# Patient Record
Sex: FEMALE | Race: WHITE | NOT HISPANIC OR LATINO | Employment: OTHER | URBAN - METROPOLITAN AREA
[De-identification: names, ages, dates, MRNs, and addresses within clinical notes are randomized per-mention and may not be internally consistent; named-entity substitution may affect disease eponyms.]

---

## 2017-06-28 DIAGNOSIS — E78.2 MIXED HYPERLIPIDEMIA: ICD-10-CM

## 2017-06-28 RX ORDER — SIMVASTATIN 40 MG
TABLET ORAL
Qty: 30 TABLET | Refills: 0 | COMMUNITY
Start: 2017-06-28 | End: 2022-05-13

## 2017-06-28 NOTE — TELEPHONE ENCOUNTER
Patient is due for a medication recheck for the following medication SIMVASTATION ,and meets the qualifications for a physician approved 30 day extension.    A #30 day refill has been called into the pharmacy listed.  Called into  [facility] in [place]     staff, per the refill protocol can you please call the patient and help assist in setting up a FASTING medication recheck.  Please attempt to reach the patient to schedule that appointment, and if you are unable to reach them, please forward back to the prescribing physician.      Telephone Information:   Mobile [phone]   Mobile [phone] (home) none (work)      Thank You  ROSY Mccabe ([facility])

## 2017-09-02 DIAGNOSIS — E78.2 MIXED HYPERLIPIDEMIA: ICD-10-CM

## 2017-09-02 RX ORDER — SIMVASTATIN 40 MG
TABLET ORAL
Qty: 30 TABLET | Refills: 0 | OUTPATIENT
Start: 2017-09-02

## 2017-09-02 NOTE — TELEPHONE ENCOUNTER
Refused Prescriptions:                       Disp   Refills    simvastatin (ZOCOR) 40 MG tablet [Pharmacy*30 tab*0        Sig: TAKE 1 TABLET BY MOUTH DAILY  Refused By: NIDIA HUGGINS  Reason for Refusal: Patient no longer under provider care    See refill under 6-  No longer coming to this clinic   Mery  [phone] (home)

## 2022-05-13 ENCOUNTER — HOSPITAL ENCOUNTER (OUTPATIENT)
Facility: CLINIC | Age: 77
Setting detail: OBSERVATION
Discharge: PSYCHIATRIC OR SUBSTANCE USE FACILITY | End: 2022-05-16
Attending: PHYSICIAN ASSISTANT | Admitting: PHYSICIAN ASSISTANT
Payer: COMMERCIAL

## 2022-05-13 DIAGNOSIS — R45.851 SUICIDAL IDEATION: ICD-10-CM

## 2022-05-13 DIAGNOSIS — F33.2 MAJOR DEPRESSIVE DISORDER, RECURRENT EPISODE, SEVERE WITH ANXIOUS DISTRESS (H): ICD-10-CM

## 2022-05-13 LAB — SARS-COV-2 RNA RESP QL NAA+PROBE: NEGATIVE

## 2022-05-13 PROCEDURE — U0005 INFEC AGEN DETEC AMPLI PROBE: HCPCS | Performed by: PHYSICIAN ASSISTANT

## 2022-05-13 PROCEDURE — 99285 EMERGENCY DEPT VISIT HI MDM: CPT | Mod: 25

## 2022-05-13 PROCEDURE — C9803 HOPD COVID-19 SPEC COLLECT: HCPCS

## 2022-05-13 PROCEDURE — G0378 HOSPITAL OBSERVATION PER HR: HCPCS

## 2022-05-13 PROCEDURE — 250N000013 HC RX MED GY IP 250 OP 250 PS 637: Performed by: NURSE PRACTITIONER

## 2022-05-13 PROCEDURE — 90791 PSYCH DIAGNOSTIC EVALUATION: CPT

## 2022-05-13 PROCEDURE — 99220 PR INITIAL OBSERVATION CARE,LEVEL III: CPT | Performed by: NURSE PRACTITIONER

## 2022-05-13 RX ORDER — VENLAFAXINE HYDROCHLORIDE 37.5 MG/1
37.5 CAPSULE, EXTENDED RELEASE ORAL
Status: DISCONTINUED | OUTPATIENT
Start: 2022-05-14 | End: 2022-05-16 | Stop reason: HOSPADM

## 2022-05-13 RX ORDER — LORAZEPAM 0.5 MG/1
0.5 TABLET ORAL
Status: DISCONTINUED | OUTPATIENT
Start: 2022-05-13 | End: 2022-05-16 | Stop reason: HOSPADM

## 2022-05-13 RX ORDER — LANOLIN ALCOHOL/MO/W.PET/CERES
3 CREAM (GRAM) TOPICAL AT BEDTIME
Status: DISCONTINUED | OUTPATIENT
Start: 2022-05-13 | End: 2022-05-16 | Stop reason: HOSPADM

## 2022-05-13 RX ORDER — BUPROPION HYDROCHLORIDE 150 MG/1
150 TABLET ORAL EVERY MORNING
COMMUNITY
End: 2022-05-13

## 2022-05-13 RX ORDER — MIRTAZAPINE 15 MG/1
15 TABLET, FILM COATED ORAL AT BEDTIME
Status: DISCONTINUED | OUTPATIENT
Start: 2022-05-13 | End: 2022-05-16 | Stop reason: HOSPADM

## 2022-05-13 RX ORDER — DULOXETIN HYDROCHLORIDE 60 MG/1
60 CAPSULE, DELAYED RELEASE ORAL DAILY
Status: DISCONTINUED | OUTPATIENT
Start: 2022-05-14 | End: 2022-05-13

## 2022-05-13 RX ORDER — DULOXETIN HYDROCHLORIDE 20 MG/1
40 CAPSULE, DELAYED RELEASE ORAL DAILY
Status: COMPLETED | OUTPATIENT
Start: 2022-05-14 | End: 2022-05-16

## 2022-05-13 RX ADMIN — LORAZEPAM 0.5 MG: 0.5 TABLET ORAL at 23:59

## 2022-05-13 RX ADMIN — MIRTAZAPINE 15 MG: 15 TABLET, FILM COATED ORAL at 21:32

## 2022-05-13 RX ADMIN — MELATONIN TAB 3 MG 3 MG: 3 TAB at 21:32

## 2022-05-13 ASSESSMENT — ENCOUNTER SYMPTOMS
CHEST TIGHTNESS: 0
ABDOMINAL PAIN: 0
DIARRHEA: 0
SHORTNESS OF BREATH: 0
VOMITING: 0
COUGH: 0
NAUSEA: 0
FEVER: 0

## 2022-05-13 NOTE — ED PROVIDER NOTES
History     Chief Complaint:  Suicidal       HPI   Lorena Hayden is a 76 year old female with a hx of anxiety and depression, presents to the emergency room today for evaluation of wanting to kill herself since getting home from an inpatient psych unit at [facility] in [place].  She plans to overdose on pills.  She denies any overdose in the past several days, drugs, alcohol, or other.  She denies any physical complaints such as chest pain, shortness of breath, cough or other.  She is accompanied by her .    ROS:  Review of Systems   Constitutional: Negative for fever.   Respiratory: Negative for cough, chest tightness and shortness of breath.    Cardiovascular: Negative for chest pain.   Gastrointestinal: Negative for abdominal pain, diarrhea, nausea and vomiting.   Psychiatric/Behavioral: Positive for suicidal ideas.      All other systems reviewed and are negative.    Allergies:  Horse Allergy     Medications:    acetaminophen (TYLENOL) 500 MG tablet  calcium-vitamin D (CALTRATE) 600-400 MG-UNIT per tablet  chlorhexidine (PERIDEX) 0.12 % solution  Cholecalciferol (VITAMIN D-3) 5000 UNITS TABS  DULoxetine (CYMBALTA) 60 MG capsule  Fish Oil-Cholecalciferol (OMEGA-3 FISH OIL/VITAMIN D3) 6194-8944 MG-UNIT CAPS  MELATONIN  MULTIVITAMINS TABS   OR  simvastatin (ZOCOR) 40 MG tablet        Past Medical History:    Past Medical History:   Diagnosis Date     Allergic rhinitis, cause unspecified      Anxiety      Depressive disorder      Patient Active Problem List   Diagnosis     Allergic rhinitis     Symptomatic menopausal or female climacteric states     Mixed hyperlipidemia     Anxiety state     Insomnia     Anal fissure     Major depressive disorder, recurrent episode, moderate (H)     Irritable bowel syndrome     ACP (advance care planning)     Health Care Home     Tinnitus     Acute lower gastrointestinal hemorrhage     Abdominal pain        Past Surgical History:    Past Surgical History:   Procedure  "Laterality Date     C IMPLANT HALLUX  3/2006 &     Yesi STEELE MAMMOGRAM, SCREENING  2011    Dr arredondo     COLONOSCOPY  2014    Mirian Orozco     EXTRACAPSULAR CATARACT EXTRATION WITH INTRAOCULAR LENS IMPLANT  2011     HC DILATION/CURETTAGE DIAG/THER NON OB  2005    D & C     HCL PAP SMEAR  2011    Dr Arredondo     ZZC  DELIVERY ONLY       ZZC LIGATE FALLOPIAN TUBE      Tubal Ligation     ZZHC COLONOSCOPY THRU STOMA W BIOPSY/CAUTERY TUMOR/POLYP/LESION  / /2011    benign polyp        Family History:    family history includes Cerebrovascular Disease in her father; Psychotic Disorder in her mother.    Social History:   reports that she has never smoked. She has never used smokeless tobacco. She reports current alcohol use of about 0.8 standard drinks of alcohol per week. She reports that she does not use drugs.  PCP: No primary care provider on file.     Physical Exam     Patient Vitals for the past 24 hrs:   BP Temp Temp src Pulse Resp SpO2 Height Weight   22 1458 137/78 98  F (36.7  C) Temporal 86 16 99 % 1.626 m (5' 4\") 64.4 kg (142 lb)        Physical Exam  General: Well appearing, pleasant female, resting on exam bed  HEENT: No evidence of trauma.  Conjunctive are clear. Neck range of motion intact.  Nose and throat clear.  Respiratory: Good effort  Cardiovascular: Good distal perfusion  Gastrointestinal: Nondistended  Musculoskeletal: Atraumatic  Skin: Exposed skin clear.  Neurologic: Alert.  Psych:  Patient is cooperative, with normal affect.      Emergency Department Course   ECG:  none    Imaging:  No orders to display        Laboratory:  Labs Ordered and Resulted from Time of ED Arrival to Time of ED Departure   COVID-19 VIRUS (CORONAVIRUS) BY PCR - Normal       Result Value    SARS CoV2 PCR Negative          Interventions:  Medications - No data to display     Impression & Plan      Medical Decision Making:  Lorena Hayden is a 76 year old female with a hx of " anxiety and depression, recent hospitalization, presents emergency room today for evaluation of suicidal ideation with a plan.  See HPI.  Her vitals are unremarkable.  She has no physical complaints.  I feel that she is safe for further evaluation by the [facility] team.  She is accompanied by her  who is watching her in the room.  Stable at time of transfer to the unit.    Diagnosis:    ICD-10-CM    1. Suicidal ideation  R45.851         Discharge Medications:  New Prescriptions    No medications on file        5/13/2022   Ankur Irene PA-C Cyr, Matthew R, PA-C  05/13/22 1556

## 2022-05-13 NOTE — PROGRESS NOTES
76 year old female received from ED for depression and suicidal ideation with a plan to overdose on medication. Pt reports she has felt depressed since the end of February when she thinks her medications stopped working. Pt reports she feels overwhelmed at home and can't keep up with her house chores. She reports having a hard time getting up in the morning. Pt states she feels hopeless about her medications not working. She states she has tried various medications and they do not work for her. She reports having recently been at [facility] in [place] for a week where they added Wellbutrin to her medications. Pt reports having fleeting suicidal ideation with a plan to overdose on medications she has at home. Pt reports feeling scared of being at home due to access to medications. Pt denies any SA in the past. Pt reports she won't see a therapist until June.       Nursing and risk assessments completed.  Assessments reviewed with LMHP and physician. Video monitoring in progress, patient informed.  Admission information reviewed with patient. Patient given a tour of [facility] and instructions on using the facility. Questions regarding [facility] addressed. Pt search completed and belongings inventoried.

## 2022-05-13 NOTE — CONSULTS
5/13/2022  Lorena Hayden 1945     [facility] Crisis Assessment    Patient was assessed: in person  Patient location: [facility]  Was a release of information signed: Yes. Providers included on the release: Brigida Patricio MD [facility]    Referral Data and Chief Complaint  Lorena is a 76 year old who uses she.her pronouns. Patient presented to the ED with family/friends and was referred to the ED by family/friends. Patient is presenting to the ED for the following concerns: depression with suicidal ideation.      Informed Consent and Assessment Methods    Patient is her own guardian. Writer met with patient and explained the crisis assessment process, including applicable information disclosures and limits to confidentiality, assessed understanding of the process, and obtained consent to proceed with the assessment. Patient was observed to be able to participate in the assessment as evidenced by egngaged in interview. . Assessment methods included conducting a formal interview with patient, review of medical records, collaboration with medical staff, and obtaining relevant collateral information from family and community providers when available.    Narrative Summary of Presenting Problem and Current Functioning  What led to the patient presenting for crisis services, factors that make the crisis life threatening or complex, stressors, how is this disrupting the patient's life, and how current functioning is in comparison to baseline. How is patient presenting during the assessment.     Lorena is a 76 year old female was brought to [facility] by her  Cole with chief complaint of depression with suicidal ideation with thoughts of wanting to overdose on her medications.  The patient reports she has had suicidal thoughts over the past 1+ weeks.  She was recent hospitalized in late  April at [facility] after presenting to a local ED for evaluation, but there were no beds in the [facility].    The patient's medications are being managed through her PCP for which she saw this provider this past Wednesday for which no medications could be adjusted for which the patient felt hopeless and has led to her symptoms being worse over the past few days.  She is currently not seeing a therapist at this time.  She does identify her main stressor is feeling lonely.  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   When she is feeling better, she describes herself as having tons of energy, joyful, very social in nature.      History of the Crisis  Duration of the current crisis, coping skills attempted to reduce the crisis, community resources used, and past presentations.    Kandance had her first depressive episode 10 years ago where she was hospitalized at [facility], was started on medications and given ECT.  She reports her mother completed suicide and had significant depression.  Her psychiatrist  around this time as well and her medications have been managed through PCP ever since.   She is currently not seeing a therapist.  She has no hx of IOP, but appears she could benefit from this as she struggles with daily structure.      Collateral Information  The following information was received from Cole Hayden whose relationship to the patient is . Information was obtained via phone. Their phone number is [phone] and they last had contact with patient on today.    What happened today: Reports the patient has been struggling since she left her inpatient hospitalization at [facility].  States today, their daughter in law came to the house today and recommended for patient come for help.  States there are 2 guns that are in locked glass cases a shot gun and a hand gun for which he is willing to have them removed from the house.  He states patient has not talked about wanting to harm herself with the guns, but had been thinking about removing them before having the conversation with this  provider.  States patient has never harmed herself in the past.  States at home, she is loved but again comments on struggling with loneliness.     What is different about patient's functioning: When she is feeling better, she is very social and active.     Concern about alcohol/drug use: No    What do you think the patient needs: Stay at [facility]    [facility] patient made comments about wanting to kill themselves/others:  Yes Pt made comments of wanting to harm herself by overdose    If d/c is recommended, can they take part in safety/aftercare planning: Yes Is willing to be part of her care plan    Other information:         Risk Assessment    Risk of Harm to Self     ESS-6  1.a. Over the past 2 weeks, have you had thoughts of killing yourself? Yes  1.b. Have you ever attempted to kill yourself and, if yes, when did this last happen? No   2. Recent or current suicide plan? Yes thoughts of wanting to overdose on her medications   3. Recent or current intent to act on ideation? Yes  4. Lifetime psychiatric hospitalization? Yes  5. Pattern of excessive substance use? No  6. Current irritability, agitation, or aggression? No  Scoring note: BOTH 1a and 1b must be yes for it to score 1 point, if both are not yes it is zero. All others are 1 point per number. If all questions 1a/1b - 6 are no, risk is negligible. If one of 1a/1b is yes, then risk is mild. If either question 2 or 3, but not both, is yes, then risk is automatically moderate regardless of total score. If both 2 and 3 are yes, risk is automatically high regardless of total score.     Score: 3, high risk    The patient has the following risk factors for suicide: depressive symptoms, family member or friend completed suicide and isolation    Is the patient experiencing current suicidal ideation: Yes. Plan: overodse Intent rates intent on wanting to act on these thoughts 7/10    Is the patient engaging in preparatory suicide behaviors (formulating how to act on plan,  giving away possessions, saying goodbye, displaying dramatic behavior changes, etc)? No    Does the patient have access to firearms or other lethal means? yes, lethal means counseling was provided and the following plan for risk mitigation was discussed:  is working on getting guns removed from house. .     The patient has the following protective factors: social support, voluntarily seeking mental health support, displays insight, expresses desire to engage in treatment, sense of obligation to people/pets and safe/stable housing    Support system information: , children    Patient strengths: Is artistic    Does the patient engage in non-suicidal self-injurious behavior (NSSI/SIB)? no    Is the patient vulnerable to sexual exploitation?  No    Is the patient experiencing abuse or neglect? no    Is the patient a vulnerable adult? No      Risk of Harm to Others  The patient has the following risk factors of harm to others: no risk factors identified    Does the patient have thoughts of harming others? No    Is the patient engaging in sexually inappropriate behavior?  no       Current Substance Abuse    Is there recent substance abuse? no    Was a urine drug screen or blood alcohol level obtained: No    CAGE AID  Have you felt you ought to cut down on your drinking or drug use?  No  Have people annoyed you by criticizing your drinking or drug use? No  Have you felt bad or guilty about your drinking or drug use? No  Have you ever had a drink or used drugs first thing in the morning to steady your nerves or to get rid of a hangover? No  Score: 0/4       Current Symptoms/Concerns    Symptoms  Attention, hyperactivity, and impulsivity symptoms present: No    Anxiety symptoms present: Yes: Generalized Symptoms: Cognitive anxiety - feelings of doom, racing thoughts, difficulty concentrating , Excessive worry, Pacing, Physiological anxiety - sweating, flushing, shaking, shortness of breath, or racing heart and  Somatic symptoms - abdominal pain, headache, or tension      Appetite symptoms present: Yes: Loss of Appetite     Behavioral difficulties present: No     Cognitive impairment symptoms present: No    Depressive symptoms present: Yes Appetite change/weight change , Crying or feels like crying, Depressed mood, Feelings of helplessness , Feelings of hopelessness , Feelings of worthlessness , Impaired concentration, Impaired decision making , Isolative , Low self esteem , Sleep disturbance  and Thoughts of suicide/death      Eating disorder symptoms present: No    Learning disabilities, cognitive challenges, and/or developmental disorder symptoms present: No      Manic/hypomanic symptoms present: No    Personality and interpersonal functioning difficulties present : No    Psychosis symptoms present: No      Sleep difficulties present: Yes: Difficulty falling asleep     Substance abuse disorder symptoms present: No     Trauma and stressor related symptoms present: No       Mental Status Exam   Affect: Labile   Appearance: Appropriate    Attention Span/Concentration: Attentive?    Eye Contact: Variable   Fund of Knowledge: Appropriate    Language /Speech Content: Fluent   Language /Speech Volume: Soft    Language /Speech Rate/Productions: Normal    Recent Memory: Intact   Remote Memory: Intact   Mood: Anxious, Depressed and Sad    Orientation to Person: Yes    Orientation to Place: Yes   Orientation to Time of Day: Yes    Orientation to Date: Yes    Situation (Do they understand why they are here?): Yes    Psychomotor Behavior: Underactive    Thought Content: Suicidal   Thought Form: Obsessive/Perseverative       Mental Health and Substance Abuse History    History  Current and historical diagnoses or mental health concerns: Depression    Prior  services (inpatient, programmatic care, outpatient, etc) : Yes Hx of inpatient 10 years ago at [facility].  April 2022 [facility]    Has the patient used [facility] crisis team  services before?: No    History of substance abuse: No    Prior RYLEY services (inpatient, programmatic care, detox, outpatient, etc) : No    History of commitment: No    Family history of MH/RYLEY: Yes Mother with depression who completed suicide    Trauma history: No    Medication  Psychotropic medications: Yes. Pt is currently taking Cymbalta 90 mg and Wellbutrin  mg . Medication compliant: Yes. Recent medication changes: No    Current Care Team  Primary Care Provider: Yes. Name: Brigida Patricio MD. Location: [facility]. Date of last visit: unknown. Frequency: prn. Perceived helpfulness: yes.    Psychiatrist: No    Therapist: No    : No    CTSS or ARMHS: No    ACT Team: No    Other: No    Biopsychosocial Information    Socioeconomic Information  Current living situation: Lives with .     Employment/income source: Retired     Relevant legal issues: Denies    Cultural, Catholic, or spiritual influences on mental health care: no    Is the patient active in the  or a : No      Relevant Medical Concerns   Patient identifies concerns with completing ADLs? No     Patient can ambulate independently? Yes     Other medical concerns? No     History of concussion or TBI? No        Diagnosis    296.33 (F33.2) Major Depressive Disorder, Recurrent Episode, Severe _ and With anxious distress    300.02 (F41.1) Generalized Anxiety Disorder - primary     Therapeutic Intervention  The following therapeutic methodologies were employed when working with the patient: establishing rapport, active listening and assessing dimensions of crisis. Patient response to intervention: Engaged in intervention. .      Disposition  Recommended disposition: Other: Admit to [facility] under OBS status for safety and stabilization      Reviewed case and recommendations with attending provider. Attending Name: TESSA Leyva      Attending concurs with disposition: Yes      Patient concurs with disposition: Yes       Guardian concurs with disposition: NA     Final disposition: Other: Admitted to [facility] under OBS Status for safety and stabilization.     I      Clinical Substantiation of Recommendations   Rationale with supporting factors for disposition and diagnosis.     Lorena is a 76 year old female who present today with worsening depression and anxiety along with suicidal ideation.  Patient is being admitted to [facility] under OBS status for safety and stabilization.          Assessment Details  Patient interview started at: 1700  and completed at: 1730.    Total duration spent on the patient case in minutes: 1.0 hrs     CPT code(s) utilized: 94832 - Psychotherapy for Crisis - 60 (30-74*) min         CAL Fofana

## 2022-05-13 NOTE — ED TRIAGE NOTES
Pt recently left inpatient behavioral health admission, reports not feeling suicidal until getting home. Pt doesn't meet with therapist until June. Denies wanting to act on plan as she doesn't want to die.     Triage Assessment     Row Name 05/13/22 7603       Triage Assessment (Adult)    Airway WDL WDL       Respiratory WDL    Respiratory WDL --  Diana SOB       Skin Circulation/Temperature WDL    Skin Circulation/Temperature WDL WDL       Cardiac WDL    Cardiac WDL --  Denies CP       Peripheral/Neurovascular WDL    Peripheral Neurovascular WDL WDL       Cognitive/Neuro/Behavioral WDL    Cognitive/Neuro/Behavioral WDL WDL               +racing thoughts

## 2022-05-14 ENCOUNTER — TELEPHONE (OUTPATIENT)
Dept: BEHAVIORAL HEALTH | Facility: CLINIC | Age: 77
End: 2022-05-14
Payer: COMMERCIAL

## 2022-05-14 LAB
ALBUMIN SERPL-MCNC: 3.3 G/DL (ref 3.4–5)
ALBUMIN UR-MCNC: NEGATIVE MG/DL
ALP SERPL-CCNC: 87 U/L (ref 40–150)
ALT SERPL W P-5'-P-CCNC: 24 U/L (ref 0–50)
AMPHETAMINES UR QL SCN: NORMAL
ANION GAP SERPL CALCULATED.3IONS-SCNC: 5 MMOL/L (ref 3–14)
APPEARANCE UR: ABNORMAL
AST SERPL W P-5'-P-CCNC: 21 U/L (ref 0–45)
BARBITURATES UR QL: NORMAL
BASOPHILS # BLD AUTO: 0 10E3/UL (ref 0–0.2)
BASOPHILS NFR BLD AUTO: 0 %
BENZODIAZ UR QL: NORMAL
BILIRUB SERPL-MCNC: 0.6 MG/DL (ref 0.2–1.3)
BILIRUB UR QL STRIP: NEGATIVE
BUN SERPL-MCNC: 13 MG/DL (ref 7–30)
CALCIUM SERPL-MCNC: 8.9 MG/DL (ref 8.5–10.1)
CANNABINOIDS UR QL SCN: NORMAL
CHLORIDE BLD-SCNC: 106 MMOL/L (ref 94–109)
CO2 SERPL-SCNC: 29 MMOL/L (ref 20–32)
COCAINE UR QL: NORMAL
COLOR UR AUTO: ABNORMAL
CREAT SERPL-MCNC: 0.65 MG/DL (ref 0.52–1.04)
EOSINOPHIL # BLD AUTO: 0.2 10E3/UL (ref 0–0.7)
EOSINOPHIL NFR BLD AUTO: 4 %
ERYTHROCYTE [DISTWIDTH] IN BLOOD BY AUTOMATED COUNT: 13.2 % (ref 10–15)
GFR SERPL CREATININE-BSD FRML MDRD: >90 ML/MIN/1.73M2
GLUCOSE BLD-MCNC: 133 MG/DL (ref 70–99)
GLUCOSE UR STRIP-MCNC: NEGATIVE MG/DL
HCT VFR BLD AUTO: 47.4 % (ref 35–47)
HGB BLD-MCNC: 15.4 G/DL (ref 11.7–15.7)
HGB UR QL STRIP: NEGATIVE
HYALINE CASTS: 1 /LPF
IMM GRANULOCYTES # BLD: 0 10E3/UL
IMM GRANULOCYTES NFR BLD: 0 %
KETONES UR STRIP-MCNC: NEGATIVE MG/DL
LEUKOCYTE ESTERASE UR QL STRIP: ABNORMAL
LYMPHOCYTES # BLD AUTO: 0.8 10E3/UL (ref 0.8–5.3)
LYMPHOCYTES NFR BLD AUTO: 14 %
MCH RBC QN AUTO: 30.9 PG (ref 26.5–33)
MCHC RBC AUTO-ENTMCNC: 32.5 G/DL (ref 31.5–36.5)
MCV RBC AUTO: 95 FL (ref 78–100)
MONOCYTES # BLD AUTO: 0.4 10E3/UL (ref 0–1.3)
MONOCYTES NFR BLD AUTO: 8 %
MUCOUS THREADS #/AREA URNS LPF: PRESENT /LPF
NEUTROPHILS # BLD AUTO: 4 10E3/UL (ref 1.6–8.3)
NEUTROPHILS NFR BLD AUTO: 74 %
NITRATE UR QL: NEGATIVE
NRBC # BLD AUTO: 0 10E3/UL
NRBC BLD AUTO-RTO: 0 /100
OPIATES UR QL SCN: NORMAL
PCP UR QL SCN: NORMAL
PH UR STRIP: 5.5 [PH] (ref 5–7)
PLATELET # BLD AUTO: 286 10E3/UL (ref 150–450)
POTASSIUM BLD-SCNC: 3.7 MMOL/L (ref 3.4–5.3)
PROT SERPL-MCNC: 7.2 G/DL (ref 6.8–8.8)
RBC # BLD AUTO: 4.98 10E6/UL (ref 3.8–5.2)
RBC URINE: 0 /HPF
SODIUM SERPL-SCNC: 140 MMOL/L (ref 133–144)
SP GR UR STRIP: 1.03 (ref 1–1.03)
UROBILINOGEN UR STRIP-MCNC: NORMAL MG/DL
WBC # BLD AUTO: 5.4 10E3/UL (ref 4–11)
WBC URINE: 44 /HPF

## 2022-05-14 PROCEDURE — 250N000013 HC RX MED GY IP 250 OP 250 PS 637: Performed by: NURSE PRACTITIONER

## 2022-05-14 PROCEDURE — G0378 HOSPITAL OBSERVATION PER HR: HCPCS

## 2022-05-14 PROCEDURE — 85025 COMPLETE CBC W/AUTO DIFF WBC: CPT | Performed by: NURSE PRACTITIONER

## 2022-05-14 PROCEDURE — 81001 URINALYSIS AUTO W/SCOPE: CPT | Performed by: NURSE PRACTITIONER

## 2022-05-14 PROCEDURE — 36415 COLL VENOUS BLD VENIPUNCTURE: CPT | Performed by: NURSE PRACTITIONER

## 2022-05-14 PROCEDURE — 99217 PR OBSERVATION CARE DISCHARGE: CPT | Performed by: NURSE PRACTITIONER

## 2022-05-14 PROCEDURE — 80053 COMPREHEN METABOLIC PANEL: CPT | Performed by: NURSE PRACTITIONER

## 2022-05-14 PROCEDURE — 87086 URINE CULTURE/COLONY COUNT: CPT | Performed by: NURSE PRACTITIONER

## 2022-05-14 PROCEDURE — 80307 DRUG TEST PRSMV CHEM ANLYZR: CPT | Performed by: NURSE PRACTITIONER

## 2022-05-14 RX ORDER — LORAZEPAM 0.5 MG/1
0.5 TABLET ORAL 2 TIMES DAILY PRN
Status: DISCONTINUED | OUTPATIENT
Start: 2022-05-14 | End: 2022-05-16 | Stop reason: HOSPADM

## 2022-05-14 RX ORDER — TIMOLOL MALEATE 5 MG/ML
1 SOLUTION/ DROPS OPHTHALMIC EVERY MORNING
COMMUNITY

## 2022-05-14 RX ORDER — TIMOLOL MALEATE 5 MG/ML
1 SOLUTION/ DROPS OPHTHALMIC DAILY
Status: DISCONTINUED | OUTPATIENT
Start: 2022-05-14 | End: 2022-05-16 | Stop reason: HOSPADM

## 2022-05-14 RX ADMIN — MIRTAZAPINE 15 MG: 15 TABLET, FILM COATED ORAL at 21:58

## 2022-05-14 RX ADMIN — MELATONIN TAB 3 MG 3 MG: 3 TAB at 21:58

## 2022-05-14 RX ADMIN — LORAZEPAM 0.5 MG: 0.5 TABLET ORAL at 12:55

## 2022-05-14 RX ADMIN — LORAZEPAM 0.5 MG: 0.5 TABLET ORAL at 23:44

## 2022-05-14 RX ADMIN — DULOXETINE 40 MG: 20 CAPSULE, DELAYED RELEASE ORAL at 09:40

## 2022-05-14 RX ADMIN — VENLAFAXINE HYDROCHLORIDE 37.5 MG: 37.5 CAPSULE, EXTENDED RELEASE ORAL at 09:40

## 2022-05-14 RX ADMIN — TIMOLOL MALEATE 1 DROP: 5 SOLUTION/ DROPS OPHTHALMIC at 13:28

## 2022-05-14 NOTE — SAFE
Lorena C Jackelyn  May 14, 2022  SAFE Note    Critical Safety Issues: Pt presents today with worsening symptoms of depression and anxiety.  Reporting suicidal ideation with thoughts of wanting to overdose on her medications.  She was recently hospitalized inpatient [facility] in April.  First psych admission 10 years ago.       Current Suicidal Ideation/Self-Injurious Concerns/Methods: Ingestion her medications      Current or Historical Inappropriate Sexual Behavior: No      Current or Historical Aggression/Homicidal Ideation: None - N/A      Triggers: None    Guardianship Status: [facility] Guardian: is her own guardian.     Updated care team: Yes: RN and APRN    For additional details see full [facility] assessment.       CAL Fofana

## 2022-05-14 NOTE — ED NOTES
Pt reports she is doing well today she just feels bored. She denies any SI, HI or hallucinations. Pt has been sitting on the recliner watching tv or coloring. She is pleasant and polite when interacting with staff and is social with peers.

## 2022-05-14 NOTE — ED NOTES
Pt has been awake since she woke up and reported that she did not get enough last night due fire alarm test going off last night. Pt got so anxious this morning due to some violent and agitated patients that ended up being restrained and taking away from the unit. Pt request a ose of ativan and her eye drops from home were included on her meds and pharmacist requested for a sticker. Pt will be going IP which might be more calming for her with activities and Pt is currently on IP list.

## 2022-05-14 NOTE — ED NOTES
Patient reports she had fallen asleep very briefly but then woke suddenly and was wide awake and feeling anxious. She denies any specific event that woke her. She is not experiencing racing thoughts, just a general sense of unease. She requests and received 0.5 mg Ativan and will try to return to sleep. Currently resting in recliner with eyes closed. VSS.

## 2022-05-14 NOTE — ED NOTES
Pt is calm and A/O x3. Pt at start of shift appears to be sleeping with eys closed until a peer became so loud, agitated. Pt ate her break fast and was med compliant.

## 2022-05-14 NOTE — TELEPHONE ENCOUNTER
Pt presents at  [facility] with SI.  B: Worsening depression, hopeless, decrease functioning, decrease sleep. No real stressors or precipitator. Pt states lonely, unable to cope. SI with plans to overdose on medications.Hx ECT.  Recent inpt in [facility].   A: Depression. No medical conditions, cooperative, vol.  R:  Patient cleared and ready for behavioral bed placement: Yes

## 2022-05-14 NOTE — ED PROVIDER NOTES
EmPATH Unit - Psychiatric Observation Discharge Summary  [facility] Emergency Department  Discharge Date: 5/14/2022    Lorena Hayden MRN: [id number]   Age: 76 year old YOB: 1945     Brief HPI & Initial ED Course     Chief Complaint   Patient presents with     Suicidal     HPI  Lorena Hayden is a 76 year old female with history notable for major depressive disorder and anxiety who presents to the ED with suicidal ideation with a plan to overdose on her medications.  Once medicaly cleared, she was transferred to [facility] for psychiatric assessment where she was evaluated by myself and placed on observation status while initiating medication changes to target her anxiety and depression. Wellbutrin  mg daily was discontinued and Remeron 15 mg nightly was started, while Duloxetine is being tapered down with the initiation of Venlafaxine. Please refer to yesterdays note for more detail on past treatment responses.    Patient is seen for follow up of anxiety and depression. She reports her anxiety is severe, unchanged from yesterday, and somewhat heightened due to witnessing two combative patients being restrained this morning.  She denies any adverse side effects from her medications. She felt like the Remeron was helpful for sleep until she was awoken by the sound of an alarm on the unit. She received a prn dose of ativan 0.5 mg after awakening and states it helped her fall back to sleep.  Her appetite is good.    She does not feel safe to return home today feeling she would become suicidal again. She is not finding the atmosphere therapeutic or engaging enough on the EmPATH unit to gain benefit from an additional day on observations status, thus she is seeking further treatment in the inpatient seeking, which appears prudent given the intensity of her symptoms and recent medication changes, which would benefit from further monitoring.          Physical Examination   BP: 135/85  Pulse: 84  Temp:  "97.3  F (36.3  C)  Resp: 16  Height: 162.6 cm (5' 4\")  Weight: 64.9 kg (143 lb)  SpO2: 98 %    Physical Exam  General: Appears stated age.   Neuro: Alert and fully oriented. Extremities appear to demonstrate normal strength on visual inspection.   Integumentary/Skin: no rash visualized, normal color    Psychiatric Examination   Appearance: awake, alert  Attitude:  cooperative  Eye Contact:  good  Mood:  anxious  Affect:  intensity is heightened  Speech:  clear, coherent  Psychomotor Behavior:  no evidence of tardive dyskinesia, dystonia, or tics and fidgeting  Thought Process:  logical, linear and goal oriented  Associations:  no loose associations  Thought Content:  no evidence of psychotic thought and passive suicidal ideation present  Insight:  good  Judgement:  intact  Oriented to:  time, person, and place  Attention Span and Concentration:  intact  Recent and Remote Memory:  intact  Language: able to name/identify objects without impairment  Fund of Knowledge: intact with awareness of current and past events    Results        Labs Ordered and Resulted from Time of ED Arrival to Time of ED Departure   COVID-19 VIRUS (CORONAVIRUS) BY PCR - Normal       Result Value    SARS CoV2 PCR Negative     COMPREHENSIVE METABOLIC PANEL   ROUTINE UA WITH MICROSCOPIC REFLEX TO CULTURE       Observation Course   The patient was found to have a psychiatric condition that would benefit from an observation stay in the emergency department for further psychiatric stabilization and/or coordination of a safe disposition. The plan upon observation admission included serial assessments of psychiatric condition, potential administration of medications if indicated, further disposition pending the patient's psychiatric course during the monitoring period.     Serial assessments of the patient's psychiatric condition were performed. Nursing notes were reviewed. During the observation period, the patient did not require medications for " agitation, and did not require restraints/seclusion for patient and/or provider safety.         Discharge Diagnoses:   Final diagnoses:   Suicidal ideation   Major depressive disorder, recurrent episode, severe with anxious distress (H)       Treatment Plan:  -Transfer to inpatient hospitalization for further mood stability and medication management.  -Currently taking:  Remeron 15 mg nightly (started 5/13/22)  Effexor 37.5 mg daily (first dose today, 5/14/22)  Tapering Cymbalta from 90 mg daily to 40 mg daily (fist 40 mg dose received today)  Ativan 0.5 mg TID PRN anxiety. She took a dose after being woke up from an alarm sounding and another dose after witnessing this morning two other patient's becoming combative with staff.  -resume PTA medications: melatonin 3 mg nightly for sleep and timolol maleate eye drops. Patient is using her own supply.  -Labs for transfer:  UA  Utox  CMP  CBC            --  VIRIDIANA Melton CNP  [facility] EMERGENCY DEPT  EmPATH Unit  5/14/2022      Alba Levya APRN CNP  05/14/22 [id number]

## 2022-05-14 NOTE — ED PROVIDER NOTES
[facility] Unit - Psychiatric Consultation  [facility] Emergency Department    Lorena Hayden MRN: [id number]   Age: 76 year old YOB: 1945     History     Chief Complaint   Patient presents with     Suicidal     HPI  Lorena Hayden is a 76 year old female with history notable for major depressive disorder and anxiety who presents to the ED with suicidal ideation with a plan to overdose on her medications.  Patient was evaluated by the ED provider, who medically cleared patient to transfer to [facility] for psychiatric assessment, this is reviewed along with all pertinent labs and tests performed.    Per chart review, patient has an extensive history of depression and anxiety documented over the past 15 years.  In 2011, she received outpatient ECT, which was briefly helpful. She has multiple past medication trials including: Ativan, Trazodone, Klonopin, Lexapro, Wellbutrin, Celexa, Seroquel, Zoloft, and Abilify. She has two past psychiatric hospitalizations. The first one 05/02/13-05/07/13 where she was treated for anxiety and depression, this is reviewed. Her symptoms have been best managed with Cymbalta 60 mg daily.  She was seen in [facility] ED 04/25/22 for suicidal ideation where she was transferred inpatient at [facility] in [place] for a week. At some point her Cymbalta was increased to 90 mg daily and Wellbutrin 150 mg daily was added during hospitalization.    On examination, patient states her depression has worsened since February 2022. She is unable to identify a specific trigger other then feeling lonely, overwhelmed and mentally exhausted daily. She states strategies she typically engages in to cope with her symptoms have not been working. She also notes she has no interest in doing activities she once enjoyed, like drawing, painting, and coloring.  She feels sad and hopeless. She does not feel the recent medication changes have improved her symptoms since being discharged from the  "hospital.  She became discouraged after a clinic visit with her PCP two days ago who recommended holding off on any further medication changes at this time.  Patient states she does not feel stable enough to wait at home for the medications to take effect.  She states she finds herself always \"waiting\" and is feeling suicidal in the interm.  She states her  has been supportive, yet has limitations to the times he is available for her.  She states mornings are the most difficult time of the day as she sleeps poorly and her  sleeps in and she is alone for almost 4 hours in the morning and feels helpless and doesn't know what to do with her time.  She is seeking psychiatric consultation for medication management and further crisis stabilization. She may seek inpatient hospitalization if her symptoms do not improve during her stay on EmPATH.    Past Medical History  Past Medical History:   Diagnosis Date     Allergic rhinitis, cause unspecified      Anxiety      Depressive disorder      Past Surgical History:   Procedure Laterality Date     C IMPLANT HALLUX  3/2006 &     Yesi STEELE MAMMOGRAM, SCREENING  2011    Dr arredondo     COLONOSCOPY  2014    Mirian Pam     EXTRACAPSULAR CATARACT EXTRATION WITH INTRAOCULAR LENS IMPLANT  2011     HC DILATION/CURETTAGE DIAG/THER NON OB  2005    D & C     HCL PAP SMEAR  2011    Dr Arredondo     ZC  DELIVERY ONLY       ZC LIGATE FALLOPIAN TUBE      Tubal Ligation     ZZ COLONOSCOPY THRU STOMA W BIOPSY/CAUTERY TUMOR/POLYP/LESION  / /2011    benign polyp     DULoxetine (CYMBALTA) 60 MG capsule      Allergies   Allergen Reactions     Horse Allergy      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 No additional pertinent items.       Review of Systems  A complete review of systems was performed with pertinent positives and negatives noted in the HPI, and all other systems negative.    Physical Examination   BP: 137/78  Pulse: 86  Temp: 98  F (36.7  C)  Resp: 16  Height: 162.6 cm (5' 4\")  Weight: 64.4 kg (142 lb)  SpO2: 99 %    Physical Exam  General: Appears stated age.   Neuro: Alert and fully oriented. Extremities appear to demonstrate normal strength on visual inspection.   Integumentary/Skin: no rash visualized, normal color    Psychiatric Examination   Appearance: awake, alert, cooperative, mild distress and neatly groomed  Attitude:  cooperative  Eye Contact:  good  Mood:  anxious, sad  and depressed  Affect:  mood congruent  Speech:  clear, coherent  Psychomotor Behavior:  no evidence of tardive dyskinesia, dystonia, or tics  Thought Process:  logical, linear and goal oriented  Associations:  no loose associations  Thought Content:  no evidence of psychotic thought and passive suicidal ideation present  Insight:  good  Judgement:  intact  Oriented to:  time, person, and place  Attention Span and Concentration:  intact  Recent and Remote Memory:  intact  Language: able to name/identify objects without impairment  Fund of Knowledge: intact with awareness of current and past events    ED Course        Labs Ordered and Resulted from Time of ED Arrival to Time of ED Departure   COVID-19 VIRUS (CORONAVIRUS) BY PCR - Normal       Result Value    SARS CoV2 PCR Negative         Assessments & Plan (with Medical Decision Making)   Patient presenting with worsening anxiety and depression in the context of MDD " "and HYACINTH further complicated by recent medication adjustments which patient perceives as ineffective.  She has been on Cymbalta for over 10 years, only recently increasing the dose from 60 mg to 90 mg daily once she noticed her depressive symptoms returning. This was unhelpful per patient. Her Cymbalta has also been augmented with Seroquel and Abilify in the past with poor tolerance.  Most recently, Wellbutrin was added, which patient has been on in the past with doses up to 300 mg daily, which was discontinued due to lack of response.  Patient describes her baseline as independent, cheerful and content.  She states it is her goal to feel this way again, believing Cymbalta was extremely helpful overcoming her last serious episode of depression, stating \"I noticed it working right away.\"      Nursing notes reviewed noting no acute issues.     I have reviewed the assessment completed by the [facility].     The Prescription Drug Monitoring Program (PDMP) database was accessed to verify accuracy of patients prescribed controlled substances.    Preliminary diagnosis:    ICD-10-CM    1. Suicidal ideation  R45.851    2. Major depressive disorder, recurrent episode, severe with anxious distress (H)  F33.2         Treatment Plan:  -Observation status for further crisis stabilization and medication adjustments.  -Will discontinue Wellbutrin 150 mg. I do not anticipate withdrawal to be an issue as it has been less than a month since she started it.  -Remeron 15 mg nightly to target depression and anxiety with the added benefit of sleep aide.  -Starting tomorrow, will taper off Cymbalta and start Effexor 37.5 mg daily for 7 days and then increase to 75 mg daily. Will schedule Cymbalta 40 mg daily for 3 doses, 20 mg daily for 3 doses then discontinue at which point she will be on Effexor 75 mg daily.  -Melatonin 3 mg for sleep per PTA medication.  -Ativan 0.5 mg at bedtime as needed will be available if patient is unable to fall asleep " after an hour of taking her HS medications or if she wakes up during the night and is unable to fall back to sleep.  -Medication education provided this visit includes, rationale for medication, importance of compliance, medication interactions, and common side effects. Patient agreeable.  -Reassess tomorrow. Anticipate patient could benefit from outpatient supports such as psychiatry, therapy and senior day treatment.  --  VIRIDIANA Melton CNP   [facility] EMERGENCY DEPT  EmPATH Unit  5/13/2022      Alba Leyva APRN CNP  05/13/22 2019

## 2022-05-14 NOTE — TREATMENT PLAN
EmPATH Treatment Plan    Client's Name: Lorena Hayden  YOB: 1945    DSM-5 Diagnoses: F33.3 Major Depressive Disorder recurrent severe.  F41.1 Generalized anxiety disorder    Psychosocial / Contextual Factors: Patient presented to the emPATH unit with the following concerns: Worsening symptoms of depression and anxiety with suicidal ideation with plan of wanting to overdose on her medications.     Anticipated number of sessions or this episode of care: 1-4    MeasurableTreatment Goal(s) related to diagnosis / functional impairment(s)    Goal 1: Goal: Stabilize the suicidal crisis?      Objective: Identify life factors/triggers that preceded the SI?      Patient will: express feelings related to SI in order to explore factors/triggers?      ?LMHP will: assist patient in becoming aware of life factors and triggers that may have been?      ?precursors to SI?      Objective: participate in therapy session around emotional issues resulting in suicidal thoughts?      ?Patient will: discuss feelings and emotional sources of stress, hopelessness?      ?LMHP will: encourage pt to express feelings and emotions?             Goal 2: Goal: Patient will increase awareness of depression symptoms and their impact on functioning and develop skills to reduce negative impact.      Objective #A       Patient will describe thoughts, feelings, and actions associated with depression.       Intervention(s)      LMHP will explore and process with patient how depression has impacted them.      Objective #B      Patient will increase depression coping skills.      Intervention(s)      LMHP will teach CBT skills and model their use.              Appearance:   Appropriate    Eye Contact:   Fair    Psychomotor Behavior: Normal    Attitude:   Cooperative    Orientation:   All   Speech    Rate / Production: Normal     Volume:  Normal    Mood:    Anxious  Depressed  Sad    Affect:    Labile    Thought Content:  Suicidal   Thought  Form:  Coherent  Logical    Insight:    Fair           PLAN:   Patient will board in emPATH until patient and treatment deem it appropriate to either discharge or admit to a higher level of care. Details:       Popeye March, [facility] HospitalSW                                                           ________

## 2022-05-15 PROCEDURE — G0378 HOSPITAL OBSERVATION PER HR: HCPCS

## 2022-05-15 PROCEDURE — 250N000013 HC RX MED GY IP 250 OP 250 PS 637: Performed by: NURSE PRACTITIONER

## 2022-05-15 RX ADMIN — MIRTAZAPINE 15 MG: 15 TABLET, FILM COATED ORAL at 21:50

## 2022-05-15 RX ADMIN — TIMOLOL MALEATE 1 DROP: 5 SOLUTION/ DROPS OPHTHALMIC at 10:11

## 2022-05-15 RX ADMIN — VENLAFAXINE HYDROCHLORIDE 37.5 MG: 37.5 CAPSULE, EXTENDED RELEASE ORAL at 10:12

## 2022-05-15 RX ADMIN — MELATONIN TAB 3 MG 3 MG: 3 TAB at 21:50

## 2022-05-15 RX ADMIN — DULOXETINE 40 MG: 20 CAPSULE, DELAYED RELEASE ORAL at 10:11

## 2022-05-15 RX ADMIN — LORAZEPAM 0.5 MG: 0.5 TABLET ORAL at 21:50

## 2022-05-15 RX ADMIN — LORAZEPAM 0.5 MG: 0.5 TABLET ORAL at 10:21

## 2022-05-15 NOTE — ED NOTES
Pt has been soundly sleeping in a sensory room after she was transferred there. No signs of distress noted. Will continue to monitor.

## 2022-05-15 NOTE — PROGRESS NOTES
RN introduced self to patient and explained that RN would be taking over as patient's nurse for the remainder of the shift. Patient verbalized understanding. Patient requested assistance with ordering dinner. RN assisted patient with ordering dinner. Patient denied need for anything else right now.

## 2022-05-15 NOTE — ED NOTES
Pt got up the chair and told this writer that she had a difficult time sleeping in her recliner. Pt agreed to move to sensory room for more comfortable sleep.

## 2022-05-15 NOTE — PROGRESS NOTES
"Pt tearful reporting feeling scared that nothing is going to help her depression and anxiety. Pt reports feeling \"stuck.\" She reports wanting to feel happy and calm again. She denies any SI, HI or hallucinations. Pt able to calm down with reassurance and encouragement from staff.   "

## 2022-05-15 NOTE — ED NOTES
Pt's  dropped some reading materials that have large print for patient. She was sleeping from 11:30 to 2 pm. We ordered lunch at 2 pm. still waiting to go inpatient.

## 2022-05-15 NOTE — ED NOTES
"Patient was sleeping in sensory room C. She did awaken when this writer opened the door. She needed to have some help getting up from the floor mat, but otherwise had a stable but slow gait. Her face has flat mask type and her hand s shake. She states this is because of anxiety. She rates her anxiety and depression at #10 and has fleeting thoughts of SI. Her Am meds given, food was ordered. Pt said she did not want to be here anymore and wants to go home. We processed this and she realized going home is not the answer as she needs help with medications adjustment to control her depression/anxiety better. Lorazepam given at 1021 for anxiety and this seemed to help her shaking At 1200 pt was coloring at a table and got up wanted to call her  but felt dizzy. BP taken was 119/79 and pulse 55. Pt was walked back to her chair and said \"why is this happening to me?\"  We did some visual imagery of remembering her past vacations with her family and some deep breathing. At 1213 she is resting.  "

## 2022-05-15 NOTE — TELEPHONE ENCOUNTER
R Pt in Empath unit awaiting placement    11pm bed search: No appropriate beds available in [place] or [facility]     Pt placed on work list until appropriate placement is available

## 2022-05-16 ENCOUNTER — HOSPITAL ENCOUNTER (INPATIENT)
Facility: CLINIC | Age: 77
LOS: 39 days | Discharge: HOME | DRG: 885 | End: 2022-06-24
Attending: PSYCHIATRY & NEUROLOGY | Admitting: PSYCHIATRY & NEUROLOGY
Payer: COMMERCIAL

## 2022-05-16 ENCOUNTER — TELEPHONE (OUTPATIENT)
Dept: BEHAVIORAL HEALTH | Facility: CLINIC | Age: 77
End: 2022-05-16

## 2022-05-16 VITALS
RESPIRATION RATE: 16 BRPM | TEMPERATURE: 98.4 F | HEART RATE: 77 BPM | DIASTOLIC BLOOD PRESSURE: 77 MMHG | WEIGHT: 143 LBS | OXYGEN SATURATION: 97 % | BODY MASS INDEX: 24.41 KG/M2 | SYSTOLIC BLOOD PRESSURE: 121 MMHG | HEIGHT: 64 IN

## 2022-05-16 DIAGNOSIS — F33.2 MAJOR DEPRESSIVE DISORDER, RECURRENT SEVERE WITHOUT PSYCHOTIC FEATURES (H): ICD-10-CM

## 2022-05-16 DIAGNOSIS — F33.2 MAJOR DEPRESSIVE DISORDER, RECURRENT EPISODE, SEVERE WITH ANXIOUS DISTRESS (H): ICD-10-CM

## 2022-05-16 DIAGNOSIS — F33.1 MAJOR DEPRESSIVE DISORDER, RECURRENT EPISODE, MODERATE (H): Primary | ICD-10-CM

## 2022-05-16 LAB — BACTERIA UR CULT: NORMAL

## 2022-05-16 PROCEDURE — 99222 1ST HOSP IP/OBS MODERATE 55: CPT | Mod: AI | Performed by: PSYCHIATRY & NEUROLOGY

## 2022-05-16 PROCEDURE — H2032 ACTIVITY THERAPY, PER 15 MIN: HCPCS

## 2022-05-16 PROCEDURE — 250N000009 HC RX 250: Performed by: PSYCHIATRY & NEUROLOGY

## 2022-05-16 PROCEDURE — 250N000013 HC RX MED GY IP 250 OP 250 PS 637: Performed by: NURSE PRACTITIONER

## 2022-05-16 PROCEDURE — 250N000013 HC RX MED GY IP 250 OP 250 PS 637: Performed by: PSYCHIATRY & NEUROLOGY

## 2022-05-16 PROCEDURE — 124N000002 HC R&B MH UMMC

## 2022-05-16 PROCEDURE — G0177 OPPS/PHP; TRAIN & EDUC SERV: HCPCS

## 2022-05-16 PROCEDURE — G0378 HOSPITAL OBSERVATION PER HR: HCPCS

## 2022-05-16 RX ORDER — OLANZAPINE 5 MG/1
5 TABLET ORAL 3 TIMES DAILY PRN
Status: DISCONTINUED | OUTPATIENT
Start: 2022-05-16 | End: 2022-06-24 | Stop reason: HOSPADM

## 2022-05-16 RX ORDER — AMOXICILLIN 250 MG
1 CAPSULE ORAL 2 TIMES DAILY PRN
Status: DISCONTINUED | OUTPATIENT
Start: 2022-05-16 | End: 2022-06-13

## 2022-05-16 RX ORDER — OLANZAPINE 10 MG/2ML
5 INJECTION, POWDER, FOR SOLUTION INTRAMUSCULAR 3 TIMES DAILY PRN
Status: DISCONTINUED | OUTPATIENT
Start: 2022-05-16 | End: 2022-06-24 | Stop reason: HOSPADM

## 2022-05-16 RX ORDER — MAGNESIUM HYDROXIDE/ALUMINUM HYDROXICE/SIMETHICONE 120; 1200; 1200 MG/30ML; MG/30ML; MG/30ML
30 SUSPENSION ORAL EVERY 4 HOURS PRN
Status: DISCONTINUED | OUTPATIENT
Start: 2022-05-16 | End: 2022-06-24 | Stop reason: HOSPADM

## 2022-05-16 RX ORDER — ACETAMINOPHEN 325 MG/1
650 TABLET ORAL EVERY 4 HOURS PRN
Status: DISCONTINUED | OUTPATIENT
Start: 2022-05-16 | End: 2022-06-10

## 2022-05-16 RX ORDER — TRAZODONE HYDROCHLORIDE 50 MG/1
50 TABLET, FILM COATED ORAL
Status: DISCONTINUED | OUTPATIENT
Start: 2022-05-16 | End: 2022-06-10

## 2022-05-16 RX ORDER — VENLAFAXINE HYDROCHLORIDE 37.5 MG/1
37.5 TABLET, EXTENDED RELEASE ORAL
Status: DISCONTINUED | OUTPATIENT
Start: 2022-05-17 | End: 2022-05-19

## 2022-05-16 RX ORDER — TIMOLOL MALEATE 5 MG/ML
1 SOLUTION/ DROPS OPHTHALMIC 2 TIMES DAILY
Status: DISCONTINUED | OUTPATIENT
Start: 2022-05-16 | End: 2022-05-17

## 2022-05-16 RX ORDER — HYDROXYZINE HYDROCHLORIDE 25 MG/1
25 TABLET, FILM COATED ORAL EVERY 4 HOURS PRN
Status: DISCONTINUED | OUTPATIENT
Start: 2022-05-16 | End: 2022-06-24 | Stop reason: HOSPADM

## 2022-05-16 RX ORDER — MIRTAZAPINE 7.5 MG/1
7.5 TABLET, FILM COATED ORAL AT BEDTIME
Status: DISCONTINUED | OUTPATIENT
Start: 2022-05-16 | End: 2022-05-17

## 2022-05-16 RX ORDER — DULOXETIN HYDROCHLORIDE 20 MG/1
40 CAPSULE, DELAYED RELEASE ORAL DAILY
Status: DISCONTINUED | OUTPATIENT
Start: 2022-05-17 | End: 2022-05-17

## 2022-05-16 RX ADMIN — DULOXETINE 40 MG: 20 CAPSULE, DELAYED RELEASE ORAL at 08:39

## 2022-05-16 RX ADMIN — VENLAFAXINE HYDROCHLORIDE 37.5 MG: 37.5 CAPSULE, EXTENDED RELEASE ORAL at 08:39

## 2022-05-16 RX ADMIN — HYDROXYZINE HYDROCHLORIDE 25 MG: 25 TABLET, FILM COATED ORAL at 19:04

## 2022-05-16 RX ADMIN — TRAZODONE HYDROCHLORIDE 50 MG: 50 TABLET ORAL at 22:44

## 2022-05-16 RX ADMIN — LORAZEPAM 0.5 MG: 0.5 TABLET ORAL at 09:39

## 2022-05-16 RX ADMIN — TRAZODONE HYDROCHLORIDE 50 MG: 50 TABLET ORAL at 21:11

## 2022-05-16 RX ADMIN — MIRTAZAPINE 7.5 MG: 7.5 TABLET, FILM COATED ORAL at 20:51

## 2022-05-16 RX ADMIN — TIMOLOL MALEATE 1 DROP: 5 SOLUTION/ DROPS OPHTHALMIC at 08:39

## 2022-05-16 ASSESSMENT — ACTIVITIES OF DAILY LIVING (ADL)
WEAR_GLASSES_OR_BLIND: YES
WALKING_OR_CLIMBING_STAIRS_DIFFICULTY: NO
HEARING_DIFFICULTY_OR_DEAF: YES
DIFFICULTY_COMMUNICATING: NO
ORAL_HYGIENE: INDEPENDENT
DESCRIBE_HEARING_LOSS: HEARING LOSS ON RIGHT SIDE;HEARING LOSS ON LEFT SIDE
FALL_HISTORY_WITHIN_LAST_SIX_MONTHS: NO
DOING_ERRANDS_INDEPENDENTLY_DIFFICULTY: NO
DRESSING/BATHING_DIFFICULTY: NO
LAUNDRY: UNABLE TO COMPLETE
WERE_AUXILIARY_AIDS_OFFERED?: NO
PATIENT'S_PREFERRED_MEANS_OF_COMMUNICATION: VERBAL
DRESS: INDEPENDENT
CONCENTRATING,_REMEMBERING_OR_MAKING_DECISIONS_DIFFICULTY: YES
HYGIENE/GROOMING: INDEPENDENT
THE_FOLLOWING_AIDS_WERE_PROVIDED;: PATIENT DECLINED OFFER OF AUXILIARY AIDS
DIFFICULTY_EATING/SWALLOWING: NO
HYGIENE/GROOMING: INDEPENDENT
DRESS: INDEPENDENT;SCRUBS (BEHAVIORAL HEALTH)
ORAL_HYGIENE: INDEPENDENT
TOILETING_ISSUES: NO
CHANGE_IN_FUNCTIONAL_STATUS_SINCE_ONSET_OF_CURRENT_ILLNESS/INJURY: NO
LAUNDRY: UNABLE TO COMPLETE

## 2022-05-16 NOTE — PROGRESS NOTES
Notified by intake that pt accepted to go to [place] [id number]. Empath to call for report around 0800 today. [phone].

## 2022-05-16 NOTE — PLAN OF CARE
"Assessment/Intervention/Current Symtoms and Care Coordination:  -Refer to psychosocial completed on 5/16/22 for assessment/social functioning  -Chart review  -Team meeting   -Writer met with pt to introduce self and explain role, pt said writer could call her \"Cayla\". Pt was working with psych associate to try to get headphones to work. She says she's feeling so bored here and writer told pt about different things she could try on the unit. Pt says she slept in a lazy boy chair last night and her left side is a little sore today. Pt told writer about her medications trials and became tearful when describing how trying them with no success/relief to her symptoms causes her to feel hopeless. She says the doctor talked to her about ECT and she didn't understand whether this was a recommendation or what the doctor thought about it. Writer asked if pt has tried therapy in the past and she says she hasn't but has an appointment scheduled for 6/2. She says it was hard to get this appointment. Pt says she tried to attend groups today. Writer explained there will be more groups this evening an encouraged pt to try them. Pt said she's \"scared\" when she's bored and is alone with her thoughts. Writer reviewed skills pt can try in these situations and encouraged pt to utilize staff/unit resources. Writer talked with pt about day treatment/55+ program as writer heard provider mention this to pt today. She reports interest, though she isn't comfortable with a computer. Pt says her  would be able to help her set it up. Writer will provide pt with hand out tomorrow 5/17. Pt said \"I just have to say this, shit\". Pt says she wants the tv channel to be changed to something other than the care channel and feels she needs more stimulation. Pt signed CARLOS MANUEL for her , son, and daughter-in-law. She expressed no further questions or concerns at this time.   Current Symptoms include the following: anxious, patient endorses poor " sleep and tearful  Precautions: SI    Discharge Plan or Goal:  Pending stabilization & development of a safe discharge plan.  Considerations include: Return home with outpatient providers    Barriers to Discharge:  Patient presents following SI with plan to overdose on medications in context of worsening depression and concern that medications are not improving symptoms. She reports feeling lonely and struggles to keep up with daily activities. Patient requires symptom stabilization, medication management, and increased support at time of discharge.     Referral Status:  Referrals TBD    Legal Status:  Patient is voluntary    Contacts:   - Cole Jackelyn (phone: [phone]) - CARLOS MANUEL obtained 5/16/22  Son - Kadeem Jackelyn (phone: [phone]) - CARLOS MANUEL obtained 5/16/22  Daughter-in-law - Shannon Jackelyn (phone: [phone]) - CARLOS MANUEL obtained 5/16/22    Upcoming Meetings/Important Dates:  N/A    Rationale for SIO/No Roommate Order:  Patient is not on SIO.  Patient has current roommate.

## 2022-05-16 NOTE — PLAN OF CARE
Occupational Therapy Group Note:     05/16/22 1500   Group Therapy Session   Group Attendance attended group session   Time Session Began 1315   Time Session Ended 1455   Total Time patient participated (minutes) 60   Total # Attendees 4-5   Group Type expressive therapy   Group Topic Covered coping skills/lifestyle management   Group Session Detail OT clinic   Patient Response/Contribution cooperative with task;organized   Patient Response Detail Pt actively participated in occupational therapy clinic to facilitate coping skill exploration, creative expression within personally meaningful activities, and clinical observation of social, cognitive, and kinesthetic performance skills. Pt response: Minimal assistance needed to initiate, gather materials, sequence, and adjust to workspace demands as needed. Demonstrated good focus, planning, and attention to detail for this moderately complex creative expression task. Able to ask for assistance and appeared comfortable interacting with peers and staff. Appeared to have difficulty hearing at times, asking writer to repeat self multiple times. Calm and pleasant on approach. Will continue to assess. Pt will be given self-assessment form, and OT staff will explain the purpose of including them in their treatment plan and offer options for meeting their needs. Initial assessment to be completed upon additional group participation.

## 2022-05-16 NOTE — PLAN OF CARE
05/16/22 1037   Patient Belongings   Did you bring any home meds/supplements to the hospital?  Yes   Disposition of meds  Other (see comment)   Patient Belongings locker   Patient Belongings Put in Hospital Secure Location (Security or Locker, etc.) cash/credit card;cell phone/electronics;clothing;glasses;medication(s);money (see comment);plastic bag;purse/wallet;shoes;wallet   Belongings Search Yes   Clothing Search Yes   Second Staff Buddhism     Pt brought the following items that went in her locker:  1 pair of grey, lace up shoes and white socks  1 pink jacket  3 skin lotion containers  2 magazines  1 hard covered book  1 white and grey cloth bag  1 pair of jeans  1 white t-shirt  1 black and pink cloth bag  1 blue and silver colored wallet  1 smart phone  2 eye glass containers  1 pair sunglasses  1 pair of reading glasses with string  1 tablet device  1 bra  1 pink shirt  1 pair pink and black leggings    Pt brought eye drops in container and were given to nursing staff.     Pt brought the following items and were sent to security:  $20 cash  Credit card ending in [id number]  Debit card ending in [phone]      A               Admission:  I am responsible for any personal items that are not sent to the safe or pharmacy.  [facility] is not responsible for loss, theft or damage of any property in my possession.    Signature:  _________________________________ Date: _______  Time: _____                                              Staff Signature:  ____________________________ Date: ________  Time: _____      2nd Staff person, if patient is unable/unwilling to sign:    Signature: ________________________________ Date: ________  Time: _____     Discharge:  [facility] has returned all of my personal belongings:    Signature: _________________________________ Date: ________  Time: _____                                          Staff Signature:  ____________________________ Date: ________  Time: _____

## 2022-05-16 NOTE — PLAN OF CARE
"Situation: Patient admitted to station 30 from [facility] Unit for depression and SI.    Background: Patient was brought to [facility] by her  after having increased depression and SI with plan to overdose on medication.  Patient explains that she was in an inpatient mental health unit in April and after being discharged, became overwhelmed at home.  She states that medication was not helping her.  She began crying when discussing that she did not have a therapy appointment scheduled until June.     Assessment: Patient is alert and orient to person, place, time, and situation.  She endorses high depression.  She states that she has intermittent SI and had a plan to overdose at home.  Patient states that she is \"unsure\" if she would ever act on her plan.  Patient expresses that she wants to feel better.  She explains that she is scared that she has thoughts of suicide because her mother committed suicide.  She states she feels safe in the hospital and contracts fors safety this shift.  Patient becomes tearful intermittently throughout admission assessment.  Patient endorses left hip pain.  Patient received PRN lorazepam 0.5 mg for anxiety prior to transfer to unit.     Vital Signs:  B/P: 149/87, T: 97.8, P: 80, R: 16     Recommendation: Patient has been oriented to unit.  Will facilitate therapeutic environment by encouraging attending groups, engaging with staff, and taking medication per provider order. Will monitor patient on status 15 minute checks. Sivan Arteaga RN    "

## 2022-05-16 NOTE — DISCHARGE INSTRUCTIONS
Behavioral Discharge Planning and Instructions    Summary: You were admitted on 5/16/2022 due to Depression and Suicidal Ideations. You were treated by Dr. Hernandez and discharged on 6/24/2022 from [facility] to home.     Main Diagnosis:   1.  Major depressive disorder, recurrent, moderate severity.  2.  Unspecified anxiety disorder.    3.  Suicidal ideation.    Health Care Follow-up:   Appointment Date/Time: June 30, 2022 at 2:00   Primary Care Provider: Brigida Patricio MD     Address: [place] [address]   Phone Number: [phone]         Appointment Date/Time: July 5 at 10:00 am  Therapist: Chiquita Gregory   Address: [place] [id number]   Phone Number: [phone]      2nd appointment with above therapist: July 12 at 10:00 am     Appointment Date/Time: August 11 at 7:55   Psychiatrist/Medication Management: Gary SAVAGE  Address: [address] [address]   Phone Number: [phone]       Follow-up appointment with above psychiatrist: September 8, 10:00 a.m. Psychiatrist/Medication Management: Gary SAVAGE    Referrals for group programming:    [facility] 55 plus outpatient group programming (3 hours a day, 3 times a week, virtual)  Complete the interview for the program.   [phone] plus outpatient programming  [phone] 55 plus intake scheduling phone number  Interview, Tuesday, June 28, 2:00 via computer  Simran Fitzgerald is the provider for the appointment.  They will send you a link close to the appointment time on Tuesday, to the following email: [email or web address]  Click on the link to enter the appointment at 2:00 p.m.    Referral for future DBT programming (2 hours/week)  Contact Thea for intake appointment   [facility]   Call: [phone]    Attend all scheduled appointments with your outpatient providers. Call at least  "48 hours in advance if you need to reschedule an appointment to ensure continued access to your outpatient providers.     Major Treatments, Procedures and Findings:  You were provided with: a psychiatric assessment, assessed for medical stability, medication evaluation and/or management, group therapy, and milieu management    Symptoms to Report: Feeling more aggressive, increased confusion, losing more sleep, mood getting worse, or thoughts of suicide.    Early warning signs can include: Increased depression or anxiety sleep disturbances increased thoughts or behaviors of suicide or self-harm  increased unusual thinking, such as paranoia or hearing voices.    Safety and Wellness: Take all medicines as directed. Make no changes unless your doctor suggests them. Follow treatment recommendations. Refrain from alcohol and non-prescribed drugs. Ask your support system to help you reduce your access to items that could harm yourself or others. If there is a concern for safety, call 525.    Resources:   Mental Health Resources:   -National [place] on Mental Illness (MALLORY) [place]: Connect for help, to navigate the mental health system, and for support and for resources. Call: 3-881-ATUE-Helps / 6-728-462-0605  -Crisis Text Line: The 24/7 emergency service is available if you or someone you know is experiencing a psychiatric or mental health crisis. Text: \"[place]\" to 123905  -[place] Warmline: Are you an adult needing support? Talk to a specialist who has firsthand experience living with a mental health condition. Call: 586.758.2535  Text: \"Support\" to 82334   mentalUniversity [facility]als [facility] Center.org/support/[place]-warmline/  -National Suicide Prevention Lifeline: The 24/7 lifeline provides support when in distress, has prevention and crisis resources for you or your loved ones, and resources for professionals.  Call 4-536-123-TALK (5625)  -Peer Support Connection Warmlines: Gasi-su-qsen telephone support that s safe and supportive. Open 5 " p.m. to 9 a.m. Call or text: 1-415.410.7807   -COVID Cares Stress Phone Support Service. Any [place] experiencing stress can call 671-OBGN4RM (885-305-9044) for free telephone support from 9am to 9pm every day. The service is a collaboration with volunteers from the [place] Psychiatric Society, the [place] Psychological Association, the [place] Black Psychologists, and [facility]. The free service is also accessible at [employer] where searchers can also find psychiatric and mental health services availability and real-time Substance Use Disorder Treatment program   openings.  -[facility] Crisis Response 080-342-0151    Outpatient Therapy Resources: (many of these clinics/organizations also offer outpatient psychiatry and medication management services - call to inquire)  -[facility] -  [phone]  https://www.CHORD/care/specialty/mental-behavioral-health/  https://www.CHORD/care/find/doctors/psychologists/  -[facility] - https://www.[facility].org/treatments/Counseling-Adult  -[facility] - https://[facility].Previstar/  -[facility] - ([phone])  https://[facility] Center[facility]ooskmn.Previstar/  -[facility] - (1-541.152.9712) https://www.[employer].Previstar/  -[facility] - https://www.[facility].Previstar/  -[facility] - https://[facility] Hospital[facility]k.Previstar/  -[facility] - https://walkin.org/    General Medication Instructions:   See your medication sheet(s) for instructions.   Take all medicines as directed.  Make no changes unless your doctor suggests them.   Go to all your doctor visits.  Be sure to have all your required lab tests. This way, your medicines can be refilled on time.  Do not use any drugs not prescribed by your doctor.  Avoid alcohol.    Advance Directives:   Scanned document on file with [employer]? No  scanned doc  Is document scanned? No. Copy Requested.  Honoring Choices Your Rights Handout: Informed and given  Was more information offered? Pt declined    The Treatment team has appreciated the opportunity to work with you. If you have any questions or concerns about your recent admission, you can contact the unit which can receive your call 24 hours a day, 7 days a week. They will be able to get in touch with a Provider if needed. The unit number is [phone].      ECT Treatment Appointment:  Date/Time :July 1st at 10:20 AM Provider: Dr. March  Address: [address] [place] [address].  [id number]  Phone: [phone]    Please call the above phone number at least 3 days prior to cancel your ECT treatment, if you decide you do not want to continue with maintenance ECT.     Getting Ready for Outpatient Electroconvulsive Therapy (ECT)    Your visit will take about 2 to 3 hours. Please wear a loose-fitting or short-sleeved shirt.  Bring a list of your current medications.    Driving:   Do Not drive for 24 hours after your ECT treatment if you are having only 1 treatment or less per week.  If you will be having 2 or more ECT treatments within a week, you must not drive until 7 days after your last ECT treatment.      A responsible adult must drive you home from your ECT treatment and stay with you for 6 hours after your treatment.      Food and Drink:  Please refrain from drinking alcohol or using illegal drugs for 24 hours before or after your ECT treatment.  To be safe, avoid using these substances altogether during the time you are having ECT.    Stop all food, milk, and tobacco 8 hours before treatment (this is usually midnight).  Also, refrain from chewing gum or sucking on hard candy.    You may keep drinking clear liquids until 2 hours before treatment.  Clear liquids include: water, clear juice, gatorade, clear soda, black coffee or clear tea without milk, cream or sugar.      Medications:  In case  you were/will be taking medication, the ECT physician will tell you which medicines to take on the morning of treatment.  Theses often include:  Blood pressure medications  Heart medications (for chest pain or irregular heartbeat)  GERD (acid reflux) medications  Inhalers (bronchodilators)  Long-acting (basal) insulin: take 1/2 of your normal dose.      During your ECT treatment period, you may call the ECT Department at [phone] between the hours of 6:00 am and 2:00 pm on Monday, Wednesday, and Friday about scheduling/cancelling your appointment or with any questions.   You may also leave a message on our department voice mail on Tuesday/Thursday and we will get back to you the next business day.    You will need to get a covid test before your procedure.    You are able to take an at-home rapid antigen Covid test.for your procedure. You can buy these at many pharmacy stores. Or you can order free, at-home tests at covid.gov/tests. If your test is negative, talia take a photo of the test. Show the photo to the nurse when you come in for your procedure. If your test is positive, call ECT right away. A positive test means that you have Covid-19 and your care team will discuss the plan for scheduling your procedure.   If you are unable to obtain an at-home rapid antigen test, please proceed with the below information for scheduling a PCR test with [facility].      Covid-19 Testing:  Please call our central scheduling department at [phone]  to have a Covid-19 test done on 6/29/22.  A Covid test MAY NOT be performed more than 4 days before  your scheduled ECT treatment.       Drop Off Instructions: Please come to the [facility] entrance and check-in with Security before your ECT appointment.  The [facility] entrance is located at [address] [place] [address], which is on the West side of our campus.  Our security personnel will notify our staff so we may escort you to the ECT  department.     Instructions: After your appointment, you may be picked up at the [facility] entrance, which is located at [address] [place] [address], about 1 block North of the [facility] entrance. You will be given a set of discharge instructions to take home with you and an ECT staff person will transport you to the entrance of the [facility] to meet your ride.

## 2022-05-16 NOTE — PROGRESS NOTES
Patient agreed to discharge plan going Inpatient to another hospital. Transfer  instructions reviewed with patient including follow-up care plan. Medications: all morning meds given with compliance. Ativan prn 0.5 mg po given for anxienty during transfer. Reviewed safety plan and outpatient resources. Denies SI and HI. All belongings that were brought into the hospital have been returned to patient. Escorted off the unit at door 4 accompanied by [facility] staff. Discharged to [place] via EMS.

## 2022-05-16 NOTE — PROGRESS NOTES
"SPIRITUAL HEALTH SERVICES  SPIRITUAL ASSESSMENT Progress Note  [facility] ([facility] Station 30     REFERRAL SOURCE: I did visit this afternoon patient Lorena per [facility]  referral. I introduced myself as the unit  and shared all the info about the SH[facility]. Pt was in a group, doing some project when I talk to her. Pt was not ready for one to one conversation with me because she was doing her project and she want to finish her project. Pt said, \"thanks for stopping by to visit me. As you can see me, I am so busy now. If I need your help, I will let you know myself and thanks anyway.\" I did respect the pt response and left her in the room to finish her project that she started. Pt got all the info from me and when she is ready I told her that I am always willing to come back for another visit and she agreed on that.    PLAN: I will remain open to provide spiritual care for the pt as needed.    Johan Sarmiento M.Div. (Alem), M.Th., D.Min., [facility]  Staff   Pager [phone]    "

## 2022-05-16 NOTE — TELEPHONE ENCOUNTER
R: 30 / Pho[facility]   03:51 - Called [facility]. RN reports pt is independent w/ ADLs and ambulation and would be appropriate for a roommate. 04:0 - Paged on call. 04:06 - Tony accepts for himself on 30 in a shared room. 04:16 - Placed pt in queue. Notified unit. RN reports bed is currently dirty but they can accommodate pt on day shift. 04:20 - Notified [facility]

## 2022-05-16 NOTE — TELEPHONE ENCOUNTER
R: Pt in [facility] unit awaiting placement    9pm bed search: No appropriate beds available in [place] or [facility]     Pt placed on work list until appropriate placement is available

## 2022-05-16 NOTE — PLAN OF CARE
05/16/22 1216   Individualization/Patient Specific Goals   Patient Personal Strengths expressive of emotions;expressive of needs;family/social support;independent living skills;resilient;resourceful;stable living environment   Patient Vulnerabilities limited support system   Team Discussion   Participants Srinivasa Sandhu MD; Sivan Arteaga RN; [facility]   Progress Minimal   Anticipated length of stay 3-5 days   Continued Stay Criteria/Rationale Patient presents following SI with plan to overdose on medications in context of worsening depression and concern that medications are not improving symptoms. She reports feeling lonely and struggles to keep up with daily activities. Patient requires symptom stabilization, medication management, and increased support at time of discharge.   Precautions SI   Plan Continue patient's medications with possible adjustments. Coordinate with patient's family and determine whether IOP or similar program would be appropriate.   Rationale for change in precautions or plan Initial plan   Anticipated Discharge Disposition home with family

## 2022-05-16 NOTE — PLAN OF CARE
"Problem: Behavioral Health Plan of Care  Goal: Adheres to Safety Considerations for Self and Others  Outcome: Ongoing, Progressing    Goal: Optimal Comfort and Wellbeing  Outcome: Ongoing, Progressing    Patient is up in the milieu and engages with select staff and peers since admission.  She reports high anxiety and depression.  PRN hydroxyzine offered but patient declined stating \"I don't just want to be medicated the whole time I am here.\"  Essential oil was provided which patient appeared to like.  She becomes intermittently tearful and expresses discomfort with adjusting to the unit.  She also states repeatedly, \"I can't wait, I want to feel better.\"  She asks multiple times about what she should do with her time.  Encouraged to attend groups.  Patient remains safe on unit.  Will continue to monitor.  Sivan Arteaga RN       "

## 2022-05-16 NOTE — PLAN OF CARE
Initial Psychosocial Assessment    I have reviewed the chart, met with the patient, and developed Care Plan.  Information for assessment was obtained from:     Chart review and patient interview    Presenting Problem:  Patient is a 76 year old female with a history of MDD and anxiety who presented to [facility] ED by family following suicidal ideation with plan to overdose on her medication. Patient was admitted to Station 30 voluntarily.   Recent stressors include: worsening depression/feeling her meds are no longer effective     History of Mental Health and Chemical Dependency:  Mental Health History:    Previous psychiatric hospitalizations:    [facility] - [facility] (week long admission?)   [facility] Station 20 - 13-13      Diagnoses: Major Depressive Disorder, Anxiety       Current Medications: Remeron, Effexor, Cymbalta (currently tapering)  o Multiple previous trials: Ativan, Trazodone, Klonopin, Lexapro, Celexa, Seroquel, Zoloft, Abilify, Wellbutrin       Chemical Dependency History:    Summary of use: Pt endorses minimal alcohol use. She denies use of drugs.      Chemical dependency treatment/detox: None       Family Description (Constellation, Family Psychiatric History):  Constellation/Background: Pt was born and raised in [place], [place]. She's the oldest of 3 siblings. She got  in  and has 2 sons.     Family Psychiatric/Substance Use History: Pt's mom had history of psychotic disorder and depression, and completed suicide when pt was 22 years old. Both of pt's sons have depression and one also has OCD.     Significant Life Events (Illness, Abuse, Trauma, Death):  Pt's mom and psychiatrist  around the same time.    Living Situation:  Pt lives with her  Cole (phone: [phone]) in [place], [place].   Pt's  reports to [facility]  there are 2 guns in their home which he plans to work on having removed.     Educational Background:  Pt attended  school for [employer].     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     Financial Status:    Health insurance: [facility] Medicare Advantage    Employment status: Retired    Income source: Not asked    Legal Issues:    Legal status during current admission: Voluntary    Legal guardian: Pt is her own guardian    History of civil commitment: None     Ethnic/Cultural Issues:  None reported    Spiritual Orientation:  Buddhism     Service History:  None    Social Functioning (organization, interests):  Pt reports her main stressor is feeling lonely, but that when she's doing well, she has lots of energy, is joyful, and social in nature.     Current Treatment Providers are:    Primary Care Provider (PCP): Brigida Patricio MD of [facility] in [place] (phone: [phone])   o Pt's medications have been managed by this provider.      Therapy - appointment scheduled in 6/2/22    No current outpatient psychiatrist     Social Service Assessment/Plan:  Patient will have psychiatric assessment and medication management by the psychiatrist. Medications will be reviewed and adjusted per DO/MD/APRN CNP as indicated. The treatment team will continue to assess and stabilize the patient's mental health symptoms with the use of medications and therapeutic programming. Hospital staff will provide a safe environment and a therapeutic milieu. Staff will continue to assess patient as needed. Patient will participate in unit groups and activities. Patient will receive individual and group support on the unit.      CTC will do individual inpatient treatment planning and after care planning. CTC will discuss options for increasing community supports with the patient. CTC will coordinate with outpatient providers and will place referrals to ensure appropriate follow up care is in place.

## 2022-05-17 LAB
HOLD SPECIMEN: NORMAL
TSH SERPL DL<=0.005 MIU/L-ACNC: 1.22 MU/L (ref 0.4–4)

## 2022-05-17 PROCEDURE — H2032 ACTIVITY THERAPY, PER 15 MIN: HCPCS

## 2022-05-17 PROCEDURE — 84443 ASSAY THYROID STIM HORMONE: CPT | Performed by: PSYCHIATRY & NEUROLOGY

## 2022-05-17 PROCEDURE — 124N000002 HC R&B MH UMMC

## 2022-05-17 PROCEDURE — 99232 SBSQ HOSP IP/OBS MODERATE 35: CPT | Performed by: PSYCHIATRY & NEUROLOGY

## 2022-05-17 PROCEDURE — 99233 SBSQ HOSP IP/OBS HIGH 50: CPT | Mod: 25 | Performed by: PSYCHIATRY & NEUROLOGY

## 2022-05-17 PROCEDURE — 99231 SBSQ HOSP IP/OBS SF/LOW 25: CPT | Performed by: PHYSICIAN ASSISTANT

## 2022-05-17 PROCEDURE — 250N000009 HC RX 250: Performed by: PSYCHIATRY & NEUROLOGY

## 2022-05-17 PROCEDURE — 90853 GROUP PSYCHOTHERAPY: CPT

## 2022-05-17 PROCEDURE — 250N000013 HC RX MED GY IP 250 OP 250 PS 637: Performed by: PSYCHIATRY & NEUROLOGY

## 2022-05-17 PROCEDURE — 36415 COLL VENOUS BLD VENIPUNCTURE: CPT | Performed by: PSYCHIATRY & NEUROLOGY

## 2022-05-17 RX ORDER — VENLAFAXINE 37.5 MG/1
37.5 TABLET ORAL DAILY
Status: ON HOLD | COMMUNITY
End: 2022-06-21

## 2022-05-17 RX ORDER — SIMVASTATIN 20 MG
40 TABLET ORAL AT BEDTIME
Status: DISCONTINUED | OUTPATIENT
Start: 2022-05-17 | End: 2022-06-24 | Stop reason: HOSPADM

## 2022-05-17 RX ORDER — LANOLIN ALCOHOL/MO/W.PET/CERES
3 CREAM (GRAM) TOPICAL
COMMUNITY
End: 2022-07-06

## 2022-05-17 RX ORDER — DULOXETIN HYDROCHLORIDE 20 MG/1
20 CAPSULE, DELAYED RELEASE ORAL DAILY
Status: COMPLETED | OUTPATIENT
Start: 2022-05-18 | End: 2022-05-22

## 2022-05-17 RX ORDER — DULOXETIN HYDROCHLORIDE 20 MG/1
20 CAPSULE, DELAYED RELEASE ORAL DAILY
Status: ON HOLD | COMMUNITY
End: 2022-06-21

## 2022-05-17 RX ORDER — DULOXETIN HYDROCHLORIDE 20 MG/1
40 CAPSULE, DELAYED RELEASE ORAL DAILY
Status: ON HOLD | COMMUNITY
End: 2022-06-21

## 2022-05-17 RX ORDER — TIMOLOL MALEATE 5 MG/ML
1 SOLUTION/ DROPS OPHTHALMIC DAILY
Status: DISCONTINUED | OUTPATIENT
Start: 2022-05-18 | End: 2022-06-24 | Stop reason: HOSPADM

## 2022-05-17 RX ORDER — SIMVASTATIN 40 MG
40 TABLET ORAL AT BEDTIME
COMMUNITY

## 2022-05-17 RX ORDER — MIRTAZAPINE 15 MG/1
15 TABLET, FILM COATED ORAL AT BEDTIME
Status: ON HOLD | COMMUNITY
End: 2022-06-21

## 2022-05-17 RX ORDER — MIRTAZAPINE 15 MG/1
15 TABLET, FILM COATED ORAL AT BEDTIME
Status: DISCONTINUED | OUTPATIENT
Start: 2022-05-17 | End: 2022-06-07

## 2022-05-17 RX ADMIN — CALCIUM CARBONATE 600 MG (1,500 MG)-VITAMIN D3 400 UNIT TABLET 1 TABLET: at 16:07

## 2022-05-17 RX ADMIN — MIRTAZAPINE 15 MG: 15 TABLET, FILM COATED ORAL at 21:32

## 2022-05-17 RX ADMIN — TIMOLOL MALEATE 1 DROP: 5 SOLUTION OPHTHALMIC at 08:54

## 2022-05-17 RX ADMIN — TRAZODONE HYDROCHLORIDE 50 MG: 50 TABLET ORAL at 21:32

## 2022-05-17 RX ADMIN — VENLAFAXINE HYDROCHLORIDE 37.5 MG: 37.5 TABLET, EXTENDED RELEASE ORAL at 08:53

## 2022-05-17 RX ADMIN — DULOXETINE HYDROCHLORIDE 40 MG: 20 CAPSULE, DELAYED RELEASE ORAL at 08:53

## 2022-05-17 RX ADMIN — SIMVASTATIN 40 MG: 20 TABLET, FILM COATED ORAL at 21:32

## 2022-05-17 ASSESSMENT — ACTIVITIES OF DAILY LIVING (ADL)
HYGIENE/GROOMING: INDEPENDENT
HYGIENE/GROOMING: INDEPENDENT
DRESS: SCRUBS (BEHAVIORAL HEALTH)
ORAL_HYGIENE: INDEPENDENT
ORAL_HYGIENE: INDEPENDENT
DRESS: INDEPENDENT

## 2022-05-17 NOTE — CONSULTS
"[facility], [place]  Internal Medicine Consult    Lorena Hayden MRN# [id number]   Age: 76 year old YOB: 1945     Date of Admission: 5/16/2022  Date of Consult:  5/17/2022    Requesting Service: Behavioral Health - Srinivasa Sandhu MD  Reason for Consult: Pre ECT Medicine Evaluation   Indication for ECT: Severe depression    Chief Complaint: SI  HPI: Mrs. Lorena Hayden is a 77 yo female with hx of severe depression admitted to station 30N after presenting to ED with SI. Please refer to psychiatric H&P by Dr. Sandhu dated 5/16 for further details of events that led to admission. An internal medicine consultation was ordered by Dr. Sandhu today to assess pt as having any absolute medical contraindication prior to undergoing ECT tentatively scheduled for tomorrow am. Currently pt denies fever, chills, chest pain, SOB, abd pain, and other acute physical concerns at this time.    Review of Systems:   Cardiovascular: negative  Pulmonary: No shortness of breath, dyspnea on exertion, cough, or hemoptysis  Neurological: negative    Past Medical History:   Prior Anesthesia: Yes  If yes, any complications: No    Prior ECT: Yes  If yes, 2012  Response: \"I don't remember\"  Side Effects: \"I don't remember\"    Cardiovascular: CAD No, MI No, HTN No, CHF No, pacemaker or ICD No  Pulmonary: Asthma/COPD Yes as a child of which she states she grew out of, Prior respiratory failure or need for emergent intubation No, On theophylline No (note that theophylline use is a contraindication to proceeding with ECT, needs to be tapered off prior)  Neurological: Brain tumor No, TIA/CVA No, Dementia No, Neuromuscular Disease (including post polio syndrome) No, Seizures and/or Epilepsy Yes, Head Injury No, Intracranial Hemorrhage No  Diabetes: No    Past Surgical History:   Past Surgical History:   Procedure Laterality Date     C IMPLANT HALLUX  3/2006 & 2007    Yesi STEELE" MAMMOGRAM, SCREENING  2011    Dr arredondo     COLONOSCOPY  2014    Mirian Orozco     EXTRACAPSULAR CATARACT EXTRATION WITH INTRAOCULAR LENS IMPLANT  2011     HC DILATION/CURETTAGE DIAG/THER NON OB  2005    D & C     HCL PAP SMEAR  2011    Dr Arredondo     ZZC  DELIVERY ONLY       ZZC LIGATE FALLOPIAN TUBE      Tubal Ligation     ZZHC COLONOSCOPY THRU STOMA W BIOPSY/CAUTERY TUMOR/POLYP/LESION  / /2011    benign polyp       Allergies:      Allergies   Allergen Reactions     Horse Allergy      Allergic to horse hair       Medication list reviewed.    Physical Exam:  /85 (BP Location: Right arm, Patient Position: Sitting)   Pulse 92   Temp 97.6  F (36.4  C) (Temporal)   Resp 16   Wt 65.1 kg (143 lb 8 oz)   SpO2 98%   BMI 24.63 kg/m     Cardiovascular: RRR. S1, S2. No murmurs, rubs, gallops.   Pulmonary: Effort normal. Lungs CTAB with no wheezing, rales, rhonchi.   Neurological: A&Ox3. CNs II-XII grossly intact. No acute deficits noted.     Data:  EKG (2022): Normal    Head Imaging: N/A    Labs were obtained within the last 30 days on 22 and are as follows:   CBC:  Recent Labs   Lab Test 22  1312   WBC 5.4   RBC 4.98   HGB 15.4   HCT 47.4*   MCV 95   MCH 30.9   MCHC 32.5   RDW 13.2        CMP:  Recent Labs   Lab Test 22  1312      POTASSIUM 3.7   CHLORIDE 106   RENUKA 8.9   CO2 29   BUN 13   CR 0.65   *   AST 21   ALT 24   BILITOTAL 0.6   ALBUMIN 3.3*   PROTTOTAL 7.2   ALKPHOS 87       Recommendations: Patient does not have absolute medical contraindications to proceeding with ECT at this time. Treatment plan per psychiatry.     Ankur Forte PA-C  [facility]  Hospitalist Service

## 2022-05-17 NOTE — PHARMACY-ADMISSION MEDICATION HISTORY
Admission Medication History Completed by Pharmacy    See [facility] Admission Navigator for allergy information, preferred outpatient pharmacy, prior to admission medications and immunization status.     Medication History Sources:     Patient    Sure Scripts    Changes made to PTA medication list (reason):    Added: calcium/vit d, vit d, fish oil (per pt), simvastatin (per pt, fill hx), melatonin prn (per [facility] provider, pt), mirtazapine (per pt, [facility] provider), venlafaxine (per pt, empath provider)    Deleted: None    Changed: Updated the Cymbalta taper plan (per [facility]path provider), timolol eye drops from BID to AM (per pt & Sure Scripts)    Additional Information:    Patient was unsure of the vitamin D and fish oil strengths she takes.    Patient says she takes simvastatin for her cholesterol and this matches with her fill hx. Not sure why in the [facility] Everywhere it has atorvastatin recently listed.     Patient confirmed she is no longer taking the Abilify, Wellbutrin, and hydroxyzine PRN.     Prior to Admission medications    Medication Sig Last Dose Taking? Auth Provider   calcium carbonate 600 mg-vitamin D 400 units (CALTRATE) 600-400 MG-UNIT per tablet Take 1 tablet by mouth daily Past Week at Unknown time Yes Unknown, Entered By History   DULoxetine (CYMBALTA) 20 MG capsule Take 40 mg by mouth daily 5/17/2022 at Unknown time Yes Unknown, Entered By History   DULoxetine (CYMBALTA) 20 MG capsule Take 20 mg by mouth daily  Yes Unknown, Entered By History   melatonin 3 MG tablet Take 3 mg by mouth nightly as needed for sleep (+ 1 tablet PRN if first tablet does not work) Past Week at Unknown time Yes Unknown, Entered By History   mirtazapine (REMERON) 15 MG tablet Take 15 mg by mouth At Bedtime 5/16/2022 at Unknown time Yes Unknown, Entered By History   Omega-3 Fatty Acids (FISH OIL PO) Take 1 capsule by mouth daily Past Week at Unknown time Yes Unknown, Entered By History   simvastatin (ZOCOR) 40 MG  tablet Take 40 mg by mouth At Bedtime Past Week at Unknown time Yes Unknown, Entered By History   timolol maleate (TIMOPTIC) 0.5 % ophthalmic solution Place 1 drop into both eyes every morning 5/17/2022 at Unknown time Yes Reported, Patient   venlafaxine (EFFEXOR) 37.5 MG tablet Take 37.5 mg by mouth daily 37.5mg x 7 days (through 5/20), then on 5/21 increase to 75mg daily 5/17/2022 at Unknown time Yes Unknown, Entered By History   VITAMIN D PO Take 1 tablet by mouth daily Past Week at Unknown time Yes Unknown, Entered By History       Date completed: 05/17/22    Medication history completed by: Serena Squires, PharmD, BCPS

## 2022-05-17 NOTE — PLAN OF CARE
05/17/22 1300   Group Therapy Session   Group Attendance attended group session   Time Session Began 1015   Time Session Ended 1115   Total Time patient participated (minutes) 35 (No Charge)   Total # Attendees 3   Group Type Occupational Therapy   Group Topic Covered cognitive therapy techniques;coping skills/lifestyle management;emotions/expression;structured socialization   Group Session Detail General Health and Coping Skills Group   Patient Response Detail Intervention: General Health and Coping Group with 2 peers.    Patient Response: Pt participated in group focused on the use of positive affirmations as a positive coping skill. Group involved discussion of positive affirmations and creating a personal affirmations project using personally selected affirmations from a list provided by writer that are meaningful to pt. Pt listened during discussion portion of group, did not offer answers of participate in discussion. Pt read through list of affirmations for a few minutes prior to telling writer that they are feeling to overwhelmed with how bad and down they are feeling right now and can't focus on reading anything. Writer encouraged pt that participating in group could be a way to focus on something else. Pt agreed to stay and work on the creative/decorating portion of the group to do something to keep their hands busy. Pt worked on this for another 20 mins prior to telling writer they were too overwhelmed and wanted to go talk to staff about it. Pt was noted to appear tearful and have shaking hands.     Cognition: Distractible, Preoccupied, Restless        Mood/Affect: Anxious, Tearful        Thought Content: Pt noted having difficulty concentrating on anything other than how down and depressed they feel right now.     Plan: Patient encouraged to maintain attendance for continued ongoing support in working towards occupational therapy goals to support overall treatment/care.

## 2022-05-17 NOTE — PLAN OF CARE
Problem: Behavioral Health Plan of Care  Goal: Plan of Care Review  5/17/2022 0657 by Chacho Schmidt, RN  Outcome: Ongoing, Progressing  5/16/2022 2154 by Chacho Schmidt, RN  Outcome: Ongoing, Progressing   Goal Outcome Evaluation:    Plan of Care Reviewed With: patient   Patient was given trazodone 50 mg at the start of the shift, she slept 7.0 hours.

## 2022-05-17 NOTE — PLAN OF CARE
Problem: Behavioral Health Plan of Care  Goal: Plan of Care Review  Outcome: Ongoing, Progressing  Flowsheets (Taken 5/16/2022 1700)  Plan of Care Reviewed With: patient  Patient Agreement with Plan of Care: agrees   Goal Outcome Evaluation:    Plan of Care Reviewed With: patient     Patient doing well, she is alert and oriented x 4. Patient able to verbalize concerns, independent of all her cares. Patient denies SI/SIB/HI and AVH. Patient endorsed depression 7/10 and anxiety 6/10. Patient was given scheduled Remeron and hydroxyzine 25 mg , medication was effective. Patient was also given trazodone 50 mg for sleep and refused her eye gtts, she wants to have them in the morning. Patient denies all other psych symptoms, she attended the group, good appetite and medication compliant. Will continue to monitor.

## 2022-05-17 NOTE — PLAN OF CARE
"Problem: Behavioral Health Plan of Care  Goal: Optimized Coping Skills in Response to Life Stressors  Outcome: Ongoing, Progressing    Cayla presents with a sad affect. She is intermittently tearful, stating, \"I just want to feel better.\" She endorses wishing she wasn't alive \"only sometimes.\" Reports no plan or intent to act on these thoughts. Contracts for safety. Reassurance and active listening are helpful to calm down pt when she is having a crying episode. Endorses depression and anxiety. Denies HI/AVH. She is present in the milieu, but tends to keep to herself.    Pt was seen by ECT provider, who recommended staff give pt ECT information and show her the ECT video. Writer offered these to pt, but pt states, \"I want to talk to my  first.\" Pt had a phone call, then told W that she does not want to do ECT, \"because it can make my memory worse.\" ECT provider mentioned that pt's mini mental exam showed some cognitive concern. Pt wants to \"try psychotherapy and the Effexor first.\" Of note, if pt chooses to do ECT, there will need to be an EKG ordered. Encouraged pt to think about it and discuss her decision with MD tomorrow.    She received PRN Trazodone with HS meds and went to bed.    "

## 2022-05-17 NOTE — PLAN OF CARE
"   05/17/22 1456   Group Therapy Session   Group Attendance attended group session   Time Session Began 1400   Time Session Ended 1500   Total Time patient participated (minutes) 30 (No Charge)    Total # Attendees 2   Group Type Occupational Therapy   Group Topic Covered balanced lifestyle;coping skills/lifestyle management;leisure exploration/use of leisure time;relaxation techniques   Group Session Detail OT Clinic Group   Patient Response Detail   Intervention: OT Clinic with 1 peer.    Patient Response: Pt joined clinic group about 15 minutes after it started and requested to work on a collage project, requesting needed materials and supplies for this project from writer. Pt appeared comfortable interacting with writer and peer in the room, asking peer about their project and was moderately social with writer. This is a noted change from morning group where pt noted they were feeling \"too down\" to be able to focus on anything and \"never felt so depressed in my life\" and left group being unable to focus on anything else. Pt excused themself early from group to use the bathroom, thanking writer for their time while leaving and noted they were looking forward to continuing to work on their collage next clinic group.     Cognition: Goal directed       Mood/Affect: Pleasant     Plan: Patient encouraged to maintain attendance for continued ongoing support in working towards occupational therapy goals to support overall treatment/care.        "

## 2022-05-17 NOTE — H&P
Admitted: 05/16/2022    PSYCHIATRIC EVALUATION    IDENTIFICATION:  The patient was seen for 52 minutes on 05/16/2022 on station 30.  Greater than 50% of the time was spent on counseling and coordinating care, clarifying diagnostic and prognostic issues, presence of support in community.    CHIEF COMPLAINT AND REASON FOR ADMISSION:  The patient is a 76-year-old  female admitted because of worsening of depression and increase in suicidal thoughts.    HISTORY OF PRESENT ILLNESS:  The patient presents as a reasonably reliable historian, reports longstanding history of depression with worsening about a year to year and a half ago.  She apparently was treated with Cymbalta for a long time and was able to maintain stable mood and be functional.  Then symptoms of depression started getting worse.  She tried to see a primary care provider/prescriber of psychotropic medication to increase Cymbalta to no avail.  Then Abilify was added, then Wellbutrin, then patient's Cymbalta was decreased with plan to taper it off and stop, and she was started on Effexor.  She reported that no significant improvement with all those medication changes.  Reported having more thoughts that life was not worth living and thoughts of suicide.  She also reported difficulties with sleep, low energy, poor appetite with significant weight loss of about 20 pounds.  She finally came to the hospital and requested to be hospitalized.  The patient was initially seen at [facility] unit and was referred for psychiatric stabilization.    PAST PSYCHIATRIC HISTORY:  Reports history of depression and anxiety.  Reported 1 psychiatric hospitalization back in 2013 when she was treated for anxiety and depression.  Past medication trials include Ativan, trazodone, Klonopin, Lexapro, Wellbutrin, Celexa, Seroquel, Zoloft, Abilify, currently Effexor and Cymbalta.  Patient denied any previous suicide attempts.  Reported that she had a course of 15 ECT about 10 years  ago and had a brief improvement, which then disappeared and she started feeling depressed again.  She was hospitalized again about 2 weeks ago.  Was transferred to [facility] in [place] and was hospitalized there for about 1 week.  The patient is unable to identify any specific triggers for increased depression.  Reports that she and her  are relatively healthy.  They are both on Social Security, but do not have any major financial obligations; they own their house and their cars.    PAST MEDICAL HISTORY:  Allergic rhinitis and mental health.    PAST SURGICAL HISTORY:  Significant for colonoscopy, mammography, extracapsular cataract extraction with intraocular lens implant, dilation and curettage diagnostic in year , history of  in year , status post ligation of fallopian tube, colonoscopy in  with biopsy tumor/polyp/lesion cauterization.    ALLERGIES:  REPORTED TO BEING ALLERGIC TO HORSE HAIR.    HOME MEDICATIONS:    1.  Cymbalta.  Most recent dose is 90 mg daily.  2.  Timoptic 0.5% one drop into both eyes 2 times a day.  3.  Effexor extended release 37.5 mg daily.    FAMILY HISTORY AND SOCIAL HISTORY:  Lorena has been  for many years.  Lives with her .  Has 2 adult sons, one of them is .  She has 3 grandkids.  Son has a significant other, but not  and has no children.  The patient and her  are retired.  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  Said that she liked her job.  Patient reports only one member of her family, her mother, suffered from depression and eventually committed suicide.    PHYSICAL EXAMINATION/REVIEW OF SYSTEMS:  For physical examination and 12-point review of system, please refer to the note of Dr. Irene's from 2022.  I reviewed this note and agree with it.  PATIENT'S VITAL SIGNS:  Temperature 97.8, respirations 16, heart rate 80, blood pressure 149/87.    MENTAL STATUS EXAMINATION:  The patient is a   female, short stature, dressed in hospital garbs, who is wearing glasses.  She is pleasant, cooperative on approach.  Speech is spontaneous, but monotonous.  Reports significant depression and passive suicidal thoughts.  She contracts for safety here.  She denies homicidal ideation.  She denied auditory or visual hallucinations.  Thought processes are linear, logical, goal directed.  Associations tight.  She is alert and oriented x 3.  Fund of knowledge is average with proper usage of vocabulary.  Ability to focus, concentrate are both good.  Insight and judgment are fair.  Immediate short and long-term memories appears to be good.  The patient is hard of hearing and frequently needs to be repeated questions.    IMPRESSION:  1.  Major depressive disorder, recurrent, moderate severity.  2.  Unspecified anxiety disorder.    3.  Suicidal ideation.    TREATMENT PLAN:  I discussed with the patient her current medications and her past trials of ECT.  I encouraged her to talk to her  as she was not sure whether ECT was helpful.  Advised her that if ECT was reasonably helpful we could repeat the course again and if it was not we would focus on medication changes.  She promised to talk to her .  We will continue her current meds with the only addition of mirtazapine 15 mg in hopes to help to improve sleep and appetite.  We will provide patient with support and structure.      Srinivasa Sandhu MD        D: 2022   T: 2022   MT: [id number]    Name:     MYA PEOPLES  MRN:      [id number]        Account:     [id number]   :      1945           Admitted:    2022       Document: [id number]    cc:  Brigida Patricio MD

## 2022-05-17 NOTE — PROGRESS NOTES
05/16/22 2100   Group Therapy Session   Group Attendance attended group session   Time Session Began 1700   Time Session Ended 1750   Total Time patient participated (minutes) 50   Total # Attendees 4   Group Type recreation   Group Topic Covered leisure exploration/use of leisure time   Group Session Detail TR leisure group   Patient Response/Contribution cooperative with task   Patient Response Detail Pt participated in Therapeutic Recreation group with focus on leisure participation, communication skills, and critical thinking. Engaged and focused in the group recreational activity via a group game.  Pt was a full participant throughout the entire duration of group. Pt seemed to occasionally have trouble sequencing the game, but with extra cues and help, she was able to complete her turns.

## 2022-05-17 NOTE — PLAN OF CARE
"  Problem: Behavioral Health Plan of Care  Goal: Plan of Care Review  Outcome: Ongoing, Progressing  Flowsheets (Taken 5/17/2022 1028)  Plan of Care Reviewed With: patient  Patient Agreement with Plan of Care: agrees   Goal Outcome Evaluation:    Plan of Care Reviewed With: patient     Pt had just finished a phone call with  when writer checked in with pt. Presented with a flat/tearful affect. Endorsed mild anxiety rating at a 1/10, depression 10/10, denied physical pain, SI,HI,and contracted for safety. Pt is fearful, sad, expressing hopelessness. Stated \" I am so tired of been so depressed, I want my medicine to work\", writer encouraged pt to give the medicine time to work, be positive. Pt stated \" I am having a hard time to be positive, I have tried so many medications which have not have not worked\". Compliant with all medications, social with staff and select peers. Attended community meeting and afternoon OT group. Pt has an order to use home face products with staff supervision. Pt is getting more comfortable and less fearful.                  "

## 2022-05-17 NOTE — PLAN OF CARE
05/17/22 1650   Group Therapy Session   Group Attendance attended group session   Time Session Began 1305   Time Session Ended 1350   Total Time patient participated (minutes) 50   Total # Attendees 4   Group Type psychotherapeutic   Group Topic Covered cognitive therapy techniques   Group Session Detail DBT skill of Mindfullness: Observing, Describing, Participating and How: Non Judgelemtnally, One-Mindfully, and Effectively; To reduce Pain and Increase Happiness   Patient Response/Contribution able to recall/repeat info presented;expressed readiness to alter behaviors;expressed understanding of topic   Patient Response Detail Check IN: Pt reported looking forward to grilling, walks and spending time with family this summer. Pt reported that she very much liked the topic as it related to reducing pain and increasing happiness.

## 2022-05-17 NOTE — PLAN OF CARE
"Assessment/Intervention/Current Symtoms and Care Coordination:  -Chart review  -Team meeting - pt endorses anxiety and depression. She accepted Trazodone PRN for sleep. RN reports pt was \"afraid\" to take medications this morning and required confirmation from looking at the computer before agreeing to take them.   -Writer met with pt and she stated she just went to group about mindfulness which she says was helpful as she still is experiencing SI in context of her antidepressants being ineffective/taking some time to notice improvement. Writer provided encouragement to pt to work on skill-building and practice as this paired with medications will get pt closer to her goals of feeling better. Pt says she feels skeptical about the medications working for her and wants to know how long she's been taking them. She says she feels a pit in her stomach when thinking about her SI and says she wants to feel hopeful. Writer told pt her questions about her medications would be better addressed by RN. She says she's going to start ECT tomorrow and expects to be here for another week or so. Writer provided additional information about 55+ IOP and pt says she plans to discuss it more with her . Pt showed writer her hand out from process group but had a hard time reading her print as the ink wasn't dark enough. Writer assisted pt with reading some of what she wrote including take things one at a time and observe. Writer mentioned that we can work on rescheduling individual therapy appointment if it seems pt will not be able to make the existing appointment on 6/2. Pt says the therapist is Kat Ortega from \"[facility]". She thanked writer and expressed no further questions at this time.   -Writer called pt's  Cole, left voicemail at 1429.   Current Symptoms include the following: SI and anxious  Precautions: SI     Discharge Plan or Goal:  Pending stabilization & development of a safe discharge " plan.  Considerations include: Return home with outpatient providers     Barriers to Discharge:  Patient presents following SI with plan to overdose on medications in context of worsening depression and concern that medications are not improving symptoms. She reports feeling lonely and struggles to keep up with daily activities. Patient requires symptom stabilization, medication management, and increased support at time of discharge.      Referral Status:  Referrals TBD     Legal Status:  Patient is voluntary     Contacts:   - Cole Jackelyn (phone: [phone]) - CARLOS MANUEL obtained 5/16/22  Son - Kadeem Jackelyn (phone: [phone]) - CARLOS MANUEL obtained 5/16/22  Daughter-in-law - Shannon Jackelyn (phone: [phone]) - CARLOS MANUEL obtained 5/16/22     Upcoming Meetings/Important Dates:  N/A     Rationale for SIO/No Roommate Order:  Patient is not on SIO.  Patient has current roommate.

## 2022-05-17 NOTE — PROGRESS NOTES
"[facility], [place]   Psychiatric Progress Note        Interim History:   The patient's care was discussed with the treatment team during the daily team meeting and/or staff's chart notes were reviewed.  Staff report patient slept for about 7 hours. Lorena reported feeling highly anxious, sad. Said that she felt desperate that even more recent med changes would not help and she would remain depressed and suicidal. Appeared to be suspicious about meds she was given, RN reports pt was \"afraid\" to take medications this morning and required confirmation from looking at the computer before agreeing to take them.     Met with patient: she was seen in interview room. Patient appeared to be open, came close to tears while talking about so many meds she had tried and how they were not helpful. Said that she tried to talk about her past experience with ECT and especially, if it was beneficial for her with her , but he could not recall too much as it was more than 10 years ago. We discussed with Lorena that per electronic records she had positive experience with ECT, but it was short lived. Lorena agreed to have at least 8 ECT treatments and stay at this hospital during ECT treatments. Lorena admitted to still having passive Suicidal ideation, but contracted for safety. She had no further questions or concerns.         Medications:       calcium carbonate 600 mg-vitamin D 400 units  1 tablet Oral Daily     [START ON 5/18/2022] DULoxetine  20 mg Oral Daily     mirtazapine  15 mg Oral At Bedtime     simvastatin  40 mg Oral At Bedtime     timolol maleate  1 drop Both Eyes BID     venlafaxine  37.5 mg Oral Daily with breakfast          Allergies:     Allergies   Allergen Reactions     Horse Allergy      Allergic to horse hair          Labs:     Recent Results (from the past 24 hour(s))   TSH with free T4 reflex and/or T3 as indicated    Collection Time: 05/17/22  7:19 AM   Result Value Ref " Range    TSH 1.22 0.40 - 4.00 mU/L   Extra Purple Top Tube    Collection Time: 05/17/22  7:19 AM   Result Value Ref Range    Hold Specimen JIC           Psychiatric Examination:     /85 (BP Location: Right arm, Patient Position: Sitting)   Pulse 92   Temp 97.6  F (36.4  C) (Temporal)   Resp 16   Wt 65.1 kg (143 lb 8 oz)   SpO2 98%   BMI 24.63 kg/m    Weight is 143 lbs 8 oz  Body mass index is 24.63 kg/m .    Orthostatic Vitals  Report      Most Recent      Sitting Orthostatic /84 05/17 0921    Sitting Orthostatic Pulse (bpm) 91 05/17 0921    Standing Orthostatic /84 05/17 0921    Standing Orthostatic Pulse (bpm) 97 05/17 0921          Appearance: awake, alert, dressed in hospital scrubs and appeared older than stated age  Attitude:  somewhat cooperative  Eye Contact:  fair  Mood:  anxious and sad   Affect:  intensity is dramatic  Speech:  decreased prosody  Psychomotor Behavior:  no evidence of tardive dyskinesia, dystonia, or tics  Throught Process:  linear  Associations:  no loose associations  Thought Content:  passive suicidal ideation present  Insight:  limited  Judgement:  limited  Oriented to:  time, person, and place  Attention Span and Concentration:  fair  Recent and Remote Memory:  fair    Clinical Global Impressions  First: 6/4 5/17/2022      Most recent:            Precautions:     Behavioral Orders   Procedures     Code 1 - Restrict to Unit     Electroconvulsive therapy     Routine Programming     As clinically indicated     Status 15     Every 15 minutes.     Suicide precautions     Patients on Suicide Precautions should have a Combination Diet ordered that includes a Diet selection(s) AND a Behavioral Tray selection for Safe Tray - with utensils, or Safe Tray - NO utensils            DIagnoses:     1.  Major depressive disorder, recurrent, moderate severity.  2.  Unspecified anxiety disorder.    3.  Suicidal ideation.         Plan:     Will order ECT and IM consults. Patient  had pharmacist reconciliation of her outpatient meds: as per reconciliation I will decrease Cymbalta to 20 mg daily, increase Remeron to 15 mg qhs, will add Simvastatin and Calcium Carbonate. Will continue to provide support and structure.

## 2022-05-18 ENCOUNTER — TELEPHONE (OUTPATIENT)
Dept: BEHAVIORAL HEALTH | Facility: CLINIC | Age: 77
End: 2022-05-18
Payer: COMMERCIAL

## 2022-05-18 PROCEDURE — 250N000013 HC RX MED GY IP 250 OP 250 PS 637: Performed by: PSYCHIATRY & NEUROLOGY

## 2022-05-18 PROCEDURE — 124N000002 HC R&B MH UMMC

## 2022-05-18 PROCEDURE — 90853 GROUP PSYCHOTHERAPY: CPT

## 2022-05-18 PROCEDURE — 99232 SBSQ HOSP IP/OBS MODERATE 35: CPT | Performed by: PSYCHIATRY & NEUROLOGY

## 2022-05-18 PROCEDURE — G0177 OPPS/PHP; TRAIN & EDUC SERV: HCPCS

## 2022-05-18 RX ADMIN — SIMVASTATIN 40 MG: 20 TABLET, FILM COATED ORAL at 21:35

## 2022-05-18 RX ADMIN — DULOXETINE HYDROCHLORIDE 20 MG: 20 CAPSULE, DELAYED RELEASE ORAL at 09:01

## 2022-05-18 RX ADMIN — MIRTAZAPINE 15 MG: 15 TABLET, FILM COATED ORAL at 21:35

## 2022-05-18 RX ADMIN — TRAZODONE HYDROCHLORIDE 50 MG: 50 TABLET ORAL at 21:40

## 2022-05-18 RX ADMIN — CALCIUM CARBONATE 600 MG (1,500 MG)-VITAMIN D3 400 UNIT TABLET 1 TABLET: at 09:01

## 2022-05-18 RX ADMIN — HYDROXYZINE HYDROCHLORIDE 25 MG: 25 TABLET, FILM COATED ORAL at 11:13

## 2022-05-18 RX ADMIN — TIMOLOL MALEATE 1 DROP: 5 SOLUTION OPHTHALMIC at 09:01

## 2022-05-18 RX ADMIN — VENLAFAXINE HYDROCHLORIDE 37.5 MG: 37.5 TABLET, EXTENDED RELEASE ORAL at 09:01

## 2022-05-18 ASSESSMENT — ACTIVITIES OF DAILY LIVING (ADL)
LAUNDRY: UNABLE TO COMPLETE
DRESS: INDEPENDENT;SCRUBS (BEHAVIORAL HEALTH)
HYGIENE/GROOMING: INDEPENDENT
ORAL_HYGIENE: INDEPENDENT
DRESS: SCRUBS (BEHAVIORAL HEALTH);INDEPENDENT
ORAL_HYGIENE: INDEPENDENT
HYGIENE/GROOMING: INDEPENDENT
LAUNDRY: UNABLE TO COMPLETE

## 2022-05-18 NOTE — PLAN OF CARE
Problem: Behavioral Health Plan of Care  Goal: Plan of Care Review  Outcome: Ongoing, Progressing  Flowsheets  Taken 5/18/2022 1727  Plan of Care Reviewed With: patient  Patient Agreement with Plan of Care: agrees  Taken 5/18/2022 1700  Plan of Care Reviewed With: patient     Problem: Behavioral Health Plan of Care  Goal: Optimal Comfort and Wellbeing  Intervention: Provide Person-Centered Care  Recent Flowsheet Documentation  Taken 5/18/2022 1700 by Sujatha Donnelly RN  Trust Relationship/Rapport:    choices provided    emotional support provided    empathic listening provided    questions answered    thoughts/feelings acknowledged    reassurance provided    questions encouraged    other (see comments)     Problem: Behavioral Health Plan of Care  Goal: Develops/Participates in Therapeutic Cascade to Support Successful Transition  Intervention: Foster Therapeutic Cascade  Recent Flowsheet Documentation  Taken 5/18/2022 1700 by Sujatha Donnelly RN  Trust Relationship/Rapport:    choices provided    emotional support provided    empathic listening provided    questions answered    thoughts/feelings acknowledged    reassurance provided    questions encouraged    other (see comments)    Patient pleasant,cooperative, confused, disorganized at times (frequently asking for numbers to be written down). Visualized patient many times watching TV, and chatting with others. During MH check in patient began to cry and get weepy when talking about ECT. Because, she is unsure if it is going to work this time. She rates her depression a 6 out of 10. Anxiety is rating a 5 out of 10. She denies any SI,SIB,HI tendencies. She concluded that her sleeping appetite are adequate and she has no complaints. Medication compliant. No other  incidents to report on this shift.

## 2022-05-18 NOTE — CONSULTS
[facility], [place]   ECT Consultation   May 17, 2022     Lorena Hayden [id number]   76 year old 1945     Patient Status: Inpatient    Is this the first in a series of 12 treatments?  No    Allergies   Allergen Reactions     Horse Allergy      Allergic to horse hair       Weight:  143 lbs 8 oz              Indications for ECT:   Medications ineffective and History of good ECT response in one or more previous episodes of illness         Clinical Narrative:   Lorena Hayden is a 76 year old woman with a history of depression, started in her mid 60s. Her depression was relatively well-controlled on duloxetine until February 2022, since when she has had progressive depression with suicidal thoughts without an identifiable trigger. Multiple medication trials yielded to no improvement (Increased duloxetine, added aripiprazole then bupropion, now cross titrating to desvenlafaxine). She had a recent psychiatric hospitalization in [facility] in [place], discharged only two weeks ago. However, depression persisted, she had more thoughts that life was not worth living and thoughts of suicide.  She also reported difficulties with sleep, low energy, poor appetite with significant weight loss of about 20 pounds.  She finally came to the hospital and requested to be hospitalized.  The patient was initially seen at [facility] unit and was referred for psychiatric stabilization. This consultation is requested to evaluate electroconvulsive therapy (ECT) candidacy.      Psychiatric Review of Systems:  Depression: Positive for depressive symptoms including sadness, irritability, anhedonia, social isolation, sleep and appetite irregularities, psychomotor retardation, fatigue, feeling worthless, poor concentration, indecisiveness, passive thoughts of death. Reports no thoughts of self-harm or harm to others when asked about explicitly.   Anxiety: Positive for generalized worry in the  "context of depression and fear that she \"would never be able to feel happy again\"   Cognition: She shares progressive difficulty remembering names and details of recent unimportant events. The patient reports being fully independent in activities of daily living, performing household chores, managing her medictions without known difficulties, driving without getting lost or any traffic violation/accidents. Household finances have always been managed by her . She is unsure whether ECT had any effects on her memory.  A comprehensive psychiatric review of systems have been performed and otherwise negative.       Psychiatric History:   Diagnoses: Major Depressive Disorder    Hospitalizations: Two former (first in , last in )    Suicide Attempts: None    Medication Trials: Ativan, trazodone, Klonopin, Lexapro, Wellbutrin, Celexa, Seroquel, Zoloft, Abilify, currently Effexor and Cymbalta. No TCAs or MAOIs.    Neuromodulation:  Right unilateral ultrabrief ECT x 19 sessions in . She had a good response during treatment which lasted only several weeks after the discontinuation.    Psychotherapy:  None      Past Medical History:   Diagnosis Date     Allergic rhinitis, cause unspecified      Anxiety      Depressive disorder        Past Surgical History:   Procedure Laterality Date     C IMPLANT HALLUX  3/2006 &     Yesi STEELE MAMMOGRAM, SCREENING  2011    Dr arredondo     COLONOSCOPY  2014    Mirian Orozco     EXTRACAPSULAR CATARACT EXTRATION WITH INTRAOCULAR LENS IMPLANT  2011     HC DILATION/CURETTAGE DIAG/THER NON OB  2005    D & C     HCL PAP SMEAR  2011    Dr Arredondo     ZZC  DELIVERY ONLY       ZZC LIGATE FALLOPIAN TUBE      Tubal Ligation     ZZHC COLONOSCOPY THRU STOMA W BIOPSY/CAUTERY TUMOR/POLYP/LESION  / /2011    benign polyp       Family History   Problem Relation Age of Onset     Cerebrovascular Disease Father         74     Psychotic Disorder Mother         " "suicide       Social History     Tobacco Use     Smoking status: Never Smoker     Smokeless tobacco: Never Used   Substance Use Topics     Alcohol use: Yes     Alcohol/week: 0.8 standard drinks     Types: 1 drink(s) per week     Comment: social           Current Med  Current Facility-Administered Medications   Medication     acetaminophen (TYLENOL) tablet 650 mg     alum & mag hydroxide-simethicone (MAALOX) suspension 30 mL     calcium carbonate 600 mg-vitamin D 400 units (CALTRATE) per tablet 1 tablet     DULoxetine (CYMBALTA) DR capsule 20 mg     hydrOXYzine (ATARAX) tablet 25 mg     mirtazapine (REMERON) tablet 15 mg     OLANZapine (zyPREXA) tablet 5 mg    Or     OLANZapine (zyPREXA) injection 5 mg     senna-docusate (SENOKOT-S/PERICOLACE) 8.6-50 MG per tablet 1 tablet     simvastatin (ZOCOR) tablet 40 mg     timolol maleate (TIMOPTIC) 0.5 % ophthalmic solution 1 drop     traZODone (DESYREL) tablet 50 mg     venlafaxine (EFFEXOR-ER) 24 hr tablet 37.5 mg        PMH:  Past Medical History:   Diagnosis Date     Allergic rhinitis, cause unspecified      Anxiety      Depressive disorder        Objective:  /85 (BP Location: Right arm, Patient Position: Sitting)   Pulse 92   Temp 97.6  F (36.4  C) (Temporal)   Resp 16   Wt 65.1 kg (143 lb 8 oz)   SpO2 98%   BMI 24.63 kg/m   Weight is 143 lbs 8 oz  Body mass index is 24.63 kg/m .    Mental Status Examination:  Appearance/ Behavior: In appropriate attire; has good hygiene. Calmly and actively engages with the examiner. Maintains good eye contact.   Speech:  Clear, spontaneous with no apparent receptive or expressive difficulties. Normal rate, rhythm and volume.  Musculoskeletal:  Normal bulk with no visible atrophy.  No abnormal movements noted.  Gait is of normal base, stride and speed. Normal arm swing bilaterally.  Mood/Affect: Mood is reported as \"depressed\".  Affect is restricted to depressed range with occasional appropriate tearfulness.    Thought " Process: Mostly linear with occasional circumstantiality which responds to redirection  Thought Content: Passive death wishes with no plan/intent. No evidence for thoughts of harm to others   Perception:  No evidence for any perceptual disturbances  Cognition:  Mild delay in processing information with mild increase in response latency  Short-term recall: 1/3  Appropriate fund of knowledge.   Judgment/Insight: Fair insight regarding the multifactorial nature of emotional dysregulation and need for care. Judgment is reasonable within the context of insight.         Labs/imaging:       Lab Results   Component Value Date     05/14/2022     04/12/2016     03/16/2015     05/03/2013    Lab Results   Component Value Date    CHLORIDE 106 05/14/2022    CHLORIDE 102 04/12/2016    CHLORIDE 104 03/16/2015    CHLORIDE 105 05/03/2013    Lab Results   Component Value Date    BUN 13 05/14/2022    BUN 14 04/12/2016    BUN NOT APPLICABLE 04/12/2016    BUN 18 03/16/2015    BUN NOT APPLICABLE 03/16/2015    BUN 15 05/03/2013      Lab Results   Component Value Date    POTASSIUM 3.7 05/14/2022    POTASSIUM 4.1 04/12/2016    POTASSIUM 4.5 03/16/2015    POTASSIUM 4.1 05/03/2013    Lab Results   Component Value Date    CO2 29 05/14/2022    CO2 25 04/12/2016    CO2 26 03/16/2015    CO2 28 05/03/2013    Lab Results   Component Value Date    CR 0.65 05/14/2022    CR 0.75 04/12/2016    CR 0.75 03/16/2015    CR 0.70 05/03/2013        Lab Results   Component Value Date    WBC 5.4 05/14/2022    WBC 4.3 05/03/2013    WBC 4.5 12/06/2012    HGB 15.4 05/14/2022    HGB 10.5 (L) 06/29/2016    HGB 10.2 (L) 06/29/2016    HCT 47.4 (H) 05/14/2022    HCT 42.4 05/03/2013    HCT 44.6 12/06/2012    MCV 95 05/14/2022    MCV 95 05/03/2013    MCV 96 12/06/2012     05/14/2022     05/03/2013     12/06/2012     Lab Results   Component Value Date    TSH 1.22 05/17/2022    TSH 1.31 05/03/2013    TSH 2.10 11/02/2011             Diagnosis:     1. Major Depressive Disorder, recurrent, severe, without psychotic features   2. Cognitive Complaints        Assessment/ Plan:     Considering the clinical acuity and historical evidence for medication inefficacy and former response to treatment; electroconvulsive therapy (ECT) appears appropriate; despite multifactorial nature of the presentation that would benefit from optimization of cognitive, behavioral and social interventions longitudinally.     Discussed all relevant aspects of ECT with patient, including risks of memory loss, HA, nausea, death <1/50,000, driving prohibition; possible lack of benefit or relapse after successful treatment, alternatives, right to decline, possible outpatient procedures. All questions answered, patient will have opportunity to review ECT video.  Due to her concerns for her memory, the patient is hesitant about ECT. She decided to think about it and discuss with her .  Once she reaches her decision, she will update the primary team. The case is reviewed with the primary nursing team and the ECT team.  She may be a candidate for the research study implementing FEAST protocol. She is willing to hear about it. Will discuss with the research team and orchestrate accordingly.  Adding structured, evidence-based, time-limited cognitive behavioral therapy to her regimen would be of help. She has not tried this and is interested.  Once psychiatric stability is reached, a comprehensive neuropsychometric testing is needed to further evaluate cognitive performance, establish a baseline for future comparison and longitudinal follow-up. We would recommend this evaluation to be completed as an outpatient at a tertiary care facility, ensuring high quality and fidelity. I'd also recommend baseline brain imaging, preferably MRI brain without and with contrast, at the time of comprehensive neuropsychometric testing.

## 2022-05-18 NOTE — PLAN OF CARE
05/18/22 1340   Group Therapy Session   Group Attendance attended group session   Time Session Began 1300   Time Session Ended 1325   Total Time patient participated (minutes) 25   Total # Attendees 2   Group Type psychotherapeutic   Group Topic Covered coping skills/lifestyle management   Group Session Detail Solution Focused Therapy - Miracle Question   Patient Response/Contribution discussed personal experience with topic   Patient Response Detail When pt came to group, she initially said she was shaking because she was cold in the room. Writer introduced topic and pt was able to offer ideas about what would be different if she were to wake up and feel better with her problems resolved. Pt then started becoming distressed about all of the things she needs to do when she returns home. Pt said she was no longer shaking from being cold, but rather due to feeling scared. Writer prompted pt on ideas for how to prioritize tasks and utilize support. Pt said this would be helpful, but she is currently too overwhelmed and it feels like too much - she became tearful when stating this. Pt's shaking increased and writer asked if there was a calming/coping strategy that has worked for her such as deep breathing. Pt did deep breathing and the shaking seemed to improve. Pt elected to leave group as she felt she could not participate further.

## 2022-05-18 NOTE — PROGRESS NOTES
Behavioral Health  Note   Behavioral Health  Spirituality Group Note     Unit 30    Name: Lorena Hayden    YOB: 1945   MRN: [id number]    Age: 76 year old     Patient attended -led group, which included discussion of spirituality, coping with illness and building resilience.   Patient attended group for 1.0 hrs.   patient minimally participated, but was respectful to the group process.     Johan [facility] [place]  Staff    Page [phone]

## 2022-05-18 NOTE — PLAN OF CARE
"Assessment/Intervention/Current Symtoms and Care Coordination:  -Chart review  -Team meeting - pt presents as sad, tearful, reports passive SI and needs frequent reassurance. Pt was encouraged to watch ECT video but after talking with her , decided not to pursue ECT due to concern for memory impairment. Pt has been asked to take part in an ECT research study and RN reports pt is hesitant but willing to listen. Pt endorses having a good night of sleep.   -Writer received voicemail from pt's  requesting call back.   -Writer spoke with Frances Ricks MD - fellow from ECT research department. He states he met with pt to discuss ECT and doesn't think pt has capacity to engage in ~8 hours of assessment. He states he compelted MoCA with pt and she scored 16/30 which he thinks is due to pt's level of anxiety versus impairment. Dr. Ricks states he spoke with both pt and her , and encouraged them to discuss whether to proceed and provide decision by tomorrow (within 24 hours) so pt can be prepped to start ECT on Friday 5/20. He says he told pt she could have MoCA done throughout ECT to monitor for change in cognitive functioning. Dr. Ricks provided phone number: [phone] and asks that someone from team check in with pt tomorrow about decision and let him know.   -Writer returned call to pt's  and asked for collateral information about when he noticed pt's symptoms start to worsen. He says it happened quick and didn't take long for meds to stop working. Cole says he was part of conversation with Dr. Ricks and had a good talk with him. He feels this provider knows what he's doing and explained ECT in \"bridget's terms\" so there were some things he didn't understand a lot of but he thinks pt should move forward with ECT. He says pt had positive results from the first time she did ECT based on the records, as he has trouble recalling how it went for her given it was about 10 years ago. He says pt " called him again after speaking with Dr. Ricks and they already made a decision that she should do it. Cole says he told pt since it will be a couple weeks before getting the full effect of her medications, she may as well start ECT at the same time. He says pt thought that sounded like a good plan. Cole says before talking with Dr. Ricks, he felt skeptical about ECT. He asked if he needs to be here for ECT on Friday and writer explained he won't need to be but could visit in the evenings. He says he feels badly he hasn't been to visit pt and shared he's just talked with her on the phone. He says he doesn't like driving at night due to living in [place] and recovering from spinal myelopathy.   -Writer provided name of ECT fellow to pt per her request.   Current Symptoms include the following: SI and anxious  Precautions: SI     Discharge Plan or Goal:  Pending stabilization & development of a safe discharge plan.  Considerations include: Return home with outpatient providers     Barriers to Discharge:  Patient presents following SI with plan to overdose on medications in context of worsening depression and concern that medications are not improving symptoms. She reports feeling lonely and struggles to keep up with daily activities. Patient requires symptom stabilization, medication management, and increased support at time of discharge.      Referral Status:  Referrals TBD     Legal Status:  Patient is voluntary     Contacts:   - Cole Boyerar (phone: [phone]) - CARLOS MANUEL obtained 5/16/22  Son - Kadeem Boyerar (phone: [phone]) - CARLOS MANUEL obtained 5/16/22  Daughter-in-law - Shannon Jackelyn (phone: [phone]) - CARLOS MANUEL obtained 5/16/22     Upcoming Meetings/Important Dates:  N/A     Rationale for SIO/No Roommate Order:  Patient is not on SIO.  Patient has current roommate.

## 2022-05-18 NOTE — PLAN OF CARE
"Problem: Behavioral Health Plan of Care  Goal: Optimal Comfort and Wellbeing  Outcome: Ongoing, Progressing  Intervention: Provide Person-Centered Care  Goal: Optimized Coping Skills in Response to Life Stressors  Outcome: Ongoing, Progressing    At initial assessment patient expressed that she slept well and was feeling better.  She denies anxiety, depression, SI, HI, SIB, and hallucinations.  Patient has increasing anxiety this morning.  Patient reports increasing anxiety when being around other patients who are loud or agitated.  She expresses feeling worried about everything she has to do when she goes home.  PRN hydroxyzine 25 mg administered at 1113.  Essential oil, deep breathing, and radio also provided for patient.  Patient reports that interventions are effective but anxiety \"comes and goes.\"  She is intermittently tearful and restless.  Tremor in hand is observed.  Patient is medication compliant and remains safe on unit.  Will continue to monitor.  Sivan Arteaga RN            "

## 2022-05-18 NOTE — PLAN OF CARE
Goal Outcome Evaluation:        Problem: Sleep Disturbance (Depressive Signs/Symptoms)  Goal: Improved Sleep (Depressive Signs/Symptoms)  Outcome: Ongoing, Progressing  Intervention: Promote Healthy Sleep Hygiene  Recent Flowsheet Documentation  Taken 5/18/2022 0600 by Alexia Darling RN  Sleep Hygiene Promotion:   awakenings minimized   noise level reduced     Patient appeared to be asleep for 7 hours during safety checks this shift. No complaints or concerns voiced by patient or noted by staff. Will continue to monitor and update if there are changes.

## 2022-05-18 NOTE — PROGRESS NOTES
"[facility], [place]   Psychiatric Progress Note        Interim History:   The patient's care was discussed with the treatment team during the daily team meeting and/or staff's chart notes were reviewed.  Staff report patient slept for about 7 hours. Lorena reported feeling less depressed and connected this with getting better sleep, but still highly anxious. She again discussed ECT with her  and decided not to do it due to concerns of having lasting memory deficit.    Met with patient: she was seen in interview room. Patient appeared to be very anxious and appeared to be close to panic attack. She was able to talk to me for a short time only. Confirmed that she would not do ECT and would prefer to focus on changing her meds. Because of severe anxiety we had to interrupt our meeting and I talked to the patient 2nd time in a couple of hours. Lorena asked about participating in 55+program. I in presence of RN explained to Lorena what participation would involve and Lorena indicated that she understood and was interested in participating. Her processing speed was slow, Lorena seemed to be having a lot of difficulties understanding even simple concepts and making decisions. She asked about discharge date, then, voiced concern about how she would do back home and whether she would be safe at home. I encouraged Lorena to take: \"one day at a time\" approach and not to try to discharge from hospital before she is ready. She had no further questions or concerns.         Medications:       calcium carbonate 600 mg-vitamin D 400 units  1 tablet Oral Daily     DULoxetine  20 mg Oral Daily     mirtazapine  15 mg Oral At Bedtime     simvastatin  40 mg Oral At Bedtime     timolol maleate  1 drop Both Eyes Daily     venlafaxine  37.5 mg Oral Daily with breakfast          Allergies:     Allergies   Allergen Reactions     Horse Allergy      Allergic to horse hair          Labs:     No results " found for this or any previous visit (from the past 24 hour(s)).       Psychiatric Examination:     BP (!) 142/80 (BP Location: Right leg, Patient Position: Sitting, Cuff Size: Adult Regular)   Pulse 94   Temp 97.6  F (36.4  C) (Temporal)   Resp 16   Wt 65.1 kg (143 lb 8 oz)   SpO2 96%   BMI 24.63 kg/m    Weight is 143 lbs 8 oz  Body mass index is 24.63 kg/m .    Orthostatic Vitals  Report      Most Recent      Sitting Orthostatic /84 05/17 0921    Sitting Orthostatic Pulse (bpm) 91 05/17 0921    Standing Orthostatic /84 05/17 0921    Standing Orthostatic Pulse (bpm) 97 05/17 0921           Appearance: awake, alert, dressed in hospital scrubs and appeared older than stated age  Attitude:  somewhat cooperative  Eye Contact:  fair  Mood:  anxious and sad   Affect: constricted, at times anxious and dramatic  Speech:  decreased prosody  Psychomotor Behavior:  no evidence of tardive dyskinesia, dystonia, or tics  Throught Process:  linear  Associations:  no loose associations  Thought Content:  passive suicidal ideation present  Insight:  limited  Judgement:  limited  Oriented to:  time, person, and place  Attention Span and Concentration: partial  Recent and Remote Memory: mildly impaired    Clinical Global Impressions  First: 6/4 5/17/2022      Most recent:            Precautions:     Behavioral Orders   Procedures     Code 1 - Restrict to Unit     Electroconvulsive therapy     Routine Programming     As clinically indicated     Status 15     Every 15 minutes.     Suicide precautions     Patients on Suicide Precautions should have a Combination Diet ordered that includes a Diet selection(s) AND a Behavioral Tray selection for Safe Tray - with utensils, or Safe Tray - NO utensils            DIagnoses:     1.  Major depressive disorder, recurrent, moderate severity.  2.  Unspecified anxiety disorder.    3.  Suicidal ideation.         Plan:     Changed her mind about doing ECT. Will refer to 55+ program.  No med changes today 5/18/2022. Will continue to provide support and structure.

## 2022-05-18 NOTE — PLAN OF CARE
"   05/17/22 2100   Group Therapy Session   Group Attendance attended group session   Time Session Began 1700   Time Session Ended 1745   Total Time patient participated (minutes) 40   Total # Attendees 7   Group Type expressive therapy  (art therapy)   Group Topic Covered emotions/expression   Group Session Detail image collage   Patient Response/Contribution cooperative with task;organized;discussed personal experience with topic   Patient Response Detail \"Kaylynn\" presented as calm and cooperative. She participated in making a collage of words and images representing her interests and appeared focused while working. She shared briefly with the group and interacted appropriately with peers.   Goal Outcome Evaluation:                      "

## 2022-05-18 NOTE — TELEPHONE ENCOUNTER
OP UR received phone call from Marylu at  wanting to provide auth for IP. Wtr transferred her to IP UR at [phone] and also provide IP UR contact.

## 2022-05-18 NOTE — PLAN OF CARE
Occupational Therapy Group Note:     05/18/22 1300   Group Therapy Session   Group Attendance attended group session   Time Session Began 1115   Time Session Ended 1215   Total Time patient participated (minutes) 10  (no charge)   Total # Attendees 1-3   Group Type expressive therapy   Group Topic Covered coping skills/lifestyle management   Group Session Detail OT clinic   Patient Response/Contribution cooperative with task   Patient Response Detail Attended the last x10 minutes of group (no charge) and politely requested to just relax and listen to music, sharing that she enjoys Johnathon Gudino specifically. Reported feeling anxious regarding a new peer's behavior on the unit. Will continue to assess.

## 2022-05-19 DIAGNOSIS — F33.2 MAJOR DEPRESSIVE DISORDER, RECURRENT SEVERE WITHOUT PSYCHOTIC FEATURES (H): Primary | ICD-10-CM

## 2022-05-19 LAB — SARS-COV-2 RNA RESP QL NAA+PROBE: NEGATIVE

## 2022-05-19 PROCEDURE — H2032 ACTIVITY THERAPY, PER 15 MIN: HCPCS

## 2022-05-19 PROCEDURE — 250N000013 HC RX MED GY IP 250 OP 250 PS 637: Performed by: PSYCHIATRY & NEUROLOGY

## 2022-05-19 PROCEDURE — G0177 OPPS/PHP; TRAIN & EDUC SERV: HCPCS

## 2022-05-19 PROCEDURE — 124N000002 HC R&B MH UMMC

## 2022-05-19 PROCEDURE — 93010 ELECTROCARDIOGRAM REPORT: CPT | Performed by: INTERNAL MEDICINE

## 2022-05-19 PROCEDURE — 99232 SBSQ HOSP IP/OBS MODERATE 35: CPT | Performed by: PSYCHIATRY & NEUROLOGY

## 2022-05-19 PROCEDURE — U0003 INFECTIOUS AGENT DETECTION BY NUCLEIC ACID (DNA OR RNA); SEVERE ACUTE RESPIRATORY SYNDROME CORONAVIRUS 2 (SARS-COV-2) (CORONAVIRUS DISEASE [COVID-19]), AMPLIFIED PROBE TECHNIQUE, MAKING USE OF HIGH THROUGHPUT TECHNOLOGIES AS DESCRIBED BY CMS-2020-01-R: HCPCS | Performed by: PSYCHIATRY & NEUROLOGY

## 2022-05-19 PROCEDURE — 93005 ELECTROCARDIOGRAM TRACING: CPT

## 2022-05-19 RX ORDER — VENLAFAXINE HYDROCHLORIDE 75 MG/1
75 TABLET, EXTENDED RELEASE ORAL
Status: DISCONTINUED | OUTPATIENT
Start: 2022-05-20 | End: 2022-05-25

## 2022-05-19 RX ADMIN — MIRTAZAPINE 15 MG: 15 TABLET, FILM COATED ORAL at 21:38

## 2022-05-19 RX ADMIN — CALCIUM CARBONATE 600 MG (1,500 MG)-VITAMIN D3 400 UNIT TABLET 1 TABLET: at 08:15

## 2022-05-19 RX ADMIN — SIMVASTATIN 40 MG: 20 TABLET, FILM COATED ORAL at 21:38

## 2022-05-19 RX ADMIN — DULOXETINE HYDROCHLORIDE 20 MG: 20 CAPSULE, DELAYED RELEASE ORAL at 08:15

## 2022-05-19 RX ADMIN — VENLAFAXINE HYDROCHLORIDE 37.5 MG: 37.5 TABLET, EXTENDED RELEASE ORAL at 08:15

## 2022-05-19 RX ADMIN — TRAZODONE HYDROCHLORIDE 50 MG: 50 TABLET ORAL at 21:39

## 2022-05-19 ASSESSMENT — ACTIVITIES OF DAILY LIVING (ADL)
ORAL_HYGIENE: INDEPENDENT
LAUNDRY: UNABLE TO COMPLETE
DRESS: SCRUBS (BEHAVIORAL HEALTH)
HYGIENE/GROOMING: INDEPENDENT

## 2022-05-19 NOTE — PROGRESS NOTES
[facility], [place]   Psychiatric Progress Note        Interim History:   The patient's care was discussed with the treatment team during the daily team meeting and/or staff's chart notes were reviewed.  Staff report patient slept for about 7 hours. Lorena reported feeling highly anxious because of ongoing commotion on this unit. She, however, changed her mind about ECT and decided to do it. Was seen by ECT psychiatrist.     Met with patient: she was seen in milieu. Lorena admitted that today she was not the best. Reported very high anxiety. She agreed with my suggestion to increase her Effexor ER dose. She also confirmed that she would do ECT and hoped that she could start tomorrow. Lorena reported only passive Suicidal ideation and contracted for safety at this facility. She had no further questions or concerns. EKG and Covid 19 were ordered. Results were reviewed.         Medications:       calcium carbonate 600 mg-vitamin D 400 units  1 tablet Oral Daily     DULoxetine  20 mg Oral Daily     mirtazapine  15 mg Oral At Bedtime     simvastatin  40 mg Oral At Bedtime     timolol maleate  1 drop Both Eyes Daily     venlafaxine  37.5 mg Oral Daily with breakfast          Allergies:     Allergies   Allergen Reactions     Horse Allergy      Allergic to horse hair          Labs:     Recent Results (from the past 24 hour(s))   EKG 12-lead, complete    Collection Time: 05/19/22 10:44 AM   Result Value Ref Range    Systolic Blood Pressure  mmHg    Diastolic Blood Pressure  mmHg    Ventricular Rate 96 BPM    Atrial Rate 96 BPM    ME Interval 132 ms    QRS Duration 76 ms     ms    QTc 414 ms    P Axis 63 degrees    R AXIS 24 degrees    T Axis 66 degrees    Interpretation ECG       Sinus rhythm  Normal ECG  When compared with ECG of 06-DEC-2012 22:54,  No significant change was found     Asymptomatic COVID-19 Virus (Coronavirus) by PCR Nasopharyngeal    Collection Time: 05/19/22 11:34 AM     Specimen: Nasopharyngeal; Swab   Result Value Ref Range    SARS CoV2 PCR Negative Negative          Psychiatric Examination:     /82   Pulse 93   Temp 98  F (36.7  C) (Temporal)   Resp 18   Wt 65.7 kg (144 lb 14.4 oz)   SpO2 97%   BMI 24.87 kg/m    Weight is 144 lbs 14.4 oz  Body mass index is 24.87 kg/m .    Orthostatic Vitals  Report      Most Recent      Sitting Orthostatic /81 05/19 0851    Sitting Orthostatic Pulse (bpm) 92 05/19 0851    Standing Orthostatic /78 05/19 0851    Standing Orthostatic Pulse (bpm) 98 05/19 0851         Appearance: awake, alert, dressed in hospital scrubs and appeared older than stated age  Attitude:  somewhat cooperative  Eye Contact:  fair  Mood:  anxious and sad   Affect: constricted, at times anxious and dramatic  Speech:  decreased prosody  Psychomotor Behavior:  no evidence of tardive dyskinesia, dystonia, or tics  Throught Process:  linear  Associations:  no loose associations  Thought Content:  passive suicidal ideation present  Insight: mildly impaired  Judgement: improving?  Oriented to:  time, person, and place  Attention Span and Concentration: partial  Recent and Remote Memory: mildly impaired    Clinical Global Impressions  First: 6/4 5/17/2022      Most recent:            Precautions:     Behavioral Orders   Procedures     Code 1 - Restrict to Unit     Electroconvulsive therapy     Routine Programming     As clinically indicated     Status 15     Every 15 minutes.     Suicide precautions     Patients on Suicide Precautions should have a Combination Diet ordered that includes a Diet selection(s) AND a Behavioral Tray selection for Safe Tray - with utensils, or Safe Tray - NO utensils            DIagnoses:     1.  Major depressive disorder, recurrent, moderate severity.  2.  Unspecified anxiety disorder.    3.  Suicidal ideation.         Plan:     Changed her mind about doing ECT and will have her first ECT tomorrow. Was referred to 55+ program.  Intake is aware and promised to interview patient shortly. Will increase Effexor ER dose on 5/19/2022. Will continue to provide support and structure.

## 2022-05-19 NOTE — PROGRESS NOTES
CLINICAL NUTRITION SERVICES - ASSESSMENT NOTE     Nutrition Prescription    RECOMMENDATIONS FOR MDs/PROVIDERS TO ORDER:  Please consult RDN if further nutrition needs arise.    Malnutrition Status:    Patient does not meet two of the established criteria necessary for diagnosing malnutrition.    Recommendations already ordered by Registered Dietitian (RD):  None at this time. RDN to sign off.       REASON FOR ASSESSMENT  Lorena Hayden is a/an 76 year old female assessed by the dietitian for Admission Nutrition Risk Screen for unsure wt loss and decreased appetite.    [facility]  Lorena Hayden is a 76 year old woman with a history of depression, started in her mid 60s. Her depression was relatively well-controlled on duloxetine until February 2022, since when she has had progressive depression with suicidal thoughts without an identifiable trigger. Multiple medication trials yielded to no improvement (Increased duloxetine, added aripiprazole then bupropion, now cross titrating to desvenlafaxine). She had a recent psychiatric hospitalization in [facility] in [place], discharged only two weeks ago. However, depression persisted, she had more thoughts that life was not worth living and thoughts of suicide.  She also reported difficulties with sleep, low energy, poor appetite with significant weight loss of about 20 pounds.    NUTRITION HISTORY  Lorena reports she is eating well and has no nutrition concerns at this time. She reports her typical weight to be between 150-155# (no significant weight loss). At home she eats 3 meals per day and continues to do that during this admission.    CURRENT NUTRITION ORDERS  Diet: Regular  Intake/Tolerance: eating well per patient    LABS  Labs reviewed 5/19  Glucose 133H (5/14)    MEDICATIONS  Medications reviewed 5/19  Calcium Carbonate 600mg - Vitamin D 400 units  Duloxetine  Remeron  Zocor  Atarax  Desyrel    ANTHROPOMETRICS  Height: 162.6cm  Most Recent  Weight: 65.7 kg (144 lb 14.4 oz)    IBW: 54.6 kg  %IBW: 120%  BMI: Normal BMI  Weight History: Difficult to assess weight trends since no recent weight history, however, patient reports typical weight of 150-155#.   Wt Readings from Last 5 Encounters:   05/19/22 65.7 kg (144 lb 14.4 oz)   05/13/22 64.9 kg (143 lb)   07/07/16 72.6 kg (160 lb)   06/29/16 74.3 kg (163 lb 12.8 oz)   06/06/16 73.5 kg (162 lb)     Dosing Weight: 65.7 kg (actual BW)    ASSESSED NUTRITION NEEDS  Estimated Energy Needs: 9032-6443 kcals/day (25 - 30 kcals/kg)  Justification: Maintenance  Estimated Protein Needs: 53-66 grams protein/day (0.8 - 1 grams of pro/kg)  Justification: Maintenance  Estimated Fluid Needs: 9236-7443 mL/day (1 mL/kcal)   Justification: Maintenance    PHYSICAL FINDINGS  See malnutrition section below.    MALNUTRITION  % Intake: No decreased intake noted  % Weight Loss: Weight loss does not meet criteria  Subcutaneous Fat Loss: None observed, visually  Muscle Loss: None observed, visually  Fluid Accumulation/Edema: None noted  Malnutrition Diagnosis: Patient does not meet two of the established criteria necessary for diagnosing malnutrition    NUTRITION DIAGNOSIS  No nutrition diagnosis at this time.    INTERVENTIONS  Implementation  Provided education on RDN role in care and nutrient needs.    Goals  Patient to consume % of nutritionally adequate meal trays TID, or the equivalent with supplements/snacks.     Monitoring/Evaluation  Progress toward goals will be monitored and evaluated per protocol.  Paty Granados, MPH, RDN, LD  Behavioral Clinical Dietitian  Mental Health Pager (M-F): [phone]  On Call Pager (weekends only): [phone]

## 2022-05-19 NOTE — PLAN OF CARE
Problem: Activity and Energy Impairment (Depressive Signs/Symptoms)  Goal: Optimized Energy Level (Depressive Signs/Symptoms)  Outcome: Ongoing, Not Progressing  Flowsheets (Taken 5/19/2022 1135)  Mutually Determined Action Steps (Optimized Energy Level):   dresses/ready for morning activity   grooms self without prompting   Goal Outcome Evaluation:      Patient up early Ruminating about her therapist christopher made for June. Needed much reassurance and guidance on what to to. She has a hard time thinking about today and near future  instead of worrying about future. She was able to call daughter in-law and cancel christopher until a later day. She has agreed to start Ect tomorrow.and was compliant with Covid swab, and Ekg ordered. She has been attending groups today with participation. Blunted affect, mood anxious, will continue to offer support. She denies any safety concerns at this time.

## 2022-05-19 NOTE — PROGRESS NOTES
05/18/22 2200   Group Therapy Session   Group Attendance attended group session   Time Session Began 1715   Time Session Ended 1800   Total Time patient participated (minutes) 45   Total # Attendees 4   Group Type psychotherapeutic   Group Topic Covered self-care activities;structured socialization   Group Session Detail process/ values/ self awareness   Patient Response/Contribution cooperative with task;discussed personal experience with topic;listened actively   Patient Response Detail pt reported mood as mixture/ nervous, ups and downs. 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

## 2022-05-19 NOTE — CONSULTS
Consult acknowledged. Message left for CTC to coordinate DA interview.     Whitney Fortune, [facility]  Mental Health and Addiction Evaluation Center  Phone: [phone]                - over last few days, more weakness than usual. difficulty walking, sleeping more than usual.   - has not felt this tired after previous chemo cycles - feels extreme weakness this time.

## 2022-05-19 NOTE — PLAN OF CARE
05/19/22 1432   Group Therapy Session   Group Attendance attended group session   Time Session Began 1115   Time Session Ended 1200   Total Time patient participated (minutes) 45   Total # Attendees 5   Group Type life skill;other (see comments)  (OT)   Group Topic Covered balanced lifestyle;coping skills/lifestyle management;self-care activities;structured socialization   Group Session Detail OT - Coping   Patient Response/Contribution able to recall/repeat info presented;expressed understanding of topic;cooperative with task;discussed personal experience with topic   Patient Response Detail Topic of group was identification of various skills and techniques that are helpful for health management such as affirmations and attitudes, supports and community services, friends and family, and skills. Pt was able to give examples in all the areas and noted several things that she does try to engage in and do. She was open to listen to others. She was generally quiet with flat affect.       Goal Outcome Evaluation: Progressing and ongoing

## 2022-05-19 NOTE — PLAN OF CARE
"Assessment/Intervention/Current Symtoms and Care Coordination:  -Chart review  -Team meeting - pt presents as \"nervous wreck\". She has been experiencing distress and anxiety surrounding ECT and discussion of aftercare. Pt will need to have EKG, negative COVID test, and be medically cleared before she's able to begin ECT, potentially tomorrow 5/20.   -Writer received message from Whitney Fortune from the assessment center wondering if pt would be ready to complete DA today or tomorrow. Writer explained pt would be able to complete assessment, but she is expected to begin ECT tomorrow so it may be better to hold off for the DA until team has better idea of when pt will be discharged. Whitney confirmed it would be better to wait until pt is closer to discharging before having her complete DA. She says consult will be closed in a week and provider would need to place a new consult order when pt is ready to have DA.   -Writer met with pt who was reading in the dining area with magnifying glass. She said it was helpful to have as her reading glasses weren't working so well and she was so bored between groups. Pt reportedly did not remember who her  was at the hospital so writer reminded pt of role and it seemed pt was familiar with writer as she recognized writer's glasses. Writer asked pt if she'd like help with cancelling or rescheduling her therapy appointment on 6/2 as she expressed concern about this to RN. She says she spoke with her daughter in law who agreed to cancel appointment. Writer explained process for DA prior to expected discharge and when pt would start 55+ program and she seemed understanding of this. Pt did not recall that writer spoke with her  yesterday and writer reminded her of conversation from yesterday. Pt's teeth were chattering during conversation and she appeared anxious. She thanked writer for stopping by and resumed reading.   Current Symptoms include the following: SI " and anxious  Precautions: SI     Discharge Plan or Goal:  Pending stabilization & development of a safe discharge plan.  Considerations include: Return home with outpatient providers     Barriers to Discharge:  Patient presents following SI with plan to overdose on medications in context of worsening depression and concern that medications are not improving symptoms. She reports feeling lonely and struggles to keep up with daily activities. Patient requires symptom stabilization, medication management, and increased support at time of discharge.      Referral Status:  Referrals TBD     Legal Status:  Patient is voluntary     Contacts:   - Cole Jackelyn (phone: [phone]) - CARLOS MANUEL obtained 5/16/22  Son - Kadeem Jackelyn (phone: [phone]) - CARLOS MANUEL obtained 5/16/22  Daughter-in-law - Shannon Jackelyn (phone: [phone]) - CARLOS MANUEL obtained 5/16/22     Upcoming Meetings/Important Dates:  N/A     Rationale for SIO/No Roommate Order:  Patient is not on SIO.  Patient has current roommate.

## 2022-05-19 NOTE — CONSULTS
Clinician discussed referral with CTC. CTC stated patient will start ECT tomorrow and not yet ready to be assessed. CTC will request provider place new consult when pt is ready to be seen.       KAREN Ordaz, [facility]  Mental Health and Addiction Evaluation Center  Phone: [phone]

## 2022-05-19 NOTE — PROGRESS NOTES
ECT follow-up    Complete ECT clearance awaiting EKG and Covid test ordered today.    Clarify if Antibiotic treatment is warranted for recent positive Urine culture (I didn't see the resolution of that issue in the chart)    ECT plan has been entered and the service will call friday morning and give specific appointment slot.      Stop all food, milk, and tobacco 8 hours before treatment (usually midnight)    Ok to drink clear liquids until 2 hours before treatment (about 5:30 am).  This includes water, clear juice, Gatorade, clear soda, black coffee or clear tea without milk.      Ok to have gum or hard candy until 2 hours before treatment    No driving for 24 hours after treatment.  If you have more than one treatment within a week, no driving until 7 days after your last ECT treatment.        Do not take these medications the evening before an ECT is planned: Avoid benzodizipines or lithium the night before planned tretament  Take these medications in the morning before ECT (take all other medications after you are done with ECT): can utilize her prn's for comfort or pre-procedure anxiety    Marvin March MD

## 2022-05-19 NOTE — PLAN OF CARE
05/19/22 1428   Group Therapy Session   Group Attendance attended group session   Time Session Began 1015   Time Session Ended 1100   Total Time patient participated (minutes) 45   Total # Attendees 5   Group Type task skill;other (see comments)  (OT)   Group Topic Covered cognitive activities;leisure exploration/use of leisure time;structured socialization;relaxation techniques   Group Session Detail OT Clinic   Patient Response/Contribution able to recall/repeat info presented;cooperative with task;discussed personal experience with topic   Patient Response Detail Pt actively participated in occupational therapy clinic to facilitate coping skill exploration, creative expression within personally meaningful activities, and clinical observation of social, cognitive, and kinesthetic performance skills.   Pt response: Pt initially noted feeling overwhelmed and could not decide what to choose next to work on. She then on her own initiated a structured creative project. She was able to make color choices and focused on fine detail. She was hesitant initially to mix colors and then was able to do so with minimal assistance. She was quiet and did not interact with others. She did share her work history and interests of activities.      Goal Outcome Evaluation: Progressing and on going

## 2022-05-19 NOTE — PLAN OF CARE
Problem: Sleep Disturbance (Depressive Signs/Symptoms)  Goal: Improved Sleep (Depressive Signs/Symptoms)  Outcome: Ongoing, Progressing      Patient appeared to be asleep at the start of the night shift and remains quietly resting in bed when staff observed her during rounds.  She offered no complaints or concerns when awake and no PRN medications were given during hs.  Approximately 7 hours of sleep was recorded tonight.

## 2022-05-19 NOTE — PLAN OF CARE
05/19/22 1435   Group Therapy Session   Group Attendance attended group session   Time Session Began 1300   Time Session Ended 1345   Total Time patient participated (minutes) 45   Total # Attendees 3   Group Type life skill;other (see comments)  (OT)   Group Topic Covered cognitive activities;structured socialization   Group Session Detail OT - Topic   Patient Response/Contribution able to recall/repeat info presented;cooperative with task   Patient Response Detail Pt actively participated in a structured occupational therapy group with a focus on visuospatial problem solving and social engagement via a group game. Pt demonstrated understanding of the novel 3-step task after an initial explanation and demonstration and a few reminders/assistance. Pt remained focused and engaged throughout the full duration of group. She noted that it was enjoyable and that she did enjoy playing games with others. She shared more about her depressive symptoms and how her medications stopped working and she has not been able to find another that works at this time. Staff also gave her magnifier to assist in reading as she noted her prescription glasses were still making things blurry for her. She noted this was helpful and would be another activity should could engage in on the unit.       Goal Outcome Evaluation: Progressing and ongoing

## 2022-05-20 ENCOUNTER — ANESTHESIA (OUTPATIENT)
Dept: BEHAVIORAL HEALTH | Facility: CLINIC | Age: 77
DRG: 885 | End: 2022-05-20
Payer: COMMERCIAL

## 2022-05-20 ENCOUNTER — ANESTHESIA EVENT (OUTPATIENT)
Dept: BEHAVIORAL HEALTH | Facility: CLINIC | Age: 77
DRG: 885 | End: 2022-05-20
Payer: COMMERCIAL

## 2022-05-20 ENCOUNTER — APPOINTMENT (OUTPATIENT)
Dept: BEHAVIORAL HEALTH | Facility: CLINIC | Age: 77
DRG: 885 | End: 2022-05-20
Attending: PSYCHIATRY & NEUROLOGY
Payer: COMMERCIAL

## 2022-05-20 LAB — SARS-COV-2 RNA RESP QL NAA+PROBE: NEGATIVE

## 2022-05-20 PROCEDURE — 99232 SBSQ HOSP IP/OBS MODERATE 35: CPT | Mod: 25 | Performed by: PSYCHIATRY & NEUROLOGY

## 2022-05-20 PROCEDURE — 90870 ELECTROCONVULSIVE THERAPY: CPT

## 2022-05-20 PROCEDURE — 90870 ELECTROCONVULSIVE THERAPY: CPT | Performed by: PSYCHIATRY & NEUROLOGY

## 2022-05-20 PROCEDURE — 250N000011 HC RX IP 250 OP 636: Performed by: STUDENT IN AN ORGANIZED HEALTH CARE EDUCATION/TRAINING PROGRAM

## 2022-05-20 PROCEDURE — 124N000002 HC R&B MH UMMC

## 2022-05-20 PROCEDURE — 250N000009 HC RX 250: Performed by: PSYCHIATRY & NEUROLOGY

## 2022-05-20 PROCEDURE — 250N000009 HC RX 250: Performed by: STUDENT IN AN ORGANIZED HEALTH CARE EDUCATION/TRAINING PROGRAM

## 2022-05-20 PROCEDURE — G0177 OPPS/PHP; TRAIN & EDUC SERV: HCPCS

## 2022-05-20 PROCEDURE — GZB0ZZZ ELECTROCONVULSIVE THERAPY, UNILATERAL-SINGLE SEIZURE: ICD-10-PCS | Performed by: PSYCHIATRY & NEUROLOGY

## 2022-05-20 PROCEDURE — 87635 SARS-COV-2 COVID-19 AMP PRB: CPT | Performed by: PSYCHIATRY & NEUROLOGY

## 2022-05-20 PROCEDURE — 250N000011 HC RX IP 250 OP 636: Performed by: PSYCHIATRY & NEUROLOGY

## 2022-05-20 PROCEDURE — 250N000013 HC RX MED GY IP 250 OP 250 PS 637: Performed by: PSYCHIATRY & NEUROLOGY

## 2022-05-20 PROCEDURE — 370N000017 HC ANESTHESIA TECHNICAL FEE, PER MIN

## 2022-05-20 RX ORDER — HYDRALAZINE HYDROCHLORIDE 20 MG/ML
2.5-5 INJECTION INTRAMUSCULAR; INTRAVENOUS EVERY 10 MIN PRN
Status: DISCONTINUED | OUTPATIENT
Start: 2022-05-20 | End: 2022-05-20

## 2022-05-20 RX ORDER — ONDANSETRON 4 MG/1
4 TABLET, ORALLY DISINTEGRATING ORAL EVERY 30 MIN PRN
Status: DISCONTINUED | OUTPATIENT
Start: 2022-05-20 | End: 2022-05-20

## 2022-05-20 RX ORDER — NICARDIPINE HCL-0.9% SOD CHLOR 1 MG/10 ML
SYRINGE (ML) INTRAVENOUS PRN
Status: DISCONTINUED | OUTPATIENT
Start: 2022-05-20 | End: 2022-05-20

## 2022-05-20 RX ORDER — ONDANSETRON 2 MG/ML
4 INJECTION INTRAMUSCULAR; INTRAVENOUS EVERY 30 MIN PRN
Status: DISCONTINUED | OUTPATIENT
Start: 2022-05-20 | End: 2022-05-20

## 2022-05-20 RX ORDER — ETOMIDATE 2 MG/ML
INJECTION INTRAVENOUS PRN
Status: DISCONTINUED | OUTPATIENT
Start: 2022-05-20 | End: 2022-05-20

## 2022-05-20 RX ORDER — SODIUM CHLORIDE, SODIUM LACTATE, POTASSIUM CHLORIDE, CALCIUM CHLORIDE 600; 310; 30; 20 MG/100ML; MG/100ML; MG/100ML; MG/100ML
INJECTION, SOLUTION INTRAVENOUS CONTINUOUS
Status: DISCONTINUED | OUTPATIENT
Start: 2022-05-20 | End: 2022-05-20

## 2022-05-20 RX ORDER — LABETALOL HYDROCHLORIDE 5 MG/ML
10 INJECTION, SOLUTION INTRAVENOUS
Status: DISCONTINUED | OUTPATIENT
Start: 2022-05-20 | End: 2022-05-20

## 2022-05-20 RX ORDER — DIMENHYDRINATE 50 MG/ML
25 INJECTION, SOLUTION INTRAMUSCULAR; INTRAVENOUS
Status: DISCONTINUED | OUTPATIENT
Start: 2022-05-20 | End: 2022-05-20

## 2022-05-20 RX ADMIN — Medication 14 MG: at 12:14

## 2022-05-20 RX ADMIN — Medication 500 MCG: at 12:16

## 2022-05-20 RX ADMIN — Medication 500 MCG: at 12:20

## 2022-05-20 RX ADMIN — SIMVASTATIN 40 MG: 20 TABLET, FILM COATED ORAL at 21:50

## 2022-05-20 RX ADMIN — MIDAZOLAM HYDROCHLORIDE 1 MG: 1 INJECTION, SOLUTION INTRAMUSCULAR; INTRAVENOUS at 12:24

## 2022-05-20 RX ADMIN — SUCCINYLCHOLINE CHLORIDE 60 MG: 20 INJECTION, SOLUTION INTRAMUSCULAR; INTRAVENOUS; PARENTERAL at 12:15

## 2022-05-20 RX ADMIN — TRAZODONE HYDROCHLORIDE 50 MG: 50 TABLET ORAL at 21:50

## 2022-05-20 RX ADMIN — MIRTAZAPINE 15 MG: 15 TABLET, FILM COATED ORAL at 21:50

## 2022-05-20 RX ADMIN — TIMOLOL MALEATE 1 DROP: 5 SOLUTION OPHTHALMIC at 09:47

## 2022-05-20 RX ADMIN — HYDROXYZINE HYDROCHLORIDE 25 MG: 25 TABLET, FILM COATED ORAL at 20:34

## 2022-05-20 RX ADMIN — DULOXETINE HYDROCHLORIDE 20 MG: 20 CAPSULE, DELAYED RELEASE ORAL at 13:03

## 2022-05-20 RX ADMIN — VENLAFAXINE HYDROCHLORIDE 75 MG: 75 TABLET, EXTENDED RELEASE ORAL at 13:03

## 2022-05-20 RX ADMIN — CALCIUM CARBONATE 600 MG (1,500 MG)-VITAMIN D3 400 UNIT TABLET 1 TABLET: at 13:03

## 2022-05-20 ASSESSMENT — ACTIVITIES OF DAILY LIVING (ADL)
ORAL_HYGIENE: INDEPENDENT
ORAL_HYGIENE: INDEPENDENT
DRESS: SCRUBS (BEHAVIORAL HEALTH)
HYGIENE/GROOMING: INDEPENDENT
DRESS: INDEPENDENT;SCRUBS (BEHAVIORAL HEALTH)
LAUNDRY: UNABLE TO COMPLETE
HYGIENE/GROOMING: INDEPENDENT

## 2022-05-20 ASSESSMENT — LIFESTYLE VARIABLES: TOBACCO_USE: 0

## 2022-05-20 NOTE — PROGRESS NOTES
Patients VSS, A/O, IV removed, meets phase 2 criteria and is able to move to unit 30 at this time. Report given to BRITTANY Finnegan

## 2022-05-20 NOTE — PROGRESS NOTES
" 05/19/22 1900   Group Therapy Session   Time Session Began 1700   Time Session Ended 1745   Total Time patient participated (minutes) 45   Total # Attendees 3   Group Type expressive therapy   Group Topic Covered balanced lifestyle;relaxation techniques   Group Session Detail Evening Relaxation   Patient Response/Contribution cooperative with task   Patient Response Detail Cooperatively engaged in Evening Music Relaxation group to decrease anxiety and promote sleep.  Calm affect, engaged in session, responding well to the music.   Became tearful during a slow song (\"You Raise Me Up\" by Azael Colvin) near the end of session.  Presented with safe and successful emotional release through the music.  Positive participant.         "

## 2022-05-20 NOTE — PLAN OF CARE
"    Problem: Activity and Energy Impairment (Depressive Signs/Symptoms)  Goal: Optimized Energy Level (Depressive Signs/Symptoms)  Outcome: Ongoing, Progressing     Goal Outcome Evaluation:      Kandance has been visible in the community areas of the unit all evening. She participated in evening programing. She ate supper well. She is very anxious. She declined taking any medication for her anxiety.  She reports being anxious about ECT in the am and worried about her  visiting.  Her  Mohsen \"Does not want to drive here in the dark.\" Lorena was informed that on Saturday mohsen can visit during the day as there are daytime visiting hours. She is also very disappointed that some peers on the unit indicated that they would watch a movie with her and then went to bed. She is aware that she is having ECT in the AM. and should not eat or drink after midnight. Cayla denies SI/SIB/AH/VH.Cayla continues on Suicide precautions. Nursing to continue to support current plan of care.                    "

## 2022-05-20 NOTE — PROCEDURES
Procedure/Surgery Information   [facility]    Bedside Procedure Note  Date of Service (when I performed the procedure): 05/20/2022    Lorena Hayden is a 76 year old female patient.  1. Major depressive disorder, recurrent severe without psychotic features (H)      Past Medical History:   Diagnosis Date     Allergic rhinitis, cause unspecified      Anxiety      Depressive disorder      Temp: 97.7  F (36.5  C) Temp src: Oral BP: 115/76 Pulse: 91   Resp: 16 SpO2: 95 % O2 Device: None (Room air)      Procedures     Marvin March MD     Lorena Hayden is a 76 year old  year old female patient.  [id number]  @DX@    [facility]   ECT Procedure Note   05/20/2022    Patient Status: Inpatient    Is this the first in a series of 12 treatments?  Yes     Allergies   Allergen Reactions     Horse Allergy      Allergic to horse hair       Weight:  144 lbs 14.4 oz         Indications for ECT:   Medications ineffective         Clinical Narrative:   Lorena Hayden is a 76 year old woman with a history of depression, started in her mid 60s. Her depression was relatively well-controlled on duloxetine until February 2022, since when she has had progressive depression with suicidal thoughts without an identifiable trigger. Multiple medication trials yielded to no improvement (Increased duloxetine, added aripiprazole then bupropion, now cross titrating to desvenlafaxine). She had a recent psychiatric hospitalization in  in [place], discharged only two weeks ago. However, depression persisted, she had more thoughts that life was not worth living and thoughts of suicide.  She also reported difficulties with sleep, low energy, poor appetite with significant weight loss of about 20 pounds.  She finally came to the hospital and requested to be hospitalized.     Psychiatric History:   Diagnoses: Major Depressive  Disorder     Hospitalizations: Two former (first in 2013, last in 2022)     Suicide Attempts: None     Medication Trials: Ativan, trazodone, Klonopin, Lexapro, Wellbutrin, Celexa, Seroquel, Zoloft, Abilify, currently Effexor and Cymbalta. No TCAs or MAOIs.     Neuromodulation:  Right unilateral ultrabrief ECT x 19 sessions in 2012. She had a good response during treatment which lasted only several weeks after the discontinuation.     Psychotherapy:  None            Diagnosis:   Major depression         Assessment:   #1 she reports her depression is severe and decreases pleasure and motivation and she can be very paralyzed and indecisive with anxiety. ECT was helpful before         Pause for the Cause:     Right patient Yes   Right procedure/laterality settings: Yes          Intra-Procedure Documentation:       ECT #: 1   Treatment number this series: 1   Total treatment number: 20     Type of ECT:  Right, unilateral ultrabrief    ECT Medications:    Etomidate: 12mg  Succinyl Choline: 60mg  Versed 1mg for agitation    ECT Strip Summary:   Energy Level: 19.2 percent  Motor Seizure Duration: 28 seconds  EEG Seizure Duration: 110 seconds poor suppression    Complications: No     time for re-orientation:     Plan:   Continue ECT Q MWF at 115.2m C settings    Monitor depression severity with clinical assessment augmented with CUDOS every other treatment  Continue current medications    MD Rod

## 2022-05-20 NOTE — ANESTHESIA CARE TRANSFER NOTE
Patient: Lorena Hayden    Procedure: *ECT     Diagnosis: *Major depressive disorder, recurrent episode, moderate   Diagnosis Additional Information: No value filed.    Anesthesia Type:   General     Note:    Oropharynx: oropharynx clear of all foreign objects  Level of Consciousness: drowsy  Oxygen Supplementation: room air    Independent Airway: airway patency satisfactory and stable    Vital Signs Stable: post-procedure vital signs reviewed and stable  Report to RN Given: handoff report given  Patient transferred to: PACU    Handoff Report: Identifed the Patient, Identified the Reponsible Provider, Reviewed the pertinent medical history, Discussed the surgical course, Reviewed Intra-OP anesthesia mangement and issues during anesthesia, Set expectations for post-procedure period and Allowed opportunity for questions and acknowledgement of understanding      Vitals:  Vitals Value Taken Time   BP     Temp     Pulse     Resp     SpO2         Electronically Signed By: Petra Peters MD  May 20, 2022  12:29 PM

## 2022-05-20 NOTE — INTERVAL H&P NOTE
I have reviewed the surgical (or preoperative) H&P that is linked to this encounter, and examined the patient. There are no significant changes

## 2022-05-20 NOTE — PLAN OF CARE
05/20/22 1225   Group Therapy Session   Group Attendance attended group session   Time Session Began 1015   Time Session Ended 1100   Total Time patient participated (minutes) 45   Total # Attendees 3   Group Type task skill;other (see comments)  (OT)   Group Topic Covered relaxation techniques;leisure exploration/use of leisure time;coping skills/lifestyle management;cognitive activities   Group Session Detail OT Clinic   Patient Response/Contribution able to recall/repeat info presented;cooperative with task;expressed understanding of topic   Patient Response Detail Pt actively participated in occupational therapy clinic to facilitate coping skill exploration, creative expression within personally meaningful activities, and clinical observation of social, cognitive, and kinesthetic performance skills.   Pt response: Noted she was feeling anxious and depressed and difficulty with waiting for ECT. Did note benefits of distraction from engagement in structured creative task. She also was able to note the fine detailed task she had started yesterday was not the activity to try to engage in and chose to work on her other one - less choices and less detail. She then engaged in it for a few minutes at a time,would stop and note to staff how she was feeling and then return to work after staff offered support and validation. She also was open to try a creative meditative activity and noted benefits from that.       Goal Outcome Evaluation: Progressing and ongoing

## 2022-05-20 NOTE — PLAN OF CARE
Problem: Activity and Energy Impairment (Depressive Signs/Symptoms)  Goal: Optimized Energy Level (Depressive Signs/Symptoms)  Outcome: Ongoing, Progressing  Intervention: Optimize Energy Level     Problem: Feelings of Worthlessness, Hopelessness or Excessive Guilt (Depressive Signs/Symptoms)  Goal: Enhanced Self-Esteem and Confidence (Depressive Signs/Symptoms)  Outcome: Ongoing, Progressing    Upon initial approach patient is highly anxious and restless.  Continues to endorse anxiety and depression.  She was intermittently tearful.  Patient had first ECT today but had to wait until about 1130 to go to ECT.  Waiting for this made her nervous.  Staff attempted to distract patient.  She read, watched, television, and attended group.  Reassurance is frequently provided. Upon return from ECT patient appears much calmer.  She is up in milieu and watches television.  She engages appropriately with staff.  Patient expresses fear with loud patients on the unit.  States that patient across the lara from her scares her because he is loud.  Reassurance continues to be provided.  Patient is medication compliant and remains safe on unit.  Will continue to monitor.  Sivan Arteaga RN

## 2022-05-20 NOTE — PROGRESS NOTES
"Pt appeared to sleep 7 hours tonight.  Remains resting in bed at 7 am.  Affect appears blunted.  She remains NPO for ECT and states \"I am ready I guess.\"  She denies pain and any concerns at this time.  Pre-ECT vitals are completed.  Pt says she will use the bathroom prior to ECT.    "

## 2022-05-20 NOTE — ANESTHESIA POSTPROCEDURE EVALUATION
Patient: Lorena Hayden    Procedure: * No procedures listed *       Anesthesia Type:  General    Note:  Disposition: Inpatient   Postop Pain Control: Uneventful            Sign Out: Well controlled pain   PONV: No   Neuro/Psych: Uneventful            Sign Out: Acceptable/Baseline neuro status   Airway/Respiratory: Uneventful            Sign Out: Acceptable/Baseline resp. status   CV/Hemodynamics: Uneventful            Sign Out: Acceptable CV status; No obvious hypovolemia; No obvious fluid overload   Other NRE: NONE   DID A NON-ROUTINE EVENT OCCUR? No           Last vitals:  Vitals:    05/20/22 0700 05/20/22 1018 05/20/22 1224   BP: 113/65 115/76 129/64   Pulse: 103 91 95   Resp: 18 16 19   Temp: 36.8  C (98.2  F) 36.5  C (97.7  F) 36.5  C (97.7  F)   SpO2: 95% 95% 94%       Electronically Signed By: Petra Peters MD  May 20, 2022  12:40 PM

## 2022-05-20 NOTE — PLAN OF CARE
05/20/22 1429   Group Therapy Session   Group Attendance attended group session   Time Session Began 1315   Time Session Ended 1345   Total Time patient participated (minutes) 30 - no charge   Total # Attendees 3   Group Type life skill;other (see comments)  (OT)   Group Topic Covered coping skills/lifestyle management;relaxation techniques   Group Session Detail OT - Topic   Patient Response/Contribution able to recall/repeat info presented;cooperative with task   Patient Response Detail Pt attended group with focus on practice of a variety of structured relaxation techniques. Pt were to identify those they have tried to practice and ways to improve and/or try new techniques based on their own situations and symptoms.   Pt was finishing lunch after ECT at start of group and then joined the group as a code on the unit was occurring. She was able to note some relaxation practices she used to do. She then was more focused on ideas of things to do for the weekend, occasionally sighing and briefly noting her depression and anxiety. She was able to make choices of materials for her use this weekend.       Goal Outcome Evaluation: Progressing and ongoing

## 2022-05-20 NOTE — PLAN OF CARE
Assessment/Intervention/Current Symptoms and Care Coordination:  Attend Team Meeting  Review Chart  Pt was very panicky about her first ECT.            Discharge Plan or Goal:  Pending stabilization & development of a safe discharge plan.  Considerations include: Return home with outpatient providers     Barriers to Discharge:  Schedule for ECT has not been determined.  Today was the first day.     Referral Status:  Referrals TBD     Legal Status:  Patient is voluntary

## 2022-05-20 NOTE — ANESTHESIA PREPROCEDURE EVALUATION
Anesthesia Pre-Procedure Evaluation    Patient: Lorena Hayden   MRN: [id number] : 1945        Procedure : * No procedures listed *          Past Medical History:   Diagnosis Date     Allergic rhinitis, cause unspecified      Anxiety      Depressive disorder       Past Surgical History:   Procedure Laterality Date     C IMPLANT HALLUX  3/2006 &     Yesi STEELE MAMMOGRAM, SCREENING  2011    Dr arredondo     COLONOSCOPY  2014    Mirian Orozco     EXTRACAPSULAR CATARACT EXTRATION WITH INTRAOCULAR LENS IMPLANT  2011     HC DILATION/CURETTAGE DIAG/THER NON OB  2005    D & C     HCL PAP SMEAR  2011    Dr Arredondo     ZZC  DELIVERY ONLY       ZZC LIGATE FALLOPIAN TUBE      Tubal Ligation     ZZHC COLONOSCOPY THRU STOMA W BIOPSY/CAUTERY TUMOR/POLYP/LESION  / /2011    benign polyp      Allergies   Allergen Reactions     Horse Allergy      Allergic to horse hair      Social History     Tobacco Use     Smoking status: Never Smoker     Smokeless tobacco: Never Used   Substance Use Topics     Alcohol use: Yes     Alcohol/week: 0.8 standard drinks     Types: 1 drink(s) per week     Comment: social      Wt Readings from Last 1 Encounters:   22 65.7 kg (144 lb 14.4 oz)        Anesthesia Evaluation   Pt has had prior anesthetic.     No history of anesthetic complications       ROS/MED HX  ENT/Pulmonary: Comment:   Tinnutus  Prior cataract Sx with IOL, uncomplicated   (-) tobacco use   Neurologic:  - neg neurologic ROS     Cardiovascular:     (+) Dyslipidemia -----    METS/Exercise Tolerance:     Hematologic:  - neg hematologic  ROS     Musculoskeletal:  - neg musculoskeletal ROS     GI/Hepatic: Comment:   Hx/o GI bleeding       Renal/Genitourinary:  - neg Renal ROS     Endo:  - neg endo ROS     Psychiatric/Substance Use:     (+) psychiatric history anxiety and depression     Infectious Disease:  - neg infectious disease ROS     Malignancy:  - neg malignancy ROS     Other: Comment:  PAST SURGICAL HISTORY:  Significant for colonoscopy, mammography, extracapsular cataract extraction with intraocular lens implant, dilation and curettage diagnostic in year , history of  in year , status post ligation of fallopian tube, colonoscopy in  with biopsy tumor/polyp/lesion cauterization.  No reported anesthesia complications. But no anesthetic records available             Physical Exam    Airway  airway exam normal      Mallampati: II   TM distance: > 3 FB   Neck ROM: full   Mouth opening: > 3 cm    Respiratory Devices and Support         Dental  no notable dental history         Cardiovascular   cardiovascular exam normal          Pulmonary   pulmonary exam normal                OUTSIDE LABS:  CBC:   Lab Results   Component Value Date    WBC 5.4 2022    WBC 4.3 2013    HGB 15.4 2022    HGB 10.5 (L) 2016    HCT 47.4 (H) 2022    HCT 42.4 2013     2022     2013     BMP:   Lab Results   Component Value Date     2022     2016    POTASSIUM 3.7 2022    POTASSIUM 4.1 2016    CHLORIDE 106 2022    CHLORIDE 102 2016    CO2 29 2022    CO2 25 2016    BUN 13 2022    BUN 14 2016    BUN NOT APPLICABLE 2016    CR 0.65 2022    CR 0.75 2016     (H) 2022    GLC 97 2016     COAGS: No results found for: PTT, INR, FIBR  POC: No results found for: BGM, HCG, HCGS  HEPATIC:   Lab Results   Component Value Date    ALBUMIN 3.3 (L) 2022    PROTTOTAL 7.2 2022    ALT 24 2022    AST 21 2022    ALKPHOS 87 2022    BILITOTAL 0.6 2022    BILIDIRECT 0.1 2012     OTHER:   Lab Results   Component Value Date    PH 7.0 2012    A1C 5.4 2013    RENUKA 8.9 2022    TSH 1.22 2022    SED 33 (A) 2011       Anesthesia Plan    ASA Status:  2   NPO Status:  NPO Appropriate    Anesthesia Type:  General.     - Airway: Mask Only   Induction: Intravenous.   Maintenance: N/A.        Consents    Anesthesia Plan(s) and associated risks, benefits, and realistic alternatives discussed. Questions answered and patient/representative(s) expressed understanding.    - Discussed:     - Discussed with:  Patient      - Extended Intubation/Ventilatory Support Discussed: No.      - Patient is DNR/DNI Status: No    Use of blood products discussed: No .     Postoperative Care    Pain management: Oral pain medications.   PONV prophylaxis: Ondansetron (or other 5HT-3)     Comments:    Other Comments: KASIE Peters MD

## 2022-05-20 NOTE — PROGRESS NOTES
[facility], [place]   Psychiatric Progress Note        Interim History:     The patient's care was discussed with the treatment team during the daily team meeting and/or staff's chart notes were reviewed.  Staff report patient slept for about 7 hours. Lorena reported feeling highly anxious because of ongoing commotion on this unit. Today reported even more anxiety as she was told to wait until 11:30 am for her 1st ECT treatment, needed a lot of support and encouragement.    Met with patient: she was seen in milieu. Lorena reported severe anxiety and, in fact, was trembling during our visit. Appeared to be irritated that she would have to skip her breakfast and eat her lunch late, but agreed with me that because of her late decision to agree to do ECT, ECT team might not have other time slots to give her. Voiced hope that her next ECT treatments would be scheduled for earlier times. Appeared to be somewhat less anxious and more relaxed by the end of our conversation. Lorena denied Suicidal ideation, contracted for safety and had no further questions or concerns. Later on came from ECT and reported no immediate side effects. Covid 19 test was negative.         Medications:       calcium carbonate 600 mg-vitamin D 400 units  1 tablet Oral Daily     DULoxetine  20 mg Oral Daily     mirtazapine  15 mg Oral At Bedtime     simvastatin  40 mg Oral At Bedtime     timolol maleate  1 drop Both Eyes Daily     venlafaxine  37.5 mg Oral Daily with breakfast          Allergies:     Allergies   Allergen Reactions     Horse Allergy      Allergic to horse hair          Labs:     Recent Results (from the past 24 hour(s))   Asymptomatic COVID-19 Virus (Coronavirus) by PCR Nasopharyngeal    Collection Time: 05/20/22 12:19 PM    Specimen: Nasopharyngeal; Swab   Result Value Ref Range    SARS CoV2 PCR Negative Negative          Psychiatric Examination:     /65   Pulse 85   Temp 97.9  F (36.6  C)  (Temporal)   Resp 24   Wt 65.7 kg (144 lb 14.4 oz)   SpO2 96%   BMI 24.87 kg/m    Weight is 144 lbs 14.4 oz  Body mass index is 24.87 kg/m .    Orthostatic Vitals  Report      Most Recent      Sitting Orthostatic /81 05/19 0851    Sitting Orthostatic Pulse (bpm) 92 05/19 0851    Standing Orthostatic /78 05/19 0851    Standing Orthostatic Pulse (bpm) 98 05/19 0851         Appearance: awake, alert, dressed in hospital scrubs and appeared older than stated age  Attitude:  somewhat cooperative  Eye Contact:  fair  Mood:  anxious   Affect: constricted, at times anxious and dramatic  Speech:  decreased prosody  Psychomotor Behavior:  no evidence of tardive dyskinesia, dystonia, trembles  Throught Process:  linear  Associations:  no loose associations  Thought Content: denies active and passive Suicidal ideation.  Insight: mildly impaired  Judgement: partial  Oriented to:  time, person, and place  Attention Span and Concentration: partial  Recent and Remote Memory: mildly impaired    Clinical Global Impressions  First: 6/4 5/17/2022      Most recent:            Precautions:     Behavioral Orders   Procedures     Code 1 - Restrict to Unit     Code 2     Electroconvulsive therapy     Routine Programming     As clinically indicated     Status 15     Every 15 minutes.     Suicide precautions     Patients on Suicide Precautions should have a Combination Diet ordered that includes a Diet selection(s) AND a Behavioral Tray selection for Safe Tray - with utensils, or Safe Tray - NO utensils            DIagnoses:     1.  Major depressive disorder, recurrent, moderate severity.  2.  Unspecified anxiety disorder.    3.  Suicidal ideation.         Plan:     76 year old WF with history of chronic depression and anxiety. Still remains highly emotional/anxious. Was referred to 55+ program. Today had 1st ECT with no immediate side effects. No medication changes today. Will continue to provide support and structure.

## 2022-05-21 LAB
ATRIAL RATE - MUSE: 96 BPM
DIASTOLIC BLOOD PRESSURE - MUSE: NORMAL MMHG
INTERPRETATION ECG - MUSE: NORMAL
P AXIS - MUSE: 63 DEGREES
PR INTERVAL - MUSE: 132 MS
QRS DURATION - MUSE: 76 MS
QT - MUSE: 328 MS
QTC - MUSE: 414 MS
R AXIS - MUSE: 24 DEGREES
SYSTOLIC BLOOD PRESSURE - MUSE: NORMAL MMHG
T AXIS - MUSE: 66 DEGREES
VENTRICULAR RATE- MUSE: 96 BPM

## 2022-05-21 PROCEDURE — 124N000002 HC R&B MH UMMC

## 2022-05-21 PROCEDURE — 250N000013 HC RX MED GY IP 250 OP 250 PS 637: Performed by: PSYCHIATRY & NEUROLOGY

## 2022-05-21 RX ADMIN — MIRTAZAPINE 15 MG: 15 TABLET, FILM COATED ORAL at 21:36

## 2022-05-21 RX ADMIN — TRAZODONE HYDROCHLORIDE 50 MG: 50 TABLET ORAL at 21:36

## 2022-05-21 RX ADMIN — CALCIUM CARBONATE 600 MG (1,500 MG)-VITAMIN D3 400 UNIT TABLET 1 TABLET: at 08:38

## 2022-05-21 RX ADMIN — VENLAFAXINE HYDROCHLORIDE 75 MG: 75 TABLET, EXTENDED RELEASE ORAL at 08:37

## 2022-05-21 RX ADMIN — SIMVASTATIN 40 MG: 20 TABLET, FILM COATED ORAL at 21:36

## 2022-05-21 RX ADMIN — TIMOLOL MALEATE 1 DROP: 5 SOLUTION OPHTHALMIC at 08:38

## 2022-05-21 RX ADMIN — HYDROXYZINE HYDROCHLORIDE 25 MG: 25 TABLET, FILM COATED ORAL at 09:40

## 2022-05-21 RX ADMIN — DULOXETINE HYDROCHLORIDE 20 MG: 20 CAPSULE, DELAYED RELEASE ORAL at 08:37

## 2022-05-21 ASSESSMENT — ACTIVITIES OF DAILY LIVING (ADL)
HYGIENE/GROOMING: INDEPENDENT
HYGIENE/GROOMING: INDEPENDENT
ORAL_HYGIENE: INDEPENDENT
ORAL_HYGIENE: INDEPENDENT
DRESS: SCRUBS (BEHAVIORAL HEALTH);INDEPENDENT
LAUNDRY: UNABLE TO COMPLETE
LAUNDRY: UNABLE TO COMPLETE
DRESS: SCRUBS (BEHAVIORAL HEALTH)

## 2022-05-21 NOTE — PROGRESS NOTES
05/20/22 2000   Group Therapy Session   Group Attendance attended group session   Time Session Began 1715   Time Session Ended 1800   Total Time patient participated (minutes) 45   Total # Attendees 4   Group Type psychotherapeutic   Group Topic Covered cognitive activities   Patient Response/Contribution cooperative with task;discussed personal experience with topic;listened actively   Patient Response Detail mood reported as mixed feeling, ups and downs. 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 She spoke about her dog, Tibetan terrier she loves to take for walks.

## 2022-05-21 NOTE — PLAN OF CARE
"Problem: Mood Impairment (Anxiety Signs/Symptoms)  Goal: Improved Mood Symptoms (Anxiety Signs/Symptoms)  Outcome: Ongoing, Progressing    Cayla continues to struggle emotionally this evening. She is quite disturbed by one specific younger manic patient. She reports, \"I can't handle him and all of this. But I don't want to be alone in my room.\" Any time he walks by her, she starts crying. Pt reports feelings of hopelessness, loneliness, and overall very low mood. She is anxious. Affect is sad, tearful, labile. Denies HI/SI/AVH.    Pt perseverates on discharge and has asked several staff if she can leave on Monday. Pt is very distressed about the possibility of \"getting bored\" this weekend, and frequently sobs when discussing this. She was given magazines and coloring books. Staff went to the senior unit to get her some puzzles that have big pieces that she can work on. She was grateful for this. Staff will continue to encourage pt to engage in programming, take her treatment one day at a time, and to talk to her provider next week.    Pt refused Hydroxyzine for anxiety x3. Pt was having a crying episode later in the shift and eventually accepted Hydroxyzine, states \"it helped a little,\" and is glad she tried it. Pt spent the shift watching TV, going to group, pacing, and talking on the phone. She cried throughout several of these activities. She received PRN Trazodone with HS meds and retired to her room.       "

## 2022-05-21 NOTE — PROGRESS NOTES
Pt appeared to be resting/sleeping much of the night as staff observed rounds.   7 hours of sleep was recorded.  She offered no complaints when awake. Next ECT is Monday.

## 2022-05-22 PROCEDURE — 124N000002 HC R&B MH UMMC

## 2022-05-22 PROCEDURE — 250N000013 HC RX MED GY IP 250 OP 250 PS 637: Performed by: PSYCHIATRY & NEUROLOGY

## 2022-05-22 RX ADMIN — CALCIUM CARBONATE 600 MG (1,500 MG)-VITAMIN D3 400 UNIT TABLET 1 TABLET: at 08:57

## 2022-05-22 RX ADMIN — VENLAFAXINE HYDROCHLORIDE 75 MG: 75 TABLET, EXTENDED RELEASE ORAL at 08:57

## 2022-05-22 RX ADMIN — DULOXETINE HYDROCHLORIDE 20 MG: 20 CAPSULE, DELAYED RELEASE ORAL at 08:57

## 2022-05-22 RX ADMIN — HYDROXYZINE HYDROCHLORIDE 25 MG: 25 TABLET, FILM COATED ORAL at 10:21

## 2022-05-22 RX ADMIN — MIRTAZAPINE 15 MG: 15 TABLET, FILM COATED ORAL at 21:25

## 2022-05-22 RX ADMIN — TIMOLOL MALEATE 1 DROP: 5 SOLUTION OPHTHALMIC at 08:57

## 2022-05-22 RX ADMIN — TRAZODONE HYDROCHLORIDE 50 MG: 50 TABLET ORAL at 21:25

## 2022-05-22 RX ADMIN — SIMVASTATIN 40 MG: 20 TABLET, FILM COATED ORAL at 21:25

## 2022-05-22 ASSESSMENT — ACTIVITIES OF DAILY LIVING (ADL)
ORAL_HYGIENE: INDEPENDENT
LAUNDRY: UNABLE TO COMPLETE
DRESS: SCRUBS (BEHAVIORAL HEALTH)
HYGIENE/GROOMING: INDEPENDENT
LAUNDRY: UNABLE TO COMPLETE
HYGIENE/GROOMING: INDEPENDENT
ORAL_HYGIENE: INDEPENDENT
DRESS: SCRUBS (BEHAVIORAL HEALTH)

## 2022-05-22 NOTE — PLAN OF CARE
"Pt presented in anxious mood with flat affect. Just after breakfast pt reported anxiety 7/10 and depression 10/10. Pt denies SI, SIB, HI and A/VH. PRN hydroxyzine given with beneficial effect. During this time, pt reported feelings of \"anger\" and this is new. Pt was sobbing, clenching her fists and yelling. Writer guided pt through grounding techniques and deep breathing. Pt was able to calm down after about 20 minutes. Pt reports feeling bored on the unit, \"we watch the same movies over and over\". Pt was visible in the milieu intermittently, hanging out on the fringes with little socialization with select peers.    Pt continues to be anxious, worried. Pt with observed tremor in hands.    Pt was med compliant and appreciative of care.    VS reviewed: BP (!) 140/83   Pulse 88   Temp 97.4  F (36.3  C)   Resp 16   Wt 65.7 kg (144 lb 14.4 oz)   SpO2 97%   BMI 24.87 kg/m   . Patient denies pain.    Length of stay: 6  "

## 2022-05-22 NOTE — PLAN OF CARE
"  Problem: Activity and Energy Impairment (Depressive Signs/Symptoms)  Goal: Optimized Energy Level (Depressive Signs/Symptoms)  Outcome: Ongoing, Progressing  Intervention: Optimize Energy Level  Recent Flowsheet Documentation  Taken 5/21/2022 1900 by Lisseth Rodas RN  Patient Performed Hygiene: dressed  Activity (Behavioral Health): up ad jessenia     Goal Outcome Evaluation:    Plan of Care Reviewed With: patient     Cayla has been present in the television lounge all shift. She informed this writer \"I just want to sit and watch a movie.\" She is overly concerned about what her peer's are doing and if they want to watch the movie she has picked out or not. The movie after dinner this evening was changed four times as Cayla attempts to please everyone. Cayla reports \"Anxiety\" but declined to take any medications for it. Lorena's  visited earlier today and she reports \"It went well.\" She has multiple complaints about why she can't do something ie: \"Can't read in my room because the lightening isn't good enough.\" She is concerned about a male peer because \"He is all spaced out and can't concentrate on the movie because he has been up since 05 am.\" She has been encouraged to  take care of herself. She denies any Suicidal thoughts at this time. She denies any Hallucinations. Complains of \"High Anxiety.\" Cayla continues on Suicide precautions. Nursing to continue to support current plan of care.                "

## 2022-05-22 NOTE — PLAN OF CARE
Pt appeared to be quietly sleeping much of the night as staff observed rounds.   No behavioral concerns noted this shift.  7 hours of sleep was recorded.   Next ECT is Monday.

## 2022-05-23 ENCOUNTER — ANESTHESIA (OUTPATIENT)
Dept: BEHAVIORAL HEALTH | Facility: CLINIC | Age: 77
DRG: 885 | End: 2022-05-23
Payer: COMMERCIAL

## 2022-05-23 ENCOUNTER — APPOINTMENT (OUTPATIENT)
Dept: BEHAVIORAL HEALTH | Facility: CLINIC | Age: 77
DRG: 885 | End: 2022-05-23
Attending: PSYCHIATRY & NEUROLOGY
Payer: COMMERCIAL

## 2022-05-23 ENCOUNTER — ANESTHESIA EVENT (OUTPATIENT)
Dept: BEHAVIORAL HEALTH | Facility: CLINIC | Age: 77
DRG: 885 | End: 2022-05-23
Payer: COMMERCIAL

## 2022-05-23 LAB — SARS-COV-2 RNA RESP QL NAA+PROBE: NEGATIVE

## 2022-05-23 PROCEDURE — 250N000009 HC RX 250: Performed by: STUDENT IN AN ORGANIZED HEALTH CARE EDUCATION/TRAINING PROGRAM

## 2022-05-23 PROCEDURE — 250N000011 HC RX IP 250 OP 636: Performed by: STUDENT IN AN ORGANIZED HEALTH CARE EDUCATION/TRAINING PROGRAM

## 2022-05-23 PROCEDURE — 250N000011 HC RX IP 250 OP 636: Performed by: PSYCHIATRY & NEUROLOGY

## 2022-05-23 PROCEDURE — U0003 INFECTIOUS AGENT DETECTION BY NUCLEIC ACID (DNA OR RNA); SEVERE ACUTE RESPIRATORY SYNDROME CORONAVIRUS 2 (SARS-COV-2) (CORONAVIRUS DISEASE [COVID-19]), AMPLIFIED PROBE TECHNIQUE, MAKING USE OF HIGH THROUGHPUT TECHNOLOGIES AS DESCRIBED BY CMS-2020-01-R: HCPCS | Performed by: PSYCHIATRY & NEUROLOGY

## 2022-05-23 PROCEDURE — 90853 GROUP PSYCHOTHERAPY: CPT

## 2022-05-23 PROCEDURE — 250N000013 HC RX MED GY IP 250 OP 250 PS 637: Performed by: PSYCHIATRY & NEUROLOGY

## 2022-05-23 PROCEDURE — 90870 ELECTROCONVULSIVE THERAPY: CPT | Performed by: PSYCHIATRY & NEUROLOGY

## 2022-05-23 PROCEDURE — 370N000017 HC ANESTHESIA TECHNICAL FEE, PER MIN

## 2022-05-23 PROCEDURE — G0177 OPPS/PHP; TRAIN & EDUC SERV: HCPCS

## 2022-05-23 PROCEDURE — 90870 ELECTROCONVULSIVE THERAPY: CPT

## 2022-05-23 PROCEDURE — 124N000002 HC R&B MH UMMC

## 2022-05-23 PROCEDURE — 99232 SBSQ HOSP IP/OBS MODERATE 35: CPT | Mod: 25 | Performed by: PSYCHIATRY & NEUROLOGY

## 2022-05-23 RX ORDER — HYDRALAZINE HYDROCHLORIDE 20 MG/ML
2.5-5 INJECTION INTRAMUSCULAR; INTRAVENOUS EVERY 10 MIN PRN
Status: DISCONTINUED | OUTPATIENT
Start: 2022-05-23 | End: 2022-05-23

## 2022-05-23 RX ORDER — SODIUM CHLORIDE, SODIUM LACTATE, POTASSIUM CHLORIDE, CALCIUM CHLORIDE 600; 310; 30; 20 MG/100ML; MG/100ML; MG/100ML; MG/100ML
INJECTION, SOLUTION INTRAVENOUS CONTINUOUS
Status: DISCONTINUED | OUTPATIENT
Start: 2022-05-23 | End: 2022-05-23

## 2022-05-23 RX ORDER — ONDANSETRON 4 MG/1
4 TABLET, ORALLY DISINTEGRATING ORAL EVERY 30 MIN PRN
Status: DISCONTINUED | OUTPATIENT
Start: 2022-05-23 | End: 2022-05-23

## 2022-05-23 RX ORDER — ETOMIDATE 2 MG/ML
INJECTION INTRAVENOUS PRN
Status: DISCONTINUED | OUTPATIENT
Start: 2022-05-23 | End: 2022-05-23

## 2022-05-23 RX ORDER — ONDANSETRON 2 MG/ML
4 INJECTION INTRAMUSCULAR; INTRAVENOUS EVERY 30 MIN PRN
Status: DISCONTINUED | OUTPATIENT
Start: 2022-05-23 | End: 2022-05-23

## 2022-05-23 RX ORDER — NICARDIPINE HCL-0.9% SOD CHLOR 1 MG/10 ML
SYRINGE (ML) INTRAVENOUS PRN
Status: DISCONTINUED | OUTPATIENT
Start: 2022-05-23 | End: 2022-05-23

## 2022-05-23 RX ORDER — LABETALOL HYDROCHLORIDE 5 MG/ML
10 INJECTION, SOLUTION INTRAVENOUS
Status: DISCONTINUED | OUTPATIENT
Start: 2022-05-23 | End: 2022-05-23

## 2022-05-23 RX ADMIN — MIDAZOLAM HYDROCHLORIDE 1 MG: 1 INJECTION, SOLUTION INTRAMUSCULAR; INTRAVENOUS at 11:34

## 2022-05-23 RX ADMIN — Medication 500 MCG: at 11:28

## 2022-05-23 RX ADMIN — Medication 500 MCG: at 11:31

## 2022-05-23 RX ADMIN — VENLAFAXINE HYDROCHLORIDE 75 MG: 75 TABLET, EXTENDED RELEASE ORAL at 13:21

## 2022-05-23 RX ADMIN — MIRTAZAPINE 15 MG: 15 TABLET, FILM COATED ORAL at 21:19

## 2022-05-23 RX ADMIN — HYDROXYZINE HYDROCHLORIDE 25 MG: 25 TABLET, FILM COATED ORAL at 17:53

## 2022-05-23 RX ADMIN — CALCIUM CARBONATE 600 MG (1,500 MG)-VITAMIN D3 400 UNIT TABLET 1 TABLET: at 13:22

## 2022-05-23 RX ADMIN — TIMOLOL MALEATE 1 DROP: 5 SOLUTION OPHTHALMIC at 10:20

## 2022-05-23 RX ADMIN — Medication 14 MG: at 11:26

## 2022-05-23 RX ADMIN — SUCCINYLCHOLINE CHLORIDE 60 MG: 20 INJECTION, SOLUTION INTRAMUSCULAR; INTRAVENOUS; PARENTERAL at 11:26

## 2022-05-23 RX ADMIN — TRAZODONE HYDROCHLORIDE 50 MG: 50 TABLET ORAL at 21:19

## 2022-05-23 RX ADMIN — SIMVASTATIN 40 MG: 20 TABLET, FILM COATED ORAL at 21:19

## 2022-05-23 ASSESSMENT — ACTIVITIES OF DAILY LIVING (ADL)
ORAL_HYGIENE: INDEPENDENT
DRESS: SCRUBS (BEHAVIORAL HEALTH)
LAUNDRY: UNABLE TO COMPLETE
HYGIENE/GROOMING: INDEPENDENT

## 2022-05-23 ASSESSMENT — LIFESTYLE VARIABLES: TOBACCO_USE: 0

## 2022-05-23 NOTE — PLAN OF CARE
Goal Outcome Evaluation:Patient expressing fear that she isn't making the right choice to have ECT. Expressing anxiety, breaks down crying that she won't ever leave the hospital. Had much angst waiting until being called down at 1030 for ECT. Responds well to empathetic listening and encouragement. Complained of dizziness on return from ECT. Drank and ate well on return from ECT. Feeling more stable after lunch. Brief period of crying lamenting that it takes so long for ECT and feeling better.

## 2022-05-23 NOTE — PROGRESS NOTES
"[facility] [place]   Psychiatric Progress Note        Interim History:     The patient's care was discussed with the treatment team during the daily team meeting and/or staff's chart notes were reviewed.  Staff report patient slept for about 7 hours. Lorena reported feeling highly anxious because of ongoing commotion on this unit. Today reported even more anxiety as she was told to wait until 11:30 am for her 1st ECT treatment, needed a lot of support and encouragement.    Met with patient: she was seen in her room. She said that she was very distressed about the fact that ECT was postponed this morning. She said that she was informed that she would have ECT at 0730, but had to wait until 1130. Explained why there may be delays. She said that after ECT, \"I felt like there was a cloud over me. I was mentally exhausted. That is the best way I can describe it.\" She was alert and oriented x 3. Denied other side effects/adverse reactions from ECT. Reports that she has felt depressed since 2/2022. She reported that last SI thought was this morning \"when I was feeling frustrated before ECT.\" When asked to describe the thought, she replied \"to take pills, but it was fleeting.\" She denied any intent to harm herself in the hospital. She is amenable with plan to continue ECT at this time while monitoring closely for side effects. She denied side effects from medications. Appeared to know her medications and recent medication changes quite well.  Appears anxious, though responded well to reassurance.          Medications:       calcium carbonate 600 mg-vitamin D 400 units  1 tablet Oral Daily     DULoxetine  20 mg Oral Daily     mirtazapine  15 mg Oral At Bedtime     simvastatin  40 mg Oral At Bedtime     timolol maleate  1 drop Both Eyes Daily     venlafaxine  37.5 mg Oral Daily with breakfast          Allergies:     Allergies   Allergen Reactions     Horse Allergy      Allergic to horse hair " "         Labs:     No results found for this or any previous visit (from the past 24 hour(s)).       Psychiatric Examination:     /71 (BP Location: Left arm, Patient Position: Sitting, Cuff Size: Adult Regular)   Pulse 96   Temp 97.8  F (36.6  C)   Resp 18   Wt 65.7 kg (144 lb 14.4 oz)   SpO2 98%   BMI 24.87 kg/m    Weight is 144 lbs 14.4 oz  Body mass index is 24.87 kg/m .    Orthostatic Vitals  Report      Most Recent      Sitting Orthostatic /81 05/19 0851    Sitting Orthostatic Pulse (bpm) 92 05/19 0851    Standing Orthostatic /78 05/19 0851    Standing Orthostatic Pulse (bpm) 98 05/19 0851         Appearance: awake, alert, dressed in hospital scrubs and appeared older than stated age  Attitude:  Cooperative, anxious  Eye Contact: good  Mood:  Anxious, \"mentally exhausted\"   Affect: constricted, at times anxious and dramatic, heightened intensity  Speech:  decreased prosody  Psychomotor Behavior:  no evidence of tardive dyskinesia, dystonia, trembles  Throught Process:  linear, goal oriented  Associations:  no loose associations  Thought Content: Reports brief active SI thought without intent. Denies HI and psychosis.   Insight: mildly impaired  Judgement: partial  Oriented to:  time, person, and place  Attention Span and Concentration: partial  Recent and Remote Memory: mildly impaired    Clinical Global Impressions  First: 6/4 5/17/2022      Most recent:6            Precautions:     Behavioral Orders   Procedures     Code 1 - Restrict to Unit     Code 2     Electroconvulsive therapy     Routine Programming     As clinically indicated     Status 15     Every 15 minutes.     Suicide precautions     Patients on Suicide Precautions should have a Combination Diet ordered that includes a Diet selection(s) AND a Behavioral Tray selection for Safe Tray - with utensils, or Safe Tray - NO utensils            DIagnoses:     1.  Major depressive disorder, recurrent, moderate severity.  2.  " Unspecified anxiety disorder.    3.  Suicidal ideation.         Plan:     76 year old WF with history of chronic depression and anxiety. Still remains highly emotional/anxious. Was referred to 55+ program. Today had 2nd ECT with no immediate side effects with exception of possible confusion post ECT, now resolved. She is alert and oriented x 3. No medication changes today. Will continue to provide support and structure.

## 2022-05-23 NOTE — ANESTHESIA PREPROCEDURE EVALUATION
Anesthesia Pre-Procedure Evaluation    Patient: Lorena Hayden   MRN: [id number] : 1945        Procedure : * ECT        Past Medical History:   Diagnosis Date     Allergic rhinitis, cause unspecified      Anxiety      Depressive disorder       Past Surgical History:   Procedure Laterality Date     C IMPLANT HALLUX  3/2006 &     Yesi STEELE MAMMOGRAM, SCREENING  2011    Dr arredondo     COLONOSCOPY  2014    Mirian Orozco     EXTRACAPSULAR CATARACT EXTRATION WITH INTRAOCULAR LENS IMPLANT  2011     HC DILATION/CURETTAGE DIAG/THER NON OB  2005    D & C     HCL PAP SMEAR  2011    Dr Arredondo     ZZC  DELIVERY ONLY       ZZC LIGATE FALLOPIAN TUBE      Tubal Ligation     ZZHC COLONOSCOPY THRU STOMA W BIOPSY/CAUTERY TUMOR/POLYP/LESION  / /2011    benign polyp      Allergies   Allergen Reactions     Horse Allergy      Allergic to horse hair      Social History     Tobacco Use     Smoking status: Never Smoker     Smokeless tobacco: Never Used   Substance Use Topics     Alcohol use: Yes     Alcohol/week: 0.8 standard drinks     Types: 1 drink(s) per week     Comment: social      Wt Readings from Last 1 Encounters:   22 65.7 kg (144 lb 14.4 oz)        Anesthesia Evaluation   Pt has had prior anesthetic.     No history of anesthetic complications       ROS/MED HX  ENT/Pulmonary: Comment:   Tinnutus  Prior cataract Sx with IOL, uncomplicated   (-) tobacco use   Neurologic:  - neg neurologic ROS     Cardiovascular:     (+) Dyslipidemia -----    METS/Exercise Tolerance:     Hematologic:  - neg hematologic  ROS     Musculoskeletal:  - neg musculoskeletal ROS     GI/Hepatic: Comment:   Hx/o GI bleeding       Renal/Genitourinary:  - neg Renal ROS     Endo:  - neg endo ROS     Psychiatric/Substance Use:     (+) psychiatric history anxiety and depression     Infectious Disease:  - neg infectious disease ROS     Malignancy:  - neg malignancy ROS     Other: Comment: PAST SURGICAL HISTORY:   Significant for colonoscopy, mammography, extracapsular cataract extraction with intraocular lens implant, dilation and curettage diagnostic in year , history of  in year , status post ligation of fallopian tube, colonoscopy in  with biopsy tumor/polyp/lesion cauterization.  No reported anesthesia complications. But no anesthetic records available             Physical Exam    Airway  airway exam normal           Respiratory Devices and Support         Dental  no notable dental history         Cardiovascular   cardiovascular exam normal          Pulmonary   pulmonary exam normal                OUTSIDE LABS:  CBC:   Lab Results   Component Value Date    WBC 5.4 2022    WBC 4.3 2013    HGB 15.4 2022    HGB 10.5 (L) 2016    HCT 47.4 (H) 2022    HCT 42.4 2013     2022     2013     BMP:   Lab Results   Component Value Date     2022     2016    POTASSIUM 3.7 2022    POTASSIUM 4.1 2016    CHLORIDE 106 2022    CHLORIDE 102 2016    CO2 29 2022    CO2 25 2016    BUN 13 2022    BUN 14 2016    BUN NOT APPLICABLE 2016    CR 0.65 2022    CR 0.75 2016     (H) 2022    GLC 97 2016     COAGS: No results found for: PTT, INR, FIBR  POC: No results found for: BGM, HCG, HCGS  HEPATIC:   Lab Results   Component Value Date    ALBUMIN 3.3 (L) 2022    PROTTOTAL 7.2 2022    ALT 24 2022    AST 21 2022    ALKPHOS 87 2022    BILITOTAL 0.6 2022    BILIDIRECT 0.1 2012     OTHER:   Lab Results   Component Value Date    PH 7.0 2012    A1C 5.4 2013    RENUKA 8.9 2022    TSH 1.22 2022    SED 33 (A) 2011       Anesthesia Plan    ASA Status:  3   NPO Status:  NPO Appropriate    Anesthesia Type: General.     - Airway: Mask Only   Induction: Intravenous.   Maintenance: TIVA.         Consents    Anesthesia Plan(s) and associated risks, benefits, and realistic alternatives discussed. Questions answered and patient/representative(s) expressed understanding.    - Discussed:     - Discussed with:  Patient      - Extended Intubation/Ventilatory Support Discussed: No.      - Patient is DNR/DNI Status: No    Use of blood products discussed: No .     Postoperative Care    Pain management: Oral pain medications.   PONV prophylaxis: Ondansetron (or other 5HT-3)     Comments:    Other Comments: KASIE Peters MD

## 2022-05-23 NOTE — PROCEDURES
Procedure/Surgery Information   [facility]    Bedside Procedure Note  Date of Service (when I performed the procedure): 05/23/2022    Lorena Hayden is a 76 year old female patient.  1. Major depressive disorder, recurrent severe without psychotic features (H)      Past Medical History:   Diagnosis Date     Allergic rhinitis, cause unspecified      Anxiety      Depressive disorder      Temp: 98.6  F (37  C) Temp src: Temporal BP: 126/71 Pulse: 96   Resp: 18 SpO2: 96 % O2 Device: None (Room air)      Procedures     Marvin March MD     Lorena Hayden is a 76 year old  year old female patient.  [id number]  @DX@    [facility]   ECT Procedure Note   05/23/2022    Patient Status: Inpatient    Is this the first in a series of 12 treatments?  Yes     Allergies   Allergen Reactions     Horse Allergy      Allergic to horse hair       Weight:  144 lbs 14.4 oz         Indications for ECT:   Medications ineffective         Clinical Narrative:   Lorena Hayden is a 76 year old woman with a history of depression, started in her mid 60s. Her depression was relatively well-controlled on duloxetine until February 2022, since when she has had progressive depression with suicidal thoughts without an identifiable trigger. Multiple medication trials yielded to no improvement (Increased duloxetine, added aripiprazole then bupropion, now cross titrating to desvenlafaxine). She had a recent psychiatric hospitalization in [facility] in [place], discharged only two weeks ago. However, depression persisted, she had more thoughts that life was not worth living and thoughts of suicide.  She also reported difficulties with sleep, low energy, poor appetite with significant weight loss of about 20 pounds.  She finally came to the hospital and requested to be hospitalized.     Psychiatric History:   Diagnoses: Major Depressive  Disorder     Hospitalizations: Two former (first in 2013, last in 2022)     Suicide Attempts: None     Medication Trials: Ativan, trazodone, Klonopin, Lexapro, Wellbutrin, Celexa, Seroquel, Zoloft, Abilify, currently Effexor and Cymbalta. No TCAs or MAOIs.     Neuromodulation:  Right unilateral ultrabrief ECT x 19 sessions in 2012. She had a good response during treatment which lasted only several weeks after the discontinuation.     Psychotherapy:  None            Diagnosis:   Major depression         Assessment:   #1 she reports her depression is severe and decreases pleasure and motivation and she can be very paralyzed and indecisive with anxiety. ECT was helpful before  #2  She is irritable she was awoken early but her appt pushed back. She denies discomfort after last appointment. Some SI but she feels it is  becuase she is so anxious waiting.           Pause for the Cause:     Right patient Yes   Right procedure/laterality settings: Yes          Intra-Procedure Documentation:       ECT #: 2   Treatment number this series: 2   Total treatment number: 22     Type of ECT:  Right, unilateral ultrabrief    ECT Medications:    Etomidate: 14mg  Succinyl Choline: 60mg  Versed 1mg for agitation    ECT Strip Summary:    Titration( 19.2 percent ) Energy Level:115.2mc  0.3ms 30 Hz time 8 sec 800mA  Motor Seizure Duration: *? seconds  EEG Seizure Duration: ?seconds      Complications: None        Plan:    Prefers earliest appointment to decrease pre-procedure anxiety    Continue ECT Q MWF at 115.2m C settings    Monitor depression severity with clinical assessment augmented with CUDOS every other treatment  Continue current medications    MD Rod

## 2022-05-23 NOTE — PLAN OF CARE
05/23/22 1504   Group Therapy Session   Group Attendance attended group session   Time Session Began 1410   Time Session Ended 1450   Total Time patient participated (minutes) 30   Total # Attendees 3   Group Type psychotherapeutic   Group Topic Covered coping skills/lifestyle management;community integration;relationship   Group Session Detail Social Skills   Patient Response/Contribution listened actively;discussed personal experience with topic   Patient Response Detail Patient arrived about 10 minutes late to group. She was an active participant and participation was appropriate. She reported she felt like she was in a fog due to ECT.

## 2022-05-23 NOTE — PROGRESS NOTES
Patients VSS, A/O, IV removed, meets phase 2 criteria and is able to move to st 30 at this time. Report given to RN.

## 2022-05-23 NOTE — PLAN OF CARE
05/23/22 1125   Individualization/Patient Specific Goals   Patient Personal Strengths expressive of emotions;expressive of needs;family/social support;independent living skills;resilient;resourceful;stable living environment   Patient Vulnerabilities limited support system   Team Discussion   Participants Rnia Dye MD; BRITTANY Santiago; Rosa M [facility], [facility]   Progress Minimal   Anticipated length of stay 3-4 weeks   Continued Stay Criteria/Rationale Patient started an ECT series on 5/20/22 and planned to have 12 treatment. It is not feasible for her to complete the series on an OP basis due to distance to ECT hospital and available supports.   Medical/Physical None noted   Plan Complete ECT series started 5/20/22 12 treatments at 3 times a week puts final treatment on 6/15/22. Maintence ECT may not be an option.   Rationale for change in precautions or plan ECT started   Anticipated Discharge Disposition home with family       PRECAUTIONS AND SAFETY    Behavioral Orders   Procedures    Code 1 - Restrict to Unit    Code 2    Electroconvulsive therapy    Routine Programming     As clinically indicated    Status 15     Every 15 minutes.    Suicide precautions     Patients on Suicide Precautions should have a Combination Diet ordered that includes a Diet selection(s) AND a Behavioral Tray selection for Safe Tray - with utensils, or Safe Tray - NO utensils         Safety  Safety WDL: WDL  Patient Location: patient room, own  Observed Behavior: sleeping  Observed Behavior (Comment): reading, watching television  Airway Safety Measures: all equipment/monitors on and audible, emergency medication sheet, mask at bedside, manual resuscitator at bedside, manual resuscitator/mask/valve in room, suction at bedside, suction regulator, suction equipment, oxygen flowmeter  Safety Measures: suicide assessment completed, suicide check-in completed, safety rounds completed, clinical history reviewed  Suicidality: Status 15,  Behavioral scrubs (pajamas)  Assault: status 15, behavioral scrubs (pajamas)  Elopement: status 15, behavioral scrubs (pajamas)  Sexual: status 15, private room

## 2022-05-23 NOTE — PLAN OF CARE
Pt appeared to be quietly sleeping much of the night.   She remains NPO for ECT.  No behavioral concerns noted this shift.  VS done and Pt has voided at this time.  7 hours of sleep was recorded.

## 2022-05-23 NOTE — PLAN OF CARE
"[facility] Occupational Therapy Group Intervention Note     05/23/22 1750   Group Therapy Session   Group Attendance attended group session   Time Session Began 1700   Time Session Ended 1750   Total Time patient participated (minutes) 45   Total # Attendees 3   Group Type life skill   Group Topic Covered coping skills/lifestyle management;relaxation techniques   Group Session Detail Coping skill exploration group through guided meditation for decreased anxiety and stress management. Education was provided on the use of meditation within daily/weekly routine.    Patient Response/Contribution cooperative with task;discussed personal experience with topic   Patient Response Detail Pt reported previous use of meditation for relaxation with variable effects. Was observed fully engaged in two 10 minute guided meditation practices. Expressed significant positive outcome after body scan meditation. Observed with labored breathing and  anxious as described as \"having a hard time with it\" during a visualization practice.     Shiela French OT on 5/23/2022 at 5:51 PM      "

## 2022-05-23 NOTE — ANESTHESIA POSTPROCEDURE EVALUATION
Patient: Lorena Hayden    Procedure: * No procedures listed *       Anesthesia Type:  General    Note:  Disposition: Inpatient   Postop Pain Control: Uneventful            Sign Out: Well controlled pain   PONV: No   Neuro/Psych: Uneventful            Sign Out: Acceptable/Baseline neuro status   Airway/Respiratory: Uneventful            Sign Out: Acceptable/Baseline resp. status   CV/Hemodynamics: Uneventful            Sign Out: Acceptable CV status; No obvious hypovolemia; No obvious fluid overload   Other NRE: NONE   DID A NON-ROUTINE EVENT OCCUR? No           Last vitals:  Vitals:    05/23/22 1147 05/23/22 1200 05/23/22 1239   BP: 125/76 (!) 142/78    Pulse: 93 94 80   Resp: 20 18 18   Temp: 36.7  C (98  F) 36.6  C (97.9  F) 36.8  C (98.2  F)   SpO2: 94% 96% 96%       Electronically Signed By: Petra Peters MD  May 23, 2022  12:41 PM

## 2022-05-23 NOTE — PLAN OF CARE
"  Problem: Activity and Energy Impairment (Depressive Signs/Symptoms)  Goal: Optimized Energy Level (Depressive Signs/Symptoms)  Outcome: Ongoing, Progressing     Goal Outcome Evaluation:    Plan of Care Reviewed With: patient     Kandance has appeared to be anxious all evening but refused to take any medication for it. A male hyper verbal peer is causing her to be irritable. She informed this writer \"He just doesn't stop talking. She denies any SI/SIB/AH/VH. She is very worried she is going to be in the Hospital all summer. She was angry about a movie tonight as she thought it was \"Too sad.\" She ate well for supper. Denies SI/SIB/AH/VH. She continues on Suicide precautions. Nursing to continue to support current plan of care.                 "

## 2022-05-23 NOTE — PLAN OF CARE
"Assessment/Intervention/Current Symtoms and Care Coordination:    Upcoming appointments:  Future Appointments   Date Time Provider Department Center   5/25/2022 10:20 AM Marvin March MD  ECT [place]       Chart Review    Met with team to discuss patient care.    Completed Team Note.    Checked in with patient after group. She said ECT took a lot out of her today and she feels like everything is in a fog. She is worried it will get worse as the strength of the treatment is increased. Encouraged her to discuss with Dr. March before her treatment on Wednesday so she is aware. Talked with patient about plans after the ECT series is complete. Confirmed with her that maintenance ECT once weekly is unrealistic. She does not have the supports in place to drive out to [place] once a week and be monitored after. She said her  just had a procedure with his spine and when he visited he needed to be wheeled out. Observed her get up from the table and slowly shuffle to the door. Inquired if she uses any walking aids at home, she said she sometimes uses a walker. Inquired if she thinks she needs one here, she said \"I don't think so\".    Discharge Plan or Goal:  Pending stabilization & development of a safe discharge plan.  Considerations include: Return home with outpatient providers    Barriers to Discharge:  Patient requires further psychiatric stabilization due to current symptomology and will likely complete the 12 treatment series in hospital as OP ECT would be undue hardship due to distance and supports she has. Will need to discuss if maintenance ECT will be needed.    Referral Status:  None made    Legal Status:  Patient is voluntary    "

## 2022-05-23 NOTE — ANESTHESIA CARE TRANSFER NOTE
Patient: Lorena Hayden    Procedure: * No procedures listed *       Diagnosis: * No pre-op diagnosis entered *  Diagnosis Additional Information: No value filed.    Anesthesia Type:   General     Note:    Oropharynx: oropharynx clear of all foreign objects  Level of Consciousness: drowsy      Independent Airway: airway patency not satisfactory and stable (received verseed 1mg, Sedated in PACU. Requires chin lift )  Dentition: dentition unchanged      Patient transferred to: PACU    Handoff Report: Identifed the Patient, Identified the Reponsible Provider, Reviewed the pertinent medical history, Discussed the surgical course, Reviewed Intra-OP anesthesia mangement and issues during anesthesia, Set expectations for post-procedure period and Allowed opportunity for questions and acknowledgement of understanding      Vitals:  Vitals Value Taken Time   /68    Temp     Pulse     Resp     SpO2         Electronically Signed By: Petra Peters MD  May 23, 2022  11:35 AM

## 2022-05-24 ENCOUNTER — ANESTHESIA EVENT (OUTPATIENT)
Dept: BEHAVIORAL HEALTH | Facility: CLINIC | Age: 77
DRG: 885 | End: 2022-05-24
Payer: COMMERCIAL

## 2022-05-24 PROCEDURE — 90853 GROUP PSYCHOTHERAPY: CPT

## 2022-05-24 PROCEDURE — 124N000002 HC R&B MH UMMC

## 2022-05-24 PROCEDURE — 99232 SBSQ HOSP IP/OBS MODERATE 35: CPT

## 2022-05-24 PROCEDURE — G0177 OPPS/PHP; TRAIN & EDUC SERV: HCPCS

## 2022-05-24 PROCEDURE — 250N000013 HC RX MED GY IP 250 OP 250 PS 637: Performed by: PSYCHIATRY & NEUROLOGY

## 2022-05-24 RX ORDER — ONDANSETRON 2 MG/ML
4 INJECTION INTRAMUSCULAR; INTRAVENOUS EVERY 30 MIN PRN
Status: CANCELLED | OUTPATIENT
Start: 2022-05-24

## 2022-05-24 RX ORDER — LABETALOL HYDROCHLORIDE 5 MG/ML
10 INJECTION, SOLUTION INTRAVENOUS
Status: CANCELLED | OUTPATIENT
Start: 2022-05-24

## 2022-05-24 RX ORDER — SODIUM CHLORIDE, SODIUM LACTATE, POTASSIUM CHLORIDE, CALCIUM CHLORIDE 600; 310; 30; 20 MG/100ML; MG/100ML; MG/100ML; MG/100ML
INJECTION, SOLUTION INTRAVENOUS CONTINUOUS
Status: CANCELLED | OUTPATIENT
Start: 2022-05-24

## 2022-05-24 RX ORDER — ONDANSETRON 4 MG/1
4 TABLET, ORALLY DISINTEGRATING ORAL EVERY 30 MIN PRN
Status: CANCELLED | OUTPATIENT
Start: 2022-05-24

## 2022-05-24 RX ADMIN — TRAZODONE HYDROCHLORIDE 50 MG: 50 TABLET ORAL at 21:26

## 2022-05-24 RX ADMIN — SENNOSIDES AND DOCUSATE SODIUM 1 TABLET: 50; 8.6 TABLET ORAL at 09:05

## 2022-05-24 RX ADMIN — MIRTAZAPINE 15 MG: 15 TABLET, FILM COATED ORAL at 21:26

## 2022-05-24 RX ADMIN — SIMVASTATIN 40 MG: 20 TABLET, FILM COATED ORAL at 21:26

## 2022-05-24 RX ADMIN — TIMOLOL MALEATE 1 DROP: 5 SOLUTION OPHTHALMIC at 08:01

## 2022-05-24 RX ADMIN — VENLAFAXINE HYDROCHLORIDE 75 MG: 75 TABLET, EXTENDED RELEASE ORAL at 07:57

## 2022-05-24 RX ADMIN — CALCIUM CARBONATE 600 MG (1,500 MG)-VITAMIN D3 400 UNIT TABLET 1 TABLET: at 07:57

## 2022-05-24 ASSESSMENT — ACTIVITIES OF DAILY LIVING (ADL)
HYGIENE/GROOMING: INDEPENDENT
ORAL_HYGIENE: INDEPENDENT
LAUNDRY: WITH SUPERVISION
DRESS: INDEPENDENT

## 2022-05-24 NOTE — PROGRESS NOTES
[facility], [place]   Psychiatric Progress Note  Hospital Day: 8        Interim History:   The patient's care was discussed with the treatment team during the daily team meeting and staff's chart notes were reviewed. Patient seen by VIRIDIANA Gerardo CNP  Staff report patient slept 6.5 hours and staff has noted high anxiety, took a Hydroxyzine PRN last night after much encouragement.    Upon interview, the patient states she feels better after showering. Patient states that she didn't sleep great last night because she was up late worrying about different things such as her , cleaning the house, her upcoming ECT. Pt became anxious and shakey just talking about her worries. States she mainly uses distraction as a form of coping. States she wishes she could go home and finish up ECT treatments from home, but states there is no way her and her  can make the drive from [facility] three days a week. Denies any physical complaints.     Suicidal ideation: denies current or recent suicidal ideation or behaviors.    Homicidal ideation: denies current or recent homicidal ideation or behaviors.    Psychotic symptoms: Patient denies AH, VH, paranoia, delusions.            Diagnoses:     1.  Major depressive disorder, recurrent, moderate severity.  2.  Unspecified anxiety disorder.    3.  Suicidal ideation.          Plan:     76 year old WF with history of chronic depression and anxiety. Still remains highly emotional/anxious. Was referred to 55+ program. Yesterday had 2nd ECT with no immediate side effects with exception of possible confusion post ECT, now resolved. She is alert and oriented x 3. No medication changes today. Will continue to provide support and structure.      Disposition Plan   Discharge location: home with family  Discharge Medications: not ordered  Follow-up Appointments: not scheduled  Legal Status: voluntary         Medications:       calcium carbonate 600  mg-vitamin D 400 units  1 tablet Oral Daily     mirtazapine  15 mg Oral At Bedtime     simvastatin  40 mg Oral At Bedtime     timolol maleate  1 drop Both Eyes Daily     venlafaxine  75 mg Oral Daily with breakfast            Psychiatric Examination:     /86 (BP Location: Right arm, Patient Position: Sitting, Cuff Size: Adult Regular)   Pulse 90   Temp 98.2  F (36.8  C) (Oral)   Resp 18   Wt 66 kg (145 lb 9.6 oz)   SpO2 96%   BMI 24.99 kg/m    Weight is 145 lbs 9.6 oz  Body mass index is 24.99 kg/m .  Orthostatic Vitals  Report      Most Recent      Sitting Orthostatic /83 05/23 1239    Sitting Orthostatic Pulse (bpm) 80 05/23 1239    Standing Orthostatic /90 05/23 0904    Standing Orthostatic Pulse (bpm) 104 05/23 0904        Appearance: awake, alert, adequately groomed and dressed in hospital scrubs  Attitude:  cooperative  Eye Contact:  good  Mood:  anxious  Affect:  appropriate and in normal range and mood congruent  Speech:  clear, coherent  Psychomotor Behavior:  no evidence of tardive dyskinesia, dystonia, or tics  Thought Process:  logical, linear and goal oriented  Associations:  no loose associations  Thought Content:  no evidence of suicidal ideation or homicidal ideation and no evidence of psychotic thought  Insight:  good  Judgement:  intact  Oriented to:  time, person, and place  Attention Span and Concentration:  intact  Recent and Remote Memory:  intact           Allergies:     Allergies   Allergen Reactions     Horse Allergy      Allergic to horse hair          Labs:   No results found for this or any previous visit (from the past 24 hour(s)).         Precautions:     Behavioral Orders   Procedures     Code 1 - Restrict to Unit     Code 2     Electroconvulsive therapy     Routine Programming     As clinically indicated     Status 15     Every 15 minutes.     Suicide precautions     Patients on Suicide Precautions should have a Combination Diet ordered that includes a Diet  selection(s) AND a Behavioral Tray selection for Safe Tray - with utensils, or Safe Tray - NO utensils         Entered by: VIRIDIANA Gerardo CNP on May 24, 2022 at 12:05 PM

## 2022-05-24 NOTE — PLAN OF CARE
"  Problem: Activity and Energy Impairment (Depressive Signs/Symptoms)  Goal: Optimized Energy Level (Depressive Signs/Symptoms)  Outcome: Ongoing, Progressing  Intervention: Optimize Energy Level  Recent Flowsheet Documentation  Taken 5/23/2022 1903 by Lisseth Rodas RN  Diversional Activity: (Chess/board games)    television    other (see comments)  Activity (Behavioral Health): up ad jessenia     Goal Outcome Evaluation:    Plan of Care Reviewed With: patient     Cayla has been out in the television lounge all evening. She did participate in evening programing. She has been very labile all evening. Cayla has been perseverating on \"I'm going to be here all summer.\" She has been reassured that the plan is not to keep her here. She has been tearful off and on. She denies any pain. She denies any SI/SIB/AH/VH. She does complain of very high Anxiety. Took some Hydroxyzine for it after lots of encouragement. She enjoyed watching a movie with a male peer this evening. She ate well for supper. She continues on Suicide Precautions. Nursing to continue to support current plan of care.                "

## 2022-05-24 NOTE — PLAN OF CARE
05/24/22 1532   Group Therapy Session   Group Attendance attended group session   Time Session Began 1305   Time Session Ended 1355   Total Time patient participated (minutes) 45   Total # Attendees 4   Group Type psychotherapeutic   Group Topic Covered relationship   Group Session Detail Building Social Support to assist with mental health Recovery   Patient Response/Contribution discussed personal experience with topic   Patient Response Detail Upon check in, pt reproted she was looking forward to celebrting her birthday which is on the 4th of July with her family. Pt did appear somewhat non-responsive at times but did respond if asked questions directly to her. She needed repeated reminders to keep her mask on. 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 She reported that [facility] and that her family is supportive.

## 2022-05-24 NOTE — PLAN OF CARE
Pt appeared to be sleeping at the start of the night and remained resting in bed most of the night.  She had ECT yesterday but offered no complaints tonight.  She did not request any PRN medications nor report any post ECT symptoms.  6.5 hours of sleep was recorded.

## 2022-05-24 NOTE — PLAN OF CARE
Assessment/Intervention/Current Symtoms and Care Coordination:  -Chart review  -Team meeting - pt presents as labile and tearful. She reports sleeping well, though she endorses anxiety. Pt reported dizziness following ECT yesteday - team will continue to monitor. Pt is expected to have ECT #3 tomorrow.   -Pt continues to express anxiety about her medications, LOS, ECT, and all of the things she's missing at home while she remains hospitalized. Pt seems accepting of reassurance by staff and is able to verbalize when she feels she needs a break or is feeling too overwhelmed.   Current Symptoms include the following: anxious  Precautions: SI     Discharge Plan or Goal:  Pending stabilization & development of a safe discharge plan.  Considerations include: Return home with outpatient providers     Barriers to Discharge:  Patient presents following SI with plan to overdose on medications in context of worsening depression and concern that medications are not improving symptoms. She reports feeling lonely and struggles to keep up with daily activities. Patient requires symptom stabilization, medication management, and increased support at time of discharge.      Referral Status:  Referrals TBD     Legal Status:  Patient is voluntary     Contacts:   - Cole Jackelyn (phone: [phone]) - CARLOS MANUEL obtained 5/16/22  Son - Kadeem Jackelyn (phone: [phone]) - CARLOS MANUEL obtained 5/16/22  Daughter-in-law - Shannon Jackelyn (phone: [phone]) - CARLOS MANUEL obtained 5/16/22     Upcoming Meetings/Important Dates:  N/A     Rationale for SIO/No Roommate Order:  Patient is not on SIO.  Patient has current roommate.

## 2022-05-24 NOTE — PLAN OF CARE
05/24/22 1255   Group Therapy Session   Group Attendance attended group session   Time Session Began 1015   Time Session Ended 1115   Total Time patient participated (minutes) 45   Total # Attendees 4   Group Type Occupational Therapy   Group Topic Covered balanced lifestyle;coping skills/lifestyle management;leisure exploration/use of leisure time;relaxation techniques   Group Session Detail OT Clinic Group   Patient Response Detail Intervention: OT Clinic with 3 peers    Patient Response: Pt joined clinic group requesting to continue working on a painting project. Pt was assisted with setup of of project after which they worked on project IND for the duration of their time in group, was noted to demonstrate concentration and attention to project. Pt was moderately social with writer and peer during group time. After 40 minutes, pt stated they needed to take a break, cleaned up their supplies and left group.     Mood/Affect: Pleasant      Plan: Patient encouraged to maintain attendance for continued ongoing support in working towards occupational therapy goals to support overall treatment/care.

## 2022-05-24 NOTE — PLAN OF CARE
"Pt received in hallway awake at start of shift. Affect anxious and worried. Pt able to make needs known. Requested a shower at the start of the shift. Clothing and bedding changed. Appreciated a warm blanket to help her warm up. Compliant with morning medications. Denied pain or anxiety.  Denies SI/SIB/HI/AVH at this time. Ate meals. Attended groups. Endorsed increasing anxiety toward the end of the shift. States she is anxious and depressed and doesn't know what to do about it. Wrier offered prn medication for anxiety. Pt declined, stating \"not yet\". Pt agreed to plan to take medication if her anxiety does not start doing down soon. Stated she needs something to do with her hands. Declined coloring. Accepted offer for stress ball.      Suicide precautions in addition to No Roommate order.     Recommendations for oncoming staff: Remind pt that she agreed to take prn medication if her anxiety didn't decrease by the start of evening shift.     Will continue to monitor and assess.       Problem: Behavioral Health Plan of Care  Goal: Adheres to Safety Considerations for Self and Others  Outcome: Ongoing, Progressing  Goal: Absence of New-Onset Illness or Injury  Outcome: Ongoing, Progressing     Problem: Pain Acute  Goal: Acceptable Pain Control and Functional Ability  Outcome: Ongoing, Progressing     Problem: Suicidal Behavior  Goal: Suicidal Behavior is Absent or Managed  Outcome: Ongoing, Progressing     Problem: Activity and Energy Impairment (Depressive Signs/Symptoms)  Goal: Optimized Energy Level (Depressive Signs/Symptoms)  Outcome: Ongoing, Progressing  Intervention: Optimize Energy Level  Recent Flowsheet Documentation  Taken 5/24/2022 1300 by Lucy Cardoza RN  Patient Performed Hygiene: shower  Activity (Behavioral Health): up ad jessenia     Problem: Decreased Participation/Engagement (Depressive Signs/Symptoms)  Goal: Increased Participation and Engagement (Depressive Signs/Symptoms)  Outcome: Ongoing, " Progressing     Problem: Mood Impairment (Depressive Signs/Symptoms)  Goal: Improved Mood Symptoms (Depressive Signs/Symptoms)  Outcome: Ongoing, Progressing     Problem: Activity and Energy Impairment (Anxiety Signs/Symptoms)  Goal: Optimized Energy Level (Anxiety Signs/Symptoms)  Outcome: Ongoing, Progressing  Intervention: Optimize Energy Level  Recent Flowsheet Documentation  Taken 5/24/2022 1300 by Lucy Cardoza, RN  Patient Performed Hygiene: shower  Activity (Behavioral Health): up ad jessenia

## 2022-05-25 ENCOUNTER — APPOINTMENT (OUTPATIENT)
Dept: BEHAVIORAL HEALTH | Facility: CLINIC | Age: 77
DRG: 885 | End: 2022-05-25
Attending: PSYCHIATRY & NEUROLOGY
Payer: COMMERCIAL

## 2022-05-25 ENCOUNTER — ANESTHESIA (OUTPATIENT)
Dept: BEHAVIORAL HEALTH | Facility: CLINIC | Age: 77
DRG: 885 | End: 2022-05-25
Payer: COMMERCIAL

## 2022-05-25 PROCEDURE — 90870 ELECTROCONVULSIVE THERAPY: CPT

## 2022-05-25 PROCEDURE — 370N000017 HC ANESTHESIA TECHNICAL FEE, PER MIN

## 2022-05-25 PROCEDURE — 250N000013 HC RX MED GY IP 250 OP 250 PS 637: Performed by: PSYCHIATRY & NEUROLOGY

## 2022-05-25 PROCEDURE — 250N000011 HC RX IP 250 OP 636: Performed by: PSYCHIATRY & NEUROLOGY

## 2022-05-25 PROCEDURE — 250N000011 HC RX IP 250 OP 636: Performed by: ANESTHESIOLOGY

## 2022-05-25 PROCEDURE — 250N000009 HC RX 250: Performed by: ANESTHESIOLOGY

## 2022-05-25 PROCEDURE — 90870 ELECTROCONVULSIVE THERAPY: CPT | Performed by: PSYCHIATRY & NEUROLOGY

## 2022-05-25 PROCEDURE — 124N000002 HC R&B MH UMMC

## 2022-05-25 RX ORDER — NICARDIPINE HCL-0.9% SOD CHLOR 1 MG/10 ML
SYRINGE (ML) INTRAVENOUS PRN
Status: DISCONTINUED | OUTPATIENT
Start: 2022-05-25 | End: 2022-05-25

## 2022-05-25 RX ORDER — OXYCODONE HYDROCHLORIDE 5 MG/1
5 TABLET ORAL EVERY 4 HOURS PRN
Status: DISCONTINUED | OUTPATIENT
Start: 2022-05-25 | End: 2022-05-25

## 2022-05-25 RX ORDER — SODIUM CHLORIDE, SODIUM LACTATE, POTASSIUM CHLORIDE, CALCIUM CHLORIDE 600; 310; 30; 20 MG/100ML; MG/100ML; MG/100ML; MG/100ML
INJECTION, SOLUTION INTRAVENOUS CONTINUOUS
Status: DISCONTINUED | OUTPATIENT
Start: 2022-05-25 | End: 2022-05-25

## 2022-05-25 RX ORDER — VENLAFAXINE HYDROCHLORIDE 150 MG/1
150 TABLET, EXTENDED RELEASE ORAL
Status: DISCONTINUED | OUTPATIENT
Start: 2022-05-26 | End: 2022-06-03

## 2022-05-25 RX ORDER — ETOMIDATE 2 MG/ML
INJECTION INTRAVENOUS PRN
Status: DISCONTINUED | OUTPATIENT
Start: 2022-05-25 | End: 2022-05-25

## 2022-05-25 RX ORDER — ONDANSETRON 4 MG/1
4 TABLET, ORALLY DISINTEGRATING ORAL EVERY 30 MIN PRN
Status: DISCONTINUED | OUTPATIENT
Start: 2022-05-25 | End: 2022-05-25

## 2022-05-25 RX ORDER — ONDANSETRON 2 MG/ML
4 INJECTION INTRAMUSCULAR; INTRAVENOUS EVERY 30 MIN PRN
Status: DISCONTINUED | OUTPATIENT
Start: 2022-05-25 | End: 2022-05-25

## 2022-05-25 RX ADMIN — SIMVASTATIN 40 MG: 20 TABLET, FILM COATED ORAL at 21:42

## 2022-05-25 RX ADMIN — TIMOLOL MALEATE 1 DROP: 5 SOLUTION OPHTHALMIC at 09:58

## 2022-05-25 RX ADMIN — HYDROXYZINE HYDROCHLORIDE 25 MG: 25 TABLET, FILM COATED ORAL at 15:22

## 2022-05-25 RX ADMIN — MIRTAZAPINE 15 MG: 15 TABLET, FILM COATED ORAL at 21:42

## 2022-05-25 RX ADMIN — TRAZODONE HYDROCHLORIDE 50 MG: 50 TABLET ORAL at 21:42

## 2022-05-25 RX ADMIN — CALCIUM CARBONATE 600 MG (1,500 MG)-VITAMIN D3 400 UNIT TABLET 1 TABLET: at 12:26

## 2022-05-25 RX ADMIN — MIDAZOLAM 1 MG: 1 INJECTION INTRAMUSCULAR; INTRAVENOUS at 11:37

## 2022-05-25 RX ADMIN — SUCCINYLCHOLINE CHLORIDE 60 MG: 20 INJECTION, SOLUTION INTRAMUSCULAR; INTRAVENOUS; PARENTERAL at 11:29

## 2022-05-25 RX ADMIN — Medication 400 MCG: at 11:33

## 2022-05-25 RX ADMIN — Medication 16 MG: at 11:29

## 2022-05-25 RX ADMIN — VENLAFAXINE HYDROCHLORIDE 75 MG: 75 TABLET, EXTENDED RELEASE ORAL at 12:26

## 2022-05-25 ASSESSMENT — ACTIVITIES OF DAILY LIVING (ADL)
DRESS: INDEPENDENT
HYGIENE/GROOMING: INDEPENDENT
LAUNDRY: WITH SUPERVISION
ORAL_HYGIENE: INDEPENDENT
DRESS: INDEPENDENT
LAUNDRY: WITH SUPERVISION
HYGIENE/GROOMING: INDEPENDENT
ORAL_HYGIENE: INDEPENDENT

## 2022-05-25 NOTE — PLAN OF CARE
"Goal Outcome Evaluation:    Patient presents with a sad and tearful affect. Reports mood as depressed, anxious, and fearful. Is pleasant, polite, calm, and cooperative. Thought content presents as hopelessness and perseveration. Thought process presents as relevant and linear. Speech is soft, quiet, clear, and coherent. Eye contact is good. Patient endorses \"a lot of depression and some anxiety\" but would not rated. Refused offered PRN medication for anxiety stating \"I don't want it. I don't want to be drugged up.\" Denies suicidal ideation, SIB, homicidal ideation, and auditory/visual hallucinations. No medical issues noted. Vital signs stable. Medication compliant. Did not attend group this shift. Ate evening meal. Patient out in the milieu occasionally this shift watching movies. Social with select peer.    /69   Pulse 95   Temp 98.3  F (36.8  C) (Oral)   Resp 16   Wt 66 kg (145 lb 9.6 oz)   SpO2 95%   BMI 24.99 kg/m      Patient was tearful this shift. Tearfully verbalized to this writer that she doesn't want to be here any longer. States that she doesn't like it here, and that she doesn't feel comfortable with all of the young people on the unit. States \"I feel disconnected. There is no one my age to talk to. All these young people and I have nothing in common. That makes me afraid. I'm afraid I'm going to have to live here and never leave. That's how I feel.\" This writer provided empathetic listening, emotional support, and reassurance to patient. Patient was appreciative. Patient is currently calm at HS and is no longer tearful; and verbalizing upset and fear.              "

## 2022-05-25 NOTE — ANESTHESIA PREPROCEDURE EVALUATION
Anesthesia Pre-Procedure Evaluation    Patient: Lorena Hayden   MRN: [id number] : 1945        Procedure : * No procedures listed *          Past Medical History:   Diagnosis Date     Allergic rhinitis, cause unspecified      Anxiety      Depressive disorder       Past Surgical History:   Procedure Laterality Date     C IMPLANT HALLUX  3/2006 &     Yesi STEELE MAMMOGRAM, SCREENING  2011    Dr arredondo     COLONOSCOPY  2014    Mirian Mottamalika     EXTRACAPSULAR CATARACT EXTRATION WITH INTRAOCULAR LENS IMPLANT  2011     HC DILATION/CURETTAGE DIAG/THER NON OB  2005    D & C     HCL PAP SMEAR  2011    Dr Arredondo     ZZC  DELIVERY ONLY       ZZC LIGATE FALLOPIAN TUBE      Tubal Ligation     ZZHC COLONOSCOPY THRU STOMA W BIOPSY/CAUTERY TUMOR/POLYP/LESION  / /2011    benign polyp      Allergies   Allergen Reactions     Horse Allergy      Allergic to horse hair      Social History     Tobacco Use     Smoking status: Never Smoker     Smokeless tobacco: Never Used   Substance Use Topics     Alcohol use: Yes     Alcohol/week: 0.8 standard drinks     Types: 1 drink(s) per week     Comment: social      Wt Readings from Last 1 Encounters:   22 66 kg (145 lb 9.6 oz)           Physical Exam    Airway        Mallampati: II   TM distance: > 3 FB   Neck ROM: full   Mouth opening: > 3 cm    Respiratory Devices and Support         Dental  no notable dental history         Cardiovascular   cardiovascular exam normal          Pulmonary                   OUTSIDE LABS:  CBC:   Lab Results   Component Value Date    WBC 5.4 2022    WBC 4.3 2013    HGB 15.4 2022    HGB 10.5 (L) 2016    HCT 47.4 (H) 2022    HCT 42.4 2013     2022     2013     BMP:   Lab Results   Component Value Date     2022     2016    POTASSIUM 3.7 2022    POTASSIUM 4.1 2016    CHLORIDE 106 2022    CHLORIDE 102 2016     CO2 29 05/14/2022    CO2 25 04/12/2016    BUN 13 05/14/2022    BUN 14 04/12/2016    BUN NOT APPLICABLE 04/12/2016    CR 0.65 05/14/2022    CR 0.75 04/12/2016     (H) 05/14/2022    GLC 97 04/12/2016     COAGS: No results found for: PTT, INR, FIBR  POC: No results found for: BGM, HCG, HCGS  HEPATIC:   Lab Results   Component Value Date    ALBUMIN 3.3 (L) 05/14/2022    PROTTOTAL 7.2 05/14/2022    ALT 24 05/14/2022    AST 21 05/14/2022    ALKPHOS 87 05/14/2022    BILITOTAL 0.6 05/14/2022    BILIDIRECT 0.1 07/28/2012     OTHER:   Lab Results   Component Value Date    PH 7.0 07/28/2012    A1C 5.4 01/25/2013    RENUKA 8.9 05/14/2022    TSH 1.22 05/17/2022    SED 33 (A) 04/23/2011       Anesthesia Plan    ASA Status:  3      Anesthesia Type: General.   Induction: Intravenous.           Consents            Postoperative Care            Comments:                Jenaro Beaver DO

## 2022-05-25 NOTE — ANESTHESIA POSTPROCEDURE EVALUATION
Patient: Lorena Hayden    Procedure: * No procedures listed *       Anesthesia Type:  General    Note:     Postop Pain Control: Uneventful            Sign Out: Well controlled pain   PONV: No   Neuro/Psych: Uneventful            Sign Out: Acceptable/Baseline neuro status   Airway/Respiratory: Uneventful            Sign Out: Acceptable/Baseline resp. status   CV/Hemodynamics: Uneventful            Sign Out: Acceptable CV status; No obvious hypovolemia; No obvious fluid overload   Other NRE: NONE   DID A NON-ROUTINE EVENT OCCUR? No           Last vitals:  Vitals:    05/24/22 1554 05/24/22 1555 05/25/22 0950   BP:   (!) 147/87   Pulse:   98   Resp: 14 12 16   Temp: 36.8  C (98.3  F) 36.6  C (97.8  F) 36.8  C (98.2  F)   SpO2:   95%       Electronically Signed By: Jenaro Beaver DO  May 25, 2022  11:38 AM

## 2022-05-25 NOTE — ANESTHESIA CARE TRANSFER NOTE
Patient: Lorena Hayden    Procedure: * No procedures listed *       Diagnosis: * No pre-op diagnosis entered *  Diagnosis Additional Information: No value filed.    Anesthesia Type:   General     Note:      Level of Consciousness: drowsy      Independent Airway: airway patency satisfactory and stable  Dentition: dentition unchanged  Vital Signs Stable: post-procedure vital signs reviewed and stable  Report to RN Given: handoff report given            Vitals:  Vitals Value Taken Time   BP     Temp     Pulse     Resp     SpO2         Electronically Signed By: Jenaro Beaver DO  May 25, 2022  11:38 AM

## 2022-05-25 NOTE — PLAN OF CARE
Problem: Activity and Energy Impairment (Anxiety Signs/Symptoms)  Goal: Optimized Energy Level (Anxiety Signs/Symptoms)  Outcome: Ongoing, Progressing     Goal Outcome Evaluation:    Kandance was smiling upon this writers arrival to the unit this afternoon. She has been very calm this evening. She was quite concerned after dinner as her habit is to watch a movie and that was threatened by a baseball game. The movie was watched , the baseball game came on afterwards and she was pleased. She denies SI/SIB/AH/VH. She was medication compliant with her medications. Ect is scheduled tomorrow, Cayla is aware of nothing to eat or drink after midnight.  Cayla continues on Suicide Precautions.

## 2022-05-25 NOTE — PLAN OF CARE
Pt appeared to anxiously walk to her room at 11:30 pm while trying to avoid a male patient who was cursing loudly in the hallway nearby.  She was resting in bed/sleeping for much of the night and did not approach staff with concerns.  Pt remains NPO for ECT.  She slept approximately 7 hours tonight.  Pt is aware that her ECT is scheduled for 10:20 am but did comment that she would prefer an earlier ECT time.

## 2022-05-25 NOTE — PROGRESS NOTES
"SPIRITUAL HEALTH SERVICES  SPIRITUAL ASSESSMENT Progress Note  [facility] [address]     REFERRAL SOURCE: I did visit this morning patient Lorena per epic consult order. I encourage the pt to come into the spirituality group to attend and she did came to attend but she didn't stay longer. In stead she get upset and left the group room. Pt said, \"I am waiting to go for other treatment today and I am not feeling well. I can't stay here for the whole one hour and I am leaving now.\" As we can tell pt was not feeling good and have some anger mood. I tried to make her calm to stay in the group without participation but she don't. After the group was over pt already went for ECT treatment. I informed about the pt situation with the unit staff.     PLAN: I will remain open to provide spiritual care for the pt as needed.    Johan Sarmiento M.Div. ([place]), M.Th., D.Min., [facility]  Staff   Pager [phone]    "

## 2022-05-25 NOTE — PROCEDURES
Procedure/Surgery Information   [facility]    Bedside Procedure Note  Date of Service (when I performed the procedure): 05/25/2022    Lorena Hayden is a 76 year old female patient.  1. Major depressive disorder, recurrent severe without psychotic features (H)      Past Medical History:   Diagnosis Date     Allergic rhinitis, cause unspecified      Anxiety      Depressive disorder      Temp: 98.2  F (36.8  C) Temp src: Oral BP: (!) 147/87 Pulse: 98   Resp: 16 SpO2: 95 % O2 Device: None (Room air)      Procedures       Marvin March MD     Lorena Hayden is a 76 year old  year old female patient.  [id number]  @DX@    [facility]   ECT Procedure Note   05/25/2022    Patient Status: Inpatient    Is this the first in a series of 12 treatments?  Yes     Allergies   Allergen Reactions     Horse Allergy      Allergic to horse hair       Weight:  145 lbs 9.6 oz         Indications for ECT:   Medications ineffective         Clinical Narrative:   Lorena Hayden is a 76 year old woman with a history of depression, started in her mid 60s. Her depression was relatively well-controlled on duloxetine until February 2022, since when she has had progressive depression with suicidal thoughts without an identifiable trigger. Multiple medication trials yielded to no improvement (Increased duloxetine, added aripiprazole then bupropion, now cross titrating to desvenlafaxine). She had a recent psychiatric hospitalization in [facility] in [place], discharged only two weeks ago. However, depression persisted, she had more thoughts that life was not worth living and thoughts of suicide.  She also reported difficulties with sleep, low energy, poor appetite with significant weight loss of about 20 pounds.  She finally came to the hospital and requested to be hospitalized.     Psychiatric History:   Diagnoses: Major Depressive  Disorder     Hospitalizations: Two former (first in 2013, last in 2022)     Suicide Attempts: None     Medication Trials: Ativan, trazodone, Klonopin, Lexapro, Wellbutrin, Celexa, Seroquel, Zoloft, Abilify, currently Effexor and Cymbalta. No TCAs or MAOIs.     Neuromodulation:  Right unilateral ultrabrief ECT x 19 sessions in 2012. She had a good response during treatment which lasted only several weeks after the discontinuation.     Psychotherapy:  None            Diagnosis:   Major depression         Assessment:   #1 she reports her depression is severe and decreases pleasure and motivation and she can be very paralyzed and indecisive with anxiety. ECT was helpful before  #2  She is irritable she was awoken early but her appt pushed back. She denies discomfort after last appointment. Some SI but she feels it is  becuase she is so anxious waiting.  #3 sleeping well with trazodone + remeron, she reports no worsening as in in fact improving no SI today, no tremor and less anxious, less catastrophizing thoughts. Her dog nehemias is awaiting her at home. She feels her thinking is improved not worsened since starting ECT           Pause for the Cause:     Right patient Yes   Right procedure/laterality settings: Yes          Intra-Procedure Documentation:       ECT #: 3   Treatment number this series: 3   Total treatment number: 23     Type of ECT:  Right, unilateral ultrabrief    ECT Medications:    Etomidate: 16mg  Succinyl Choline: 60mg  Versed 1mg for agitation      ECT Strip Summary:    Titration( 19.2 percent ) Energy Level:115.2mc  0.3ms 30 Hz time 8 sec 800mA  Motor Seizure Duration: 42seconds  EEG Seizure Duration:  111 seconds      Complications: None        Plan:    Prefers earliest appointment to decrease pre-procedure anxiety.      If she has consistent transportation and supervision for at least 6hrs after treatment she can complete her course outpatient when appropriate    Continue ECT Q MWF at 115.2m C  settings    Monitor depression severity with clinical assessment augmented with CUDOS every other treatment  Continue current medications    MD Rod

## 2022-05-25 NOTE — PLAN OF CARE
"   05/25/22 1451   Group Therapy Session   Group Attendance attended group session   Time Session Began 1400   Time Session Ended 1430   Total Time patient participated (minutes) 30 - no charge   Total # Attendees 1   Group Type life skill   Group Topic Covered Coping skills/lifestyle management, leisure   Group Session Detail OT - Topic   Patient Response/Contribution cooperative with task   Patient Response Detail Pt was only person for group and she noted her frustration with being one of the only older patients on the unit with others discharging. She was tearful for brief period and noted feeling more anxious, getting hot, etc. Staff encouraged use of breathing and fidget for calming. She required much reassurances and encouragement to try these coping skills. She then did note that she fel cooler and less anxious. She was open to engage in a leisure activity for distraction vs the coping skills activity and did so for brief period. She noted that she had awoken this morning \"feeling better\" and now did not feel well. She  noted that she thought the treatments were \"getting stronger\" and she had just completed third ECT.     Goal Outcome Evaluation: Progressing and ongoing                      "

## 2022-05-25 NOTE — PLAN OF CARE
Problem: Mood Impairment (Depressive Signs/Symptoms)  Goal: Improved Mood Symptoms (Depressive Signs/Symptoms)  Outcome: Ongoing, Progressing       Pt had her 3rd ECT this morning at 1030 and requested to speak to a  before she had her treatment. However pt was already in the treatment area and wouldn't have time to meet with anyone before going into anesthesia. When she came back to the unit she reported feeling dizzy and unsteady. Pt was assisted to her room and encouraged to lie down and rest in bed. Pt is currently denying pain and nausea. She was compliant with medications and ate her lunch tray. Affect was blunted, flat. Mood was anxious, somewhat tense and irritable at times. Pt feels that she would feel more comfortable if she could be transferred to 3B where the patients are closer to her age and would have more in common with her. Covering psychiatrist was notified of pt's preference, however 3B is currently full. Will continue to monitor.

## 2022-05-25 NOTE — PROGRESS NOTES
Patient arrived in PACU at 1137    Patient meets phase I recovery  criteria at 1152 , switched to phase II recovery at 1153.      The following medications were given in ECT:    Anesthetic:   Etomidate 16mg at 1129    Muscle Relaxant:   Succinylcholine  60mg at 1129    Blood Pressure:   Nicardipine 400 mcg  at 1133    Anxiety/Aggitation:      Versed 1 mg at 1137      Patient meets PACU discharge criteria at 1207.    Pt discharged from the recovery room via wheelchair back to station 30N   with staff at 1222       Report given to Jailene SOTO                  Feel free to call with any questions at *[phone]    Alba Dyer RN

## 2022-05-25 NOTE — PLAN OF CARE
Assessment/Intervention/Current Symtoms and Care Coordination:  -Chart review  -Team meeting - pt went down for ECT #3 today and RN reports pt requested  visit when she got downstairs for this treatment. Pt continues to present as anxious, tense, and irritable.   -Pt reports feeling displeased with LOS yet doesn't feel continuing ECT as outpatient is viable given the distance from her home to [facility] and because pt's  is recovering from spinal myelopathy, so may not be able to drive her/supervise for length of time recommended after treatment. Team will continue to discuss options for how pt may consider continuing ECT while outpatient if it seems like something her family can arrange. Pt continues to benefit from 1:1 interactions with staff members and requires frequent reassurance.   Current Symptoms include the following: patient is irritable and anxious  Precautions: SI     Discharge Plan or Goal:  Pending stabilization & development of a safe discharge plan.  Considerations include: Return home with outpatient providers     Barriers to Discharge:  Patient presents following SI with plan to overdose on medications in context of worsening depression and concern that medications are not improving symptoms. She reports feeling lonely and struggles to keep up with daily activities. Patient requires symptom stabilization, medication management, and increased support at time of discharge.      Referral Status:  Referrals TBD     Legal Status:  Patient is voluntary     Contacts:   - Cole Jackelyn (phone: [phone]) - CARLOS MANUEL obtained 5/16/22  Son - Kadeem aJckelyn (phone: [phone]) - CARLOS MANUEL obtained 5/16/22  Daughter-in-law - Shannon Jackelyn (phone: [phone]) - CARLOS MANUEL obtained 5/16/22     Upcoming Meetings/Important Dates:  N/A     Rationale for SIO/No Roommate Order:  Patient is not on SIO.  Patient has current roommate.

## 2022-05-26 PROCEDURE — G0177 OPPS/PHP; TRAIN & EDUC SERV: HCPCS

## 2022-05-26 PROCEDURE — 124N000002 HC R&B MH UMMC

## 2022-05-26 PROCEDURE — 250N000013 HC RX MED GY IP 250 OP 250 PS 637: Performed by: PSYCHIATRY & NEUROLOGY

## 2022-05-26 PROCEDURE — 99232 SBSQ HOSP IP/OBS MODERATE 35: CPT | Performed by: PSYCHIATRY & NEUROLOGY

## 2022-05-26 RX ADMIN — CALCIUM CARBONATE 600 MG (1,500 MG)-VITAMIN D3 400 UNIT TABLET 1 TABLET: at 08:56

## 2022-05-26 RX ADMIN — TRAZODONE HYDROCHLORIDE 50 MG: 50 TABLET ORAL at 21:23

## 2022-05-26 RX ADMIN — VENLAFAXINE HYDROCHLORIDE 150 MG: 150 TABLET, EXTENDED RELEASE ORAL at 08:56

## 2022-05-26 RX ADMIN — ACETAMINOPHEN 650 MG: 325 TABLET ORAL at 09:55

## 2022-05-26 RX ADMIN — SIMVASTATIN 40 MG: 20 TABLET, FILM COATED ORAL at 21:23

## 2022-05-26 RX ADMIN — TIMOLOL MALEATE 1 DROP: 5 SOLUTION OPHTHALMIC at 08:57

## 2022-05-26 RX ADMIN — MIRTAZAPINE 15 MG: 15 TABLET, FILM COATED ORAL at 21:23

## 2022-05-26 ASSESSMENT — ACTIVITIES OF DAILY LIVING (ADL)
DRESS: INDEPENDENT
HYGIENE/GROOMING: INDEPENDENT
LAUNDRY: WITH SUPERVISION
ORAL_HYGIENE: INDEPENDENT
HYGIENE/GROOMING: INDEPENDENT
DRESS: INDEPENDENT
LAUNDRY: WITH SUPERVISION
ORAL_HYGIENE: INDEPENDENT

## 2022-05-26 NOTE — PLAN OF CARE
"Goal Outcome Evaluation:    Patient presents with a neutral affect. Reports mood as \"okay\". Is pleasant, polite, calm, and cooperative. Thought content presents as logical and relevant. Thought process presents as logical, relevant, and linear. Speech is soft, quiet, clear, and coherent. Eye contact is good. Patient currently denies depression and anxiety stating \"It comes and goes.\" Denies suicidal ideation, SIB, homicidal ideation, and auditory/visual hallucinations. No medical issues noted. Vital signs stable. Medication compliant. Attended group this shift. Ate evening meal. Patient out in the milieu occasionally this shift watching movies. No interaction with peers noted.    /68   Pulse 86   Temp 97.9  F (36.6  C) (Temporal)   Resp 16   Wt 66 kg (145 lb 9.6 oz)   SpO2 97%   BMI 24.99 kg/m      ECT scheduled for tomorrow Friday 5/27.                "

## 2022-05-26 NOTE — PLAN OF CARE
Assessment/Intervention/Current Symtoms and Care Coordination:  -Chart review  -Team meeting - pt presents as tearful and anxious. She endorses anxiety, depression, fearfulness, and hopelessness. She continues to express concern that she'll be stuck in the hospital for an exaggerated amount of time. Pt has expressed interest in transfer to 3B as she feels uncomfortable and distressed regarding being surrounded by younger-aged peers. Team will consider a transfer when a bed becomes available. Pt will continue with ECT.   -Writer met with pt who states her daughter-in-law Shannon plans to call writer today and is looking for an update on pt's treatment plan. She says Shannon works as a Child Life Specialist and thinks she gets off work around 1600. She says Shannon is very supportive and calls her every day.   -Writer received voicemail from Shannon stating she'd like to help with coordinating care plan for pt. Writer returned call and Shannon was driving. She requested writer call back in 5 minutes when she's out of traffic.   -Writer called pt's DIL back. She suggested waiting to call pt's sister Monika until talking with Cole as Monika can cause pt a lot of anxiety. Shannon says she's surprised she wanted to sign CARLOS MANUEL for Monika. Shannon says she calls every day to check on pt and see how she's doing. Writer provided update about pt currently completing ECT, plan to possibly move pt to 3B when a bed becomes available, pursuing outpatient therapy, psychiatry, and possibly 55+ IOP upon discharge. She says based on her conversations with pt, she is very angry, sad, depressed, and feels like she's going to be in the hospital for the whole summer. She says she's tried to provide reassurance to pt and tells her this is the best place for pt to be while her meds are being increased. She says she's in support of IOP but inquired about inpatient options as pt is a very social person. She thinks the transfer to 3B would be good as pt thrives when talking  with people. She thinks the transfer would greatly help pt's demeanor. Shannon has concerns about pt not doing well over computer for IOP and says pt's house is also very stressful for her. She says pt lives in a 3 level single family home and both her and her  are not good on feet. They reportedly live in a younger [place] neighborhood and don't have many neighbors they can relate to. Pt also views her  navigating their house (specifcally stairs) to be stressful. Shannon described pt's gait as not being the best on her feet and she's in the process of working to get pt and her  into a single level home. Shannon says this is a major stressor for pt and pt doesn't think they'd be able to move while she's in the hospital. Shannon says she's getting some push back about the potential move from pt's . Shannon is working with a SW through caregiver assistance at [place] to consider 55+ community - possibly a condo or apartment. She told writer pt is scared and doesn't like how she feels after ECT. She asked questions about when to know if it's working and how to tell whether it's causing damage. She says she hasn't cancelled therapy appointment on 6/2 yet as she didn't know how long pt would be in the hospital. She will plan to cancel it and try to reschedule for the first week of July. She wondered about whether pt is seeing a therapist regularly while inpatient and wondered if pt could see a  who speaks more clear English as she was feeling frustrated she couldn't understand first  well enough. She described sony as healing to pt. She says pt needs and benefits from individual attention and often reports feeling lonely, even at home as her  doesn't require as much socialization. Shannon asked if pet therapy is available and says pt loves dogs. She says, however, that pt has a dog at home and this is one of her worries that the dog is home with her  who isn't able to walk the dog as  "much. Shannon mentioned 7 day stay at [facility] and says the doctors there expressed concern for pt's cognitive ability and questioned whether pt is demonstrating onset of dementia. Shannon shared pt's memory is \"different\" and she often repeats herself. She shared that they completed an assessment while she was there, though she couldn't recall the name of the assessment, and says pt scored 12/30. It was recommended that pt be scheduled with a neurologist and Shannon made an appointment for 6/21. She expressed concern that ECT may further lead to memory loss, but wonders if pt's current presentation has to do with her state of mind (anxiety) and not being as alert. She says there is a lot of paperwork pt will need to complete for the neurology appointment and she plans to wait to bring this up with pt to avoid overwhelming her. She is requesting help with obtaining records from [place]. Shannon reported feeling better knowing more about what pt's discharge plan might be. She explained that pt's PCP will be retiring at the end of June and pt had appointment to see her on 6/12 but will need to determine who to continue seeing. She thinks pt will be receptive to recommendation of her PCP for who will take over her care. She says pt was placed on medication for anxiety while hospitalized and PCP was just starting to manage this medication. Shannon shared that her  Jose R is currently in treatment at [facility] for alcohol but pt does not know this. She says he is expected to be released from the program on 6/7. She says pt hasn't inquired in depth about pt, but has generally asked how he's doing and Shannon has been responding that he's doing well. Shannon says he went to treatment voluntarily and doesn't think it would be helpful to overwhelm pt with this information. Shannon appreciates however team is able to support her, as she has 3 kids and is currently having to parent alone while pt's son is in treatment. She expressed no further " questions or concerns at this time and thanked writer.   Current Symptoms include the following: anxious  Precautions: SI     Discharge Plan or Goal:  Pending stabilization & development of a safe discharge plan.  Considerations include: Return home with outpatient providers     Barriers to Discharge:  Patient presents following SI with plan to overdose on medications in context of worsening depression and concern that medications are not improving symptoms. She reports feeling lonely and struggles to keep up with daily activities. Patient requires symptom stabilization, medication management, and increased support at time of discharge.      Referral Status:  Referrals TBD     Legal Status:  Patient is voluntary     Contacts:   - Cole Jackelyn (phone: [phone]) - CARLOS MANUEL obtained 5/16/22  Son - Kadeem Jackelyn (phone: [phone]) - CARLOS MANUEL obtained 5/16/22  Daughter-in-law - Shannon Jackelyn (phone: [phone]) - CARLOS MANUEL obtained 5/16/22     Upcoming Meetings/Important Dates:  N/A     Rationale for SIO/No Roommate Order:  Patient is not on SIO.  Patient has current roommate.

## 2022-05-26 NOTE — PROGRESS NOTES
"SPIRITUAL HEALTH SERVICES   Spiritual Assessment Progress Note (Behavioral Health/CD Focus)  [facility] ([facility] [place], [place]) 30N    REFERRAL SOURCE: Consult placed for pt asking for spiritual/emotional support. Pt clarified request for someone different than unit .    On this visit, I met with Lorena in the lounge for 5 minutes. Provided brief spiritual support and prayer at pt request.    EXPERIENCE OF ILLNESS/HOSPITALIZATION:  Pt was tearful as she spoke of feeling hopeless, \"What if I'm stuck here all summer!\"    MEANING-MAKING:      SPIRITUALITY/VALUES/Catholic:  Yazidi    COPING/SPIRITUAL PRACTICES: daily prayers for \"healing and hope.\"  Pt was initially tearful then calmed as we identified steps in her treatment process as evidence of hope: adjusting medications, ECT (pt recalls that this was \"successful\" a number of years ago)    SUPPORT SYSTEMS:     PLAN: I will coordinate follow up with unit  as pt requests writer to visit again.                                                                                                                                             Valeria Staples MDiv  Associate   Pager [phone]  Office [phone]  [facility] remains available 24/7 for emergent requests/referrals, either by having the switchboard page the on-call  or by entering an ASAP/STAT consult in Epic (this will also page the on-call ). Routine Epic consults receive an initial response within 24 hours.    "

## 2022-05-26 NOTE — PLAN OF CARE
BEH Occupational Therapy Group Intervention Note     05/26/22 1059   Group Therapy Session   Group Attendance attended group session   Time Session Began 1015   Time Session Ended 1100   Total Time patient participated (minutes) 45   Total # Attendees 3   Group Type life skill   Group Topic Covered relaxation techniques;self-care activities;coping skills/lifestyle management   Group Session Detail Chair yoga: mindful movement to facilitate stress management, awakening and/or relaxation. Education was provided on the use of stretching, exercise, and meditation practice as a coping tool within daily/weekly routine.    Patient Response/Contribution cooperative with task;discussed personal experience with topic;organized   Patient Response Detail Reported walking as a means for physical self-cares. Independently followed and engaged in a 20min guided yoga practice. Verbalized increased awareness of physical tension in neck/shoulders d/t stress and anxiety; asked for additional stretches to release tension and manage stress.      Shiela French OT on 5/26/2022 at 11:00 AM

## 2022-05-26 NOTE — PROGRESS NOTES
PSYCHIATRIC CONSULTATION    Admission Date: 05/16/2022  Date of Service: 05/25/2022    The patient was seen, her chart reviewed, her case discussed with staff at the Team Meeting.  She had ECT # 3 this morning and felt rather dizzy and sleepy when she came back to the unit. She was seen after she had some rest and was watching TV in the lounge. She thought that it was her 4th ECT. She noted no significant changes in her condition except for not feeling suicidal. She  Presented as emotionally labile and appeared to be depressed and somewhat agitated and hopeless thinking that she will be in the hospital till Rodney. It took a while to reassure her and calm her down. She told me that after she took both Remeron and Trazodone at night she felt dizzy and experienced hangover-type reaction.    This is a 76 year old  white female with a long history of anxiety and depression who initially presented to [facility] ED  for evaluation of suicidal ideation and intent. She had a plan to overdose on her pills. She was referred to [facility] unit where she was seen by VIRIDIANA Parada CNP  Her PTA Cymbalta was tapered and discontinued and she was started on Effexor XR. It was felt that she needed inpatient treatment and she was transferred to [facility] and placed on Station 30 where she was seen and followed by Dr. Sandhu, who recommended ECT. It was started last Friday.    MEDICATIONS, psychotropic  Effexor XR 75 mg QAM  Remeron 15 mg at bedtime  Hydroxyzine 25 mg Q4H PRN  Zyprexa 5 mg PO or IM TID PRN  Trazodone 50 mg HS PRN    LABS: No new results    MENTAL STATUS EXAMINATION   Revealed a normally built and normally developed 76-year-old white female appearing her stated age. Her speech was coherent and goal related. She appeared to be anxious and emotionally labile. Her mood was somewhat depressed with hopelessness. She denied SI and HI. No overt psychotic features were noted or elicited. She had only marginal  insight into her problems.    DIAGNOSTIC IMPRESSION  MDD, recurrent  Anxiety Disorder NOS    PLAN: Will optimize the dose of Effexor XR by increasing it to 150 mg QAM. Continue Remeron in the same dose. The patient was advised not to take PRN Trazodone.    Mir Alamo MD

## 2022-05-26 NOTE — PLAN OF CARE
Occupational Therapy Group Note      05/26/22 1400   Group Therapy Session   Group Attendance attended group session   Time Session Began 0100   Time Session Ended 0145   Total Time patient participated (minutes) 45   Total # Attendees 5   Group Type expressive therapy   Group Topic Covered coping skills/lifestyle management;emotions/expression;leisure exploration/use of leisure time   Group Session Detail Topic: OT Open Clinic for creative expression, promoting autonomy, building sense of self-worth, coping with stress/symptoms and opportunity to exercise cognitive skills (i.e. initiation, planning, organization, sequencing, sustained attention, follow-through, termination of task and overall safety awareness).   Patient Response/Contribution cooperative with task;organized    Pt joined group and was independent with gathering supplies for her painting project.  Pt worked in a goal directed manner with attention to detail.  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  Pt appeared easily frustrated when she made errors on her project but was able to persist with the activity.  Workspace was neat and tidy.  Affect: Blunted, progressing towards congruent.  Mood: content, anxious intermittently.

## 2022-05-26 NOTE — PROGRESS NOTES
"[facility], [place]   Psychiatric Progress Note        Interim History:     The patient's care was discussed with the treatment team during the daily team meeting and/or staff's chart notes were reviewed.  Staff report patient slept for about 6.5 hours. Appeared to be anxious and somewhat irritable. Complained of pain in her leg, received Tylenol. So far had 3 ECT treatments and seems to be tolerating them well.    Met with patient: she was seen in the conference room. Lorena presented as cooperative. Said that her mood was better today: \"I enjoyed playing [employer] game\". Overall, reported that her mood was somewhat better and she was not so hopeless as before. She denied active Suicidal ideation. Asked about how many more ECT treatments she would have to get before discharge, said that she and her  live in [place] and it would be too hard for them to do ECT on an outpatient basis. I informed Lorena that she would need to have at least few more ECT treatments to achieve a stable improvement in her mood. She was receptive to that and had no further questions or concerns.          Medications:       calcium carbonate 600 mg-vitamin D 400 units  1 tablet Oral Daily     DULoxetine  20 mg Oral Daily     mirtazapine  15 mg Oral At Bedtime     simvastatin  40 mg Oral At Bedtime     timolol maleate  1 drop Both Eyes Daily     venlafaxine  37.5 mg Oral Daily with breakfast          Allergies:     Allergies   Allergen Reactions     Horse Allergy      Allergic to horse hair          Labs:     No results found for this or any previous visit (from the past 24 hour(s)).       Psychiatric Examination:     /68   Pulse 86   Temp 97.9  F (36.6  C) (Temporal)   Resp 16   Wt 66 kg (145 lb 9.6 oz)   SpO2 97%   BMI 24.99 kg/m    Weight is 145 lbs 9.6 oz  Body mass index is 24.99 kg/m .    Orthostatic Vitals  Report      Most Recent      Sitting Orthostatic /86 05/26 0947    Sitting " Orthostatic Pulse (bpm) 98 05/26 0947    Standing Orthostatic /87 05/26 0947    Standing Orthostatic Pulse (bpm) 100 05/26 0947         Appearance: awake, alert, dressed in hospital scrubs and appeared older than stated age  Attitude:  Overall, cooperative  Eye Contact:  fair  Mood:  Anxious, sad   Affect: constricted, at times anxious and dramatic  Speech:  decreased prosody  Psychomotor Behavior:  no evidence of tardive dyskinesia, dystonia, trembles  Throught Process:  linear  Associations:  no loose associations  Thought Content: denies active and passive Suicidal ideation.  Insight: mildly impaired  Judgement: partial  Oriented to:  time, person, and place  Attention Span and Concentration: partial  Recent and Remote Memory: mildly impaired- was confused about how many ECTs she has had.    Clinical Global Impressions  First: 6/4 5/17/2022      Most recent: 5/3 5/26/2022             Precautions:     Behavioral Orders   Procedures     Code 1 - Restrict to Unit     Code 2     Electroconvulsive therapy     Routine Programming     As clinically indicated     Status 15     Every 15 minutes.     Suicide precautions     Patients on Suicide Precautions should have a Combination Diet ordered that includes a Diet selection(s) AND a Behavioral Tray selection for Safe Tray - with utensils, or Safe Tray - NO utensils            DIagnoses:     1.  Major depressive disorder, recurrent, moderate severity.  2.  Unspecified anxiety disorder.    3.  Suicidal ideation.         Plan:     Still remains highly emotional/anxious, though, reports more stable mood. Was referred to 55+ program. Tomorrow will have ECT#4. No medication changes today. Will continue to provide support and structure.

## 2022-05-26 NOTE — PLAN OF CARE
Problem: Sleep Disturbance (Depressive Signs/Symptoms)  Goal: Improved Sleep (Depressive Signs/Symptoms)  Outcome: Ongoing, Progressing  Intervention: Promote Healthy Sleep Hygiene  Recent Flowsheet Documentation  Taken 5/26/2022 0600 by Alexia Darling RN  Sleep Hygiene Promotion:   awakenings minimized   noise level reduced   Goal Outcome Evaluation:        Patient appeared to be asleep for 6.5 hours during safety checks this shift. No complaints or concerns voiced by patient or noted by staff. Will continue to monitor and update if there are changes.

## 2022-05-26 NOTE — PLAN OF CARE
"Problem: Activity and Energy Impairment (Depressive Signs/Symptoms)  Goal: Optimized Energy Level (Depressive Signs/Symptoms)  Intervention: Optimize Energy Level  Recent Flowsheet Documentation  Patient Performed Hygiene: dressed  Activity (Behavioral Health): up ad jessenia   Goal Outcome Evaluation:  Plan of Care Reviewed With: patient     Patient was visible in the milieu and watched TV. Pt attended and participated group. Pt read with her magnifying glasses. Pt wants to keep the magnifying glasses in her room; Writer explained due to safety reasons we need to keep it in the med room. Patient denied SI, SIB, HI, auditory, and  visual hallucinations. Patient endorses depression and anxiety. Patient describes mood as \"anxious, fearful, irritable, and agitated\" and affect is anxious. Offered PRN anxiety meds, but pt refused it. Provided emotional support. Pt endorsed hip pain and rated 4/10; Administered Tylenol. Pt reported pain has improved. Patient is cooperative with cares; appearing Blunted/Flat. Patient is med-compliant, is eating 75% of her meals. Reports \"sleeps through the night\". Patient evaluation continues.       VS reviewed: /69   Pulse 95   Temp 97.3  F (36.3  C) (Temporal)   Resp 16   Wt 66 kg (145 lb 9.6 oz)   SpO2 97%   BMI 24.99 kg/m   .       Length of stay: 10                                                                              "

## 2022-05-27 ENCOUNTER — ANESTHESIA EVENT (OUTPATIENT)
Dept: BEHAVIORAL HEALTH | Facility: CLINIC | Age: 77
DRG: 885 | End: 2022-05-27
Payer: COMMERCIAL

## 2022-05-27 ENCOUNTER — TELEPHONE (OUTPATIENT)
Dept: BEHAVIORAL HEALTH | Facility: CLINIC | Age: 77
End: 2022-05-27
Payer: COMMERCIAL

## 2022-05-27 ENCOUNTER — ANESTHESIA (OUTPATIENT)
Dept: BEHAVIORAL HEALTH | Facility: CLINIC | Age: 77
DRG: 885 | End: 2022-05-27
Payer: COMMERCIAL

## 2022-05-27 ENCOUNTER — APPOINTMENT (OUTPATIENT)
Dept: BEHAVIORAL HEALTH | Facility: CLINIC | Age: 77
DRG: 885 | End: 2022-05-27
Attending: PSYCHIATRY & NEUROLOGY
Payer: COMMERCIAL

## 2022-05-27 PROCEDURE — 250N000013 HC RX MED GY IP 250 OP 250 PS 637: Performed by: PSYCHIATRY & NEUROLOGY

## 2022-05-27 PROCEDURE — G0177 OPPS/PHP; TRAIN & EDUC SERV: HCPCS

## 2022-05-27 PROCEDURE — 124N000002 HC R&B MH UMMC

## 2022-05-27 PROCEDURE — 250N000011 HC RX IP 250 OP 636: Performed by: ANESTHESIOLOGY

## 2022-05-27 PROCEDURE — 250N000009 HC RX 250: Performed by: ANESTHESIOLOGY

## 2022-05-27 PROCEDURE — 90870 ELECTROCONVULSIVE THERAPY: CPT

## 2022-05-27 PROCEDURE — 99231 SBSQ HOSP IP/OBS SF/LOW 25: CPT | Mod: 25 | Performed by: PSYCHIATRY & NEUROLOGY

## 2022-05-27 PROCEDURE — 370N000017 HC ANESTHESIA TECHNICAL FEE, PER MIN

## 2022-05-27 PROCEDURE — 90870 ELECTROCONVULSIVE THERAPY: CPT | Performed by: PSYCHIATRY & NEUROLOGY

## 2022-05-27 RX ORDER — ONDANSETRON 2 MG/ML
4 INJECTION INTRAMUSCULAR; INTRAVENOUS EVERY 30 MIN PRN
Status: CANCELLED | OUTPATIENT
Start: 2022-05-27

## 2022-05-27 RX ORDER — NICARDIPINE HCL-0.9% SOD CHLOR 1 MG/10 ML
SYRINGE (ML) INTRAVENOUS PRN
Status: DISCONTINUED | OUTPATIENT
Start: 2022-05-27 | End: 2022-05-27

## 2022-05-27 RX ORDER — ONDANSETRON 4 MG/1
4 TABLET, ORALLY DISINTEGRATING ORAL EVERY 30 MIN PRN
Status: CANCELLED | OUTPATIENT
Start: 2022-05-27

## 2022-05-27 RX ORDER — SODIUM CHLORIDE, SODIUM LACTATE, POTASSIUM CHLORIDE, CALCIUM CHLORIDE 600; 310; 30; 20 MG/100ML; MG/100ML; MG/100ML; MG/100ML
INJECTION, SOLUTION INTRAVENOUS CONTINUOUS
Status: CANCELLED | OUTPATIENT
Start: 2022-05-27

## 2022-05-27 RX ORDER — ETOMIDATE 2 MG/ML
INJECTION INTRAVENOUS PRN
Status: DISCONTINUED | OUTPATIENT
Start: 2022-05-27 | End: 2022-05-27

## 2022-05-27 RX ADMIN — CALCIUM CARBONATE 600 MG (1,500 MG)-VITAMIN D3 400 UNIT TABLET 1 TABLET: at 09:51

## 2022-05-27 RX ADMIN — VENLAFAXINE HYDROCHLORIDE 150 MG: 150 TABLET, EXTENDED RELEASE ORAL at 09:51

## 2022-05-27 RX ADMIN — Medication 400 MCG: at 08:42

## 2022-05-27 RX ADMIN — TIMOLOL MALEATE 1 DROP: 5 SOLUTION OPHTHALMIC at 07:53

## 2022-05-27 RX ADMIN — TRAZODONE HYDROCHLORIDE 50 MG: 50 TABLET ORAL at 21:30

## 2022-05-27 RX ADMIN — SIMVASTATIN 40 MG: 20 TABLET, FILM COATED ORAL at 21:28

## 2022-05-27 RX ADMIN — SUCCINYLCHOLINE CHLORIDE 60 MG: 20 INJECTION, SOLUTION INTRAMUSCULAR; INTRAVENOUS; PARENTERAL at 08:36

## 2022-05-27 RX ADMIN — Medication 16 MG: at 08:34

## 2022-05-27 RX ADMIN — MIRTAZAPINE 15 MG: 15 TABLET, FILM COATED ORAL at 21:28

## 2022-05-27 ASSESSMENT — ACTIVITIES OF DAILY LIVING (ADL)
ORAL_HYGIENE: INDEPENDENT
LAUNDRY: WITH SUPERVISION
DRESS: INDEPENDENT
DRESS: INDEPENDENT
LAUNDRY: WITH SUPERVISION
DRESS: INDEPENDENT
ORAL_HYGIENE: INDEPENDENT
HYGIENE/GROOMING: INDEPENDENT
HYGIENE/GROOMING: INDEPENDENT
LAUNDRY: WITH SUPERVISION
HYGIENE/GROOMING: INDEPENDENT
ORAL_HYGIENE: INDEPENDENT

## 2022-05-27 NOTE — ANESTHESIA POSTPROCEDURE EVALUATION
Patient: Lorena Hayden    Procedure: * No procedures listed *       Anesthesia Type:  General    Note:     Postop Pain Control: Uneventful            Sign Out: Well controlled pain   PONV: No   Neuro/Psych: Uneventful            Sign Out: Acceptable/Baseline neuro status   Airway/Respiratory: Uneventful            Sign Out: Acceptable/Baseline resp. status   CV/Hemodynamics: Uneventful            Sign Out: Acceptable CV status; No obvious hypovolemia; No obvious fluid overload   Other NRE: NONE   DID A NON-ROUTINE EVENT OCCUR? No           Last vitals:  Vitals:    05/26/22 0947 05/26/22 1605 05/27/22 0753   BP:  101/68 133/76   Pulse:  86 89   Resp:   16   Temp: 36.3  C (97.3  F) 36.6  C (97.9  F) 36.5  C (97.7  F)   SpO2: 97%  97%       Electronically Signed By: Jenaro Beaver DO  May 27, 2022  8:46 AM

## 2022-05-27 NOTE — PLAN OF CARE
Pt appeared to sleep for a total of 7 hours overnight. No PRN medications were administered and no concerns were noted.     Problem: Sleep Disturbance (Depressive Signs/Symptoms)  Goal: Improved Sleep (Depressive Signs/Symptoms)  Outcome: Ongoing, Progressing  Flowsheets (Taken 5/27/2022 4068)  Mutually Determined Action Steps (Improved Sleep): sleeps 4-6 hours at night

## 2022-05-27 NOTE — PROGRESS NOTES
"[facility], [place]   Psychiatric Progress Note        Interim History:     The patient's care was discussed with the treatment team during the daily team meeting and/or staff's chart notes were reviewed.  Staff report patient slept between 4 to 6 hours. Appeared to be anxious and somewhat irritable. Complained repeatedly about most of patients on this floor being younger of her, watching TV shows which she finds immature and not being able to talk to many people. She is aware that unit 3B doesn't have beds today. So far had 4 ECT treatments and seems to be tolerating them well.    Met with patient: she was seen in the conference room. Lorena presented as cooperative. Said that her mood was OK, though, she felt tired: \"I just returned from ECT\". Said that she didn't have Suicidal ideation anymore, but appeared to be anxious and tired. Became tearful when I wished her to have a good weekend. When I asked her why she was crying, Lorena said that she felt as if as she was stuck forever at this hospital. I reminded her that doing ECT was her choice and she would, most likely, be done with ECT in a couple of weeks. I also informed Lorena that I called intake and that she was on a waiting list for station 3B. She was receptive to that and had no further questions or concerns.          Medications:       calcium carbonate 600 mg-vitamin D 400 units  1 tablet Oral Daily     DULoxetine  20 mg Oral Daily     mirtazapine  15 mg Oral At Bedtime     simvastatin  40 mg Oral At Bedtime     timolol maleate  1 drop Both Eyes Daily     venlafaxine  37.5 mg Oral Daily with breakfast          Allergies:     Allergies   Allergen Reactions     Horse Allergy      Allergic to horse hair          Labs:     No results found for this or any previous visit (from the past 24 hour(s)).       Psychiatric Examination:     BP (!) 133/96 (BP Location: Right arm, Patient Position: Sitting)   Pulse 93   Temp 98.1  F " (36.7  C) (Temporal)   Resp 16   Wt 66 kg (145 lb 9.6 oz)   SpO2 96%   BMI 24.99 kg/m    Weight is 145 lbs 9.6 oz  Body mass index is 24.99 kg/m .    Orthostatic Vitals  Report      Most Recent      Sitting Orthostatic /86 05/26 0947    Sitting Orthostatic Pulse (bpm) 98 05/26 0947    Standing Orthostatic /87 05/26 0947    Standing Orthostatic Pulse (bpm) 100 05/26 0947         Appearance: awake, alert, dressed in hospital scrubs and appeared older than stated age  Attitude:  Overall, cooperative  Eye Contact:  fair  Mood:  Anxious, sad   Affect: constricted, at times anxious and dramatic, at times tearful  Speech:  decreased prosody  Psychomotor Behavior:  no evidence of tardive dyskinesia, dystonia, trembles  Throught Process:  linear  Associations:  no loose associations  Thought Content: denies active and passive Suicidal ideation.  Insight: mildly impaired  Judgement: partial  Oriented to:  time, person, and place  Attention Span and Concentration: partial  Recent and Remote Memory: mildly impaired    Clinical Global Impressions  First: 6/4 5/17/2022      Most recent: 5/3 5/26/2022             Precautions:     Behavioral Orders   Procedures     Code 1 - Restrict to Unit     Code 2     Electroconvulsive therapy     Routine Programming     As clinically indicated     Status 15     Every 15 minutes.     Suicide precautions     Patients on Suicide Precautions should have a Combination Diet ordered that includes a Diet selection(s) AND a Behavioral Tray selection for Safe Tray - with utensils, or Safe Tray - NO utensils            DIagnoses:     1.  Major depressive disorder, recurrent, moderate severity.  2.  Unspecified anxiety disorder.    3.  Suicidal ideation.         Plan:     Still remains highly emotional/anxious. Was referred to 55+ program. Today had ECT#4. No medication changes today. Will have ECT#5 on Wednesday due to [facility] on Monday. Will continue to provide support and  structure. On a waiting list for a transfer to 3B. If over long weekend demands to be discharged and contracts for safety, can be discharged AMA.

## 2022-05-27 NOTE — PLAN OF CARE
05/27/22 5601   Team Discussion   Participants Srinivasa Sandhu MD; Jailene Mercer, RN; TU Tom   Progress Minimal   Anticipated length of stay 3-4 weeks   Continued Stay Criteria/Rationale Patient presented following SI with plan to overdose on medications in context of worsening depression and concern that medications are not improving symptoms. She reports feeling lonely and struggles to keep up with daily activities. She has started ECT with 4 treatments in series completed so far. It does not seem patient will be able to complete ECT while outpatient due to limitations with transportation and someone to supervise her. Patient requires symptom stabilization, medication management, and increased support at time of discharge.   Precautions SI   Plan Continue patient's medications and ECT schedule. Pursue transfer to  when a bed becomes available as patient has expressed distress regarding being surrounded by younger-aged peers. Coordinate 55+, psychiatry, and therapy referrals when patient is approaching discharge.   Anticipated Discharge Disposition home with family

## 2022-05-27 NOTE — PLAN OF CARE
05/27/22 1357   Group Therapy Session   Group Attendance attended group session   Time Session Began 1115   Time Session Ended 1200   Total Time patient participated (minutes) 45   Total # Attendees 2   Group Type life skill;other (see comments)  (OT)   Group Topic Covered relaxation techniques;other (see comments);coping skills/lifestyle management  (Sensory)   Group Session Detail OT - Sensory   Patient Response/Contribution able to recall/repeat info presented;cooperative with task   Patient Response Detail Focus of group was basic introduction of using both internal and external senses for calming and alerting self for mental health management. Pt was able to identify a few specific types of techniques with several of the various senses. She did note that she was not as aware of these things yet she did understand how doing these things can be helpful. At end of group, she was tearful with staff and noted she was frustrated and scared of behaviors of another patient. Staff offered reassurances for her safety and options of things to do to help manage if they continued. Offered a different kind of fidget and breathing techniques. She noted these were helpful.       Goal Outcome Evaluation: Progressing and ongoing

## 2022-05-27 NOTE — PROGRESS NOTES
05/26/22 1900   Group Therapy Session   Time Session Began 1700   Time Session Ended 1750   Total Time patient participated (minutes) 40   Total # Attendees 6   Group Type expressive therapy   Group Topic Covered cognitive activities;leisure exploration/use of leisure time;structured socialization   Group Session Detail Music Bingo!   Patient Response/Contribution cooperative with task   Patient Response Detail Participated in Music Therapy intervention of [employer] today.  Goals of session were focusing, memory recall, positive distraction, emotional containment and social cohesion.  Pt response was initially hesitant to try the game, but she ended up winning [employer].  She was attentive throughout and pleasant.

## 2022-05-27 NOTE — PLAN OF CARE
Problem: Mood Impairment (Depressive Signs/Symptoms)  Goal: Improved Mood Symptoms (Depressive Signs/Symptoms)  Outcome: Ongoing, Progressing     Pt is perseverating on being on a unit with patients who are younger than her and continues to be irritable about not having older adults to talk to and the fact that the unit is continuing to admit younger patients. Affect is blunted, flat. Mood is angry at times, anxious, tense and irritable. After the pt returned to the unit from ECT she reported being thirsty and hungry, but she refused her breakfast tray because it was no longer hot and declined all of the beverage options offered to her. Pt denied having any pain or nausea and was compliant with medications. Pt attended some of the groups today. No SI/SIB noted or observed. Will continue to monitor.

## 2022-05-27 NOTE — PLAN OF CARE
"Goal Outcome Evaluation:    Pt continues to perseverate on being on a unit with younger patients. She expressed frustration at not being able to watch the [employer] channel and \"just watching cartoons.\" Her affect is blunted and flat. Her mood is calm, sad, and irritable. She is apologetic when expressing thoughts. She reported having depression and denies anxiety, SI, SIB, HI, and AH/VH.  She reports feeling tired after ECT. Pt was moved to a single room due to anxiety. She spent the majority of the shift in the milieu.  She attended group.     /77   Pulse 95   Temp 98.3  F (36.8  C) (Oral)   Resp 16   Wt 66 kg (145 lb 9.6 oz)   SpO2 95%   BMI 24.99 kg/m                        "

## 2022-05-27 NOTE — PLAN OF CARE
Assessment/Intervention/Current Symtoms and Care Coordination:  -Chart review  -Team meeting - pt went down early today for ECT #4. She reports as irritable and says she doesn't like being here. Pt becomes increasingly upset when younger patients are admitted and seems to have issue with types of shows that are being played on tv as she views them as childish. Team will plan to coordinate possible transfer to 3B as pt prefers this and team agrees it would be beneficial and likely more therapeutic for her. Writer also mentioned possibility of scheduling  visits, though team indicated pt is able to request visit from  when she feels she needs to see one.  from 5/26 included pager/phone number in note. Writer brought up animal therapy and plans to discuss further with nurse manager.   -Provider states pt is on wait list for opening on 3B and it is currently unclear when bed will become available.   -Writer provided update to pt about conversation with her daughter-in-law Shannon. Pt appeared relieved that writer was able to reach her and is in agreement with plan to hopefully transfer to senior unit when a bed is available. Pt is understanding that animal therapy can be difficult to arrange and if it is determined to be possible, may take some time. She would appreciate a visit from a dog. Pt says she enjoyed her visit with  she saw yesterday and would enjoy seeing her again.   -Writer contacted nurse manager to ask about possibility of getting therapy dog to the unit.   Current Symptoms include the following: patient is irritable and anxious  Precautions: SI     Discharge Plan or Goal:  Pending stabilization & development of a safe discharge plan.  Considerations include: Return home with outpatient providers     Barriers to Discharge:  Patient presents following SI with plan to overdose on medications in context of worsening depression and concern that medications are not improving  symptoms. She reports feeling lonely and struggles to keep up with daily activities. Patient requires symptom stabilization, medication management, and increased support at time of discharge.      Referral Status:  Referrals TBD - pt will need to complete DA following IP MH consult referral if she is in agreement with 55+ program upon discharge. DA needs to be completed as close to discharge as possible, otherwise pt can be scheduled to complete this as outpatient.      Legal Status:  Patient is voluntary     Contacts:   - Cole Jackelyn (phone: [phone]) - CARLOS MANUEL obtained 5/16/22  Son - Kadeem Jackelyn (phone: [phone]) - CARLOS MANUEL obtained 5/16/22  Daughter-in-law - Shannon Jackelyn (phone: [phone]) - CARLOS MANUEL obtained 5/16/22     Upcoming Meetings/Important Dates:  N/A     Rationale for SIO/No Roommate Order:  Patient is not on SIO.  Patient has current roommate.

## 2022-05-27 NOTE — TELEPHONE ENCOUNTER
9:54am Intake received a call from Dr. Sandhu reporting pt could benefit from a transfer due to pt going through ECT and with the milieu being younger, could benefit from an older social milieu. Pt is on the working list pending a 3B transfer. Intake will follow up after finding bed placement.

## 2022-05-27 NOTE — ANESTHESIA CARE TRANSFER NOTE
Patient: Lorena Hayden    Procedure: * No procedures listed *       Diagnosis: * No pre-op diagnosis entered *  Diagnosis Additional Information: No value filed.    Anesthesia Type:   General     Note:      Level of Consciousness: drowsy      Independent Airway: airway patency satisfactory and stable  Dentition: dentition unchanged      Patient transferred to: PACU    Handoff Report: Identifed the Patient, Identified the Reponsible Provider, Reviewed the pertinent medical history, Discussed the surgical course, Reviewed Intra-OP anesthesia mangement and issues during anesthesia, Set expectations for post-procedure period and Allowed opportunity for questions and acknowledgement of understanding      Vitals:  Vitals Value Taken Time   BP     Temp     Pulse     Resp     SpO2         Electronically Signed By: Jenaro Beaver DO  May 27, 2022  8:45 AM

## 2022-05-27 NOTE — PLAN OF CARE
"   05/27/22 1405   Group Therapy Session   Group Attendance attended group session   Time Session Began 1300   Time Session Ended 1345   Total Time patient participated (minutes) 45   Total # Attendees 1   Group Type life skill;other (see comments)  (OT)   Group Topic Covered coping skills/lifestyle management;relaxation techniques;other (see comments)  (Sensorimotor)   Group Session Detail OT - Sensory   Patient Response/Contribution able to recall/repeat info presented;discussed personal experience with topic;cooperative with task   Patient Response Detail Group focus was learning and practice of use of sensorimotor techniques for calming and self regulation. Initially noted she would be the only one in group and that she was still frustrated. She noted she would participate as \"there was nothing else to do\". She then noted that she did want to participate and had participated yesterday in another type of movement and how helpful it was. Pt was engaged and practiced techniques offered. Noted benefits of feeling calmer at end of session and thanked staff for the group and information.        Goal Outcome Evaluation: Progressing and ongoing                      "

## 2022-05-27 NOTE — PROCEDURES
Procedure/Surgery Information   [facility]    Bedside Procedure Note  Date of Service (when I performed the procedure): 05/27/2022    Lorena Hayden is a 76 year old female patient.  1. 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 Major depressive disorder, recurrent episode, severe with anxious distress (H)      Past Medical History:   Diagnosis Date     Allergic rhinitis, cause unspecified      Anxiety      Depressive disorder      Temp: 97.7  F (36.5  C) Temp src: Temporal BP: 133/76 Pulse: 89   Resp: 16 SpO2: 97 % O2 Device: None (Room air)      Procedures       Marvin March MD     Lorena Hayden is a 76 year old  year old female patient.  [id number]  @DX@    [facility]   ECT Procedure Note   05/27/2022    Patient Status: Inpatient    Is this the first in a series of 12 treatments?  Yes     Allergies   Allergen Reactions     Horse Allergy      Allergic to horse hair       Weight:  145 lbs 9.6 oz         Indications for ECT:   Medications ineffective         Clinical Narrative:   Lorena Hayden is a 76 year old woman with a history of depression, started in her mid 60s. Her depression was relatively well-controlled on duloxetine until February 2022, since when she has had progressive depression with suicidal thoughts without an identifiable trigger. Multiple medication trials yielded to no improvement (Increased duloxetine, added aripiprazole then bupropion, now cross titrating to desvenlafaxine). She had a recent psychiatric hospitalization in [facility] in [place], discharged only two weeks ago. However, depression persisted, she had more thoughts that life was not worth living and thoughts of suicide.  She also reported difficulties with sleep, low energy, poor appetite with significant weight loss of about 20 pounds.  She finally came to the hospital and  requested to be hospitalized.     Psychiatric History:   Diagnoses: Major Depressive Disorder     Hospitalizations: Two former (first in 2013, last in 2022)     Suicide Attempts: None     Medication Trials: Ativan, trazodone, Klonopin, Lexapro, Wellbutrin, Celexa, Seroquel, Zoloft, Abilify, currently Effexor and Cymbalta. No TCAs or MAOIs.     Neuromodulation:  Right unilateral ultrabrief ECT x 19 sessions in 2012. She had a good response during treatment which lasted only several weeks after the discontinuation.     Psychotherapy:  None            Diagnosis:   Major depression         Assessment:   #1 she reports her depression is severe and decreases pleasure and motivation and she can be very paralyzed and indecisive with anxiety. ECT was helpful before  #2  She is irritable she was awoken early but her appt pushed back. She denies discomfort after last appointment. Some SI but she feels it is  becuase she is so anxious waiting.  #3 sleeping well with trazodone + remeron, she reports no worsening as in in fact improving no SI today, no tremor and less anxious, less catastrophizing thoughts. Her dog nehemias is awaiting her at home. She feels her thinking is improved not worsened since starting ECT  #4 Today she is flustered she has trouble putting her depression intensity on a scale but admits sadness, no tearfulness, no Si today and denies complaints about recovery process of ECT. She denies worsening anxiety and she slept well overnight.           Pause for the Cause:     Right patient Yes   Right procedure/laterality settings: Yes          Intra-Procedure Documentation:       ECT #: 4   Treatment number this series: 4   Total treatment number: 24     Type of ECT:  Right, unilateral ultrabrief    ECT Medications:    Etomidate: 16mg  Succinyl Choline: 60mg    Versed 1mg for agitation      ECT Strip Summary:    Titration ( 19.2 percent ) Energy Level:115.2mc  0.3ms 30 Hz time 8 sec 800mA  Motor Seizure Duration: 34  seconds  EEG Seizure Duration:  46 seconds      Complications: None    Time for re-orientation:20    Plan:    Prefers earliest appointment to decrease pre-procedure anxiety.      If she has consistent transportation and supervision for at least 6hrs after treatment she can complete her course outpatient when appropriate    Continue ECT Q MWF at 115.2mC settings    Monitor depression severity with clinical assessment augmented with CUDOS every treatment  Continue current medications    MD Rod

## 2022-05-27 NOTE — PLAN OF CARE
OT PROGRESS NOTE:       05/26/22 1452   Group Therapy Session   Group Attendance attended group session   Time Session Began 1115   Time Session Ended 1200   Total Time patient participated (minutes) 45   Total # Attendees 3   Group Type life skill   Group Topic Covered coping skills/lifestyle management;self-care activities;structured socialization   Group Session Detail Leisure acvtivity to support social interaction, distraction from negative obsessive thinking and integration of cognitive skill use for improved self management.   Patient Response/Contribution discussed personal experience with topic;expressed reluctance to alter behaviors;listened actively;unable to sequence the task   Patient Response Detail Initiated group, immediately became labile. Most of group was the only pt in group. Redirected by focusing on positive activities done in the past independently and with others. Identified spending time with friends, reading and drawing as activities that brought her great susanne. Identified her need to be out of her room rather than ruminate when alone in her room. With assist dentified playing cards, walking and possibly drawing as potential activities she could engage in with staff and peers. Noted she did not relate well with male mikhail thus wanted to see a female mikhail. Writer noted she would let her nurse know of her desire for a female mikhail. Before group was done a female mikhail arrived to meet with patient. Writer was unable to locate a large print novel for patient to distract and return to enjoyed activities.

## 2022-05-27 NOTE — PROGRESS NOTES
Patient adequate for discharge . Report called to unit nurse  Jailene SOTO  . Iv removed and hemostasis achieved less than 2 min.  Patient safely transported back to unit with  staff persons.

## 2022-05-27 NOTE — ANESTHESIA PREPROCEDURE EVALUATION
Anesthesia Pre-Procedure Evaluation    Patient: Lorena Hayden   MRN: [id number] : 1945        Procedure : * No procedures listed *          Past Medical History:   Diagnosis Date     Allergic rhinitis, cause unspecified      Anxiety      Depressive disorder       Past Surgical History:   Procedure Laterality Date     C IMPLANT HALLUX  3/2006 &     Yesi STEELE MAMMOGRAM, SCREENING  2011    Dr arredondo     COLONOSCOPY  2014    Mirian Mottamalika     EXTRACAPSULAR CATARACT EXTRATION WITH INTRAOCULAR LENS IMPLANT  2011     HC DILATION/CURETTAGE DIAG/THER NON OB  2005    D & C     HCL PAP SMEAR  2011    Dr Arredondo     ZZC  DELIVERY ONLY       ZZC LIGATE FALLOPIAN TUBE      Tubal Ligation     ZZHC COLONOSCOPY THRU STOMA W BIOPSY/CAUTERY TUMOR/POLYP/LESION  / /2011    benign polyp      Allergies   Allergen Reactions     Horse Allergy      Allergic to horse hair      Social History     Tobacco Use     Smoking status: Never Smoker     Smokeless tobacco: Never Used   Substance Use Topics     Alcohol use: Yes     Alcohol/week: 0.8 standard drinks     Types: 1 drink(s) per week     Comment: social      Wt Readings from Last 1 Encounters:   22 66 kg (145 lb 9.6 oz)           Physical Exam    Airway        Mallampati: III   TM distance: > 3 FB   Neck ROM: full   Mouth opening: > 3 cm    Respiratory Devices and Support         Dental  no notable dental history         Cardiovascular   cardiovascular exam normal          Pulmonary   pulmonary exam normal                OUTSIDE LABS:  CBC:   Lab Results   Component Value Date    WBC 5.4 2022    WBC 4.3 2013    HGB 15.4 2022    HGB 10.5 (L) 2016    HCT 47.4 (H) 2022    HCT 42.4 2013     2022     2013     BMP:   Lab Results   Component Value Date     2022     2016    POTASSIUM 3.7 2022    POTASSIUM 4.1 2016    CHLORIDE 106 2022     CHLORIDE 102 04/12/2016    CO2 29 05/14/2022    CO2 25 04/12/2016    BUN 13 05/14/2022    BUN 14 04/12/2016    BUN NOT APPLICABLE 04/12/2016    CR 0.65 05/14/2022    CR 0.75 04/12/2016     (H) 05/14/2022    GLC 97 04/12/2016     COAGS: No results found for: PTT, INR, FIBR  POC: No results found for: BGM, HCG, HCGS  HEPATIC:   Lab Results   Component Value Date    ALBUMIN 3.3 (L) 05/14/2022    PROTTOTAL 7.2 05/14/2022    ALT 24 05/14/2022    AST 21 05/14/2022    ALKPHOS 87 05/14/2022    BILITOTAL 0.6 05/14/2022    BILIDIRECT 0.1 07/28/2012     OTHER:   Lab Results   Component Value Date    PH 7.0 07/28/2012    A1C 5.4 01/25/2013    RENUKA 8.9 05/14/2022    TSH 1.22 05/17/2022    SED 33 (A) 04/23/2011       Anesthesia Plan    ASA Status:  3      Anesthesia Type: General.   Induction: Intravenous.           Consents            Postoperative Care            Comments:                eJnaro Beavre DO

## 2022-05-28 PROCEDURE — 124N000002 HC R&B MH UMMC

## 2022-05-28 PROCEDURE — 250N000013 HC RX MED GY IP 250 OP 250 PS 637: Performed by: PSYCHIATRY & NEUROLOGY

## 2022-05-28 PROCEDURE — G0177 OPPS/PHP; TRAIN & EDUC SERV: HCPCS

## 2022-05-28 RX ADMIN — MIRTAZAPINE 15 MG: 15 TABLET, FILM COATED ORAL at 21:23

## 2022-05-28 RX ADMIN — SIMVASTATIN 40 MG: 20 TABLET, FILM COATED ORAL at 21:23

## 2022-05-28 RX ADMIN — TIMOLOL MALEATE 1 DROP: 5 SOLUTION OPHTHALMIC at 08:51

## 2022-05-28 RX ADMIN — VENLAFAXINE HYDROCHLORIDE 150 MG: 150 TABLET, EXTENDED RELEASE ORAL at 08:51

## 2022-05-28 RX ADMIN — TRAZODONE HYDROCHLORIDE 50 MG: 50 TABLET ORAL at 21:23

## 2022-05-28 RX ADMIN — CALCIUM CARBONATE 600 MG (1,500 MG)-VITAMIN D3 400 UNIT TABLET 1 TABLET: at 08:51

## 2022-05-28 NOTE — PLAN OF CARE
"  Problem: Decreased Participation/Engagement (Depressive Signs/Symptoms)  Goal: Increased Participation and Engagement (Depressive Signs/Symptoms)  Outcome: Ongoing, Progressing           Pt has spent the majority of the day in the common areas watching TV or reading, played Dwight with peer and staff which she reports was somewhat helpful to break up her day. Pt continues to endorse feeling very depressed and \"dull,\" denies anxiety and declines any PRN medications despite becoming upset and tearful a handful of times and being worried about her  coming to visit and his safety. Pt denies feeling any improvement from ECT or her medications at this time, reports being upset that she needs to stay in the hospital and wait. Writer encouraged pt to utilize any of the distraction tools and items that are available on the unit and approach staff to make her needs know. Pt denies SI/SIB/HI/A/VH, affect is labile and appears anxious and restless but not interested in PRN medications, offered ice pack or fidgets and pt not interested at this time. Pt denies pain or medication SE, will continue to monitor and support plan of care.               "

## 2022-05-28 NOTE — PLAN OF CARE
Goal Outcome Evaluation:    Plan of Care Reviewed With: patient     Pt endorses depression and said her mood is okay. She asked writer how long she has been taking her Effexor. Pt expressed concern about not noticing a benefit so was focused on if she had taken this medication for more than two weeks. She has taken Effexor for 9 days and has been on her current dose since 5/26. Her affect is tense, sad and anxious. She denies anxiety, SI, HI, SIB, and AH/VH. She attended group. She spent most of the shift in the milieu watching television and some time reading.     /75   Pulse 92   Temp 98  F (36.7  C) (Temporal)   Resp 16   Wt 66 kg (145 lb 9.6 oz)   SpO2 96%   BMI 24.99 kg/m                 Received a fax from [facility], the form a record of the patient's DX list.      This list was printed and the forms were faxed over to [facility]. Received a confirmation of the documents sent.

## 2022-05-28 NOTE — TELEPHONE ENCOUNTER
R The pt is currently on Unit 30 awaiting a transfer to 3B.     8:04a 3B at capacity. The pt remains on the work-list awaiting a transfer.

## 2022-05-28 NOTE — PLAN OF CARE
Pt appeared to have slept for a total of 7 hours overnight. No PRN medications were administered and no concerns were noted.     Problem: Sleep Disturbance (Depressive Signs/Symptoms)  Goal: Improved Sleep (Depressive Signs/Symptoms)  Outcome: Ongoing, Progressing  Flowsheets (Taken 5/28/2022 0330)  Mutually Determined Action Steps (Improved Sleep): sleeps 4-6 hours at night

## 2022-05-28 NOTE — PROGRESS NOTES
05/27/22 1900   Group Therapy Session   Group Attendance attended group session   Time Session Began 1715   Time Session Ended 1800   Total Time patient participated (minutes) 45   Total # Attendees 2   Group Type psychotherapeutic   Group Topic Covered problem-solving   Group Session Detail motivation   Patient Response/Contribution cooperative with task;discussed personal experience with topic   Patient Response Detail reported mood as up and down and that ECT was difficult today. She is looking forward to a visit from her  this weekend

## 2022-05-29 ENCOUNTER — TELEPHONE (OUTPATIENT)
Dept: BEHAVIORAL HEALTH | Facility: CLINIC | Age: 77
End: 2022-05-29
Payer: COMMERCIAL

## 2022-05-29 PROCEDURE — H2032 ACTIVITY THERAPY, PER 15 MIN: HCPCS

## 2022-05-29 PROCEDURE — 250N000013 HC RX MED GY IP 250 OP 250 PS 637: Performed by: PSYCHIATRY & NEUROLOGY

## 2022-05-29 PROCEDURE — G0177 OPPS/PHP; TRAIN & EDUC SERV: HCPCS

## 2022-05-29 PROCEDURE — 124N000002 HC R&B MH UMMC

## 2022-05-29 RX ADMIN — HYDROXYZINE HYDROCHLORIDE 25 MG: 25 TABLET, FILM COATED ORAL at 15:12

## 2022-05-29 RX ADMIN — TIMOLOL MALEATE 1 DROP: 5 SOLUTION OPHTHALMIC at 08:49

## 2022-05-29 RX ADMIN — MIRTAZAPINE 15 MG: 15 TABLET, FILM COATED ORAL at 21:29

## 2022-05-29 RX ADMIN — TRAZODONE HYDROCHLORIDE 50 MG: 50 TABLET ORAL at 21:29

## 2022-05-29 RX ADMIN — SENNOSIDES AND DOCUSATE SODIUM 1 TABLET: 50; 8.6 TABLET ORAL at 13:20

## 2022-05-29 RX ADMIN — CALCIUM CARBONATE 600 MG (1,500 MG)-VITAMIN D3 400 UNIT TABLET 1 TABLET: at 08:49

## 2022-05-29 RX ADMIN — VENLAFAXINE HYDROCHLORIDE 150 MG: 150 TABLET, EXTENDED RELEASE ORAL at 08:49

## 2022-05-29 RX ADMIN — SIMVASTATIN 40 MG: 20 TABLET, FILM COATED ORAL at 21:29

## 2022-05-29 ASSESSMENT — ACTIVITIES OF DAILY LIVING (ADL)
DRESS: INDEPENDENT
ORAL_HYGIENE: INDEPENDENT
HYGIENE/GROOMING: INDEPENDENT
LAUNDRY: WITH SUPERVISION

## 2022-05-29 NOTE — PLAN OF CARE
"Goal Outcome Evaluation:    Plan of Care Reviewed With: patient        Pt endorses anxiety. She spent the majority of the shift in the milieu and was social with select peers. Pt made some phone calls and attended art group. She continues to appear anxious and distressed. She declined offered PRN medications. She endorses depression and anxiety and denies SI, HI, SIB, and AH/VH. She had an increase in anxiety when peers were loud. She reported anxiety about \"lack of programming due to the Holiday.\" She was reassured that she would be taken care of tomorrow. She declined medication and was able to calm herself done when prompted to do a breathing exercise. Pt then requested writer to stop an action movie due to it making her anxious. Pt received senna on day shift that was effective. She denies pain and medication SE.     /74   Pulse 89   Temp 98.4  F (36.9  C) (Oral)   Resp 16   Wt 66.6 kg (146 lb 12.8 oz)   SpO2 95%   BMI 25.20 kg/m        "

## 2022-05-29 NOTE — TELEPHONE ENCOUNTER
R The pt is currently on Unit 30 awaiting a transfer to 3B.      7:14a 3B at capacity. The pt remains on the work-list awaiting a transfer.

## 2022-05-29 NOTE — PLAN OF CARE
05/28/22 1941   Group Therapy Session   Group Attendance attended group session   Time Session Began 1700   Time Session Ended 1745   Total Time patient participated (minutes) 45   Total # Attendees 3   Group Type recreation   Group Topic Covered coping skills/lifestyle management;leisure exploration/use of leisure time;structured socialization   Group Session Detail OT Leisure Group   Patient Response/Contribution cooperative with task;listened actively;pleasant mood;engaged;congruent affect   Patient Response Detail Pt actively participated in a structured occupational therapy group with a focus on a visual-spatial leisure task. Pt response: Pt was able to follow 2-step directions of the novel task with consistent cueing from writer throughout, and demonstrated concrete planning and problem solving throughout the task. Pt remained focused and engaged for the full duration of group, and verbalized that she enjoyed the activity at the end of group. Pt stated that she often plays games with her grandson outside of the hospital.

## 2022-05-29 NOTE — PLAN OF CARE
"  Problem: Behavioral Health Plan of Care  Goal: Develops/Participates in Therapeutic Byron to Support Successful Transition  5/29/2022 1330 by Mirian Wilson RN  Outcome: Ongoing, Progressing    Pt has spent the majority of the shift in the common areas, selectively social with peers, attended scheduled unit programming and made some phone calls. Pt continues to appear anxious, restless, and distressed, offered PRN medications as well as non-pharmalogical interventions but continues to decline and state she can calm herself, was agreeable to ride bike briefly which was somewhat helpful. Occasionally becomes tearful or despondent and reports she is very depressed and wants to go home. Pt states she feels like the medications are working in her body, and she woke up this morning feeling less depressed but now is feeling that she is \"fit to be tied\" and she doesn't know what to do with herself and she just wants to go home. States being in the hospital and away from her family is what is affecting her mood and she feels like she would feel less depressed if she could go home and do outpatient ECT, encouraged pt to speak with team about this on Tuesday. Pt denies SI/SIB/HI/A/VH at this time. Pt took a shower and approached writer at the end of the shift to request PRN hydroxyzine for anxiety due to some loud peers on the unit. Pt also received PRN senna and reports her last BM was 5/27 or 5/28 but she usually goes daily. Pt denies any pain or other medication SE, will continue to monitor and support plan of care.               "

## 2022-05-29 NOTE — PLAN OF CARE
05/29/22 1240   Group Therapy Session   Group Attendance attended group session   Time Session Began 1115   Time Session Ended 1200   Total Time patient participated (minutes) 45   Total # Attendees 4   Group Type psychotherapeutic   Group Topic Covered balanced lifestyle   Patient Response/Contribution cooperative with task   Patient Response Detail Lorena GeorgianaCayla) participated in a Health & Coping group this a.m. on imaginative writing. She wrote a story of her future starting the day of discharge. Discussed her pleasant recent visit with her  and her hopes of joining a seniors' group for companionship and friendship in the future. Agreeable with restricted affect.

## 2022-05-30 ENCOUNTER — TELEPHONE (OUTPATIENT)
Dept: BEHAVIORAL HEALTH | Facility: CLINIC | Age: 77
End: 2022-05-30
Payer: COMMERCIAL

## 2022-05-30 LAB — SARS-COV-2 RNA RESP QL NAA+PROBE: NEGATIVE

## 2022-05-30 PROCEDURE — 124N000002 HC R&B MH UMMC

## 2022-05-30 PROCEDURE — 87635 SARS-COV-2 COVID-19 AMP PRB: CPT | Performed by: PSYCHIATRY & NEUROLOGY

## 2022-05-30 PROCEDURE — 90853 GROUP PSYCHOTHERAPY: CPT

## 2022-05-30 PROCEDURE — 250N000013 HC RX MED GY IP 250 OP 250 PS 637: Performed by: PSYCHIATRY & NEUROLOGY

## 2022-05-30 PROCEDURE — G0177 OPPS/PHP; TRAIN & EDUC SERV: HCPCS

## 2022-05-30 RX ADMIN — CALCIUM CARBONATE 600 MG (1,500 MG)-VITAMIN D3 400 UNIT TABLET 1 TABLET: at 08:51

## 2022-05-30 RX ADMIN — TIMOLOL MALEATE 1 DROP: 5 SOLUTION OPHTHALMIC at 08:51

## 2022-05-30 RX ADMIN — MIRTAZAPINE 15 MG: 15 TABLET, FILM COATED ORAL at 21:46

## 2022-05-30 RX ADMIN — SIMVASTATIN 40 MG: 20 TABLET, FILM COATED ORAL at 21:46

## 2022-05-30 RX ADMIN — TRAZODONE HYDROCHLORIDE 50 MG: 50 TABLET ORAL at 21:46

## 2022-05-30 RX ADMIN — VENLAFAXINE HYDROCHLORIDE 150 MG: 150 TABLET, EXTENDED RELEASE ORAL at 08:51

## 2022-05-30 ASSESSMENT — ACTIVITIES OF DAILY LIVING (ADL)
HYGIENE/GROOMING: INDEPENDENT
ORAL_HYGIENE: INDEPENDENT
HYGIENE/GROOMING: INDEPENDENT
LAUNDRY: WITH SUPERVISION
ORAL_HYGIENE: INDEPENDENT
DRESS: INDEPENDENT
DRESS: INDEPENDENT

## 2022-05-30 NOTE — PLAN OF CARE
05/29/22 1900   Group Therapy Session   Group Attendance attended group session   Time Session Began 1700   Time Session Ended 1800   Total Time patient participated (minutes) 55   Total # Attendees 5   Group Type expressive therapy  (art therapy)   Group Topic Covered emotions/expression   Group Session Detail ivet sculpture   Patient Response/Contribution cooperative with task;discussed personal experience with topic;organized   Patient Response Detail Kaylynn presented as calm and cooperative. She participated in making a ivet sculpture representing herself. She shared her two sculptures with the group, one of a heart and one of a flower, and talked about valuing her family's love for her. She interacted appropriately with peers.   Goal Outcome Evaluation:

## 2022-05-30 NOTE — PROGRESS NOTES
SPIRITUAL HEALTH SERVICES  SPIRITUAL ASSESSMENT Progress Note  [facility] ([facility]) 30N     REFERRAL SOURCE: follow up    Brief visit with Lorena in  pt room. Shared prayer for healing at pt request.    Plan: I will follow up 1-2x weekly in coordination with unit .    Valeria Staples MDiv  Associate   Pager [phone]  Office [phone]  [facility] remains available 24/7 for emergent requests/referrals, either by having the switchboard page the on-call  or by entering an ASAP/STAT consult in Epic (this will also page the on-call ). Routine Epic consults receive an initial response within 24 hours.

## 2022-05-30 NOTE — TELEPHONE ENCOUNTER
R:  Inpatient Bed Call Log 5/30/2022 done at 7am    Adults:    [facility] is posting 0 beds.     [facility] is posting 0 beds.    [facility] is posting 0 beds.    [facility] is posting 0 beds.    [facility] is posting 0 beds. Juan Jackson at 8am    [facility] is posting 0 beds.    [facility] is posting 0 beds.    [facility] is posting 0 beds.      [facility] is posting 0 beds. Mixed unit 12+. Low acuity only.     [facility] is positing 0 beds. No aggression.     [facility] is posting 0 beds.     [facility] is posting 0 beds. Low acuity only.    [facility] is posting 0 beds.    [facility] is posting 0 beds. Low acuity.     [facility] is posting 0 beds. 72 hr hold required.     [facility] is posting 0 beds.       [facility] is posting 2 beds. Vol only, No Hx of aggression, violence or assault. No sexual offenders. No 72 hr holds.    [facility] is posting 0 beds. (Must have the cognitive ability to do programming. No aggressive or violent behavior or recent HX in the last 2 yrs. MH must be primary.)    [facility] is posting 0 beds. Low acuity only. Violence and aggression capped.     [facility] is posting 0 beds. Low acuity, Neg Covid.     [facility] is posting 2 beds. Covid neg. Vol only. Combined adolescent and adult unit. No aggressive or violent behavior. No registered sex offenders.     [facility] is posting 12 beds. Call for details.    [facility] is posting 3 beds.

## 2022-05-30 NOTE — PLAN OF CARE
"  Problem: Suicidal Behavior  Goal: Suicidal Behavior is Absent or Managed  Outcome: Ongoing, Progressing   Goal Outcome Evaluation:    Plan of Care Reviewed With: patient      \"I'm the same.The medication hasn't worked yet.\"Depression is at an 8, denies anxiety. States thoughts are clear. Attends most the groups and is social with some peers. Became teary eyed during check in. Patient is fearful she isn't going to get better. Was reassured. Patient's hand was shaking a couple times today, patient said the shaking was from anxiety. Patient declined anxiety medication, instead wanted to work on breathing exercises on her own. Attended OT group this afternoon. Patient said she enjoyed group.           "

## 2022-05-30 NOTE — PLAN OF CARE
"Goal Outcome Evaluation:    Plan of Care Reviewed With: patient      Pt spent most of the shift in the milieu watching tv. She reports high depression, low anxiety and feeling lonely. She denies SI, SIB, HI, and AH/VH. She became teary eyed during check-in due to her fearing being hospitalized all Summer and feeling like her antidepressants are not working. She was reassured when told the goal of hospitalization is to help her stabilize and she would have outpatient support when she discharges.     Pt was tearful after group and talked with staff. She felt fear and grief because her mom had committed suicide and she learned that children of parents who commit suicide are more likely to make an attempt. After processing she expressed some hope saying \"look at me I'm working on myself.\"     /74   Pulse 89   Temp 97.8  F (36.6  C) (Oral)   Resp 16   Wt 66.6 kg (146 lb 12.8 oz)   SpO2 96%   BMI 25.20 kg/m             "

## 2022-05-30 NOTE — PLAN OF CARE
Pt appeared to have slept for a total of 7 hours overnight. No PRN medications were administered and no concerns were noted.     Problem: Sleep Disturbance (Depressive Signs/Symptoms)  Goal: Improved Sleep (Depressive Signs/Symptoms)  Outcome: Ongoing, Progressing

## 2022-05-30 NOTE — PLAN OF CARE
05/30/22 1456   Group Therapy Session   Group Attendance attended group session   Time Session Began 1330   Time Session Ended 1425   Total Time patient participated (minutes) 55   Total # Attendees 4   Group Type expressive therapy   Group Topic Covered balanced lifestyle   Patient Response/Contribution cooperative with task   Patient Response Detail Lorenalorene Hernandez) participated in OT clinic this afternoon, where she initiated a chosen project (painted ceramic), followed through with plan, and asked for support with supplies as needed. Selected several songs online to share with the group. Focused on task; pleasant and polite. Discussed her  and adult sons and how much she misses them.

## 2022-05-30 NOTE — PLAN OF CARE
05/30/22 1756   Group Therapy Session   Group Attendance attended group session   Time Session Began 1700   Time Session Ended 1750   Total Time patient participated (minutes) 50   Total # Attendees 4   Group Type psychotherapeutic   Group Topic Covered coping skills/lifestyle management   Group Session Detail Group participated in discussing the self-care wheel. Discussed it generally as a group; the importance of balancing the different areas, what each one might look like for the individual, how they care for themselves within that wheel. Group members then did individual exercise with pen and paper containing self-care wheel writing ways to care for self within the different areas.   Patient Response/Contribution cooperative with task;discussed personal experience with topic   Patient Response Detail Pt participated thoughtfully. After discussion and when creating her own self-care wheel, pt approached  and was quite tearful. Pt reported that a comment made during the group about the impact of suicide on children was very hard for her because her mom had committed suicide. Discussed further with pt, thanked her for sharing, empathized on how hard having those feelings brought up is.

## 2022-05-31 PROCEDURE — H2032 ACTIVITY THERAPY, PER 15 MIN: HCPCS

## 2022-05-31 PROCEDURE — 124N000003 HC R&B MH SENIOR/ADOLESCENT

## 2022-05-31 PROCEDURE — 250N000013 HC RX MED GY IP 250 OP 250 PS 637: Performed by: PSYCHIATRY & NEUROLOGY

## 2022-05-31 PROCEDURE — G0177 OPPS/PHP; TRAIN & EDUC SERV: HCPCS

## 2022-05-31 PROCEDURE — 99232 SBSQ HOSP IP/OBS MODERATE 35: CPT | Performed by: PSYCHIATRY & NEUROLOGY

## 2022-05-31 RX ADMIN — VENLAFAXINE HYDROCHLORIDE 150 MG: 150 TABLET, EXTENDED RELEASE ORAL at 08:38

## 2022-05-31 RX ADMIN — MIRTAZAPINE 15 MG: 15 TABLET, FILM COATED ORAL at 21:20

## 2022-05-31 RX ADMIN — CALCIUM CARBONATE 600 MG (1,500 MG)-VITAMIN D3 400 UNIT TABLET 1 TABLET: at 08:38

## 2022-05-31 RX ADMIN — TRAZODONE HYDROCHLORIDE 50 MG: 50 TABLET ORAL at 21:22

## 2022-05-31 RX ADMIN — TIMOLOL MALEATE 1 DROP: 5 SOLUTION OPHTHALMIC at 08:38

## 2022-05-31 RX ADMIN — HYDROXYZINE HYDROCHLORIDE 25 MG: 25 TABLET, FILM COATED ORAL at 14:46

## 2022-05-31 RX ADMIN — SIMVASTATIN 40 MG: 20 TABLET, FILM COATED ORAL at 21:20

## 2022-05-31 ASSESSMENT — ACTIVITIES OF DAILY LIVING (ADL)
LAUNDRY: UNABLE TO COMPLETE
HYGIENE/GROOMING: INDEPENDENT
HYGIENE/GROOMING: INDEPENDENT
DRESS: SCRUBS (BEHAVIORAL HEALTH);INDEPENDENT
ORAL_HYGIENE: INDEPENDENT
DRESS: SCRUBS (BEHAVIORAL HEALTH)
LAUNDRY: WITH SUPERVISION
ORAL_HYGIENE: INDEPENDENT

## 2022-05-31 NOTE — PLAN OF CARE
"Problem: Activity and Energy Impairment (Depressive Signs/Symptoms)  Goal: Optimized Energy Level (Depressive Signs/Symptoms)  Outcome: Ongoing, Progressing     Problem: Cognitive Impairment (Depressive Signs/Symptoms)  Goal: Optimized Cognitive Function  Outcome: Ongoing, Progressing     Patient is up in milieu most the shift.  She is engaged upon approach.  Patient is restless and intermittently tearful.  She states her depression is \"high\" and denies SI today.  She reports that she does not feel anxious but she feels \"overwhelmed.\"  Declines PRN medication.  Patient explained that another patient on the unit said to her that she did not trust her.  She became tearful and stated, \"I don't know what to do.\"  Reassurance provided.  Patient encouraged to attend groups, engage with staff and peers who are supportive.  She is medication compliant and remains safe on unit.  Plan is for patient to transfer to unit 3b this evening.  Patient was told this and was accepting of this but does appear anxious/nervous.  Will continue to monitor.  Sivan Arteaga RN   "

## 2022-05-31 NOTE — PROVIDER NOTIFICATION
05/31/22 0700   Sleep/Rest   Sleep/Rest/Relaxation appears asleep   Sleep Hygiene Promotion regular sleep pattern promoted   Night Time # Hours 7 hours     NOC Shift Report    Pt in bed at beginning of shift, breathing quiet and unlabored. Pt slept through shift. Pt slept 7 hours.     No pt complaints or concerns at this time. No PRNs given. Will continue to monitor.

## 2022-05-31 NOTE — PROGRESS NOTES
Cayla polite and appropriate this shift. She was cooperative with transfer to . Pt was transferred to  via wheelchair at 16:39.

## 2022-05-31 NOTE — TELEPHONE ENCOUNTER
0006-Resting in bed, eyes closed, wife at bedside. Stable. Call light and personal items within reach. 
  
0026-Assessment complete. Positive dorsalis pedis pulse with doppler to bilateral lower extremities; patient tolerated well. Dressing, left foot, intact, no drainage. Call light and personal items within reach. No complaints. 
  
0610-Stable. No change from initial assessment. Stable. No complaints. 
  
Shift Summary:  Rested quietly. Medicated per orders as needed for pain.  Stable at shift change report. 
  
  
 R: Called Dr Hernandez who informed me she accepts the pt and has already done the Doc to Doc.      Pt placed in que and unit called with disposition at 3:18PM    Unit 30 updated with placement at 3:22PM

## 2022-05-31 NOTE — PLAN OF CARE
Assessment/Intervention/Current Symtoms and Care Coordination:  -Chart review  -Team meeting - pt continues to present as anxious, tearful, and requires frequent reassurance. Pt became distressed when peer (PENG) told pt they didn't trust her. Team plans to offer reassurance to pt that it is okay for her to accept/request PRNs. Team continues to advocate for transfer to 3B as current milieu is overwhelming for pt, pt is on wait list. Pt is expected to have ECT #5 tomorrow.   -RN reports expected bed opening on 3B this afternoon and is working to coordinate with provider regarding possible transfer.   -Provider and RN confirmed pt will be transferred to 3B this evening. Writer called pt's  Cole to provide update about expected transfer this evening and provided phone number for 3B ([phone]). Writer explained time of transfer is currently unknown. He thinks this will be good for pt and she will appreciate the move as she was getting frustrated with all of the young people. He thinks pt is sounding a little better last he spoke with her. He wanted to know how long pt will be at the hospital and writer told him ECT doctor and attending psychiatrist will need to work together to provide recommendation for when pt would be appropriate to discharge/based on her improvement. He says he hopes her medication will work well for her too.   -Writer called pt's daughter in law Shannon to provide update, left voicemail at 1419.   -Writer gave hand off to CTCs on 3B (Alie and Allison - coverage).  -Pt reported to writer that she's feeling nervous about transfer to 3B and writer offered her reassurance which she seemed accepting of. Writer told pt they provided information to CTCs on 3B regarding what team was working on/recommending and pt was appreciative of this.   Current Symptoms include the following: anxious and tearful  Precautions: SI     Discharge Plan or Goal:  Pending stabilization & development of a safe  discharge plan.  Considerations include: Return home with outpatient providers     Barriers to Discharge:  Patient presents following SI with plan to overdose on medications in context of worsening depression and concern that medications are not improving symptoms. She reports feeling lonely and struggles to keep up with daily activities. Patient requires symptom stabilization, medication management, and increased support at time of discharge.      Referral Status:  Referrals TBD - pt will need to complete DA following IP MH consult referral if she is in agreement with 55+ program upon discharge. DA needs to be completed as close to discharge as possible, otherwise pt can be scheduled to complete this as outpatient.      Legal Status:  Patient is voluntary     Contacts:   - Cole Jackelyn (phone: [phone]) - CARLOS MANUEL obtained 5/16/22  Son - Kadeem Jackelyn (phone: [phone]) - CARLOS MANUEL obtained 5/16/22  Daughter-in-law - Shannon Jackelyn (phone: [phone]) - CARLOS MANUEL obtained 5/16/22     Upcoming Meetings/Important Dates:  N/A     Rationale for SIO/No Roommate Order:  Patient is not on SIO.  Patient is in single room.

## 2022-05-31 NOTE — PLAN OF CARE
05/31/22 1423   Group Therapy Session   Group Attendance attended group session   Time Session Began 1315   Time Session Ended 1415   Total Time patient participated (minutes) 50   Total # Attendees 3   Group Type Occupational Therapy   Group Topic Covered balanced lifestyle;coping skills/lifestyle management;leisure exploration/use of leisure time;relaxation techniques;structured socialization   Group Session Detail OT Clinic Group   Patient Response Detail Intervention: Leisure Group with 2 peers.    Patient Response: Pt joined to play group card game for leisure participation, exploration and socialization. Pt able to play according to cues with minimal intermittent cues throughout the game for types of cards they could or could not play. Pt was social with others during group and appeared to enjoy themself, making teasing comments to others when peer played a card that made them have to draw more cards. Pt played for the duration of group and thanked writer for their time before leaving group room.     Mood/Affect: Pleasant      Plan: Patient encouraged to maintain attendance for continued ongoing support in working towards occupational therapy goals to support overall treatment/care.

## 2022-05-31 NOTE — PROGRESS NOTES
[facility], [place]   Psychiatric Progress Note        Interim History:     The patient's care was discussed with the treatment team during the daily team meeting and/or staff's chart notes were reviewed.  Staff report patient slept about 7 hours. Reported feeling overwhelmed. Was tearful off and on. Reported feeling very stressed out from being on this unit with younger and often times more unstable than she is patients.     Met with patient: she was seen in the conference room. Lorena presented as cooperative. Said that she was very anxious and didn't see any benefit from ECT. I pointed out that most of our staff feel that she is more animated, energetic and doesn't cry as often as she used to. Lorena was glad to hear that and confirmed that she would continue with ECT tomorrow. She was receptive to my words and had no further questions or concerns. Shortly after visit with the patient I talked to unit 3B and Dr. Hernandez and intake who informed me that today 3B would have a bed later on today and Dr. Hernandez agreed to take Lorena over as her psychiatrist. This was passed on to Lorena.         Medications:       calcium carbonate 600 mg-vitamin D 400 units  1 tablet Oral Daily     DULoxetine  20 mg Oral Daily     mirtazapine  15 mg Oral At Bedtime     simvastatin  40 mg Oral At Bedtime     timolol maleate  1 drop Both Eyes Daily     venlafaxine  37.5 mg Oral Daily with breakfast          Allergies:     Allergies   Allergen Reactions     Horse Allergy      Allergic to horse hair          Labs:     No results found for this or any previous visit (from the past 24 hour(s)).       Psychiatric Examination:     /81 (BP Location: Left arm, Patient Position: Sitting, Cuff Size: Adult Regular)   Pulse 93   Temp 97.8  F (36.6  C) (Oral)   Resp 16   Wt 66.3 kg (146 lb 1.6 oz)   SpO2 96%   BMI 25.08 kg/m    Weight is 146 lbs 1.6 oz  Body mass index is 25.08 kg/m .    Orthostatic  Vitals  Report      Most Recent      Sitting Orthostatic /81 05/31 0920    Sitting Orthostatic Pulse (bpm) 93 05/31 0920    Standing Orthostatic /85 05/31 0920    Standing Orthostatic Pulse (bpm) 95 05/31 0920         Appearance: awake, alert, dressed in hospital scrubs and appeared older than stated age  Attitude:  Overall, cooperative  Eye Contact:  fair  Mood:  Anxious,    Affect: constricted, at times anxious and dramatic, at times tearful  Speech: normal prosody  Psychomotor Behavior:  no evidence of tardive dyskinesia, dystonia, trembles  Throught Process:  linear  Associations:  no loose associations  Thought Content: denies active and passive Suicidal ideation.  Insight: mildly impaired  Judgement: partial  Oriented to:  time, person, and place  Attention Span and Concentration: partial  Recent and Remote Memory: mildly impaired    Clinical Global Impressions  First: 6/4 5/17/2022      Most recent: 5/3 5/26/2022             Precautions:     Behavioral Orders   Procedures     Code 1 - Restrict to Unit     Code 2     Electroconvulsive therapy     Fall precautions     Routine Programming     As clinically indicated     Status 15     Every 15 minutes.     Suicide precautions     Patients on Suicide Precautions should have a Combination Diet ordered that includes a Diet selection(s) AND a Behavioral Tray selection for Safe Tray - with utensils, or Safe Tray - NO utensils            DIagnoses:     1.  Major depressive disorder, recurrent, moderate severity.  2.  Unspecified anxiety disorder.    3.  Suicidal ideation.         Plan:     Still remains highly emotional/anxious. Was referred to 55+ program. Tomorrow will have ECT#5. No medication changes today. Will continue to provide support and structure. Tonight will be transferred to station 3B under care of Dr. Hernandez.

## 2022-05-31 NOTE — PLAN OF CARE
05/31/22 1243   Group Therapy Session   Group Attendance attended group session   Time Session Began 1015   Time Session Ended 1115   Total Time patient participated (minutes) 50   Total # Attendees 4   Group Type Occupational Therapy   Group Topic Covered balanced lifestyle;coping skills/lifestyle management;leisure exploration/use of leisure time;relaxation techniques   Group Session Detail OT Clinic   Patient Response Detail Intervention: OT Clinic with 3 peers    Patient Response: Pt joined clinic group, electing to continue working on a painting project they had started in a previous group. Pt worked with writer to setup project and began working quietly on project. Pt appeared comfortable interacting with writer and peer sitting nearby, offering them a compliment on their project. Pt was noted to complete detailed work on project for the duration of their attendance. Pt left group briefly to meet with provider and returned after this meeting to work on project. After about an hour from group start pt stated they were done working on it for the day and were going to leave group. Pt declined writer's offer to work on something else.     Mood/Affect: Pleasant    Plan: Patient encouraged to maintain attendance for continued ongoing support in working towards occupational therapy goals to support overall treatment/care.

## 2022-06-01 ENCOUNTER — ANESTHESIA (OUTPATIENT)
Dept: BEHAVIORAL HEALTH | Facility: CLINIC | Age: 77
DRG: 885 | End: 2022-06-01
Payer: COMMERCIAL

## 2022-06-01 ENCOUNTER — APPOINTMENT (OUTPATIENT)
Dept: BEHAVIORAL HEALTH | Facility: CLINIC | Age: 77
DRG: 885 | End: 2022-06-01
Attending: PSYCHIATRY & NEUROLOGY
Payer: COMMERCIAL

## 2022-06-01 ENCOUNTER — ANESTHESIA EVENT (OUTPATIENT)
Dept: BEHAVIORAL HEALTH | Facility: CLINIC | Age: 77
DRG: 885 | End: 2022-06-01
Payer: COMMERCIAL

## 2022-06-01 PROCEDURE — 250N000013 HC RX MED GY IP 250 OP 250 PS 637: Performed by: PSYCHIATRY & NEUROLOGY

## 2022-06-01 PROCEDURE — 370N000017 HC ANESTHESIA TECHNICAL FEE, PER MIN

## 2022-06-01 PROCEDURE — 90870 ELECTROCONVULSIVE THERAPY: CPT

## 2022-06-01 PROCEDURE — 99233 SBSQ HOSP IP/OBS HIGH 50: CPT | Mod: 25 | Performed by: PSYCHIATRY & NEUROLOGY

## 2022-06-01 PROCEDURE — 90870 ELECTROCONVULSIVE THERAPY: CPT | Performed by: PSYCHIATRY & NEUROLOGY

## 2022-06-01 PROCEDURE — 124N000003 HC R&B MH SENIOR/ADOLESCENT

## 2022-06-01 PROCEDURE — 250N000011 HC RX IP 250 OP 636: Performed by: PSYCHIATRY & NEUROLOGY

## 2022-06-01 PROCEDURE — 250N000011 HC RX IP 250 OP 636: Performed by: ANESTHESIOLOGY

## 2022-06-01 PROCEDURE — 250N000009 HC RX 250: Performed by: ANESTHESIOLOGY

## 2022-06-01 PROCEDURE — G0177 OPPS/PHP; TRAIN & EDUC SERV: HCPCS

## 2022-06-01 RX ORDER — LABETALOL HYDROCHLORIDE 5 MG/ML
10 INJECTION, SOLUTION INTRAVENOUS
Status: DISCONTINUED | OUTPATIENT
Start: 2022-06-01 | End: 2022-06-01

## 2022-06-01 RX ORDER — ETOMIDATE 2 MG/ML
INJECTION INTRAVENOUS PRN
Status: DISCONTINUED | OUTPATIENT
Start: 2022-06-01 | End: 2022-06-01

## 2022-06-01 RX ADMIN — SIMVASTATIN 40 MG: 20 TABLET, FILM COATED ORAL at 21:20

## 2022-06-01 RX ADMIN — TIMOLOL MALEATE 1 DROP: 5 SOLUTION OPHTHALMIC at 09:42

## 2022-06-01 RX ADMIN — CALCIUM CARBONATE 600 MG (1,500 MG)-VITAMIN D3 400 UNIT TABLET 1 TABLET: at 09:44

## 2022-06-01 RX ADMIN — HYDROXYZINE HYDROCHLORIDE 25 MG: 25 TABLET, FILM COATED ORAL at 16:16

## 2022-06-01 RX ADMIN — MIRTAZAPINE 15 MG: 15 TABLET, FILM COATED ORAL at 21:20

## 2022-06-01 RX ADMIN — TRAZODONE HYDROCHLORIDE 50 MG: 50 TABLET ORAL at 21:20

## 2022-06-01 RX ADMIN — MIDAZOLAM HYDROCHLORIDE 1 MG: 1 INJECTION, SOLUTION INTRAMUSCULAR; INTRAVENOUS at 08:28

## 2022-06-01 RX ADMIN — VENLAFAXINE HYDROCHLORIDE 150 MG: 150 TABLET, EXTENDED RELEASE ORAL at 09:43

## 2022-06-01 RX ADMIN — SUCCINYLCHOLINE CHLORIDE 60 MG: 20 INJECTION, SOLUTION INTRAMUSCULAR; INTRAVENOUS; PARENTERAL at 08:27

## 2022-06-01 RX ADMIN — Medication 16 MG: at 08:27

## 2022-06-01 ASSESSMENT — ACTIVITIES OF DAILY LIVING (ADL)
HYGIENE/GROOMING: INDEPENDENT
DRESS: INDEPENDENT;SCRUBS (BEHAVIORAL HEALTH)
HYGIENE/GROOMING: INDEPENDENT
ORAL_HYGIENE: INDEPENDENT
DRESS: SCRUBS (BEHAVIORAL HEALTH)
LAUNDRY: WITH SUPERVISION
ORAL_HYGIENE: INDEPENDENT
LAUNDRY: WITH SUPERVISION

## 2022-06-01 NOTE — PROGRESS NOTES
05/31/22 2100   Group Therapy Session   Group Attendance attended group session   Time Session Began 1900   Time Session Ended 1950   Total Time patient participated (minutes) 50   Total # Attendees 3   Group Type recreation   Group Topic Covered relaxation techniques   Group Session Detail stress reduction   Patient Response/Contribution cooperative with task   Patient Response Detail Pt actively participated in a structured Therapeutic Recreation group with a focus on leisure participation, socializing, and exercise. Pt participated in the guided exercise for the full duration of the group. Pt followed along, engaged in the guided chair exercise routine and added to the discussion prompts throughout the routine.  Pt was encouraged to use positive imagery with the deep breathing and stretching to foster relaxation, improves focus, and reduce stress.

## 2022-06-01 NOTE — ANESTHESIA POSTPROCEDURE EVALUATION
Patient: Lorena Hayden    Procedure: * No procedures listed *       Anesthesia Type:  General    Note:  Disposition: Inpatient   Postop Pain Control: Uneventful            Sign Out: Well controlled pain   PONV: No   Neuro/Psych: Uneventful            Sign Out: Acceptable/Baseline neuro status   Airway/Respiratory: Uneventful            Sign Out: Acceptable/Baseline resp. status   CV/Hemodynamics: Uneventful            Sign Out: Acceptable CV status; No obvious hypovolemia; No obvious fluid overload   Other NRE: NONE   DID A NON-ROUTINE EVENT OCCUR? No           Last vitals:  Vitals:    06/01/22 0851 06/01/22 0859 06/01/22 0908   BP: (!) 158/84 (!) 158/74 (!) 144/79   Pulse: 112 108 104   Resp: 22 23 21   Temp:   36.5  C (97.7  F)   SpO2: 96% 98% 96%       Electronically Signed By: Imer Lea MD  June 1, 2022  9:42 AM

## 2022-06-01 NOTE — PLAN OF CARE
Problem: Mood Impairment (Anxiety Signs/Symptoms)  Goal: Improved Mood Symptoms (Anxiety Signs/Symptoms)  Outcome: Ongoing, Progressing  Flowsheets (Taken 5/31/2022 1932)  Mutually Determined Action Steps (Improved Mood Symptoms):    adheres to medication regimen    names internal triggers    names external triggers    Pt presents with blunted, flat affect and anxious mood. Denies SI/SIB and hallucinations. Rates depression 10/10 and denies anxiety although she appears anxious. Pt reports that her depression has yet to get better since hospitalization. Pt transferred from [facility] this evening as she did not feel comfortable due to the younger adults. Reports feeling somewhat sad since leaving as she had made a friend over there. Pt appears very anxious and frequently seeks out staff for reassurance. She attended groups this evening. Oriented pt to the unit and routines. Provided pt with a sound machine as she reports that this helps her sleep. She denies pain. VSS. Medication compliant, PRN Trazodone given with HS meds per pt request. She is very particular about the timing of her medications. Pt reports her first evening on the unit has been well and she feels comfortable here. Would like softer pillows, was unable to locate this evening, advised pt this would be passed on to day shift to find for her, she appreciated this. Provided pt with address to unit to provide to her  for him to visit. Appetite and fluid intake adequate. No other concerns or complaints noted.

## 2022-06-01 NOTE — PLAN OF CARE
06/01/22 1009   Individualization/Patient Specific Goals   Patient Personal Strengths relationship stability   Patient Vulnerabilities history of unsuccessful treatment   Anxieties, Fears or Concerns Patient has severe depression and anxiety.   Individualized Care Needs Patient is receiving ECT.   Patient-Specific Goals (Include Timeframe) Symptom reduction and stabilization. 10-15 days.   Interprofessional Rounds   Summary Patient's care was discussed in team meeting. Patient is a transfer from station 30. She is receiving ECT.   Team Discussion   Participants Dr. Hernandez, [facility], RN   Progress No progress - transfer from station 30. New patient to the unit.   Anticipated length of stay 10-15 days.   Continued Stay Criteria/Rationale Patient is receiving ECT, symptom reduction and stabilization needed.   Medical/Physical Please see H and P.   Precautions Suicide, fall   Plan Patient will receive ECT, psychiatric evaluations, medication management, nursing assessments, group therapy, milieu therapy, and [facility] case management.   Safety Plan code 2, s-15, vol   Anticipated Discharge Disposition home with family

## 2022-06-01 NOTE — PROCEDURES
Procedure/Surgery Information   [facility]    Bedside Procedure Note  Date of Service (when I performed the procedure): 06/01/2022    Lorena Hayden is a 76 year old female patient.  1. 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 Major depressive disorder, recurrent episode, severe with anxious distress (H)      Past Medical History:   Diagnosis Date     Allergic rhinitis, cause unspecified      Anxiety      Depressive disorder      Temp: 98.4  F (36.9  C) Temp src: Oral BP: 123/73 Pulse: 90   Resp: 16 SpO2: 98 % O2 Device: None (Room air)      Procedures       Marvin March MD     Lorena Hayden is a 76 year old  year old female patient.  [id number]  @DX@    [facility]   ECT Procedure Note   06/01/2022    Patient Status: Inpatient    Is this the first in a series of 12 treatments?  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 Her depression was relatively well-controlled on duloxetine until February 2022, since when she has had progressive depression with suicidal thoughts without an identifiable trigger. Multiple medication trials yielded to no improvement (Increased duloxetine, added aripiprazole then bupropion, now cross titrating to desvenlafaxine). She had a recent psychiatric hospitalization in [facility] in [place], discharged only two weeks ago. However, depression persisted, she had more thoughts that life was not worth living and thoughts of suicide.  She also reported difficulties with sleep, low energy, poor appetite with significant weight loss of about 20 pounds.  She finally came to the hospital and requested to  be hospitalized.     Psychiatric History:   Diagnoses: Major Depressive Disorder     Hospitalizations: Two former (first in 2013, last in 2022)     Suicide Attempts: None     Medication Trials: Ativan, trazodone, Klonopin, Lexapro, Wellbutrin, Celexa, Seroquel, Zoloft, Abilify, currently Effexor and Cymbalta. No TCAs or MAOIs.     Neuromodulation:  Right unilateral ultrabrief ECT x 19 sessions in 2012. She had a good response during treatment which lasted only several weeks after the discontinuation.     Psychotherapy:  None            Diagnosis:   Major depression         Assessment:   #1 she reports her depression is severe and decreases pleasure and motivation and she can be very paralyzed and indecisive with anxiety. ECT was helpful before  #2  She is irritable she was awoken early but her appt pushed back. She denies discomfort after last appointment. Some SI but she feels it is  becuase she is so anxious waiting.  #3 sleeping well with trazodone + remeron, she reports no worsening as in in fact improving no SI today, no tremor and less anxious, less catastrophizing thoughts. Her dog nehemias is awaiting her at home. She feels her thinking is improved not worsened since starting ECT  #4 Today she is flustered she has trouble putting her depression intensity on a scale but admits sadness, no tearfulness, no Si today and denies complaints about recovery process of ECT. She denies worsening anxiety and she slept well overnight.  #5 She is irritable and anxious today. She I not yet feeling improvement in mood. She struggled with the transfer of units to  it disrupted her bedtime and she did not sleep well. She finds the ECT frightening.             Pause for the Cause:     Right patient Yes   Right procedure/laterality settings: Yes          Intra-Procedure Documentation:       ECT #: 5   Treatment number this series: 5   Total treatment number: 25     Type of ECT:  Right, unilateral ultrabrief    ECT Medications:     Etomidate: 16mg  Succinyl Choline: 60mg    Versed 1mg for agitation      ECT Strip Summary:    Titration ( 19.2 percent ) Energy Level:115.2mc  0.3ms 30 Hz time 8 sec 800mA  Motor Seizure Duration: 50 seconds  EEG Seizure Duration:  67 seconds      Complications: None    Time for re-orientation: 2 minutes    Plan:    Prefers earliest appointment to decrease pre-procedure anxiety.      If she has consistent transportation and supervision for at least 6hrs after treatment she can complete her course outpatient when appropriate    Continue ECT Q MWF at 115.2mC settings    Monitor depression severity with clinical assessment augmented with CUDOS every treatment  Continue current medications    MD Rod

## 2022-06-01 NOTE — PLAN OF CARE
"Occupational Therapy Group Note:     06/01/22 1500   Group Therapy Session   Group Attendance attended group session   Time Session Began 1400   Time Session Ended 1445   Total Time patient participated (minutes) 30  (no charge)   Total # Attendees 1   Group Type life skill;recreation   Group Topic Covered balanced lifestyle;leisure exploration/use of leisure time;structured socialization   Group Session Detail self-reflection group game   Patient Response/Contribution cooperative with task;discussed personal experience with topic   Patient Response Detail Pt actively participated in a structured occupational therapy group involving a self-reflecting, group leisure task. Pt appeared comfortable sharing positive past experiences and personal information, and was respectful in listening and responding to writer (pt was the only individual in group). Identified a goal to move from a 3 story house to a 1 story living environment in the near future. Shared that she has been friends with one of her closest friends since they were \"2 or 3 years old.\" Stated feeling \"really depressed,\" and expressed difficulty with transitioning to this unit, explaining that it's \"too quiet\" on this unit. Writer offered active listening and encouragement. Will continue to assess.       "

## 2022-06-01 NOTE — PROGRESS NOTES
"WR was attempting to facilitate MH topic group. Pt was the only group participant to arrive. Upon arrival to the group room she was wailing, crying, and venting her frustrations around being admitted to the unit. She misses the friends she made on [address] and kept saying \"I'm in such distress, I feel so desperate, I do not know what I'm going to do.\" WR offered to speak with pt privately in her room which she declined. Pt identified that watching TV would deescalate her current emotions. WR encouraged pt to go into the milieu and watch TV, then debriefed with the provider about the interaction.   "

## 2022-06-01 NOTE — PLAN OF CARE
"  Problem: Suicidal Behavior  Goal: Suicidal Behavior is Absent or Managed  Outcome: Ongoing, Progressing  Flowsheets (Taken 6/1/2022 1007)  Mutually Determined Action Steps (Suicidal Behavior Absent/Managed): verbalizes safety check rationale  Intervention: Provide Immediate and Ongoing Protective Physical Environment  Recent Flowsheet Documentation  Taken 6/1/2022 0930 by Casalenda, Gina R, RN  Safe Transition Promotion: protective factors promoted     Pt denied anxiety this morning but endorses anxiety this afternoon. Pt unable to rate anxiety level and declines pharmacological and nonpharmacological interventions. She rates depression 10/10. She denies SI/SIB/HI/A/V/hallucinations. She contracts for safety. She denies pain. /79 sitting. Recheck /82 sitting. Pt is medication compliant. A/O. She presents with a flat, blunted affect. Speech observed to be appropriate. Pt had ECT this morning, which went well per PACU report. Pt refused breakfast after ECT. After ECT, pt reported dizziness and requested to rest. VSS. Writer encouraged pt to drink fluids. Pt observed resting in her room this morning. Pt will have ECT again on Friday.     Around 1115, pt observed sitting in the dining area. Pt states, \"I need help filling out my menu.\" Writer sat with pt and assisted her to fill out her menu for tomorrow. Pt repeatedly stated, \"I just want to cry\" and \"I don't feel good.\" When asked to elaborate, pt stated, \"it's the ECT\" and \"Oh honey, I just want to cry.\" Pt observed to be hopeless and sad. Writer sat and talked with pt after helping her fill out her menu. Pt reports she is \"tired\" and did not sleep well last night. Pt states, \"because of the flashlights\" from [facility] staff. Pt reports she does not want to sleep too much after ECT because she is worried she will not sleep again tonight.     Pt visible shortly after sitting in the lounge and watching television. She is visible on unit eating lunch. Pt visible " "intermittently sitting in the lounge and resting in her room. She attended OT group. Around 1245, pt stated, \"I don't know this unit\" and \"it's too quiet here.\" Pt also stated, \"I miss the other unit\" and \"there's no activity\" and \"everyone's eating or reading by themselves.\" Pt states, \"I am scared.\" When asked why she is scared, pt states, \"because theres no activity.\" When writer reassured pt staff is here to keep her safe, pt stated, \"I know that.\" Pt visibly anxious. Pt endorsed anxiety at this time but declines pharmacological and nonpharmacological interventions. Writer suggested various unit activities for pt, such as coloring, games, or reading, but pt politely declined. Pt reports she likes \"watching TV.\" Writer assisted pt to the [facility] area where the television was on. Pt visible watching television for about fifteen minutes. Writer encouraged pt to attend afternoon groups as she reported she enjoys having activities and being engaged with her peers. Writer also discussed how attending groups may allow her to get to know her peers better. Pt agreeable to attending groups this afternoon. Writer encouraged pt to let staff know if there is anything we can do to help her feel more comfortable on the unit. Pt acknowledged an understanding of this.     Around 1315, pt came to the [facility] area. Pt observed to be visibly upset because the 1300 group was cancelled. Pt stated, \"I am going to go crazy here!\" and \"I could just scream because there's no activity!\" Writer sat down with pt and staff turned on the television per pt's request. Pt states, \"I want to watch Jessie Rader at 2 Pm.\" Pt still visibly anxious. Writer asked pt if she wanted to play a game, noting that pt liked to play Dwight on previous unit. Pt states, \"Okay.\" A peer offered to play Dwight with pt. [facility] until around 1330. Pt visible resting in her room until around 1400 when pt came out and attended afternoon group. Safety " checks done every fifteen minutes per unit policy.    Per previous report, pt requesting softer pillows. Writer made several attempts to find softer pillows per pt request; however, unable to find any that were softer. Pt is on fall and suicide precautions - no falls or suicidal behavior noted this shift. Will continue to monitor and assist as needed.

## 2022-06-01 NOTE — ANESTHESIA PREPROCEDURE EVALUATION
Anesthesia Pre-Procedure Evaluation    Patient: Lorena Hayden   MRN: [id number] : 1945        Procedure : * No procedures listed *          Past Medical History:   Diagnosis Date     Allergic rhinitis, cause unspecified      Anxiety      Depressive disorder       Past Surgical History:   Procedure Laterality Date     C IMPLANT HALLUX  3/2006 &     Yesi STEELE MAMMOGRAM, SCREENING  2011    Dr arredondo     COLONOSCOPY  2014    Mirian Mottamalika     EXTRACAPSULAR CATARACT EXTRATION WITH INTRAOCULAR LENS IMPLANT  2011     HC DILATION/CURETTAGE DIAG/THER NON OB  2005    D & C     HCL PAP SMEAR  2011    Dr Arredondo     ZZC  DELIVERY ONLY       ZZC LIGATE FALLOPIAN TUBE      Tubal Ligation     ZZHC COLONOSCOPY THRU STOMA W BIOPSY/CAUTERY TUMOR/POLYP/LESION  / /2011    benign polyp      Allergies   Allergen Reactions     Horse Allergy      Allergic to horse hair      Social History     Tobacco Use     Smoking status: Never Smoker     Smokeless tobacco: Never Used   Substance Use Topics     Alcohol use: Yes     Alcohol/week: 0.8 standard drinks     Types: 1 drink(s) per week     Comment: social      Wt Readings from Last 1 Encounters:   22 66.3 kg (146 lb 1.6 oz)             Physical Exam    Airway        Mallampati: III   TM distance: > 3 FB   Neck ROM: full   Mouth opening: > 3 cm    Respiratory Devices and Support         Dental  no notable dental history         Cardiovascular   cardiovascular exam normal          Pulmonary   pulmonary exam normal                OUTSIDE LABS:  CBC:   Lab Results   Component Value Date    WBC 5.4 2022    WBC 4.3 2013    HGB 15.4 2022    HGB 10.5 (L) 2016    HCT 47.4 (H) 2022    HCT 42.4 2013     2022     2013     BMP:   Lab Results   Component Value Date     2022     2016    POTASSIUM 3.7 2022    POTASSIUM 4.1 2016    CHLORIDE 106 2022     CHLORIDE 102 04/12/2016    CO2 29 05/14/2022    CO2 25 04/12/2016    BUN 13 05/14/2022    BUN 14 04/12/2016    BUN NOT APPLICABLE 04/12/2016    CR 0.65 05/14/2022    CR 0.75 04/12/2016     (H) 05/14/2022    GLC 97 04/12/2016     COAGS: No results found for: PTT, INR, FIBR  POC: No results found for: BGM, HCG, HCGS  HEPATIC:   Lab Results   Component Value Date    ALBUMIN 3.3 (L) 05/14/2022    PROTTOTAL 7.2 05/14/2022    ALT 24 05/14/2022    AST 21 05/14/2022    ALKPHOS 87 05/14/2022    BILITOTAL 0.6 05/14/2022    BILIDIRECT 0.1 07/28/2012     OTHER:   Lab Results   Component Value Date    PH 7.0 07/28/2012    A1C 5.4 01/25/2013    RENUKA 8.9 05/14/2022    TSH 1.22 05/17/2022    SED 33 (A) 04/23/2011       Anesthesia Plan    ASA Status:  3      Anesthesia Type: General.   Induction: Intravenous.           Consents            Postoperative Care            Comments:                    Imer Lea MD

## 2022-06-01 NOTE — ANESTHESIA CARE TRANSFER NOTE
Patient: Lorena Hayden    Procedure: * No procedures listed *       Diagnosis: * No pre-op diagnosis entered *  Diagnosis Additional Information: No value filed.    Anesthesia Type:   General     Note:    Oropharynx: oropharynx clear of all foreign objects  Level of Consciousness: drowsy      Independent Airway: airway patency satisfactory and stable  Dentition: dentition unchanged    Report to RN Given: handoff report given  Patient transferred to: PACU    Handoff Report: Identifed the Patient, Identified the Reponsible Provider, Reviewed the pertinent medical history, Discussed the surgical course, Reviewed Intra-OP anesthesia mangement and issues during anesthesia, Set expectations for post-procedure period and Allowed opportunity for questions and acknowledgement of understanding      Vitals:  Vitals Value Taken Time   BP     Temp     Pulse     Resp     SpO2         Electronically Signed By: Imer Lea MD  June 1, 2022  9:41 AM

## 2022-06-01 NOTE — PROGRESS NOTES
Patient arrived in PACU at 0836    Patient meets phase I recovery  criteria at 0859 , switched to phase II recovery at 0900.      The following medications were given in ECT:    Anesthetic:   Etomidate 16mg at 0827    Muscle Relaxant:   Succinylcholine  60mg at 0827    Anxiety/Aggitation:      Versed 1 mg at 0828    Patient meets PACU discharge criteria at 0908.    Pt discharged from the recovery room via wheelchair back to station 3BW   with staff at 0921       Report given to Halina SOTO                 Feel free to call with any questions at *[phone]    Alba Dyer RN

## 2022-06-01 NOTE — PLAN OF CARE
Problem: Mood Impairment (Depressive Signs/Symptoms)  Goal: Improved Mood Symptoms (Depressive Signs/Symptoms)  Outcome: Ongoing, Progressing     Problem: Sleep Disturbance (Depressive Signs/Symptoms)  Goal: Improved Sleep (Depressive Signs/Symptoms)  Outcome: Ongoing, Progressing   Goal Outcome Evaluation:        Received in bed , asleep  Pt is scheduled for ECT # 5 at 0800 today  COVID negative, NPO maintained per policy  Slept for 6.5 hours   Vital signs stable  Denies pain/discomforts   No oral pre ECT medications administered  Received a call from ECT staff, pt should be transported by 0745, endorsed to day staff.

## 2022-06-01 NOTE — PLAN OF CARE
Care coordination:  - Patient's care was discussed in team meeting.  - Writer met with patient for introduction and to answer questions. Patient was tearful, depressed mood, stated she is in hell and wishes she never left station 30. Patient said she is angry she is on the unit. She was redirectable.   - Patient stated she does not like being on the unit and is having a difficult time adjusting. Patient is a transfer from unit 30.   - Writer attempted to reach patient's  and patient's daughter in law. Writer left voice message with contact information for the unit and for writer's direct contact.     Referrals:  - None today.  - Writer attempted to rescheduled patient's therapist appointment. It was scheduled for June 2nd. Writer called and left a message to cancel the existing appointment and requested a return call.  Kat: [phone]      Barriers to discharge:  - Symptom reduction and stabilization.

## 2022-06-01 NOTE — PLAN OF CARE
"Occupational Therapy Group Note:     06/01/22 1200   Group Therapy Session   Group Attendance attended group session   Time Session Began 1115   Time Session Ended 1200   Total Time patient participated (minutes) 30  (no charge)   Total # Attendees 3   Group Type life skill   Group Topic Covered coping skills/lifestyle management;emotions/expression;structured socialization   Group Session Detail music & emotions discussion   Patient Response/Contribution cooperative with task;discussed personal experience with topic   Patient Response Detail Pt actively participated in a structured occupational therapy group with a focus on connecting song titles to life events and personal feelings. Using a list of song titles, pt identified [place] Me [place] (\"I hope to go home eventually\"), The Gambler, and Walking on Sunshine as song titles that relate to her life. Pt identified Amazing Rona as a song title that indicates something about her future. Pt also independently identified \"You Raise Me Up by Azael Groban\" as a song that she personally relates to. Pt politely declined the visual scanning task offered, explaining that she wouldn't be able to concentrate on it. Became tearful x2, and expressed feeling hopeless, not sleeping well/feeling tired, and worrying that her depression will not get better. Utilized the weighted shoulder wrap to provide calming sensory input. Appeared minimally receptive to active listening and encouragement. Expressed interest in meeting with a . Ultimately chose to leave group a bit early (no charge). Will continue to assess and offer support/encouragement.       "

## 2022-06-02 ENCOUNTER — ANESTHESIA EVENT (OUTPATIENT)
Dept: BEHAVIORAL HEALTH | Facility: CLINIC | Age: 77
DRG: 885 | End: 2022-06-02
Payer: COMMERCIAL

## 2022-06-02 PROCEDURE — 250N000013 HC RX MED GY IP 250 OP 250 PS 637: Performed by: PSYCHIATRY & NEUROLOGY

## 2022-06-02 PROCEDURE — 90853 GROUP PSYCHOTHERAPY: CPT

## 2022-06-02 PROCEDURE — 124N000003 HC R&B MH SENIOR/ADOLESCENT

## 2022-06-02 PROCEDURE — H2032 ACTIVITY THERAPY, PER 15 MIN: HCPCS

## 2022-06-02 PROCEDURE — 99231 SBSQ HOSP IP/OBS SF/LOW 25: CPT | Mod: 95 | Performed by: PSYCHIATRY & NEUROLOGY

## 2022-06-02 PROCEDURE — G0177 OPPS/PHP; TRAIN & EDUC SERV: HCPCS

## 2022-06-02 RX ADMIN — MIRTAZAPINE 15 MG: 15 TABLET, FILM COATED ORAL at 21:23

## 2022-06-02 RX ADMIN — VENLAFAXINE HYDROCHLORIDE 150 MG: 150 TABLET, EXTENDED RELEASE ORAL at 09:20

## 2022-06-02 RX ADMIN — CALCIUM CARBONATE 600 MG (1,500 MG)-VITAMIN D3 400 UNIT TABLET 1 TABLET: at 09:19

## 2022-06-02 RX ADMIN — SENNOSIDES AND DOCUSATE SODIUM 1 TABLET: 50; 8.6 TABLET ORAL at 13:07

## 2022-06-02 RX ADMIN — TIMOLOL MALEATE 1 DROP: 5 SOLUTION OPHTHALMIC at 09:21

## 2022-06-02 RX ADMIN — TRAZODONE HYDROCHLORIDE 50 MG: 50 TABLET ORAL at 21:23

## 2022-06-02 RX ADMIN — SIMVASTATIN 40 MG: 20 TABLET, FILM COATED ORAL at 21:23

## 2022-06-02 ASSESSMENT — ACTIVITIES OF DAILY LIVING (ADL)
LAUNDRY: UNABLE TO COMPLETE
DRESS: INDEPENDENT
HYGIENE/GROOMING: INDEPENDENT
HYGIENE/GROOMING: INDEPENDENT
LAUNDRY: UNABLE TO COMPLETE
ORAL_HYGIENE: INDEPENDENT
ORAL_HYGIENE: INDEPENDENT
DRESS: INDEPENDENT

## 2022-06-02 NOTE — PLAN OF CARE
"   06/01/22 2200   Group Therapy Session   Group Attendance attended group session   Time Session Began 1900   Time Session Ended 1945   Total Time patient participated (minutes) 45   Total # Attendees 6   Group Type expressive therapy  (art therapy)   Group Topic Covered emotions/expression   Group Session Detail holding on and letting go   Patient Response/Contribution cooperative with task;discussed personal experience with topic;organized   Patient Response Detail Cayla presented as calm and cooperative. She participated in making art about what she wants to hold on to and let go of in life. She shared with the group and talked about letting go of her depression and holding on to \"positivity.\" She interacted appropriately with peers, offering supportive comments.   Goal Outcome Evaluation:                      "

## 2022-06-02 NOTE — PLAN OF CARE
"Occupational Therapy Group Note       06/02/22 1500   Group Therapy Session   Group Attendance attended group session   Time Session Began 1400   Time Session Ended 1450   Total Time patient participated (minutes) 50   Total # Attendees 6-7   Group Type psychoeducation   Group Topic Covered coping skills/lifestyle management;leisure exploration/use of leisure time;relaxation techniques   Group Session Detail OT Group: Journal or poster collages for healthy leisure, coping with symptoms through distraction, creative expression, and relaxation.   Patient Response/Contribution cooperative with task;organized   Patient Response Detail Patient worked in a goal-directed manner and was motivated to work on her project. She chose to create a poster collage and independently chose a gardent theme with many images of flowers. Patient requested to use markers and labeled her collage \"My Garden\" and irma other nature pictures. She expressed satisfaction in her work and intermittently conversed with a peer about her love of various flowers in her home garden. Patient demonstrated a bright affect in repsonse to group engagement.     "

## 2022-06-02 NOTE — PLAN OF CARE
Occupational Therapy Group Note       06/02/22 1400   Group Therapy Session   Group Attendance attended group session   Time Session Began 1300   Time Session Ended 1350   Total Time patient participated (minutes) 50   Total # Attendees 4   Group Type psychoeducation   Group Topic Covered coping skills/lifestyle management;leisure exploration/use of leisure time;emotions/expression;problem-solving;relaxation techniques;relapse prevention   Group Session Detail OT Group: Journaling prompt exercises for healthy coping strategies for symptom management, healthy leisure, relaxation, self-reflection and insight development, and emotional expression.   Patient Response/Contribution cooperative with task;listened actively;discussed personal experience with topic;organized   Patient Response Detail Patient consistently participated for the duration of group. She endorsed familiarity with journaling by keeping of journal of travel experiences. Patient discussed her personal repsonses to journaling prompts with ease, demonstrating good self-confidence. She shared how she feels well supported by her , friends, and daughter-in-law. She appeared to enjoy the journaling exercises and listened well when peers shared their own experiences.

## 2022-06-02 NOTE — PROGRESS NOTES
"PSYCHIATRY  PROGRESS NOTE     DATE OF SERVICE   06/02/2022  The patient is a 76 year old female who is being evaluated via a video billable telemedicine visit. The patient/guardian has consented to being seen via telemedicine. The provider was in front of a computer in a home office. The patient was on the inpatient unit at [facility].     Start time: 11:55 AM   Stop time: 12:06 PM    The patient/guardian has been notified of the following:     This telemedicine visit is conducted live between you and your clinician. We have found that certain health care needs can be provided without the need for a physical exam. This service lets us provide the care you need with a telemedicine conversation.           CHIEF COMPLAINT   \" I am feeling better today.\"       SUBJECTIVE   Nursing reports: Flat affect, mood appears calm, pt verbalized her mood is \"just ok\" today, pt denies SI/SIB, pt took all scheduled medications. Pt withdrawn this AM, minimally social with staff and peers, pt more social after lunch. Pt made needs known appropriately this shift.      Pt verbalized she is grateful for \"my family and life\". Pt stated goal for today is \"to do justice for this unit\", pt didn't elaborated.      PRN senna-docusate given per pt request for constipation, pt encouraged to increase fluid intake and activity, pt verbalized understanding.       Patient has been discussed with the  earlier today.  I informed the  that we are still in the process of stabilizing the patient.     OBJECTIVE   Upon patient interview, patient reports that she is feeling better today and is getting to know the unit.  Patient stated that she is attending groups, she is sleeping and eating well.  Patient tells me today that she is not feeling anxious but continues to have a lot of depression.  Patient said that her depression is described as her not feeling positive, having no energy, feeling hopeless and at times " having thoughts of suicide.  At the moment of the assessment the patient said that she is not suicidal and was able to contract for safety.    When talking about tentative discharge plans patient tells me that she is interested in doing group and individual therapy.  Patient would like for this writer to communicate with her daughter-in-law Anel because she has been looking into individual therapy appointments.  Shannon's phone number is [phone].  Patient is interested in us communicating with her scheduling a care conference.       MEDICATIONS   Medications:  Scheduled Meds:    calcium carbonate 600 mg-vitamin D 400 units  1 tablet Oral Daily     mirtazapine  15 mg Oral At Bedtime     simvastatin  40 mg Oral At Bedtime     timolol maleate  1 drop Both Eyes Daily     venlafaxine  150 mg Oral Daily with breakfast     Continuous Infusions:  PRN Meds:.acetaminophen, alum & mag hydroxide-simethicone, hydrOXYzine, OLANZapine **OR** OLANZapine, senna-docusate, traZODone    Medication adherence issues: MS Med Adherence Y/N: Yes, Hospitalization  Medication side effects: MEDICATION SIDE EFFECTS: no side effects reported  Benefit: Yes / No: Yes       ROS   A comprehensive review of systems was negative.       MENTAL STATUS EXAM   Vitals: BP (!) 142/78   Pulse 89   Temp 97.3  F (36.3  C) (Temporal)   Resp 16   Wt 65.5 kg (144 lb 8 oz)   SpO2 95%   BMI 24.80 kg/m      Appearance:  No apparent distress  Mood:  {Mood: Depressed   Affect: restricted  was congruent to speech  Suicidal Ideation: PRESENT / ABSENT: absent   Homicidal Ideation: PRESENT / ABSENT: absent   Thought process: unremarkable   Thought content: devoid of  suicidal and violent ideation.   Fund of Knowledge: Average  Attention/Concentration: Normal  Language ability:  Intact  Memory:  Immediate recall intact, Short-term memory intact and Long-term memory intact  Insight:  fair.  Judgement: fair  Orientation: Yes, x4  Psychomotor Behavior: normal or  unremarkable    Muscle Strength and Tone: MuscleStrength: Normal  Gait and Station: Not evaluated       LABS   personally reviewed.     No results found for: PHENYTOIN, PHENOBARB, VALPROATE, CBMZ       DIAGNOSIS   Principal Problem:    <principal problem not specified>    Active Problem List:  Patient Active Problem List   Diagnosis     Allergic rhinitis     Symptomatic menopausal or female climacteric states     Mixed hyperlipidemia     Anxiety state     Insomnia     Anal fissure     Major depressive disorder, recurrent episode, moderate (H)     Irritable bowel syndrome     ACP (advance care planning)     Health Care Home     Tinnitus     Acute lower gastrointestinal hemorrhage     Abdominal pain     Suicidal ideation     Major depressive disorder, recurrent episode, severe with anxious distress (H)     Major depressive disorder, 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 Ongoing education given regarding diagnostic and treatment options with risks, benefits and alternatives and adequate verbalization of understanding.  2.  Medications:      Remeron 15 mg at bedtime      Effexor 150 mg daily  3.  Medical team to follow the patient as needed  4.   coordinating a safe discharge plan with the patient.        Risk Assessment: [facility] RISK ASSESSMENT: Patient able to contract for safety    Coordination of Care:   Treatment Plan reviewed and physician signed, Care discussed with Care/Treatment Team Members, Chart reviewed and Patient seen      Re-Certification I certify that the inpatient psychiatric facility services furnished since the previous certification were, and continue to be, medically necessary for, either, treatment which could reasonably be expected to improve the patient s condition or diagnostic study and that the hospital records indicate that the services furnished were, either, intensive treatment services, admission and related services necessary for diagnostic  study, or equivalent services.     I certify that the patient continues to need, on a daily basis, active treatment furnished directly by or requiring the supervision of inpatient psychiatric facility personnel.   I estimate 14 days of hospitalization is necessary for proper treatment of the patient. My plans for post-hospital care for this patient are  home with family     Bri Elliott MD    -     06/02/2022  -     6:58 PM    Total time  20 minutes with > 50%spent on coordination of cares and psycho-education.    This note was created with help of Dragon dictation system. Grammatical / typing errors are not intentional.    Bri Elliott MD

## 2022-06-02 NOTE — PLAN OF CARE
Goal Outcome Evaluation:      Patient slept well the whole night for 7.25 hours. She went to the bathroom once and voided freely. Nothing unusual noted.

## 2022-06-02 NOTE — PLAN OF CARE
Problem: Suicidal Behavior  Goal: Suicidal Behavior is Absent or Managed  Outcome: Ongoing, Progressing  Flowsheets (Taken 6/1/2022 1906)  Mutually Determined Action Steps (Suicidal Behavior Absent/Managed): shares suicidal thoughts    Problem: Activity and Energy Impairment (Depressive Signs/Symptoms)  Goal: Optimized Energy Level (Depressive Signs/Symptoms)  Outcome: Ongoing, Progressing  Flowsheets (Taken 6/1/2022 1906)  Mutually Determined Action Steps (Optimized Energy Level): grooms self without prompting  Intervention: Optimize Energy Level  Recent Flowsheet Documentation  Taken 6/1/2022 1849 by Chiquita Benson RN  Patient Performed Hygiene:    shower    dressed  Diversional Activity: television  Activity (Behavioral Health): up ad jessenia    Problem: Nutrition Imbalance (Depressive Signs/Symptoms)  Goal: Optimized Nutrition Intake  Outcome: Ongoing, Progressing  Flowsheets (Taken 6/1/2022 1906)  Mutually Determined Action Steps (Optimized Nutrition Intake): eats at meal time    Pt presents with tearful affect and anxious, depressed, hopeless mood. Pt was noted to be very upset at the beginning of the shift while on the telephone. She was noted to be telling the person on the phone she was suicidal and how horrible it was here. After pt finished her phone call, writer met with pt in the conference room. Pt reported that she is having a hard time adjusting to this unit, she wants to go back to [facility]. She reports SI thoughts with a plan of overdosing on medications. She reported to the writer that she could be safe in the hospital and has no plan on acting on those thoughts here. Reassured pt about transitioning to the new unit and trying to give it some time, adjustments can be difficult at first, pt dismissive of this. Denies SIB and hallucinations. Pt appears increasingly anxious. Rates depression 10/10 and anxiety 8/10. Encouraged pt to take PRN hydroxyzine to aid with her anxiety and she was in agreement  with this. This medication helped somewhat, but pt continued to appear somewhat anxious and tense throughout the shift. Pt appears to get easily upset over minor inconveniences. For example, this evening, the cafeteria forgot to send her side salad and cantaloupe - pt became very upset and tearful. When given an XL sweater instead of L, she became upset and tearful again. Pt was present in the lounge and somewhat social with peers. Perseverates on the units activities and insistent about watching a movie after group. Pt has to be reminded that it has to be a group vote for this, to which she seemed distressed about. Attended groups. Appetite and fluid intake adequate. Denies pain. VSS. Medication compliant, PRN Trazodone given at HS. No other concerns or complaints noted this shift.

## 2022-06-02 NOTE — PLAN OF CARE
"Goal Outcome Evaluation:    Flat affect, mood appears calm, pt verbalized her mood is \"just ok\" today, pt denies SI/SIB, pt took all scheduled medications. Pt withdrawn this AM, minimally social with staff and peers, pt more social after lunch. Pt made needs known appropriately this shift.     Pt verbalized she is grateful for \"my family and life\". Pt stated goal for today is \"to do justice for this unit\", pt didn't elaborated.     PRN senna-docusate given per pt request for constipation, pt encouraged to increase fluid intake and activity, pt verbalized understanding.       "

## 2022-06-02 NOTE — CONSULTS
"SPIRITUAL HEALTH SERVICES  SPIRITUAL ASSESSMENT Progress Note  [facility] ([facility]) 3BW     REFERRAL SOURCE: Consult placed for pt asking for spiritual/emotional support. Pt known to writer from support on previous unit.    Brief visit with Cayla in pt room. Cayla stated \"it's been rough\" in reference to the transition to this unit. Pt declined spiritual care at this time stating she would like to rest and welcomed support at a later time.    Plan: I will follow up later today or tomorrow.    Valeria Staples MDiv  Associate   Pager [phone]  Office [phone]  [facility] remains available 24/7 for emergent requests/referrals, either by having the switchboard page the on-call  or by entering an ASAP/STAT consult in Epic (this will also page the on-call ). Routine Epic consults receive an initial response within 24 hours.    "

## 2022-06-03 ENCOUNTER — APPOINTMENT (OUTPATIENT)
Dept: BEHAVIORAL HEALTH | Facility: CLINIC | Age: 77
DRG: 885 | End: 2022-06-03
Attending: PSYCHIATRY & NEUROLOGY
Payer: COMMERCIAL

## 2022-06-03 ENCOUNTER — ANESTHESIA (OUTPATIENT)
Dept: BEHAVIORAL HEALTH | Facility: CLINIC | Age: 77
DRG: 885 | End: 2022-06-03
Payer: COMMERCIAL

## 2022-06-03 LAB — SARS-COV-2 RNA RESP QL NAA+PROBE: NEGATIVE

## 2022-06-03 PROCEDURE — 90870 ELECTROCONVULSIVE THERAPY: CPT | Performed by: PSYCHIATRY & NEUROLOGY

## 2022-06-03 PROCEDURE — 90853 GROUP PSYCHOTHERAPY: CPT

## 2022-06-03 PROCEDURE — 99232 SBSQ HOSP IP/OBS MODERATE 35: CPT | Mod: 25 | Performed by: PSYCHIATRY & NEUROLOGY

## 2022-06-03 PROCEDURE — 90870 ELECTROCONVULSIVE THERAPY: CPT

## 2022-06-03 PROCEDURE — 250N000013 HC RX MED GY IP 250 OP 250 PS 637: Performed by: PSYCHIATRY & NEUROLOGY

## 2022-06-03 PROCEDURE — 250N000011 HC RX IP 250 OP 636: Performed by: ANESTHESIOLOGY

## 2022-06-03 PROCEDURE — 250N000011 HC RX IP 250 OP 636: Performed by: PSYCHIATRY & NEUROLOGY

## 2022-06-03 PROCEDURE — 250N000009 HC RX 250: Performed by: ANESTHESIOLOGY

## 2022-06-03 PROCEDURE — H2032 ACTIVITY THERAPY, PER 15 MIN: HCPCS

## 2022-06-03 PROCEDURE — 124N000003 HC R&B MH SENIOR/ADOLESCENT

## 2022-06-03 PROCEDURE — 370N000017 HC ANESTHESIA TECHNICAL FEE, PER MIN

## 2022-06-03 PROCEDURE — U0005 INFEC AGEN DETEC AMPLI PROBE: HCPCS | Performed by: PSYCHIATRY & NEUROLOGY

## 2022-06-03 RX ORDER — HYDRALAZINE HYDROCHLORIDE 20 MG/ML
2.5-5 INJECTION INTRAMUSCULAR; INTRAVENOUS EVERY 10 MIN PRN
Status: DISCONTINUED | OUTPATIENT
Start: 2022-06-03 | End: 2022-06-03

## 2022-06-03 RX ORDER — ONDANSETRON 4 MG/1
4 TABLET, ORALLY DISINTEGRATING ORAL EVERY 30 MIN PRN
Status: CANCELLED | OUTPATIENT
Start: 2022-06-03

## 2022-06-03 RX ORDER — ONDANSETRON 4 MG/1
4 TABLET, ORALLY DISINTEGRATING ORAL EVERY 30 MIN PRN
Status: DISCONTINUED | OUTPATIENT
Start: 2022-06-03 | End: 2022-06-03

## 2022-06-03 RX ORDER — VENLAFAXINE HYDROCHLORIDE 225 MG/1
225 TABLET, EXTENDED RELEASE ORAL
Status: DISCONTINUED | OUTPATIENT
Start: 2022-06-04 | End: 2022-06-14

## 2022-06-03 RX ORDER — ONDANSETRON 2 MG/ML
4 INJECTION INTRAMUSCULAR; INTRAVENOUS EVERY 30 MIN PRN
Status: DISCONTINUED | OUTPATIENT
Start: 2022-06-03 | End: 2022-06-03

## 2022-06-03 RX ORDER — ONDANSETRON 2 MG/ML
4 INJECTION INTRAMUSCULAR; INTRAVENOUS EVERY 30 MIN PRN
Status: CANCELLED | OUTPATIENT
Start: 2022-06-03

## 2022-06-03 RX ORDER — ETOMIDATE 2 MG/ML
INJECTION INTRAVENOUS PRN
Status: DISCONTINUED | OUTPATIENT
Start: 2022-06-03 | End: 2022-06-03

## 2022-06-03 RX ORDER — METOPROLOL TARTRATE 1 MG/ML
1-2 INJECTION, SOLUTION INTRAVENOUS EVERY 5 MIN PRN
Status: DISCONTINUED | OUTPATIENT
Start: 2022-06-03 | End: 2022-06-03

## 2022-06-03 RX ORDER — HYDRALAZINE HYDROCHLORIDE 20 MG/ML
2.5-5 INJECTION INTRAMUSCULAR; INTRAVENOUS EVERY 10 MIN PRN
Status: CANCELLED | OUTPATIENT
Start: 2022-06-03

## 2022-06-03 RX ORDER — SODIUM CHLORIDE, SODIUM LACTATE, POTASSIUM CHLORIDE, CALCIUM CHLORIDE 600; 310; 30; 20 MG/100ML; MG/100ML; MG/100ML; MG/100ML
INJECTION, SOLUTION INTRAVENOUS CONTINUOUS
Status: CANCELLED | OUTPATIENT
Start: 2022-06-03

## 2022-06-03 RX ORDER — SODIUM CHLORIDE, SODIUM LACTATE, POTASSIUM CHLORIDE, CALCIUM CHLORIDE 600; 310; 30; 20 MG/100ML; MG/100ML; MG/100ML; MG/100ML
INJECTION, SOLUTION INTRAVENOUS CONTINUOUS
Status: DISCONTINUED | OUTPATIENT
Start: 2022-06-03 | End: 2022-06-03

## 2022-06-03 RX ORDER — METOPROLOL TARTRATE 1 MG/ML
1-2 INJECTION, SOLUTION INTRAVENOUS EVERY 5 MIN PRN
Status: CANCELLED | OUTPATIENT
Start: 2022-06-03

## 2022-06-03 RX ADMIN — Medication 16 MG: at 07:58

## 2022-06-03 RX ADMIN — TIMOLOL MALEATE 1 DROP: 5 SOLUTION OPHTHALMIC at 08:57

## 2022-06-03 RX ADMIN — VENLAFAXINE HYDROCHLORIDE 150 MG: 150 TABLET, EXTENDED RELEASE ORAL at 12:02

## 2022-06-03 RX ADMIN — CALCIUM CARBONATE 600 MG (1,500 MG)-VITAMIN D3 400 UNIT TABLET 1 TABLET: at 08:57

## 2022-06-03 RX ADMIN — MIDAZOLAM HYDROCHLORIDE 1 MG: 1 INJECTION, SOLUTION INTRAMUSCULAR; INTRAVENOUS at 08:02

## 2022-06-03 RX ADMIN — TRAZODONE HYDROCHLORIDE 50 MG: 50 TABLET ORAL at 21:50

## 2022-06-03 RX ADMIN — SIMVASTATIN 40 MG: 20 TABLET, FILM COATED ORAL at 21:50

## 2022-06-03 RX ADMIN — SUCCINYLCHOLINE CHLORIDE 60 MG: 20 INJECTION, SOLUTION INTRAMUSCULAR; INTRAVENOUS; PARENTERAL at 07:58

## 2022-06-03 RX ADMIN — MIRTAZAPINE 15 MG: 15 TABLET, FILM COATED ORAL at 21:50

## 2022-06-03 ASSESSMENT — ACTIVITIES OF DAILY LIVING (ADL)
ORAL_HYGIENE: INDEPENDENT
DRESS: INDEPENDENT
DRESS: SCRUBS (BEHAVIORAL HEALTH)
ORAL_HYGIENE: INDEPENDENT
HYGIENE/GROOMING: INDEPENDENT
HYGIENE/GROOMING: INDEPENDENT
LAUNDRY: UNABLE TO COMPLETE
LAUNDRY: WITH SUPERVISION

## 2022-06-03 NOTE — PROGRESS NOTES
"PSYCHIATRY  PROGRESS NOTE     DATE OF SERVICE   06/03/2022       CHIEF COMPLAINT   \" I am very upset\".       SUBJECTIVE   Nursing reports:   Flat affect (brightens during interactions with peers), social with peers, minimally social with this writer. Pt had ECT today, pt denies pain after ECT, pt felt tired after ECT, declined breakfast and choice to rest, A&O x 4. Pt verbalized frustration with needing to wait until \"noon\" to take her Effexor, medication needed to be requested from pharmacy, this was explained to pt, pt verbalized \"everything around here is slow\". When asked if she thought ECT was helping pt verbalized \"I am not sure. No one tells me anything\", pt asked if her anxiety is less compared to when she was admitted, pt verbalized \"yes\", pt declined to talk further. Denies SI/SIB. Denies questions/concerns at this time.     Patient has been discussed with the  earlier today.   was able to schedule a care conference with patient's family for Tuesday at 2 PM.     OBJECTIVE   Today patient was seen and evaluated in the consult room by herself, this was a face-to-face evaluation.  The patient started the meeting by saying that she is extremely upset because yesterday her daughter-in-law dropped some things with security and they did not let the unit know.  Patient stated that the problem was taken care of by the nurses, but she feels that the staff here does not communicate.  The patient tells me that she needs to let go of the anger or otherwise she is never going to get better.    Patient demanded to know how she is doing with the ECT treatments.  I discussed with the patient that it does not seem that she has had any complications from the ECT treatments and she seems to be progressing.  Patient admitted that she is not anxious anymore but reported that she continues to be depressed.  She is denying having thoughts of suicide and was able to contract for safety.  At the end " patient did agree to increase the dose of Effexor to 225 mg daily.    Patient is aware of the care conference that has been scheduled for Tuesday.  When talking about tentative discharge plans patient stated that she is okay with having individual therapy.  Patient informed me that she would like to have in person group therapy as she wants to add more structure into her day.  I discussed with the patient that I will relay this information to the .     MEDICATIONS   Medications:  Scheduled Meds:    calcium carbonate 600 mg-vitamin D 400 units  1 tablet Oral Daily     mirtazapine  15 mg Oral At Bedtime     simvastatin  40 mg Oral At Bedtime     timolol maleate  1 drop Both Eyes Daily     [START ON 6/4/2022] venlafaxine  225 mg Oral Daily with breakfast     Continuous Infusions:  PRN Meds:.acetaminophen, alum & mag hydroxide-simethicone, hydrOXYzine, OLANZapine **OR** OLANZapine, senna-docusate, traZODone    Medication adherence issues: MS Med Adherence Y/N: Yes, Hospitalization  Medication side effects: MEDICATION SIDE EFFECTS: no side effects reported  Benefit: Yes / No: Yes       ROS   A comprehensive review of systems was negative.       MENTAL STATUS EXAM   Vitals: /83 (BP Location: Right arm, Patient Position: Sitting)   Pulse 90   Temp 97.6  F (36.4  C) (Oral)   Resp 18   Wt 65.5 kg (144 lb 8 oz)   SpO2 100%   BMI 24.80 kg/m      Appearance:  No apparent distress  Mood:  {Mood: Depressed   Affect: restricted and irritable  was congruent to speech  Suicidal Ideation: PRESENT / ABSENT: absent   Homicidal Ideation: PRESENT / ABSENT: absent   Thought process: unremarkable   Thought content: devoid of  suicidal and violent ideation.   Fund of Knowledge: Average  Attention/Concentration: Normal  Language ability:  Intact  Memory:  Immediate recall intact, Short-term memory intact and Long-term memory intact  Insight:  fair.  Judgement: fair  Orientation: Yes, x4  Psychomotor Behavior: normal  or unremarkable    Muscle Strength and Tone: MuscleStrength: Normal  Gait and Station: stable        LABS   personally reviewed.     No results found for: PHENYTOIN, PHENOBARB, VALPROATE, CBMZ       DIAGNOSIS   Principal Problem:    <principal problem not specified>    Active Problem List:  Patient Active Problem List   Diagnosis     Allergic rhinitis     Symptomatic menopausal or female climacteric states     Mixed hyperlipidemia     Anxiety state     Insomnia     Anal fissure     Major depressive disorder, recurrent episode, moderate (H)     Irritable bowel syndrome     ACP (advance care planning)     Health Care Home     Tinnitus     Acute lower gastrointestinal hemorrhage     Abdominal pain     Suicidal ideation     Major depressive disorder, recurrent episode, severe with anxious distress (H)     Major depressive disorder, 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 Ongoing education given regarding diagnostic and treatment options with risks, benefits and alternatives and adequate verbalization of understanding.  2.  Medications:      Remeron 15 mg at bedtime      Increase Effexor 225 mg daily  3.  Medical team to follow the patient as needed  4.   coordinating a safe discharge plan with the patient.        Risk Assessment: [facility] RISK ASSESSMENT: Patient able to contract for safety    Coordination of Care:   Treatment Plan reviewed and physician signed, Care discussed with Care/Treatment Team Members, Chart reviewed and Patient seen      Re-Certification I certify that the inpatient psychiatric facility services furnished since the previous certification were, and continue to be, medically necessary for, either, treatment which could reasonably be expected to improve the patient s condition or diagnostic study and that the hospital records indicate that the services furnished were, either, intensive treatment services, admission and related services necessary for  diagnostic study, or equivalent services.     I certify that the patient continues to need, on a daily basis, active treatment furnished directly by or requiring the supervision of inpatient psychiatric facility personnel.   I estimate 14 days of hospitalization is necessary for proper treatment of the patient. My plans for post-hospital care for this patient are  home with family     Bri Elliott MD    -     06/03/2022  -     3:04 PM    Total time  25 minutes with > 50%spent on coordination of cares and psycho-education.    This note was created with help of Dragon dictation system. Grammatical / typing errors are not intentional.    Bri Elliott MD

## 2022-06-03 NOTE — PLAN OF CARE
06/03/22 4337   Group Therapy Session   Group Attendance attended group session   Time Session Began 1300   Time Session Ended 1400   Total Time patient participated (minutes) 60   Total # Attendees 8   Group Type psychotherapeutic   Group Topic Covered emotions/expression   Group Session Detail Group therapy: patients share their struggles and successes with their mental health journey, and support each other.   Patient Response/Contribution cooperative with task;listened actively   Patient Response Detail Patient participated in group. She shared her frustrations and feelings of anger, because she did not receive the care package from her daughter in law, quickly. She listened to her peers when they offered suggestions and coping skills to use.

## 2022-06-03 NOTE — PROCEDURES
Procedure/Surgery Information   [facility]    Bedside Procedure Note  Date of Service (when I performed the procedure): 06/03/2022    Lorena Hayden is a 76 year old female patient.  1. 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 Major depressive disorder, recurrent episode, severe with anxious distress (H)      Past Medical History:   Diagnosis Date     Allergic rhinitis, cause unspecified      Anxiety      Depressive disorder      Temp: 97.4  F (36.3  C) Temp src: Oral BP: (!) 146/80 Pulse: 80   Resp: 16 SpO2: 99 % O2 Device: None (Room air)      Procedures       Marvin March MD     Lorena Hayden is a 76 year old  year old female patient.  [id number]  @DX@    [facility]   ECT Procedure Note   06/03/2022    Patient Status: Inpatient    Is this the first in a series of 12 treatments?  No     Allergies   Allergen Reactions     Horse Allergy      Allergic to horse hair       Weight:  144 lbs 8 oz         Indications for ECT:   Medications ineffective         Clinical Narrative:   Lorena Hayden is a 76 year old woman with a history of depression, started in her mid 60s. Her depression was relatively well-controlled on duloxetine until February 2022, since when she has had progressive depression with suicidal thoughts without an identifiable trigger. Multiple medication trials yielded to no improvement (Increased duloxetine, added aripiprazole then bupropion, now cross titrating to desvenlafaxine). She had a recent psychiatric hospitalization in [facility] in [place], discharged only two weeks ago. However, depression persisted, she had more thoughts that life was not worth living and thoughts of suicide.  She also reported difficulties with sleep, low energy, poor appetite with significant weight loss of about 20 pounds.  She finally came to the hospital and requested  to be hospitalized.     Psychiatric History:   Diagnoses: Major Depressive Disorder     Hospitalizations: Two former (first in 2013, last in 2022)     Suicide Attempts: None     Medication Trials: Ativan, trazodone, Klonopin, Lexapro, Wellbutrin, Celexa, Seroquel, Zoloft, Abilify, currently Effexor and Cymbalta. No TCAs or MAOIs.     Neuromodulation:  Right unilateral ultrabrief ECT x 19 sessions in 2012. She had a good response during treatment which lasted only several weeks after the discontinuation.     Psychotherapy:  None            Diagnosis:   Major depression         Assessment:   #1 she reports her depression is severe and decreases pleasure and motivation and she can be very paralyzed and indecisive with anxiety. ECT was helpful before  #2  She is irritable she was awoken early but her appt pushed back. She denies discomfort after last appointment. Some SI but she feels it is  becuase she is so anxious waiting.  #3 sleeping well with trazodone + remeron, she reports no worsening as in in fact improving no SI today, no tremor and less anxious, less catastrophizing thoughts. Her dog nehemias is awaiting her at home. She feels her thinking is improved not worsened since starting ECT  #4 Today she is flustered she has trouble putting her depression intensity on a scale but admits sadness, no tearfulness, no Si today and denies complaints about recovery process of ECT. She denies worsening anxiety and she slept well overnight.  #5 She is irritable and anxious today. She I not yet feeling improvement in mood. She struggled with the transfer of units to  it disrupted her bedtime and she did not sleep well. She finds the ECT frightening.   #6 reports form the floor report unchanged mood so far. Less irritable and anxious today 1/10 ( mood worst it has ever been ) but SI has resolved.   2/3 at 5min recall Will increase to 9x ST             Pause for the Cause:     Right patient Yes   Right procedure/laterality  settings: Yes          Intra-Procedure Documentation:       ECT #: 6   Treatment number this series: 6   Total treatment number: 26     Type of ECT:  Right, unilateral ultrabrief    ECT Medications:    Etomidate: 16mg  Succinyl Choline: 60mg    Versed 1mg for agitation      ECT Strip Summary:    Titration ( 19.2 percent ) Energy Level:172.8mc  0.3ms 30 Hz time 8 sec 800mA  Motor Seizure Duration: 32 seconds  EEG Seizure Duration:  91 seconds      Complications: None    Time for re-orientation: 2 minutes    Plan:    Prefers earliest appointment to decrease pre-procedure anxiety.      If she has consistent transportation and supervision for at least 6hrs after treatment she can complete her course outpatient when appropriate    Continue ECT Q MWF at 115.2mC settings    Monitor depression severity with clinical assessment augmented with CUDOS every treatment  Continue current medications    MD Rod

## 2022-06-03 NOTE — PROGRESS NOTES
06/02/22 2200   Group Therapy Session   Group Attendance attended group session   Time Session Began 1900   Time Session Ended 2000   Total Time patient participated (minutes) 60   Total # Attendees 6   Group Type expressive therapy   Group Topic Covered emotions/expression   Patient Response/Contribution cooperative with task     Art Therapy directive was to answer questions on a handout related to pts relationship with the five senses and the natural environment. Pts were then encouraged to create art in response to their answers on handout using art media of pts choice.  Goals of directive: to create a self narrative, to explore aspects of self, to explore nature and/or sensory experiences, mindfulness, emotional regulation  Pt was an active participant, focused on task for the full duration of group. Pt created a drawing in response to sensory questions on handout, combining multiple drawings of imagery associated with the senses and positive memories for pt. Pts mood was calm, pleasant participant.

## 2022-06-03 NOTE — PROGRESS NOTES
"SPIRITUAL HEALTH SERVICES Progress Note  [facility]    Saw pt Lorenalorene Hayden per pt request for support while on the unit.    Illness Narrative - Cayla asked for support stating \"today has been just a really hard day.\"    Distress - Pt did not share specifics about her day, spoke of feeling \"empty, alone. Like God has forsaken me.\"    Coping/Spirit - pt welcomed prayer and spoke of getting some rest/nap    Meaning-Making -     Plan - I will follow up 1-2x weekly.    Valeria Staples MDiv  Associate   Pager [phone]  Office [phone]  [facility] remains available 24/7 for emergent requests/referrals, either by having the switchboard page the on-call  or by entering an ASAP/STAT consult in Epic (this will also page the on-call ). Routine Epic consults receive an initial response within 24 hours.    "

## 2022-06-03 NOTE — PLAN OF CARE
"  Problem: Suicidal Behavior  Goal: Suicidal Behavior is Absent or Managed  Outcome: Ongoing, Progressing   Goal Outcome Evaluation:    Plan of Care Reviewed With: patient      Patient reported that she sleeps through the night once she take Remeron and Trazodone. Both medication were provided at . Pt described her mood as being ok. Stated that she is hopeful that her antidepressant will begin to work. Rated her depression as 9-10/10. Pt expressed that she has been depressed since February because her Cymbalta stopped being effective. Pt is currently on Effexor 150 mg. Denied SI,SIB,HI and Hallucinations. Denied being anxious, stated, \"I have no anxiety right now\".   Pt's goal is to get a good sleep tonight and to continue to interact with her peers. Pt expressed that her coping skills include gong to groups and talking to people. Stated, \"I don't wonna stay in my room, I wonna be out there with other people\". Pt reported that it was difficult for her to transition from her previous unit to this unit. Explained that today has been a good day for her as she has fully transitioned. Had a bowel movement today. Requested to have her sister listed on her phone call list to enable her sister to check on her. Pleasant and cooperative with cares.            "

## 2022-06-03 NOTE — PLAN OF CARE
"Goal Outcome Evaluation:    Plan of Care Reviewed With: patient     Flat affect (brightens during interactions with peers), social with peers, minimally social with this writer. Pt had ECT today, pt denies pain after ECT, pt felt tired after ECT, declined breakfast and choice to rest, A&O x 4. Pt verbalized frustration with needing to wait until \"noon\" to take her Effexor, medication needed to be requested from pharmacy, this was explained to pt, pt verbalized \"everything around here is slow\". When asked if she thought ECT was helping pt verbalized \"I am not sure. No one tells me anything\", pt asked if her anxiety is less compared to when she was admitted, pt verbalized \"yes\", pt declined to talk further. Denies SI/SIB. Denies questions/concerns at this time.     "

## 2022-06-03 NOTE — ANESTHESIA CARE TRANSFER NOTE
Patient: Lorena Hayden    Procedure: * No procedures listed *       Diagnosis: * No pre-op diagnosis entered *  Diagnosis Additional Information: No value filed.    Anesthesia Type:   General     Note:    Oropharynx: oropharynx clear of all foreign objects  Level of Consciousness: drowsy  Oxygen Supplementation: room air    Independent Airway: airway patency satisfactory and stable  Dentition: dentition unchanged  Vital Signs Stable: post-procedure vital signs reviewed and stable  Report to RN Given: handoff report given  Patient transferred to: PACU    Handoff Report: Identifed the Patient, Identified the Reponsible Provider, Reviewed the pertinent medical history, Discussed the surgical course, Reviewed Intra-OP anesthesia mangement and issues during anesthesia, Set expectations for post-procedure period and Allowed opportunity for questions and acknowledgement of understanding      Vitals:  Vitals Value Taken Time   BP     Temp     Pulse     Resp     SpO2         Electronically Signed By: Aamir Avina MD  Nisreen 3, 2022  8:08 AM

## 2022-06-03 NOTE — PLAN OF CARE
Goal Outcome Evaluation:      Patent is scheduled for ECT this morning at 0840. She said, she is ready for the procedure. Instructions reiterated on NPO and to void prior to ECT. Patient verbalized understanding. She slept well the whole night for 8.25 hours. Vital signs taken and recorded.     0700 Patient voided freely to clear yellow urine. ECT dept called to send down the patient for the above procedure.

## 2022-06-03 NOTE — ANESTHESIA POSTPROCEDURE EVALUATION
Patient: Lorena Hayden    Procedure: * No procedures listed *       Anesthesia Type:  General    Note:  Disposition: Inpatient   Postop Pain Control: Uneventful            Sign Out: Well controlled pain   PONV: No   Neuro/Psych: Uneventful            Sign Out: Acceptable/Baseline neuro status   Airway/Respiratory: Uneventful            Sign Out: Acceptable/Baseline resp. status   CV/Hemodynamics: Uneventful            Sign Out: Acceptable CV status; No obvious hypovolemia; No obvious fluid overload   Other NRE: NONE   DID A NON-ROUTINE EVENT OCCUR? No           Last vitals:  Vitals:    06/03/22 0822 06/03/22 0837 06/03/22 0854   BP: (!) 147/80 (!) 142/85 130/83   Pulse: 98 96 90   Resp: 20 18    Temp:  36.6  C (97.8  F) 36.4  C (97.6  F)   SpO2: 96% 96% 100%       Electronically Signed By: Aamir Avina MD  Nisreen 3, 2022  12:26 PM

## 2022-06-03 NOTE — ANESTHESIA PREPROCEDURE EVALUATION
Anesthesia Pre-Procedure Evaluation    Patient: Lorena Hayden   MRN: [id number] : 1945        Procedure : * No procedures listed *          Past Medical History:   Diagnosis Date     Allergic rhinitis, cause unspecified      Anxiety      Depressive disorder       Past Surgical History:   Procedure Laterality Date     C IMPLANT HALLUX  3/2006 &     Yesi STEELE MAMMOGRAM, SCREENING  2011    Dr arredondo     COLONOSCOPY  2014    Mirian Mottamalika     EXTRACAPSULAR CATARACT EXTRATION WITH INTRAOCULAR LENS IMPLANT  2011     HC DILATION/CURETTAGE DIAG/THER NON OB  2005    D & C     HCL PAP SMEAR  2011    Dr Arredondo     ZZC  DELIVERY ONLY       ZZC LIGATE FALLOPIAN TUBE      Tubal Ligation     ZZHC COLONOSCOPY THRU STOMA W BIOPSY/CAUTERY TUMOR/POLYP/LESION  / /2011    benign polyp      Allergies   Allergen Reactions     Horse Allergy      Allergic to horse hair      Social History     Tobacco Use     Smoking status: Never Smoker     Smokeless tobacco: Never Used   Substance Use Topics     Alcohol use: Yes     Alcohol/week: 0.8 standard drinks     Types: 1 drink(s) per week     Comment: social      Wt Readings from Last 1 Encounters:   22 65.5 kg (144 lb 8 oz)        Anesthesia Evaluation   Pt has had prior anesthetic.         ROS/MED HX  ENT/Pulmonary:     (+) allergic rhinitis,     Neurologic:       Cardiovascular:       METS/Exercise Tolerance:     Hematologic:       Musculoskeletal:       GI/Hepatic:       Renal/Genitourinary:       Endo:       Psychiatric/Substance Use:     (+) psychiatric history anxiety and depression     Infectious Disease:       Malignancy:       Other:            Physical Exam    Airway        Mallampati: III   TM distance: > 3 FB   Neck ROM: full   Mouth opening: > 3 cm    Respiratory Devices and Support         Dental  no notable dental history         Cardiovascular   cardiovascular exam normal          Pulmonary   pulmonary exam normal                 OUTSIDE LABS:  CBC:   Lab Results   Component Value Date    WBC 5.4 05/14/2022    WBC 4.3 05/03/2013    HGB 15.4 05/14/2022    HGB 10.5 (L) 06/29/2016    HCT 47.4 (H) 05/14/2022    HCT 42.4 05/03/2013     05/14/2022     05/03/2013     BMP:   Lab Results   Component Value Date     05/14/2022     04/12/2016    POTASSIUM 3.7 05/14/2022    POTASSIUM 4.1 04/12/2016    CHLORIDE 106 05/14/2022    CHLORIDE 102 04/12/2016    CO2 29 05/14/2022    CO2 25 04/12/2016    BUN 13 05/14/2022    BUN 14 04/12/2016    BUN NOT APPLICABLE 04/12/2016    CR 0.65 05/14/2022    CR 0.75 04/12/2016     (H) 05/14/2022    GLC 97 04/12/2016     COAGS: No results found for: PTT, INR, FIBR  POC: No results found for: BGM, HCG, HCGS  HEPATIC:   Lab Results   Component Value Date    ALBUMIN 3.3 (L) 05/14/2022    PROTTOTAL 7.2 05/14/2022    ALT 24 05/14/2022    AST 21 05/14/2022    ALKPHOS 87 05/14/2022    BILITOTAL 0.6 05/14/2022    BILIDIRECT 0.1 07/28/2012     OTHER:   Lab Results   Component Value Date    PH 7.0 07/28/2012    A1C 5.4 01/25/2013    RENUKA 8.9 05/14/2022    TSH 1.22 05/17/2022    SED 33 (A) 04/23/2011       Anesthesia Plan    ASA Status:  3      Anesthesia Type: General.   Induction: Intravenous.           Consents            Postoperative Care            Comments:                    Aamir Avina MD

## 2022-06-03 NOTE — PROGRESS NOTES
" 06/03/22 1500   Group Therapy Session   Time Session Began 1400   Time Session Ended 1457   Total Time patient participated (minutes) 29   Total # Attendees 6   Group Type expressive therapy   Group Topic Covered balanced lifestyle;structured socialization;leisure exploration/use of leisure time   Group Session Detail Music Social Hour   Patient Response/Contribution cooperative with task   Patient Response Detail \"Candy\" needed some direction/reminder to wear her mask in group, which she had with her.  Presented as less oppositional than forgetful.  Left session once, but did return later.  Participated well and was calm/cooperative throughout session.  Pleasant.         "

## 2022-06-03 NOTE — PROGRESS NOTES
"PSYCHIATRY  PROGRESS NOTE     DATE OF SERVICE   06/01/2022     CHIEF COMPLAINT   \" I do not like this place.\"       SUBJECTIVE   Nursing reports: Today patient has been extremely upset with the unit.  The patient expressed that this unit very quiet and she does not have friends.  She has been crying and feeling extremely anxious.    Patient has been discussed with the  earlier today.  I informed the  that we are still in the process of stabilizing the patient.     OBJECTIVE   Patient is a 76-year-old  woman, she is domiciled with her , she has 2 adult sons, patient is retired and supported by Social Security benefits; that was admitted to the psychiatric inpatient unit due to suicidal ideations with a plan in the context of multiple psychosocial stressors.    Today patient was seen and evaluated in the consult room with nurse practitioner student present during the assessment, this was a face-to-face evaluation.  Patient was crying and she was very upset about being moved to this unit.  During my conversation the patient admitted that she was the one that ask to be moved to this unit, but she did not expected that she was not going to like it.  As per patient in the other unit she had her friends and she bonded with one of her peers.  I discussed with the patient that at this time it will not be possible for her to transfer back to unit 30.  Patient verbalized good understanding of this.    Patient reported that she is still feeling extremely depressed, scared and afraid that the treatment is not going to work.  She does not know if the ECT has been helpful or not.  As per patient she is feeling currently suicidal in this unit and does not believe that the medications are working.  On the other hand the patient was able to contract for safety.  I discussed with the patient that usually ECT starts working after treatment #6 and I believe that she has done only 5 treatments.  I " encouraged the patient to continue with the treatment as I was informed by the prior psychiatrist that it seems the ECT was starting to be effective.  At the time patient verbalized good understanding and agreed.     MEDICATIONS   Medications:  Scheduled Meds:    calcium carbonate 600 mg-vitamin D 400 units  1 tablet Oral Daily     mirtazapine  15 mg Oral At Bedtime     simvastatin  40 mg Oral At Bedtime     timolol maleate  1 drop Both Eyes Daily     venlafaxine  150 mg Oral Daily with breakfast     Continuous Infusions:  PRN Meds:.acetaminophen, alum & mag hydroxide-simethicone, hydrOXYzine, OLANZapine **OR** OLANZapine, senna-docusate, traZODone    Medication adherence issues: MS Med Adherence Y/N: Yes, Hospitalization  Medication side effects: MEDICATION SIDE EFFECTS: no side effects reported  Benefit: Yes / No: Yes       ROS   A comprehensive review of systems was negative.       MENTAL STATUS EXAM   Vitals: BP (!) 142/78   Pulse 89   Temp 97.3  F (36.3  C) (Temporal)   Resp 16   Wt 65.5 kg (144 lb 8 oz)   SpO2 95%   BMI 24.80 kg/m      Appearance:  No apparent distress  Mood:  {Mood: Depressed   Affect: tearful and labile  was congruent to speech  Suicidal Ideation: PRESENT / ABSENT: absent   Homicidal Ideation: PRESENT / ABSENT: absent   Thought process: unremarkable   Thought content: passive SI but able to contract for safety here in the hospital.  Fund of Knowledge: Average  Attention/Concentration: Normal  Language ability:  Intact  Memory:  Immediate recall intact, Short-term memory intact and Long-term memory intact  Insight:  fair.  Judgement: fair  Orientation: Yes, x4  Psychomotor Behavior: normal or unremarkable    Muscle Strength and Tone: MuscleStrength: Normal  Gait and Station: Not evaluated       LABS   personally reviewed.     No results found for: PHENYTOIN, PHENOBARB, VALPROATE, CBMZ       DIAGNOSIS   Principal Problem:    <principal problem not specified>    Active Problem  List:  Patient Active Problem List   Diagnosis     Allergic rhinitis     Symptomatic menopausal or female climacteric states     Mixed hyperlipidemia     Anxiety state     Insomnia     Anal fissure     Major depressive disorder, recurrent episode, moderate (H)     Irritable bowel syndrome     ACP (advance care planning)     Health Care Home     Tinnitus     Acute lower gastrointestinal hemorrhage     Abdominal pain     Suicidal ideation     Major depressive disorder, recurrent episode, severe with anxious distress (H)     Major depressive disorder, 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 Ongoing education given regarding diagnostic and treatment options with risks, benefits and alternatives and adequate verbalization of understanding.  2.  Medications:      Remeron 15 mg at bedtime      Effexor 150 mg daily  3.  Medical team to follow the patient as needed  4.   coordinating a safe discharge plan with the patient.        Risk Assessment: [facility] RISK ASSESSMENT: Patient able to contract for safety    Coordination of Care:   Treatment Plan reviewed and physician signed, Care discussed with Care/Treatment Team Members, Chart reviewed and Patient seen      Re-Certification I certify that the inpatient psychiatric facility services furnished since the previous certification were, and continue to be, medically necessary for, either, treatment which could reasonably be expected to improve the patient s condition or diagnostic study and that the hospital records indicate that the services furnished were, either, intensive treatment services, admission and related services necessary for diagnostic study, or equivalent services.     I certify that the patient continues to need, on a daily basis, active treatment furnished directly by or requiring the supervision of inpatient psychiatric facility personnel.   I estimate 14 days of hospitalization is necessary for proper treatment of the  patient. My plans for post-hospital care for this patient are  home with family     Bri Elliott MD    -    06/01/2022 -     7:06 PM    Total time 35 minutes with > 50%spent on coordination of cares and psycho-education.    This note was created with help of Dragon dictation system. Grammatical / typing errors are not intentional.    Bri Elliott MD

## 2022-06-03 NOTE — PLAN OF CARE
Care coordination:  - Patient's care was discussed in team meeting.  - Writer spoke with patient's daughter in law (Shannon), patient's  (Cole), and confirmed care conference for Tuesday at 2:00 with Dr. Hernandez. Writer left a voice message with patient's son (Kadeem).    Referrals:  - Needs DBT IOP for discharge.    Barriers to discharge:  - Symptom reduction and stabilization.

## 2022-06-03 NOTE — PROGRESS NOTES
06/02/22 1900   Group Therapy Session   Group Attendance attended group session   Time Session Began 1130   Time Session Ended 1210   Total Time patient participated (minutes) 40   Total # Attendees 5   Group Type psychotherapeutic   Group Topic Covered coping skills/lifestyle management;relaxation techniques   Group Session Detail ACT meditation and art   Patient Response/Contribution cooperative with task;left the group on several occasions   Patient Response Detail no report of mood, writer has worked with her in [facility]. She was out for part of the session meeting with her practitioner

## 2022-06-03 NOTE — PROGRESS NOTES
Patient arrived in PACU at 0807     Patient meets phase I recovery  criteria at 0822 , switched to phase II recovery at 0823.    The following medications were given in ECT:    Anesthetic:   Etomidate 16mg at 0758    Muscle Relaxant:   Succinylcholine  60mg at 0758    Anxiety/Aggitation:      Versed 1 mg at 0802    Patient meets PACU discharge criteria at 0837.    Pt discharged from the recovery room via wheelchair back to station 3BW   with staff at 0847       Report given to Shiela SOTO        Feel free to call with any questions at *[phone]    Alba Dyer RN

## 2022-06-04 PROCEDURE — 124N000003 HC R&B MH SENIOR/ADOLESCENT

## 2022-06-04 PROCEDURE — H2032 ACTIVITY THERAPY, PER 15 MIN: HCPCS

## 2022-06-04 PROCEDURE — 250N000013 HC RX MED GY IP 250 OP 250 PS 637: Performed by: PSYCHIATRY & NEUROLOGY

## 2022-06-04 RX ADMIN — VENLAFAXINE HYDROCHLORIDE 225 MG: 225 TABLET, EXTENDED RELEASE ORAL at 08:20

## 2022-06-04 RX ADMIN — SIMVASTATIN 40 MG: 20 TABLET, FILM COATED ORAL at 21:17

## 2022-06-04 RX ADMIN — TIMOLOL MALEATE 1 DROP: 5 SOLUTION OPHTHALMIC at 08:21

## 2022-06-04 RX ADMIN — TRAZODONE HYDROCHLORIDE 50 MG: 50 TABLET ORAL at 21:17

## 2022-06-04 RX ADMIN — CALCIUM CARBONATE 600 MG (1,500 MG)-VITAMIN D3 400 UNIT TABLET 1 TABLET: at 08:20

## 2022-06-04 RX ADMIN — MIRTAZAPINE 15 MG: 15 TABLET, FILM COATED ORAL at 21:17

## 2022-06-04 ASSESSMENT — ACTIVITIES OF DAILY LIVING (ADL)
LAUNDRY: WITH SUPERVISION
ORAL_HYGIENE: INDEPENDENT
LAUNDRY: WITH SUPERVISION
HYGIENE/GROOMING: INDEPENDENT
DRESS: SCRUBS (BEHAVIORAL HEALTH)
HYGIENE/GROOMING: INDEPENDENT
DRESS: SCRUBS (BEHAVIORAL HEALTH)
ORAL_HYGIENE: INDEPENDENT

## 2022-06-04 NOTE — PLAN OF CARE
"Problem: Suicidal Behavior  Goal: Suicidal Behavior is Absent or Managed  Outcome: Ongoing, Progressing    Problem: Decreased Participation/Engagement (Depressive Signs/Symptoms)  Goal: Increased Participation and Engagement (Depressive Signs/Symptoms)  Outcome: Ongoing, Progressing  Flowsheets (Taken 6/3/2022 1926)  Mutually Determined Action Steps (Increased Participation and Engagement):    participates in one or more activity    voluntarily attends group therapy    Pt presents with blunted, flat affect and anxious, depressed mood. Denies SI/SIB and hallucinations. Reports continued depression and some anxiety this evening. Pt was present in the lounge and social with peers. Attends groups. She continues to perseverate on situations and becomes easily upset if things do not go as she thinks they should. This evening, she complained to writer about her belongings being left at the security desk and not brought to the unit yesterday. Pt was also noted to be crying on the phone with her  stating that this place was horrible and she does not feel like she is getting better. Encouraged pt to practice having a more \"open\" window when it comes to situations which do not always go as planned, pt was agreeable to this, advised her that this is something that everyone, including writer could work on. Appetite and fluid intake adequate. VSS. Medication compliant, PRN Trazodone given at HS per pt request. Denies pain. No other concerns or complaints noted.   "

## 2022-06-04 NOTE — PROGRESS NOTES
06/03/22 2000   Group Therapy Session   Time Session Began 1700   Time Session Ended 1753   Total Time patient participated (minutes) 53   Total # Attendees 7   Group Type expressive therapy   Group Topic Covered balanced lifestyle;relaxation techniques   Group Session Detail Evening Relaxation   Patient Response/Contribution cooperative with task   Patient Response Detail Cooperatively engaged in Evening Music Relaxation group to decrease anxiety and promote sleep.  Calm affect, appropriately engaged in session, responding well to the music.  Positive participant.  Needed reminder to put mask on before entering group room.

## 2022-06-04 NOTE — PLAN OF CARE
"Goal Outcome Evaluation:    Plan of Care Reviewed With: patient          Problem: Behavioral Health Plan of Care  Goal: Optimal Comfort and Wellbeing  Intervention: Provide Person-Centered Care  Recent Flowsheet Documentation  Taken 6/4/2022 1023 by Sunni Nunes, RN  Trust Relationship/Rapport:   care explained   questions answered   questions encouraged     Pt. Calm and cooperative, pleasant on approach.  Out in the milieu majority of the shift minimal  interaction with peers, pt. States she waiting for her medications to start working, refused to explain and wanted to play a card came. Encouraged pt. To use own eye gtts. pt. Refused stating \"I only can put my eye gtts in at home\". Endorsed anxiety and depression 0, denies pain and all other psych symptoms, resting in her room late afternoon, med complainant and contracts for safety.   "

## 2022-06-04 NOTE — PLAN OF CARE
Problem: Sleep Disturbance (Depressive Signs/Symptoms)  Goal: Improved Sleep (Depressive Signs/Symptoms)  Outcome: Ongoing, Progressing   Goal Outcome Evaluation:             Received sleeping.  No complaints raised this shift  No behavioral issues encountered this shift.   Slept for 8.25 hours

## 2022-06-05 ENCOUNTER — ANESTHESIA EVENT (OUTPATIENT)
Dept: BEHAVIORAL HEALTH | Facility: CLINIC | Age: 77
DRG: 885 | End: 2022-06-05
Payer: COMMERCIAL

## 2022-06-05 PROCEDURE — 250N000013 HC RX MED GY IP 250 OP 250 PS 637: Performed by: PSYCHIATRY & NEUROLOGY

## 2022-06-05 PROCEDURE — H2032 ACTIVITY THERAPY, PER 15 MIN: HCPCS

## 2022-06-05 PROCEDURE — 124N000003 HC R&B MH SENIOR/ADOLESCENT

## 2022-06-05 RX ADMIN — TIMOLOL MALEATE 1 DROP: 5 SOLUTION OPHTHALMIC at 10:18

## 2022-06-05 RX ADMIN — SENNOSIDES AND DOCUSATE SODIUM 1 TABLET: 50; 8.6 TABLET ORAL at 21:22

## 2022-06-05 RX ADMIN — SENNOSIDES AND DOCUSATE SODIUM 1 TABLET: 50; 8.6 TABLET ORAL at 13:55

## 2022-06-05 RX ADMIN — VENLAFAXINE HYDROCHLORIDE 225 MG: 225 TABLET, EXTENDED RELEASE ORAL at 10:17

## 2022-06-05 RX ADMIN — MIRTAZAPINE 15 MG: 15 TABLET, FILM COATED ORAL at 21:21

## 2022-06-05 RX ADMIN — SIMVASTATIN 40 MG: 20 TABLET, FILM COATED ORAL at 21:21

## 2022-06-05 RX ADMIN — CALCIUM CARBONATE 600 MG (1,500 MG)-VITAMIN D3 400 UNIT TABLET 1 TABLET: at 10:17

## 2022-06-05 ASSESSMENT — ACTIVITIES OF DAILY LIVING (ADL)
DRESS: INDEPENDENT
ORAL_HYGIENE: INDEPENDENT
LAUNDRY: UNABLE TO COMPLETE
ORAL_HYGIENE: INDEPENDENT
LAUNDRY: UNABLE TO COMPLETE
HYGIENE/GROOMING: HANDWASHING;SHOWER;INDEPENDENT
HYGIENE/GROOMING: INDEPENDENT
DRESS: SCRUBS (BEHAVIORAL HEALTH);INDEPENDENT

## 2022-06-05 NOTE — PLAN OF CARE
Problem: Sleep Disturbance (Depressive Signs/Symptoms)  Goal: Improved Sleep (Depressive Signs/Symptoms)  Outcome: Ongoing, Progressing   Goal Outcome Evaluation:        Slept well for 8.5 hours  No behavioral issues this shift

## 2022-06-05 NOTE — PLAN OF CARE
"   06/04/22 2000   Group Therapy Session   Group Attendance attended group session   Time Session Began 1900   Time Session Ended 2000   Total Time patient participated (minutes) 55   Total # Attendees 7   Group Type expressive therapy  (art therapy)   Group Topic Covered structured socialization   Group Session Detail collaborative art   Patient Response/Contribution organized;cooperative with task   Patient Response Detail Cayla presented as cooperative with a flat affect. She engaged in group discussion about social engagement, describing herself as \"always bringing positivity\" to social situations. She participated in making collaborative artwork and contributed positive imagery to peers' art. She interacted appropriately with peers, sharing supplies and making connections. Her affect brightened.   Goal Outcome Evaluation:                      "

## 2022-06-05 NOTE — PLAN OF CARE
"Goal Outcome Evaluation:    Plan of Care Reviewed With: parent     Flat affect (brightens during interactions), pt active in milieu, social with staff and peers, pt played cards and watched a movie this shift. Pt took all scheduled medications, pt took medications late, after breakfast pt choice to lay back down, pt verbalized it felt good to nap after breakfast. Pt verbalized her mood is \"ok\", no SI/SIB. Pt eating and taking fluids freely. Pt feels her medications are starting to work. PT aware ECT is scheduled for 0720 tomorrow.      PRN senna-docusate per pt request for constipation, pt encouraged to increase fluids and activity, pt verbalized understanding.   "

## 2022-06-06 ENCOUNTER — ANESTHESIA (OUTPATIENT)
Dept: BEHAVIORAL HEALTH | Facility: CLINIC | Age: 77
DRG: 885 | End: 2022-06-06
Payer: COMMERCIAL

## 2022-06-06 ENCOUNTER — APPOINTMENT (OUTPATIENT)
Dept: BEHAVIORAL HEALTH | Facility: CLINIC | Age: 77
DRG: 885 | End: 2022-06-06
Attending: PSYCHIATRY & NEUROLOGY
Payer: COMMERCIAL

## 2022-06-06 PROCEDURE — 250N000011 HC RX IP 250 OP 636: Performed by: PSYCHIATRY & NEUROLOGY

## 2022-06-06 PROCEDURE — 250N000013 HC RX MED GY IP 250 OP 250 PS 637: Performed by: PSYCHIATRY & NEUROLOGY

## 2022-06-06 PROCEDURE — 90870 ELECTROCONVULSIVE THERAPY: CPT | Performed by: PSYCHIATRY & NEUROLOGY

## 2022-06-06 PROCEDURE — 99232 SBSQ HOSP IP/OBS MODERATE 35: CPT | Mod: 25 | Performed by: PSYCHIATRY & NEUROLOGY

## 2022-06-06 PROCEDURE — 250N000009 HC RX 250: Performed by: ANESTHESIOLOGY

## 2022-06-06 PROCEDURE — G0177 OPPS/PHP; TRAIN & EDUC SERV: HCPCS

## 2022-06-06 PROCEDURE — 124N000003 HC R&B MH SENIOR/ADOLESCENT

## 2022-06-06 PROCEDURE — U0003 INFECTIOUS AGENT DETECTION BY NUCLEIC ACID (DNA OR RNA); SEVERE ACUTE RESPIRATORY SYNDROME CORONAVIRUS 2 (SARS-COV-2) (CORONAVIRUS DISEASE [COVID-19]), AMPLIFIED PROBE TECHNIQUE, MAKING USE OF HIGH THROUGHPUT TECHNOLOGIES AS DESCRIBED BY CMS-2020-01-R: HCPCS | Performed by: PSYCHIATRY & NEUROLOGY

## 2022-06-06 PROCEDURE — 370N000017 HC ANESTHESIA TECHNICAL FEE, PER MIN

## 2022-06-06 PROCEDURE — H2032 ACTIVITY THERAPY, PER 15 MIN: HCPCS

## 2022-06-06 PROCEDURE — 90870 ELECTROCONVULSIVE THERAPY: CPT

## 2022-06-06 PROCEDURE — 250N000011 HC RX IP 250 OP 636: Performed by: ANESTHESIOLOGY

## 2022-06-06 RX ORDER — ONDANSETRON 4 MG/1
4 TABLET, ORALLY DISINTEGRATING ORAL EVERY 30 MIN PRN
Status: CANCELLED | OUTPATIENT
Start: 2022-06-06

## 2022-06-06 RX ORDER — SODIUM CHLORIDE, SODIUM LACTATE, POTASSIUM CHLORIDE, CALCIUM CHLORIDE 600; 310; 30; 20 MG/100ML; MG/100ML; MG/100ML; MG/100ML
INJECTION, SOLUTION INTRAVENOUS CONTINUOUS
Status: CANCELLED | OUTPATIENT
Start: 2022-06-06

## 2022-06-06 RX ORDER — ONDANSETRON 2 MG/ML
4 INJECTION INTRAMUSCULAR; INTRAVENOUS EVERY 30 MIN PRN
Status: CANCELLED | OUTPATIENT
Start: 2022-06-06

## 2022-06-06 RX ORDER — ETOMIDATE 2 MG/ML
INJECTION INTRAVENOUS PRN
Status: DISCONTINUED | OUTPATIENT
Start: 2022-06-06 | End: 2022-06-06

## 2022-06-06 RX ORDER — FENTANYL CITRATE 50 UG/ML
25 INJECTION, SOLUTION INTRAMUSCULAR; INTRAVENOUS EVERY 5 MIN PRN
Status: CANCELLED | OUTPATIENT
Start: 2022-06-06

## 2022-06-06 RX ORDER — FENTANYL CITRATE 50 UG/ML
25 INJECTION, SOLUTION INTRAMUSCULAR; INTRAVENOUS
Status: CANCELLED | OUTPATIENT
Start: 2022-06-06

## 2022-06-06 RX ADMIN — VENLAFAXINE HYDROCHLORIDE 225 MG: 225 TABLET, EXTENDED RELEASE ORAL at 11:59

## 2022-06-06 RX ADMIN — Medication 16 MG: at 08:44

## 2022-06-06 RX ADMIN — CALCIUM CARBONATE 600 MG (1,500 MG)-VITAMIN D3 400 UNIT TABLET 1 TABLET: at 11:59

## 2022-06-06 RX ADMIN — MIRTAZAPINE 15 MG: 15 TABLET, FILM COATED ORAL at 21:27

## 2022-06-06 RX ADMIN — SUCCINYLCHOLINE CHLORIDE 60 MG: 20 INJECTION, SOLUTION INTRAMUSCULAR; INTRAVENOUS; PARENTERAL at 08:44

## 2022-06-06 RX ADMIN — SIMVASTATIN 40 MG: 20 TABLET, FILM COATED ORAL at 21:27

## 2022-06-06 RX ADMIN — MIDAZOLAM HYDROCHLORIDE 1 MG: 1 INJECTION, SOLUTION INTRAMUSCULAR; INTRAVENOUS at 08:49

## 2022-06-06 RX ADMIN — TIMOLOL MALEATE 1 DROP: 5 SOLUTION OPHTHALMIC at 11:59

## 2022-06-06 RX ADMIN — TRAZODONE HYDROCHLORIDE 50 MG: 50 TABLET ORAL at 21:27

## 2022-06-06 ASSESSMENT — ACTIVITIES OF DAILY LIVING (ADL)
DRESS: INDEPENDENT
LAUNDRY: UNABLE TO COMPLETE
LAUNDRY: UNABLE TO COMPLETE
ORAL_HYGIENE: INDEPENDENT
HYGIENE/GROOMING: INDEPENDENT
ORAL_HYGIENE: INDEPENDENT
DRESS: SCRUBS (BEHAVIORAL HEALTH)
HYGIENE/GROOMING: INDEPENDENT

## 2022-06-06 NOTE — PROGRESS NOTES
"PSYCHIATRY  PROGRESS NOTE     DATE OF SERVICE   06/06/2022       CHIEF COMPLAINT   \"I am tired\".       SUBJECTIVE   Nursing reports:   Patient had ECT #7 this morning, returned to the unit around 0945 and she wanted to sleep/rest right away. Woke up around 11 am and reported lightheadedness, vitals were taken and was in the normal limit. Pt. encouraged to push fluid and educated to change position slowly. Pt. denies headache, pain and feeling of nausea/vomiting. Pt. reported feeling better after lunch. She denies SI, SIB and hallucination. She denies anxiety but reported depression as \"High\". Her affect is flat/blunted in a depressed mood. She is eating and drinking well. Medication complaint. Participated in group. Social with select peers. Pt. approached to this writer and stated \"Would you please help me from Jone?\" (who is another patient in the unit), during further conversation with patient, she stated Jone approached her and said to her that \"'I am scared and afraid of my daughter\" patient said this conversation with other patient make her nervous and unsettle. Patient was reassured and will be monitoring their boundaries. Continue to monitor pt. as plan of care.    Patient has been discussed with the  earlier today.  Care conference has been scheduled for tomorrow at 2 PM.     OBJECTIVE   Patient was seen and evaluated at bedside by herself, this was a face-to-face evaluation.  Patient reported that today she is feeling \"out of it\" after the ECT.  Patient feels that this has been the worst treatment that she has had.  As per patient she was feeling nauseated and did not took her medications this morning.  Now that today has progressed patient said that she is feeling a little bit better and is not nauseous anymore.  Continues to report her depression as \"high\".  Denies having any thoughts of harming herself and is angelina for safety.    Patient is aware that we are having a care " conference scheduled for tomorrow at 2 PM and continues to be in agreement with this.  I informed the patient that my recommendation for discharge is for her to get involved in group and individual therapy.  I am also recommending for her to do maintenance ECT treatments.  In terms of the ECT treatment I asked the patient to have a conversation with her family, review the positives and negatives and then let me know what is her decision about the maintenance ECT.  At the time patient verbalized good understanding and said that she will discuss doing follow-up ECT treatments with her daughter-in-law specifically.     MEDICATIONS   Medications:  Scheduled Meds:    calcium carbonate 600 mg-vitamin D 400 units  1 tablet Oral Daily     mirtazapine  15 mg Oral At Bedtime     simvastatin  40 mg Oral At Bedtime     timolol maleate  1 drop Both Eyes Daily     venlafaxine  225 mg Oral Daily with breakfast     Continuous Infusions:  PRN Meds:.acetaminophen, alum & mag hydroxide-simethicone, hydrOXYzine, OLANZapine **OR** OLANZapine, senna-docusate, traZODone    Medication adherence issues: MS Med Adherence Y/N: Yes, Hospitalization  Medication side effects: MEDICATION SIDE EFFECTS: no side effects reported  Benefit: Yes / No: Yes       ROS   A comprehensive review of systems was negative.       MENTAL STATUS EXAM   Vitals: /87 (BP Location: Right arm)   Pulse 87   Temp 97.3  F (36.3  C) (Temporal)   Resp 17   Wt 66.3 kg (146 lb 1.6 oz)   SpO2 96%   BMI 25.08 kg/m      Appearance:  No apparent distress  Mood:  {Mood: Depressed   Affect: restricted  was congruent to speech  Suicidal Ideation: PRESENT / ABSENT: absent   Homicidal Ideation: PRESENT / ABSENT: absent   Thought process: unremarkable   Thought content: devoid of  suicidal and violent ideation.   Fund of Knowledge: Average  Attention/Concentration: Normal  Language ability:  Intact  Memory:  Immediate recall intact, Short-term memory intact and Long-term  memory intact  Insight:  fair.  Judgement: fair  Orientation: Yes, x4  Psychomotor Behavior: normal or unremarkable    Muscle Strength and Tone: MuscleStrength: Normal  Gait and Station: stable        LABS   personally reviewed.     No results found for: PHENYTOIN, PHENOBARB, VALPROATE, CBMZ       DIAGNOSIS   Principal Problem:    <principal problem not specified>    Active Problem List:  Patient Active Problem List   Diagnosis     Allergic rhinitis     Symptomatic menopausal or female climacteric states     Mixed hyperlipidemia     Anxiety state     Insomnia     Anal fissure     Major depressive disorder, recurrent episode, moderate (H)     Irritable bowel syndrome     ACP (advance care planning)     Health Care Home     Tinnitus     Acute lower gastrointestinal hemorrhage     Abdominal pain     Suicidal ideation     Major depressive disorder, recurrent episode, severe with anxious distress (H)     Major depressive disorder, 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 Ongoing education given regarding diagnostic and treatment options with risks, benefits and alternatives and adequate verbalization of understanding.  2.  Medications:      Remeron 15 mg at bedtime      Effexor 225 mg daily  3.  Medical team to follow the patient as needed  4.   coordinating a safe discharge plan with the patient.        Risk Assessment: [facility] RISK ASSESSMENT: Patient able to contract for safety    Coordination of Care:   Treatment Plan reviewed and physician signed, Care discussed with Care/Treatment Team Members, Chart reviewed and Patient seen      Re-Certification I certify that the inpatient psychiatric facility services furnished since the previous certification were, and continue to be, medically necessary for, either, treatment which could reasonably be expected to improve the patient s condition or diagnostic study and that the hospital records indicate that the services furnished were,  either, intensive treatment services, admission and related services necessary for diagnostic study, or equivalent services.     I certify that the patient continues to need, on a daily basis, active treatment furnished directly by or requiring the supervision of inpatient psychiatric facility personnel.   I estimate 14 days of hospitalization is necessary for proper treatment of the patient. My plans for post-hospital care for this patient are  home with family     Bri Elliott MD    -     06/06/2022  -     2:52 PM    Total time  25 minutes with > 50%spent on coordination of cares and psycho-education.    This note was created with help of Dragon dictation system. Grammatical / typing errors are not intentional.    Bri Elliott MD

## 2022-06-06 NOTE — PLAN OF CARE
Problem: Behavioral Health Plan of Care  Goal: Plan of Care Review  Outcome: Ongoing, Progressing  Flowsheets (Taken 6/5/2022 2000)  Plan of Care Reviewed With: patient  Patient Agreement with Plan of Care: agrees  Goal: Adheres to Safety Considerations for Self and Others  Outcome: Ongoing, Progressing  Intervention: Develop and Maintain Individualized Safety Plan  Recent Flowsheet Documentation  Taken 6/5/2022 2000 by Amna Jason RN  Safety Measures:    environmental rounds completed    safety rounds completed  Goal: Develops/Participates in Therapeutic Haskell to Support Successful Transition  Outcome: Ongoing, Progressing  Intervention: Foster Therapeutic Haskell  Recent Flowsheet Documentation  Taken 6/5/2022 2000 by Amna Jason RN  Trust Relationship/Rapport:    care explained    choices provided    emotional support provided    empathic listening provided    questions answered    questions encouraged    reassurance provided    thoughts/feelings acknowledged     Problem: Suicidal Behavior  Goal: Suicidal Behavior is Absent or Managed  Outcome: Ongoing, Progressing  Intervention: Provide Immediate and Ongoing Protective Physical Environment  Recent Flowsheet Documentation  Taken 6/5/2022 2000 by Amna Jason RN  Safe Transition Promotion: protective factors promoted     Problem: Activity and Energy Impairment (Depressive Signs/Symptoms)  Goal: Optimized Energy Level (Depressive Signs/Symptoms)  Outcome: Ongoing, Progressing  Intervention: Optimize Energy Level  Recent Flowsheet Documentation  Taken 6/5/2022 2000 by Amna Jason RN  Diversional Activity:    television    reading  Activity (Behavioral Health):    activity adjusted per tolerance    activity encouraged    up ad jessenia   Goal Outcome Evaluation:    Plan of Care Reviewed With: patient      Pt has flat affect and guarded. Pt is [place]. Is calm, pleasant on approach, cooperative and med complaint. Visible in milieu majority of the  "shift, reading the book, watch TV but minimally engages with peers. Pt attended group. Pt denies pain, anxiety, HI/SI/SIB, hallucinations and other psych symptoms. Contracts for safety. Endorsed \"some\" depression. Unable to rate. Pt was given PRN Senna this morning due to c/o constipation. However, it wasn't effective per pt. Pt requested another PRN which was administered with HS meds. Please follow up if it's effective. Safety checks in place.            "

## 2022-06-06 NOTE — PROCEDURES
Procedure/Surgery Information   [facility]    Bedside Procedure Note  Date of Service (when I performed the procedure): 06/06/2022    Lorena Hayden is a 76 year old female patient.  1. 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 Major depressive disorder, recurrent episode, severe with anxious distress (H)      Past Medical History:   Diagnosis Date     Allergic rhinitis, cause unspecified      Anxiety      Depressive disorder      Temp: 98.3  F (36.8  C) Temp src: Temporal BP: 121/75 Pulse: 92   Resp: 16 SpO2: 97 % O2 Device: None (Room air)      Procedures       Marvin March MD     Lorena Hayden is a 76 year old  year old female patient.  [id number]  @DX@    [facility]   ECT Procedure Note   06/06/2022    Patient Status: Inpatient    Is this the first in a series of 12 treatments?  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 Her depression was relatively well-controlled on duloxetine until February 2022, since when she has had progressive depression with suicidal thoughts without an identifiable trigger. Multiple medication trials yielded to no improvement (Increased duloxetine, added aripiprazole then bupropion, now cross titrating to desvenlafaxine). She had a recent psychiatric hospitalization in [facility] in [place], discharged only two weeks ago. However, depression persisted, she had more thoughts that life was not worth living and thoughts of suicide.  She also reported difficulties with sleep, low energy, poor appetite with significant weight loss of about 20 pounds.  She finally came to the hospital and requested  to be hospitalized.     Psychiatric History:   Diagnoses: Major Depressive Disorder     Hospitalizations: Two former (first in 2013, last in 2022)     Suicide Attempts: None     Medication Trials: Ativan, trazodone, Klonopin, Lexapro, Wellbutrin, Celexa, Seroquel, Zoloft, Abilify, currently Effexor and Cymbalta. No TCAs or MAOIs.     Neuromodulation:  Right unilateral ultrabrief ECT x 19 sessions in 2012. She had a good response during treatment which lasted only several weeks after the discontinuation.     Psychotherapy:  None            Diagnosis:   Major depression         Assessment:   #1 she reports her depression is severe and decreases pleasure and motivation and she can be very paralyzed and indecisive with anxiety. ECT was helpful before  #2  She is irritable she was awoken early but her appt pushed back. She denies discomfort after last appointment. Some SI but she feels it is  becuase she is so anxious waiting.  #3 sleeping well with trazodone + remeron, she reports no worsening as in in fact improving no SI today, no tremor and less anxious, less catastrophizing thoughts. Her dog nehemias is awaiting her at home. She feels her thinking is improved not worsened since starting ECT  #4 Today she is flustered she has trouble putting her depression intensity on a scale but admits sadness, no tearfulness, no Si today and denies complaints about recovery process of ECT. She denies worsening anxiety and she slept well overnight.  #5 She is irritable and anxious today. She I not yet feeling improvement in mood. She struggled with the transfer of units to  it disrupted her bedtime and she did not sleep well. She finds the ECT frightening.   #6 reports form the floor report unchanged mood so far. Less irritable and anxious today 1/10 ( mood worst it has ever been ) but SI has resolved.   2/3 at 5min recall Will increase to 9x ST  #7 more verbose today. She is frustrated that her meal will be cold by the time she  returns for breakfast.  She reports mood is improving some. She denies physical side effects            Pause for the Cause:     Right patient Yes   Right procedure/laterality settings: Yes          Intra-Procedure Documentation:       ECT #: 7   Treatment number this series: 7   Total treatment number: 27     Type of ECT:  Right, unilateral ultrabrief    ECT Medications:    Etomidate: 16mg  Succinyl Choline: 60mg    Versed 1mg for agitation      ECT Strip Summary:    Titration ( 19.2 percent ) Energy Level:172.8mc  0.3ms 30 Hz time 8 sec 800mA  Motor Seizure Duration:  46 seconds  EEG Seizure Duration:  102 seconds      Complications: None    Time for re-orientation: 20 minutes    Plan:    Prefers earliest appointment to decrease pre-procedure anxiety.      If she has consistent transportation and supervision for at least 6hrs after treatment she can complete her course outpatient when appropriate    Continue ECT Q MWF at 172.8 mCsettings   COVID test 6/6/22    Monitor depression severity with clinical assessment augmented with CUDOS every treatment  Continue current medications    MD Rod

## 2022-06-06 NOTE — PLAN OF CARE
"  Problem: Activity and Energy Impairment (Depressive Signs/Symptoms)  Goal: Optimized Energy Level (Depressive Signs/Symptoms)  Intervention: Optimize Energy Level   Goal Outcome Evaluation:    Plan of Care Reviewed With: patient     Patient had ECT #7 this morning, returned to the unit around 0945 and she wanted to sleep/rest right away. Woke up around 11 am and reported lightheadedness, vitals were taken and was in the normal limit. Pt. encouraged to push fluid and educated to change position slowly. Pt. denies headache, pain and feeling of nausea/vomiting. Pt. reported feeling better after lunch. She denies SI, SIB and hallucination. She denies anxiety but reported depression as \"High\". Her affect is flat/blunted in a depressed mood. She is eating and drinking well. Medication complaint. Participated in group. Social with select peers. Pt. approached to this writer and stated \"Would you please help me from Jone?\" (who is another patient in the unit), during further conversation with patient, she stated Jone approached her and said to her that \"'I am scared and afraid of my daughter\" patient said this conversation with other patient make her nervous and unsettle. Patient was reassured and will be monitoring their boundaries. Continue to monitor pt. as plan of care.  "

## 2022-06-06 NOTE — PLAN OF CARE
Goal Outcome Evaluation:      Patient is scheduled for ECT this morning at 0720. NPO maintained. Nothing unusual noted. No complaints made. Vital signs taken and recorded. Instructed to void prior to ECT. Patient verbalized understanding. She slept for 7.75 hours the whole night.

## 2022-06-06 NOTE — PROGRESS NOTES
Patient adequate for discharge. Report called to inpatient nurse Etu Mat RN. VSS, A/O, IV removed. Discharged with staff at this time.

## 2022-06-06 NOTE — ANESTHESIA PREPROCEDURE EVALUATION
Anesthesia Pre-Procedure Evaluation    Patient: Lorena Hayden   MRN: [id number] : 1945        Procedure : * No procedures listed *          Past Medical History:   Diagnosis Date     Allergic rhinitis, cause unspecified      Anxiety      Depressive disorder       Past Surgical History:   Procedure Laterality Date     C IMPLANT HALLUX  3/2006 &     Yesi STEELE MAMMOGRAM, SCREENING  2011    Dr arredondo     COLONOSCOPY  2014    Mirian Mottamalika     EXTRACAPSULAR CATARACT EXTRATION WITH INTRAOCULAR LENS IMPLANT  2011     HC DILATION/CURETTAGE DIAG/THER NON OB  2005    D & C     HCL PAP SMEAR  2011    Dr Arredondo     ZZC  DELIVERY ONLY       ZZC LIGATE FALLOPIAN TUBE      Tubal Ligation     ZZHC COLONOSCOPY THRU STOMA W BIOPSY/CAUTERY TUMOR/POLYP/LESION  / /2011    benign polyp      Allergies   Allergen Reactions     Horse Allergy      Allergic to horse hair      Social History     Tobacco Use     Smoking status: Never Smoker     Smokeless tobacco: Never Used   Substance Use Topics     Alcohol use: Yes     Alcohol/week: 0.8 standard drinks     Types: 1 drink(s) per week     Comment: social      Wt Readings from Last 1 Encounters:   22 66.3 kg (146 lb 1.6 oz)        Anesthesia Evaluation   Pt has had prior anesthetic.         ROS/MED HX  ENT/Pulmonary:     (+) allergic rhinitis,     Neurologic:       Cardiovascular:       METS/Exercise Tolerance:     Hematologic:       Musculoskeletal:       GI/Hepatic:       Renal/Genitourinary:       Endo:       Psychiatric/Substance Use:     (+) psychiatric history anxiety and depression     Infectious Disease:       Malignancy:       Other:            Physical Exam    Airway        Mallampati: II   TM distance: > 3 FB      Respiratory Devices and Support         Dental           Cardiovascular          Rhythm and rate: regular and normal     Pulmonary           breath sounds clear to auscultation           OUTSIDE LABS:  CBC:   Lab  Results   Component Value Date    WBC 5.4 05/14/2022    WBC 4.3 05/03/2013    HGB 15.4 05/14/2022    HGB 10.5 (L) 06/29/2016    HCT 47.4 (H) 05/14/2022    HCT 42.4 05/03/2013     05/14/2022     05/03/2013     BMP:   Lab Results   Component Value Date     05/14/2022     04/12/2016    POTASSIUM 3.7 05/14/2022    POTASSIUM 4.1 04/12/2016    CHLORIDE 106 05/14/2022    CHLORIDE 102 04/12/2016    CO2 29 05/14/2022    CO2 25 04/12/2016    BUN 13 05/14/2022    BUN 14 04/12/2016    BUN NOT APPLICABLE 04/12/2016    CR 0.65 05/14/2022    CR 0.75 04/12/2016     (H) 05/14/2022    GLC 97 04/12/2016     COAGS: No results found for: PTT, INR, FIBR  POC: No results found for: BGM, HCG, HCGS  HEPATIC:   Lab Results   Component Value Date    ALBUMIN 3.3 (L) 05/14/2022    PROTTOTAL 7.2 05/14/2022    ALT 24 05/14/2022    AST 21 05/14/2022    ALKPHOS 87 05/14/2022    BILITOTAL 0.6 05/14/2022    BILIDIRECT 0.1 07/28/2012     OTHER:   Lab Results   Component Value Date    PH 7.0 07/28/2012    A1C 5.4 01/25/2013    RENUKA 8.9 05/14/2022    TSH 1.22 05/17/2022    SED 33 (A) 04/23/2011       Anesthesia Plan    ASA Status:  2   NPO Status:  NPO Appropriate    Anesthesia Type: General.     - Airway: Mask Only   Induction: Intravenous.           Consents    Anesthesia Plan(s) and associated risks, benefits, and realistic alternatives discussed. Questions answered and patient/representative(s) expressed understanding.    - Discussed:     - Discussed with:  Patient         Postoperative Care            Comments:                Aiden Hernandez MD

## 2022-06-06 NOTE — PROGRESS NOTES
06/05/22 2000   Group Therapy Session   Group Attendance attended group session   Time Session Began 1900   Time Session Ended 1950   Total Time patient participated (minutes) 50   Total # Attendees 5   Group Type recreation   Group Topic Covered relaxation techniques   Group Session Detail stress reduction   Patient Response/Contribution cooperative with task   Patient Response Detail Pt actively participated in a structured Therapeutic Recreation group with a focus on leisure participation, socializing, and exercise. Pt participated in the guided exercise for the full duration of the group. Pt followed along, engaged in the guided chair exercise routine and added to the discussion prompts throughout the routine.  Pt was encouraged to use positive imagery with the deep breathing and stretching to foster relaxation, improves focus, and reduce stress.

## 2022-06-06 NOTE — PLAN OF CARE
"Occupational Therapy Group Note:     06/06/22 1400   Group Therapy Session   Group Attendance attended group session   Time Session Began 1315   Time Session Ended 1415   Total Time patient participated (minutes) 60   Total # Attendees 7   Group Type life skill   Group Topic Covered balanced lifestyle;coping skills/lifestyle management;structured socialization   Group Session Detail discussion questions & gentle movement   Patient Response/Contribution cooperative with task;discussed personal experience with topic;expressed understanding of topic   Patient Response Detail Pt actively participated in a structured occupational therapy group with a focus on social engagement and movement. Pt had the option to choose a self-reflection question or a gentle exercise/stretching movement, and pt primarily chose to answer questions. Pt appeared comfortable responding to discussion prompts, and also followed along with guided movements. When prompted to identify a happy childhood memory, she discussed playing outside with kids in her neighborhood, and shared that she is still close with one of her childhood friends. Shared that she is doing \"okay\" today. Calm and pleasant.        "

## 2022-06-06 NOTE — ANESTHESIA CARE TRANSFER NOTE
Patient: Lorena Hayden    Procedure: * No procedures listed *       Diagnosis: * No pre-op diagnosis entered *  Diagnosis Additional Information: No value filed.    Anesthesia Type:   General     Note:    Oropharynx: spontaneously breathing  Level of Consciousness: drowsy  Oxygen Supplementation: room air    Independent Airway: airway patency satisfactory and stable  Dentition: dentition unchanged  Vital Signs Stable: post-procedure vital signs reviewed and stable  Report to RN Given: handoff report given  Patient transferred to: PACU    Handoff Report: Identifed the Patient, Identified the Reponsible Provider, Reviewed the pertinent medical history, Discussed the surgical course, Reviewed Intra-OP anesthesia mangement and issues during anesthesia, Set expectations for post-procedure period and Allowed opportunity for questions and acknowledgement of understanding      Vitals:  Vitals Value Taken Time   BP     Temp     Pulse     Resp     SpO2         Electronically Signed By: Aiden Hernandez MD  June 6, 2022  9:03 AM

## 2022-06-06 NOTE — ANESTHESIA POSTPROCEDURE EVALUATION
Patient: Lorena Hayden    Procedure: * No procedures listed *       Anesthesia Type:  General    Note:  Disposition: Outpatient   Postop Pain Control: Uneventful            Sign Out: Well controlled pain   PONV: No   Neuro/Psych: Uneventful            Sign Out: Acceptable/Baseline neuro status   Airway/Respiratory: Uneventful            Sign Out: Acceptable/Baseline resp. status   CV/Hemodynamics: Uneventful            Sign Out: Acceptable CV status; No obvious hypovolemia; No obvious fluid overload   Other NRE: NONE   DID A NON-ROUTINE EVENT OCCUR? No           Last vitals:  Vitals:    06/05/22 1557 06/06/22 0623 06/06/22 0817   BP: 117/81 120/78 121/75   Pulse: 86 88 92   Resp:  16 16   Temp: 36.8  C (98.3  F) 36.6  C (97.8  F) 36.8  C (98.3  F)   SpO2: 98% 94% 97%       Electronically Signed By: Aiden Hernandez MD  June 6, 2022  9:02 AM

## 2022-06-07 ENCOUNTER — ANESTHESIA EVENT (OUTPATIENT)
Dept: BEHAVIORAL HEALTH | Facility: CLINIC | Age: 77
DRG: 885 | End: 2022-06-07
Payer: COMMERCIAL

## 2022-06-07 LAB — SARS-COV-2 RNA RESP QL NAA+PROBE: NEGATIVE

## 2022-06-07 PROCEDURE — 99233 SBSQ HOSP IP/OBS HIGH 50: CPT | Mod: 95 | Performed by: PSYCHIATRY & NEUROLOGY

## 2022-06-07 PROCEDURE — 124N000003 HC R&B MH SENIOR/ADOLESCENT

## 2022-06-07 PROCEDURE — 250N000013 HC RX MED GY IP 250 OP 250 PS 637: Performed by: PSYCHIATRY & NEUROLOGY

## 2022-06-07 PROCEDURE — H2032 ACTIVITY THERAPY, PER 15 MIN: HCPCS

## 2022-06-07 PROCEDURE — G0177 OPPS/PHP; TRAIN & EDUC SERV: HCPCS

## 2022-06-07 RX ORDER — ONDANSETRON 4 MG/1
4 TABLET, ORALLY DISINTEGRATING ORAL EVERY 30 MIN PRN
Status: CANCELLED | OUTPATIENT
Start: 2022-06-07

## 2022-06-07 RX ORDER — MIRTAZAPINE 15 MG/1
30 TABLET, FILM COATED ORAL AT BEDTIME
Status: DISCONTINUED | OUTPATIENT
Start: 2022-06-07 | End: 2022-06-24 | Stop reason: HOSPADM

## 2022-06-07 RX ORDER — METOPROLOL TARTRATE 1 MG/ML
1-2 INJECTION, SOLUTION INTRAVENOUS EVERY 5 MIN PRN
Status: CANCELLED | OUTPATIENT
Start: 2022-06-07

## 2022-06-07 RX ORDER — ONDANSETRON 2 MG/ML
4 INJECTION INTRAMUSCULAR; INTRAVENOUS EVERY 30 MIN PRN
Status: CANCELLED | OUTPATIENT
Start: 2022-06-07

## 2022-06-07 RX ORDER — HYDRALAZINE HYDROCHLORIDE 20 MG/ML
2.5-5 INJECTION INTRAMUSCULAR; INTRAVENOUS EVERY 10 MIN PRN
Status: CANCELLED | OUTPATIENT
Start: 2022-06-07

## 2022-06-07 RX ORDER — SODIUM CHLORIDE, SODIUM LACTATE, POTASSIUM CHLORIDE, CALCIUM CHLORIDE 600; 310; 30; 20 MG/100ML; MG/100ML; MG/100ML; MG/100ML
INJECTION, SOLUTION INTRAVENOUS CONTINUOUS
Status: CANCELLED | OUTPATIENT
Start: 2022-06-07

## 2022-06-07 RX ADMIN — TRAZODONE HYDROCHLORIDE 50 MG: 50 TABLET ORAL at 21:11

## 2022-06-07 RX ADMIN — VENLAFAXINE HYDROCHLORIDE 225 MG: 225 TABLET, EXTENDED RELEASE ORAL at 08:48

## 2022-06-07 RX ADMIN — SENNOSIDES AND DOCUSATE SODIUM 1 TABLET: 50; 8.6 TABLET ORAL at 12:06

## 2022-06-07 RX ADMIN — TIMOLOL MALEATE 1 DROP: 5 SOLUTION OPHTHALMIC at 08:48

## 2022-06-07 RX ADMIN — SIMVASTATIN 40 MG: 20 TABLET, FILM COATED ORAL at 21:11

## 2022-06-07 RX ADMIN — CALCIUM CARBONATE 600 MG (1,500 MG)-VITAMIN D3 400 UNIT TABLET 1 TABLET: at 08:48

## 2022-06-07 RX ADMIN — MIRTAZAPINE 30 MG: 15 TABLET, FILM COATED ORAL at 21:11

## 2022-06-07 ASSESSMENT — ACTIVITIES OF DAILY LIVING (ADL)
ORAL_HYGIENE: INDEPENDENT
HYGIENE/GROOMING: HANDWASHING;INDEPENDENT
HYGIENE/GROOMING: INDEPENDENT
DRESS: INDEPENDENT;SCRUBS (BEHAVIORAL HEALTH)
LAUNDRY: UNABLE TO COMPLETE
LAUNDRY: UNABLE TO COMPLETE
ORAL_HYGIENE: INDEPENDENT
DRESS: INDEPENDENT;SCRUBS (BEHAVIORAL HEALTH)

## 2022-06-07 NOTE — PLAN OF CARE
"Goal Outcome Evaluation:    Pt is stating her mood is \"pretty good.\" She rates her depression 8/10, denies other MH sx. She appeared a bit anxious, when her glasses  could not be found; affect is full range. Pharmacy is checking on this, also. Pt said she would nap this am, as had difficulty getting to sleep, last night. She showered and said she would go to pm grps. She reports she is social w peers, though little socializing noted. Senna given per request-\"I need to have a bowel movement.\" Pt's goal for the day is \"to go to grps.\"                      "

## 2022-06-07 NOTE — PROGRESS NOTES
06/06/22 2200   Group Therapy Session   Time Session Began 1900   Time Session Ended 1953   Total Time patient participated (minutes) 53   Total # Attendees 10   Group Type expressive therapy   Group Topic Covered balanced lifestyle;self-care activities;relaxation techniques;leisure exploration/use of leisure time   Group Session Detail Evening Relaxation   Patient Response/Contribution cooperative with task   Patient Response Detail Cooperatively engaged in Evening Music Relaxation group to decrease anxiety and promote sleep.  Calm affect, appropriately engaged in session, responding well to the music.   Engaged well in group dynamic.

## 2022-06-07 NOTE — PLAN OF CARE
Care Coordination:  - Care conference with  and daughter in law (Shannon Galvan), Dr. Hernandez, writer, and patient.  - Family asked questions regarding medications and discharge plans. Dr. Hernandez explained patient's current care plan, discharge recommendations, and answered family's questions.  - Family would like another care conference closer to discharge.  - Patient understood the plan and is in agreement.    Referrals:  - Patient will need DBT groups, individual therapy, and psychiatry.    Barriers to discharge:  - Symptom reduction and stabilization.

## 2022-06-07 NOTE — PROGRESS NOTES
Behavioral Health  Note    Behavioral Health  Spirituality Group Note    UNIT 3BW    Name: Lorena Hayden YOB: 1945   MRN: [id number] Age: 76 year old      Patient attended -led group, which included discussion of spirituality, coping with illness and building resilience.    Patient attended group for NC hrs.    Cayla was present only for initial check-in then left.    Valeria Staples MDiv  Associate   Pager [phone]  Office [phone]

## 2022-06-07 NOTE — PLAN OF CARE
Problem: Behavioral Health Plan of Care  Goal: Plan of Care Review  Outcome: Ongoing, Progressing     Problem: Activity and Energy Impairment (Depressive Signs/Symptoms)  Goal: Optimized Energy Level (Depressive Signs/Symptoms)  Outcome: Ongoing, Progressing  Intervention: Optimize Energy Level     Goal Outcome Evaluation:    Plan of Care Reviewed With: patient     Patient was visible in the lounge, social with select peers. Presented with flat/blunted affect in a depressed mood. She denies SI, SIB and hallucination. She denies anxiety but reported depression as 9/10. She is eating and drinking well. Medication complaint. PRN trazodone given per pt. request. Participated in group. Patient stated she got PRN Senna during day shift and reported had a small bowel movement this afternoon. ECT scheduled tomorrow at 08:20 am. Pt. reminded to be NPO after midnight. Continue to monitor pt. as plan of care.

## 2022-06-07 NOTE — PLAN OF CARE
Goal Outcome Evaluation:        Patient slept on and off for 6.75 hours. No complaints made. No concerns raised.

## 2022-06-07 NOTE — PLAN OF CARE
"Occupational Therapy Group Note:     06/07/22 1500   Group Therapy Session   Group Attendance attended group session   Time Session Began 1315   Time Session Ended 1400   Total Time patient participated (minutes) 45   Total # Attendees 6-7   Group Type life skill   Group Topic Covered balanced lifestyle;structured socialization   Group Session Detail values discussion   Patient Response/Contribution cooperative with task;discussed personal experience with topic;expressed understanding of topic;listened actively   Patient Response Detail Pt actively participated in a structured occupational therapy group with a focus on identifying and prioritizing personal values. Pt contributed to a group discussion that facilitated self-reflection and application of personal values. Using a list of values, pt identified \"love, family, spirituality, creativity, and adventure\" as her most important values. Identified \"raising my family, my 2 boys\" as her greatest achievement. Pleasant and engaged. Appeared less anxious in comparison to previous groups with writer.       "

## 2022-06-07 NOTE — PLAN OF CARE
"  Problem: Mood Impairment (Depressive Signs/Symptoms)  Goal: Improved Mood Symptoms (Depressive Signs/Symptoms)  Outcome: Ongoing, Progressing  Note: Patient reported having a: \"difficult day\", \"started with the ECT, feeling lightheaded\" and very depressed\". Patient was emotional, tearful, sad, and depressed. However, she was hopeful and willing to engage in distractions, including: talking to staff, reading, playing games and conversing with other patients. She reported feeling better by the mid shift without taking PRN medications. Patient verbalized good support system, including he , sons, daughter-in-law and sister. She said she is having a CC tomorrow and she will then have a better understanding if her discharge planning. She was able to identify her stressors, including: having to sell their house and downsize to a one level house, and the increase of her depression and anxiety.   Patient talked about her family and their dog, as well as their grandchildren.   Patient denied pain and all other physical issues. Appetite is good, she ate close to 100% of her supper and HS snacks. Kept self clean and well groomed. No behaviors issues. Communicated needs well. She took medications as scheduled. No SE reported or assessed. Patient reported good sleep at night with the medications she currently takes. Requested and was given PRN Trazodone 50 mg PO. Patient followed with HS hygiene and went to bed.     Plan of Care Reviewed With: patient   "

## 2022-06-08 ENCOUNTER — APPOINTMENT (OUTPATIENT)
Dept: BEHAVIORAL HEALTH | Facility: CLINIC | Age: 77
DRG: 885 | End: 2022-06-08
Attending: PSYCHIATRY & NEUROLOGY
Payer: COMMERCIAL

## 2022-06-08 ENCOUNTER — ANESTHESIA (OUTPATIENT)
Dept: BEHAVIORAL HEALTH | Facility: CLINIC | Age: 77
DRG: 885 | End: 2022-06-08
Payer: COMMERCIAL

## 2022-06-08 LAB
ALBUMIN UR-MCNC: NEGATIVE MG/DL
APPEARANCE UR: CLEAR
BACTERIA #/AREA URNS HPF: ABNORMAL /HPF
BILIRUB UR QL STRIP: NEGATIVE
COLOR UR AUTO: ABNORMAL
GLUCOSE UR STRIP-MCNC: NEGATIVE MG/DL
HGB UR QL STRIP: NEGATIVE
KETONES UR STRIP-MCNC: NEGATIVE MG/DL
LEUKOCYTE ESTERASE UR QL STRIP: ABNORMAL
MUCOUS THREADS #/AREA URNS LPF: PRESENT /LPF
NITRATE UR QL: NEGATIVE
PH UR STRIP: 6.5 [PH] (ref 5–7)
RBC URINE: 1 /HPF
SP GR UR STRIP: 1.01 (ref 1–1.03)
TRANSITIONAL EPI: <1 /HPF
UROBILINOGEN UR STRIP-MCNC: NORMAL MG/DL
WBC URINE: 2 /HPF

## 2022-06-08 PROCEDURE — 90870 ELECTROCONVULSIVE THERAPY: CPT

## 2022-06-08 PROCEDURE — 124N000003 HC R&B MH SENIOR/ADOLESCENT

## 2022-06-08 PROCEDURE — 250N000013 HC RX MED GY IP 250 OP 250 PS 637: Performed by: PSYCHIATRY & NEUROLOGY

## 2022-06-08 PROCEDURE — H2032 ACTIVITY THERAPY, PER 15 MIN: HCPCS

## 2022-06-08 PROCEDURE — 81001 URINALYSIS AUTO W/SCOPE: CPT | Performed by: PSYCHIATRY & NEUROLOGY

## 2022-06-08 PROCEDURE — 99233 SBSQ HOSP IP/OBS HIGH 50: CPT | Mod: 25 | Performed by: PSYCHIATRY & NEUROLOGY

## 2022-06-08 PROCEDURE — 90870 ELECTROCONVULSIVE THERAPY: CPT | Performed by: PSYCHIATRY & NEUROLOGY

## 2022-06-08 PROCEDURE — 250N000009 HC RX 250: Performed by: ANESTHESIOLOGY

## 2022-06-08 PROCEDURE — 250N000011 HC RX IP 250 OP 636: Performed by: ANESTHESIOLOGY

## 2022-06-08 PROCEDURE — 370N000017 HC ANESTHESIA TECHNICAL FEE, PER MIN

## 2022-06-08 PROCEDURE — 250N000009 HC RX 250: Performed by: PSYCHIATRY & NEUROLOGY

## 2022-06-08 PROCEDURE — 250N000011 HC RX IP 250 OP 636: Performed by: PSYCHIATRY & NEUROLOGY

## 2022-06-08 RX ORDER — ETOMIDATE 2 MG/ML
INJECTION INTRAVENOUS PRN
Status: DISCONTINUED | OUTPATIENT
Start: 2022-06-08 | End: 2022-06-08

## 2022-06-08 RX ADMIN — SIMVASTATIN 40 MG: 20 TABLET, FILM COATED ORAL at 21:13

## 2022-06-08 RX ADMIN — TIMOLOL MALEATE 1 DROP: 5 SOLUTION OPHTHALMIC at 11:46

## 2022-06-08 RX ADMIN — MIRTAZAPINE 30 MG: 15 TABLET, FILM COATED ORAL at 21:13

## 2022-06-08 RX ADMIN — SUCCINYLCHOLINE CHLORIDE 60 MG: 20 INJECTION, SOLUTION INTRAMUSCULAR; INTRAVENOUS; PARENTERAL at 08:59

## 2022-06-08 RX ADMIN — MIDAZOLAM HYDROCHLORIDE 1 MG: 1 INJECTION, SOLUTION INTRAMUSCULAR; INTRAVENOUS at 09:03

## 2022-06-08 RX ADMIN — Medication 16 MG: at 08:59

## 2022-06-08 RX ADMIN — SENNOSIDES AND DOCUSATE SODIUM 1 TABLET: 50; 8.6 TABLET ORAL at 09:55

## 2022-06-08 RX ADMIN — CALCIUM CARBONATE 600 MG (1,500 MG)-VITAMIN D3 400 UNIT TABLET 1 TABLET: at 09:55

## 2022-06-08 RX ADMIN — VENLAFAXINE HYDROCHLORIDE 225 MG: 225 TABLET, EXTENDED RELEASE ORAL at 09:55

## 2022-06-08 ASSESSMENT — ACTIVITIES OF DAILY LIVING (ADL)
DRESS: INDEPENDENT
ORAL_HYGIENE: INDEPENDENT
HYGIENE/GROOMING: INDEPENDENT
HYGIENE/GROOMING: INDEPENDENT
LAUNDRY: UNABLE TO COMPLETE
LAUNDRY: UNABLE TO COMPLETE
ORAL_HYGIENE: INDEPENDENT
DRESS: INDEPENDENT

## 2022-06-08 NOTE — PLAN OF CARE
06/08/22 1008   Individualization/Patient Specific Goals   Patient Personal Strengths expressive of emotions   Patient Vulnerabilities history of unsuccessful treatment   Anxieties, Fears or Concerns Patient is fearful her depression will not improve.   Individualized Care Needs Patient receiving ECT inpatient.   Patient-Specific Goals (Include Timeframe) ECT 12 total inpatient.   Interprofessional Rounds   Summary Patient received care conference, yesterday. Medications are adjusted, patient symptoms are being monitored, and patient is responding well to ECT.   Participants nursing;psychiatrist;[facility]   Team Discussion   Participants Dr. Hernandez, RN - Shiela, SOLO - Alie   Progress Making progress   Anticipated length of stay 15 days   Continued Stay Criteria/Rationale Inpatient ECT, symptom reduction and stabilization is needed.   Medical/Physical Please see H and P.   Precautions suicide, fall   Plan Patient will complete 12 ECT inpatient treatments, medication management, psychiatric evaluations, nursing assessments, group therapy, milieu therapy, and CTC case management.   Safety Plan code 2, s-15, vol   Anticipated Discharge Disposition home or self-care     PRECAUTIONS AND SAFETY    Behavioral Orders   Procedures    Code 1 - Restrict to Unit    Code 2    Electroconvulsive therapy    Fall precautions    Routine Programming     As clinically indicated    Status 15     Every 15 minutes.    Suicide precautions     Patients on Suicide Precautions should have a Combination Diet ordered that includes a Diet selection(s) AND a Behavioral Tray selection for Safe Tray - with utensils, or Safe Tray - NO utensils         Safety  Safety WDL: WDL  Patient Location: dining room, patient room, own, lounge  Observed Behavior: calm, sitting  Observed Behavior (Comment): reading  Airway Safety Measures: all equipment/monitors on and audible  Safety Measures: environmental rounds completed, safety rounds completed, suicide  assessment completed  Diversional Activity: television, reading  Suicidality: Status 15, Minimal furniture in room, Unpredictable frequency of checking on patient, Perform mouth checks after medication administration to prevent hoarding, Optimize communication / relationship to minimize opportunity for self-harm, Promote patient engagement with treatment process, Identify and strengthen protective factors  Assault: status 15  Elopement: status 15  Sexual: status 15, semi-private room

## 2022-06-08 NOTE — ANESTHESIA CARE TRANSFER NOTE
Patient: Lorena Hayden    Procedure: * No procedures listed *       Diagnosis: * No pre-op diagnosis entered *  Diagnosis Additional Information: No value filed.    Anesthesia Type:   General     Note:    Oropharynx: oral airway in place  Level of Consciousness: drowsy  Oxygen Supplementation: room air    Independent Airway: airway patency satisfactory and stable  Dentition: dentition unchanged  Vital Signs Stable: post-procedure vital signs reviewed and stable  Report to RN Given: handoff report given  Patient transferred to: PACU    Handoff Report: Identifed the Patient, Identified the Reponsible Provider, Reviewed the pertinent medical history, Discussed the surgical course, Reviewed Intra-OP anesthesia mangement and issues during anesthesia, Set expectations for post-procedure period and Allowed opportunity for questions and acknowledgement of understanding      Vitals:  Vitals Value Taken Time   BP     Temp     Pulse     Resp     SpO2         Electronically Signed By: Aamir Avina MD  June 8, 2022  9:09 AM

## 2022-06-08 NOTE — PROGRESS NOTES
"PSYCHIATRY  PROGRESS NOTE     DATE OF SERVICE   06/08/2022       CHIEF COMPLAINT   \"I am feeling better today\".       SUBJECTIVE   Nursing reports:   Pt had ECT #8 today, pt denied pain, nausea and headache after ECT, pt verbalized she felt \"tired\" and rested in her bed. Flat affect, mood \"tired\", no SI/SIB. Before lunch pt verbalized she felt \"hopeless\" because her medication and ECT are not working and \"I don't know what to do\", pt feeling validated, pt reassured staff is here to help. Pt verbalized her last round of ECT took 15 treatment before she noticed results. Pt reported feeling dizzy, gait steady, fluids and food encouraged, pt reported feeling better after eating.     Patient has been discussed with the  earlier today.   has been assisting the patient on finding individual and group therapy.     OBJECTIVE   Patient was seen and evaluated in the consult room by herself, this was a face-to-face evaluation patient informed me that at the moment she is feeling much better but feels that having ECT is pretty rough on her.  Patient informed me that she does not like the ECT days because she misses her breakfast, has to skip medications and misses the groups.  Patient on the other hand feels that she is improving even though admitted that this has happened very slowly.  I encouraged the patient to continue to be consistent with the treatment and having a lot of patience as psychiatric medications take time to work.  Patient tells me that she understand and is aware of the but she would like to get some symptom relief.    I resumed a conversation with the patient about making adjustments on her medications.  I again gave her the option to the patient to add a different antidepressant and discontinue Effexor versus adding a mood stabilizer.  After having a prolonged discussion about different medications the patient agreed for a trial of Rexulti.  Patient has been educated about Rexulti " "including reasons for prescribing this medication, benefits, risks and side effects.    After I finished my conversation with the patient and I had a communication with the pharmacy and they will verify if Rexulti is a medication that is covered by patient's health insurance.       MEDICATIONS   Medications:  Scheduled Meds:    [START ON 6/9/2022] brexpiprazole  0.5 mg Oral Daily     calcium carbonate 600 mg-vitamin D 400 units  1 tablet Oral Daily     mirtazapine  30 mg Oral At Bedtime     simvastatin  40 mg Oral At Bedtime     timolol maleate  1 drop Both Eyes Daily     venlafaxine  225 mg Oral Daily with breakfast     Continuous Infusions:  PRN Meds:.acetaminophen, alum & mag hydroxide-simethicone, hydrOXYzine, OLANZapine **OR** OLANZapine, senna-docusate, traZODone    Medication adherence issues: MS Med Adherence Y/N: Yes, Hospitalization  Medication side effects: MEDICATION SIDE EFFECTS: no side effects reported  Benefit: Yes / No: Yes       ROS   A comprehensive review of systems was negative.       MENTAL STATUS EXAM   Vitals: /84   Pulse 105   Temp 98.3  F (36.8  C) (Oral)   Resp 16   Wt 65.6 kg (144 lb 11.2 oz)   SpO2 98%   BMI 24.84 kg/m      Appearance:  No apparent distress  Mood: \"Still depressed but a little bit better\"  Affect: restricted but reactive was congruent to speech  Suicidal Ideation: PRESENT / ABSENT: absent   Homicidal Ideation: PRESENT / ABSENT: absent   Thought process: unremarkable   Thought content: devoid of  suicidal and violent ideation.   Fund of Knowledge: Average  Attention/Concentration: Normal  Language ability:  Intact  Memory:  Immediate recall intact, Short-term memory intact and Long-term memory intact  Insight:  fair.  Judgement: fair  Orientation: Yes, x4  Psychomotor Behavior: normal or unremarkable    Muscle Strength and Tone: MuscleStrength: Normal  Gait and Station: stable        LABS   personally reviewed.     No results found for: PHENYTOIN, PHENOBARB, " VALPROATE, CBMZ       DIAGNOSIS   Principal Problem:    <principal problem not specified>    Active Problem List:  Patient Active Problem List   Diagnosis     Allergic rhinitis     Symptomatic menopausal or female climacteric states     Mixed hyperlipidemia     Anxiety state     Insomnia     Anal fissure     Major depressive disorder, recurrent episode, moderate (H)     Irritable bowel syndrome     ACP (advance care planning)     Health Care Home     Tinnitus     Acute lower gastrointestinal hemorrhage     Abdominal pain     Suicidal ideation     Major depressive disorder, recurrent episode, severe with anxious distress (H)     Major depressive disorder, 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 Ongoing education given regarding diagnostic and treatment options with risks, benefits and alternatives and adequate verbalization of understanding.  2.  Medications:      Remeron 15 mg at bedtime      Effexor 225 mg daily      Start Rexulti 0.5 mg daily  3.  Medical team to follow the patient as needed  4.   coordinating a safe discharge plan with the patient.        Risk Assessment: [facility] RISK ASSESSMENT: Patient able to contract for safety    Coordination of Care:   Treatment Plan reviewed and physician signed, Care discussed with Care/Treatment Team Members, Chart reviewed and Patient seen      Re-Certification I certify that the inpatient psychiatric facility services furnished since the previous certification were, and continue to be, medically necessary for, either, treatment which could reasonably be expected to improve the patient s condition or diagnostic study and that the hospital records indicate that the services furnished were, either, intensive treatment services, admission and related services necessary for diagnostic study, or equivalent services.     I certify that the patient continues to need, on a daily basis, active treatment furnished directly by or requiring  the supervision of inpatient psychiatric facility personnel.   I estimate 14 days of hospitalization is necessary for proper treatment of the patient. My plans for post-hospital care for this patient are  home with family     Bri Elliott MD    -     06/08/2022  -     3:28 PM    Total time 35 minutes with > 50%spent on coordination of cares and psycho-education.    This note was created with help of Dragon dictation system. Grammatical / typing errors are not intentional.    Bri Elliott MD

## 2022-06-08 NOTE — CONSULTS
Consulted to run a test claim for Rexulti 0.5mg tablets.    Patient has pharmacy benefits through [employer] [place] [employer]. Per insurance, this medications is not covered and requires prior authorization.    This process has been started--please see separate notes linked from Prior Authorization Encounter for details/updates regarding this PA.      Please feel free to contact me with any other test claims, prior authorizations, or insurance questions regarding outpatient medications.     Thanks!      Alba Oglesby [facility] Discharge Pharmacy Liaison  Pronouns: She/Her/Hers    [facility]  [address] [place] [address]  [address] [place] [address] [address] [place], [place] [id number]   [email or web address]  [email or web address]   Phone: [phone]  Pager: [phone]  Fax: [phone]

## 2022-06-08 NOTE — PLAN OF CARE
"Problem: Behavioral Health Plan of Care  Goal: Optimized Coping Skills in Response to Life Stressors  Outcome: Ongoing, Progressing     Problem: Activity and Energy Impairment (Depressive Signs/Symptoms)  Goal: Optimized Energy Level (Depressive Signs/Symptoms)  Outcome: Ongoing, Progressing  Intervention: Optimize Energy Level    Patient is pleasant and cooperative.  She is up in the milieu and engages with peers and staff.  Patient attends group.  Patient appears anxious as she is intermittently restless, tearful, and makes comments about being in the hospital \"forever.\"  Patient rates anxiety at 0/10.  She endorses \"high\" depression but is unable to rate out of 10. Patient denies SI stating \"not now.\"  Patient states suicidal thoughts come and go.  She denies SIB, HI, and hallucinations.  Patient discusses that ECT is \"always very difficult\" for her but she does feel that it is helping.  Patient reports burning with urination.  UA completed this afternoon and internal medicine paged.  Patient is medication compliant and remains safe on unit.  Will continue to monitor.  Sivan Arteaga RN       "

## 2022-06-08 NOTE — PROCEDURES
Procedure/Surgery Information   [facility]    Bedside Procedure Note  Date of Service (when I performed the procedure): 06/08/2022    Lorena Hayden is a 76 year old female patient.  1. 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 Major depressive disorder, recurrent episode, severe with anxious distress (H)      Past Medical History:   Diagnosis Date     Allergic rhinitis, cause unspecified      Anxiety      Depressive disorder      Temp: 98.3  F (36.8  C) Temp src: Oral BP: 127/77 Pulse: 75   Resp: 16 SpO2: 98 % O2 Device: None (Room air)      Procedures       Marvin March MD     Lorena Hayden is a 76 year old  year old female patient.  [id number]  @DX@    [facility]   ECT Procedure Note   06/08/2022    Patient Status: Inpatient    Is this the first in a series of 12 treatments?  No     Allergies   Allergen Reactions     Horse Allergy      Allergic to horse hair       Weight:  144 lbs 11.2 oz         Indications for ECT:   Medications ineffective         Clinical Narrative:   Lorena Hayden is a 76 year old woman with a history of depression, started in her mid 60s. Her depression was relatively well-controlled on duloxetine until February 2022, since when she has had progressive depression with suicidal thoughts without an identifiable trigger. Multiple medication trials yielded to no improvement (Increased duloxetine, added aripiprazole then bupropion, now cross titrating to desvenlafaxine). She had a recent psychiatric hospitalization in [facility] in [place], discharged only two weeks ago. However, depression persisted, she had more thoughts that life was not worth living and thoughts of suicide.  She also reported difficulties with sleep, low energy, poor appetite with significant weight loss of about 20 pounds.  She finally came to the hospital and requested to  be hospitalized.     Psychiatric History:   Diagnoses: Major Depressive Disorder     Hospitalizations: Two former (first in 2013, last in 2022)     Suicide Attempts: None     Medication Trials: Ativan, trazodone, Klonopin, Lexapro, Wellbutrin, Celexa, Seroquel, Zoloft, Abilify, currently Effexor and Cymbalta. No TCAs or MAOIs.     Neuromodulation:  Right unilateral ultrabrief ECT x 19 sessions in 2012. She had a good response during treatment which lasted only several weeks after the discontinuation.     Psychotherapy:  None            Diagnosis:   Major depression         Assessment:   #1 she reports her depression is severe and decreases pleasure and motivation and she can be very paralyzed and indecisive with anxiety. ECT was helpful before  #2  She is irritable she was awoken early but her appt pushed back. She denies discomfort after last appointment. Some SI but she feels it is  becuase she is so anxious waiting.  #3 sleeping well with trazodone + remeron, she reports no worsening as in in fact improving no SI today, no tremor and less anxious, less catastrophizing thoughts. Her dog nehemias is awaiting her at home. She feels her thinking is improved not worsened since starting ECT  #4 Today she is flustered she has trouble putting her depression intensity on a scale but admits sadness, no tearfulness, no Si today and denies complaints about recovery process of ECT. She denies worsening anxiety and she slept well overnight.  #5 She is irritable and anxious today. She I not yet feeling improvement in mood. She struggled with the transfer of units to  it disrupted her bedtime and she did not sleep well. She finds the ECT frightening.   #6 reports form the floor report unchanged mood so far. Less irritable and anxious today 1/10 ( mood worst it has ever been ) but SI has resolved.   2/3 at 5min recall Will increase to 9x ST  #7 more verbose today. She is frustrated that her meal will be cold by the time she  returns for breakfast.  She reports mood is improving some. She denies physical side effects   #8 6/10 mood (10 best) no anxiety between treatments. She feels very fatigued after treatments and needs to nap more. denies concern for confusion or insomnia. denies concerns form family about confusion.           Pause for the Cause:     Right patient Yes   Right procedure/laterality settings: Yes          Intra-Procedure Documentation:       ECT #: 8   Treatment number this series: 8   Total treatment number: 28     Type of ECT:  Right, unilateral ultrabrief    ECT Medications:    Etomidate: 16mg  Succinyl Choline: 60mg    Versed 1mg for agitation      ECT Strip Summary:    Titration ( 19.2 percent ) Energy Level:172.8mc  0.3ms 45 Hz time 8 sec 800mA  Motor Seizure Duration:  50 seconds  EEG Seizure Duration: 75 seconds      Complications: None    Time for re-orientation: 20 minutes    Plan:    Prefers earliest appointment to decrease pre-procedure anxiety.      If she has consistent transportation and supervision for at least 6hrs after treatment she can complete her course outpatient when appropriate    Continue ECT Q MWF at 172.8 mCsettings      Monitor depression severity with clinical assessment augmented with CUDOS every treatment  Continue current medications    MD Rod

## 2022-06-08 NOTE — PLAN OF CARE
Problem: Sleep Disturbance (Depressive Signs/Symptoms)  Goal: Improved Sleep (Depressive Signs/Symptoms)  Outcome: Ongoing, Progressing   Goal Outcome Evaluation:     Patient was maintained on NPO post midnight. ECT is scheduled @0820. Vital signs taken and recorded. BP-127/71, P-75, R-16, T-98.3 and O2 sat-98% @ R.A. Patient still needs to void before going down to ECT.     Slept a total of 8.25 hours.

## 2022-06-08 NOTE — ANESTHESIA POSTPROCEDURE EVALUATION
Patient: Lorena Hyaden    Procedure: * No procedures listed *       Anesthesia Type:  General    Note:  Disposition: Inpatient   Postop Pain Control: Uneventful            Sign Out: Well controlled pain   PONV: No   Neuro/Psych: Uneventful            Sign Out: Acceptable/Baseline neuro status   Airway/Respiratory: Uneventful            Sign Out: Acceptable/Baseline resp. status   CV/Hemodynamics: Uneventful            Sign Out: Acceptable CV status; No obvious hypovolemia; No obvious fluid overload   Other NRE: NONE   DID A NON-ROUTINE EVENT OCCUR? No           Last vitals:  Vitals:    06/08/22 0918 06/08/22 0931 06/08/22 0954   BP: (!) 142/68 (!) 148/78 138/84   Pulse: 99 98 105   Resp: 16 16    Temp: 36.9  C (98.4  F) 36.6  C (97.9  F) 36.8  C (98.3  F)   SpO2: 95% 97% 98%       Electronically Signed By: Aamir Avina MD  June 8, 2022  12:12 PM

## 2022-06-08 NOTE — PROGRESS NOTES
" 06/07/22 2000   Group Therapy Session   Time Session Began 1700   Time Session Ended 1750   Total Time patient participated (minutes) 50   Total # Attendees 6   Group Type expressive therapy   Group Topic Covered balanced lifestyle;emotions/expression;self-care activities   Group Session Detail Inspiration Hour-Pts each had the opportunity to share a song that inspires them, and the option to share why for the purposes of improving mood and increasing positive social interactions.    Patient Response/Contribution cooperative with task   Patient Response Detail \"Candy\" selected, with confidence, the song \"Just the Way you Are\" by Goldy Brown, a song of accepting someone.  She opted not to verbally share on it.  Calm, engaged, cooperative.        "

## 2022-06-08 NOTE — PROGRESS NOTES
Patient adequate for discharge . Report called to unit nurse  Shiela SOTO     . Iv removed and hemostasis achieved less than 2 min.  Patient safely transported back to unit with  staff persons.

## 2022-06-08 NOTE — PLAN OF CARE
"Goal Outcome Evaluation:    Plan of Care Reviewed With: patient     Pt had ECT #8 today, pt denied pain, nausea and headache after ECT, pt verbalized she felt \"tired\" and rested in her bed. Flat affect, mood \"tired\", no SI/SIB. Before lunch pt verbalized she felt \"hopeless\" because her medication and ECT are not working and \"I don't know what to do\", pt feeling validated, pt reassured staff is here to help. Pt verbalized her last round of ECT took 15 treatment before she noticed results. Pt reported feeling dizzy, gait steady, fluids and food encouraged, pt reported feeling better after eating.     PRN senna-docusate given per pt request for constipation with pt reported results.     Plan:  Continue to encourage fluids and increased activity  Continue to encourage group participation  Continue to encourage choices and independence  Continue to encourage non-pharmacological coping skills               "

## 2022-06-08 NOTE — PLAN OF CARE
Care coordination:  - Patient's care was discussed in team meeting.  - Writer is working on referrals for discharge plans.    Referrals:  [facility] [place]  [phone] General number  [phone] Thea, coordinator for DBT groups  Writer left a message with Thea requesting documents needed for the referral.    [facility] DBT program in [place]  [phone]  -Openings in [place] and (1 opening) [place].    stated patient is able to reserve her spot if she called the following phone number to schedule [phone].   - Writer and patient called [phone] and spoke with the . There are no spots available in [place] or [place]. Her recommendation is to call often, as it's rolling admission.     Barriers to discharge:  - Symptom reduction and stabilization.

## 2022-06-09 ENCOUNTER — DOCUMENTATION ONLY (OUTPATIENT)
Dept: BEHAVIORAL HEALTH | Facility: CLINIC | Age: 77
End: 2022-06-09
Payer: COMMERCIAL

## 2022-06-09 PROCEDURE — 99231 SBSQ HOSP IP/OBS SF/LOW 25: CPT | Mod: 95 | Performed by: PSYCHIATRY & NEUROLOGY

## 2022-06-09 PROCEDURE — 250N000013 HC RX MED GY IP 250 OP 250 PS 637: Performed by: PSYCHIATRY & NEUROLOGY

## 2022-06-09 PROCEDURE — 124N000003 HC R&B MH SENIOR/ADOLESCENT

## 2022-06-09 PROCEDURE — G0177 OPPS/PHP; TRAIN & EDUC SERV: HCPCS

## 2022-06-09 RX ADMIN — VENLAFAXINE HYDROCHLORIDE 225 MG: 225 TABLET, EXTENDED RELEASE ORAL at 08:41

## 2022-06-09 RX ADMIN — SENNOSIDES AND DOCUSATE SODIUM 1 TABLET: 50; 8.6 TABLET ORAL at 09:20

## 2022-06-09 RX ADMIN — TRAZODONE HYDROCHLORIDE 50 MG: 50 TABLET ORAL at 21:14

## 2022-06-09 RX ADMIN — CALCIUM CARBONATE 600 MG (1,500 MG)-VITAMIN D3 400 UNIT TABLET 1 TABLET: at 08:41

## 2022-06-09 RX ADMIN — MIRTAZAPINE 30 MG: 15 TABLET, FILM COATED ORAL at 21:11

## 2022-06-09 RX ADMIN — SIMVASTATIN 40 MG: 20 TABLET, FILM COATED ORAL at 21:11

## 2022-06-09 RX ADMIN — TIMOLOL MALEATE 1 DROP: 5 SOLUTION OPHTHALMIC at 08:41

## 2022-06-09 ASSESSMENT — ACTIVITIES OF DAILY LIVING (ADL)
ORAL_HYGIENE: INDEPENDENT
HYGIENE/GROOMING: INDEPENDENT
LAUNDRY: UNABLE TO COMPLETE
DRESS: INDEPENDENT
DRESS: INDEPENDENT;SCRUBS (BEHAVIORAL HEALTH)
LAUNDRY: UNABLE TO COMPLETE
HYGIENE/GROOMING: INDEPENDENT
ORAL_HYGIENE: INDEPENDENT

## 2022-06-09 NOTE — PROVIDER NOTIFICATION
Per Dr. Hernandez request Irma Escobar asked to review UA results and assess pt, no new orders at this time.

## 2022-06-09 NOTE — PROGRESS NOTES
"SPIRITUAL HEALTH SERVICES Progress Note  [facility] ([facility]) 3BW    Saw pt Lorena Hayden per follow up. Provided brief emotional support and prayer at pt request.    Illness Narrative - Cayla was reading in the lounge, welcomed follow up visit.    Distress -  Pt became tearful immediately when asking how the day is, spoke of conversation with other pt and fears that she too may be allergic to medications, and about \"racing thoughts.\"    Coping/Spirit - Pt calmed noticeably when taking deep breaths with \"swoosh\" exhale.   Identified reading as a helpful coping skill, \"It takes my mind off things.\"  Cayla states she prays for healing every night before bed. Writer affirmed the effectiveness of prayer as part of her plan and our support.    Meaning-Making -     Plan - I will follow up 1-2x weekly.    Valeria Staples MDiv  Associate   Pager [phone]  Office [phone]  [facility] remains available 24/7 for emergent requests/referrals, either by having the switchboard page the on-call  or by entering an ASAP/STAT consult in Epic (this will also page the on-call ). Routine Epic consults receive an initial response within 24 hours.    "

## 2022-06-09 NOTE — PROGRESS NOTES
06/08/22 2000   Group Therapy Session   Group Attendance attended group session   Time Session Began 1900   Time Session Ended 1950   Total Time patient participated (minutes) 50   Total # Attendees 6   Group Type recreation   Group Topic Covered leisure exploration/use of leisure time   Group Session Detail TR leisure group   Patient Response/Contribution cooperative with task   Patient Response Detail Pt attended the structured Therapeutic Recreation group, participating in a group activity. Pt participated in group discussion and leisure participation as a healthy outlet to gain self-esteem, manage behaviors, improve social skills, and decrease isolation.  Pt remained focused and engaged throughout group activity.  Pt participated in the group discussion about healthy outlets and participated in the group activity, contributing to the clues and descriptions for the game.

## 2022-06-09 NOTE — PLAN OF CARE
Problem: Sleep Disturbance (Depressive Signs/Symptoms)  Goal: Improved Sleep (Depressive Signs/Symptoms)  Outcome: Ongoing, Progressing   Goal Outcome Evaluation:    Patient slept a total of 8 hours without any interruptions. No behavioral issues noted the whole night.

## 2022-06-09 NOTE — PROGRESS NOTES
"PSYCHIATRY  PROGRESS NOTE     DATE OF SERVICE   06/09/2022  The patient is a 76 year old female who is being evaluated via a video billable telemedicine visit. The patient/guardian has consented to being seen via telemedicine. The provider was in front of a computer in a home office. The patient was on the inpatient unit at [facility].    Start time: 12 PM  Stop time: 12:11 PM    The patient/guardian has been notified of the following:     This telemedicine visit is conducted live between you and your clinician. We have found that certain health care needs can be provided without the need for a physical exam. This service lets us provide the care you need with a telemedicine conversation.           CHIEF COMPLAINT   \"I am still very depressed\".       SUBJECTIVE   Nursing reports:   Patient is pleasant and cooperative.  She is up in the milieu and engages with peers and staff.  Patient attends group.  Patient appears anxious as she is intermittently restless, tearful, and makes comments about being in the hospital \"forever.\"  Patient rates anxiety at 0/10.  She endorses \"high\" depression but is unable to rate out of 10. Patient denies SI stating \"not now.\"  Patient states suicidal thoughts come and go.  She denies SIB, HI, and hallucinations.  Patient discusses that ECT is \"always very difficult\" for her but she does feel that it is helping.  Patient reports burning with urination.  UA completed this afternoon and internal medicine paged.  Patient is medication compliant and remains safe on unit.  Will continue to monitor.  Sivan Arteaga RN     Patient has been discussed with the  earlier today.   will continue following up today on referrals for outpatient treatment.     OBJECTIVE   Patient was seen and evaluated in the consult with nurse practitioner present during the assessment, this was done with the use of telehealth.  Patient reported that she continues to feel depressed " and does not believe the medications have been helpful.  Patient admitted that she has been progressing and she is feeling better than when she came to the hospital but at the same time she feels her progression in terms of the depression has been minimal.  I did reviewed with the patient that it will take time for her to feel better and go back to her baseline.  When someone is getting out of a depressive episode is not always is on a straight line and is okay to have bad days.  Patient verbalized good understanding of this.  She continues to mention that she feels with the Cymbalta she was doing better but she does not want to go back to take this medication as she feels it stopped working.  I did discuss with the patient ordering a [employer] assessment.  Patient informed me that she will talk to her  about it and let me know.    Patient has been updated about the Rexulti.  She is aware that the pharmacy is going through the prior authorization process.  I informed the patient that I do not want to start the medication unless we have this prior authorization in place.       MEDICATIONS   Medications:  Scheduled Meds:    [Held by provider] brexpiprazole  0.5 mg Oral Daily     calcium carbonate 600 mg-vitamin D 400 units  1 tablet Oral Daily     mirtazapine  30 mg Oral At Bedtime     simvastatin  40 mg Oral At Bedtime     timolol maleate  1 drop Both Eyes Daily     venlafaxine  225 mg Oral Daily with breakfast     Continuous Infusions:  PRN Meds:.acetaminophen, alum & mag hydroxide-simethicone, hydrOXYzine, OLANZapine **OR** OLANZapine, senna-docusate, traZODone    Medication adherence issues: MS Med Adherence Y/N: Yes, Hospitalization  Medication side effects: MEDICATION SIDE EFFECTS: no side effects reported  Benefit: Yes / No: Yes       ROS   A comprehensive review of systems was negative.       MENTAL STATUS EXAM   Vitals: /84 (BP Location: Left arm, Patient Position: Sitting, Cuff Size: Adult  "Regular)   Pulse 91   Temp 97.2  F (36.2  C) (Temporal)   Resp 16   Wt 65.6 kg (144 lb 11.2 oz)   SpO2 97%   BMI 24.84 kg/m      Appearance:  No apparent distress  Mood: \"Feeling depressed\"  Affect: restricted but reactive was congruent to speech  Suicidal Ideation: PRESENT / ABSENT: absent   Homicidal Ideation: PRESENT / ABSENT: absent   Thought process: unremarkable   Thought content: devoid of  suicidal and violent ideation.   Fund of Knowledge: Average  Attention/Concentration: Normal  Language ability:  Intact  Memory:  Immediate recall intact, Short-term memory intact and Long-term memory intact  Insight:  fair.  Judgement: fair  Orientation: Yes, x4  Psychomotor Behavior: normal or unremarkable    Muscle Strength and Tone: MuscleStrength: Normal  Gait and Station: stable        LABS   personally reviewed.     No results found for: PHENYTOIN, PHENOBARB, VALPROATE, CBMZ       DIAGNOSIS   Principal Problem:    <principal problem not specified>    Active Problem List:  Patient Active Problem List   Diagnosis     Allergic rhinitis     Symptomatic menopausal or female climacteric states     Mixed hyperlipidemia     Anxiety state     Insomnia     Anal fissure     Major depressive disorder, recurrent episode, moderate (H)     Irritable bowel syndrome     ACP (advance care planning)     Health Care Home     Tinnitus     Acute lower gastrointestinal hemorrhage     Abdominal pain     Suicidal ideation     Major depressive disorder, recurrent episode, severe with anxious distress (H)     Major depressive disorder, 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 Ongoing education given regarding diagnostic and treatment options with risks, benefits and alternatives and adequate verbalization of understanding.  2.  Medications:      Remeron 15 mg at bedtime      Effexor 225 mg daily      Start Rexulti 0.5 mg daily  3.  Medical team to follow the patient as needed  4.   coordinating a " safe discharge plan with the patient.        Risk Assessment: [facility] RISK ASSESSMENT: Patient able to contract for safety    Coordination of Care:   Treatment Plan reviewed and physician signed, Care discussed with Care/Treatment Team Members, Chart reviewed and Patient seen      Re-Certification I certify that the inpatient psychiatric facility services furnished since the previous certification were, and continue to be, medically necessary for, either, treatment which could reasonably be expected to improve the patient s condition or diagnostic study and that the hospital records indicate that the services furnished were, either, intensive treatment services, admission and related services necessary for diagnostic study, or equivalent services.     I certify that the patient continues to need, on a daily basis, active treatment furnished directly by or requiring the supervision of inpatient psychiatric facility personnel.   I estimate 14 days of hospitalization is necessary for proper treatment of the patient. My plans for post-hospital care for this patient are  home with family     Bri Elliott MD    -     06/09/2022  -     12:38 PM    Total time 15 minutes with > 50%spent on coordination of cares and psycho-education.    This note was created with help of Dragon dictation system. Grammatical / typing errors are not intentional.    Bri Elliott MD

## 2022-06-09 NOTE — PROGRESS NOTES
Prior Authorization **INITIATED**    Medication: Rexulti 0.5mg tabs  Insurance Company: [employer] [place] - Phone [phone] Fax [phone]  Pharmacy Filling the Rx: n/a - Patient has not yet been discharged, and there are no outpatient orders for this medication yet  Start Date: 6/9/2022  Reference #: CoverMyMeds Key: [id number]  Comments:  Proactive Prior Authorization      Alba Oglesby [facility] Liaison  Pronouns: She/Her/Hers    [facility]  [address]  [address] [id number]   [email or web address]  [email or web address]   Phone: [phone]  Pager: [phone]  Fax: [phone]

## 2022-06-09 NOTE — PLAN OF CARE
"Goal Outcome Evaluation:    Plan of Care Reviewed With: patient     Flat affect, brightens on approach. Pt active in milieu, social with staff and peers. Pt verbalized she feels \"down\" today, pt verbalized her medication (effexor) is not working. She wants to have genetic testing but will need to talk to her  first to decide if they can afford it, they live on a fixed income. No SI/SIB, pt took all scheduled medications. Pt eating and taking fluids freely.     PRN senna-docusate given per pt request for constipation, pt reproted relief.     "

## 2022-06-10 ENCOUNTER — ANESTHESIA EVENT (OUTPATIENT)
Dept: BEHAVIORAL HEALTH | Facility: CLINIC | Age: 77
DRG: 885 | End: 2022-06-10
Payer: COMMERCIAL

## 2022-06-10 ENCOUNTER — ANESTHESIA (OUTPATIENT)
Dept: BEHAVIORAL HEALTH | Facility: CLINIC | Age: 77
DRG: 885 | End: 2022-06-10
Payer: COMMERCIAL

## 2022-06-10 ENCOUNTER — APPOINTMENT (OUTPATIENT)
Dept: BEHAVIORAL HEALTH | Facility: CLINIC | Age: 77
DRG: 885 | End: 2022-06-10
Attending: PSYCHIATRY & NEUROLOGY
Payer: COMMERCIAL

## 2022-06-10 LAB
ALBUMIN UR-MCNC: NEGATIVE MG/DL
APPEARANCE UR: CLEAR
BACTERIA #/AREA URNS HPF: ABNORMAL /HPF
BILIRUB UR QL STRIP: NEGATIVE
COLOR UR AUTO: ABNORMAL
GLUCOSE UR STRIP-MCNC: NEGATIVE MG/DL
HGB UR QL STRIP: NEGATIVE
KETONES UR STRIP-MCNC: NEGATIVE MG/DL
LEUKOCYTE ESTERASE UR QL STRIP: ABNORMAL
MUCOUS THREADS #/AREA URNS LPF: PRESENT /LPF
NITRATE UR QL: NEGATIVE
PH UR STRIP: 5.5 [PH] (ref 5–7)
RBC URINE: 1 /HPF
SARS-COV-2 RNA RESP QL NAA+PROBE: NEGATIVE
SP GR UR STRIP: 1.02 (ref 1–1.03)
SQUAMOUS EPITHELIAL: 1 /HPF
UROBILINOGEN UR STRIP-MCNC: NORMAL MG/DL
WBC URINE: 44 /HPF

## 2022-06-10 PROCEDURE — 87086 URINE CULTURE/COLONY COUNT: CPT | Performed by: NURSE PRACTITIONER

## 2022-06-10 PROCEDURE — 370N000017 HC ANESTHESIA TECHNICAL FEE, PER MIN

## 2022-06-10 PROCEDURE — 99232 SBSQ HOSP IP/OBS MODERATE 35: CPT | Mod: 25 | Performed by: PSYCHIATRY & NEUROLOGY

## 2022-06-10 PROCEDURE — 250N000011 HC RX IP 250 OP 636: Performed by: PSYCHIATRY & NEUROLOGY

## 2022-06-10 PROCEDURE — 250N000011 HC RX IP 250 OP 636: Performed by: ANESTHESIOLOGY

## 2022-06-10 PROCEDURE — 99207 PR NO CHARGE LOS: CPT | Performed by: NURSE PRACTITIONER

## 2022-06-10 PROCEDURE — 250N000013 HC RX MED GY IP 250 OP 250 PS 637: Performed by: PSYCHIATRY & NEUROLOGY

## 2022-06-10 PROCEDURE — 90870 ELECTROCONVULSIVE THERAPY: CPT

## 2022-06-10 PROCEDURE — 90870 ELECTROCONVULSIVE THERAPY: CPT | Performed by: PSYCHIATRY & NEUROLOGY

## 2022-06-10 PROCEDURE — 90853 GROUP PSYCHOTHERAPY: CPT

## 2022-06-10 PROCEDURE — 124N000003 HC R&B MH SENIOR/ADOLESCENT

## 2022-06-10 PROCEDURE — 87635 SARS-COV-2 COVID-19 AMP PRB: CPT | Performed by: PSYCHIATRY & NEUROLOGY

## 2022-06-10 PROCEDURE — 250N000009 HC RX 250: Performed by: ANESTHESIOLOGY

## 2022-06-10 PROCEDURE — 81001 URINALYSIS AUTO W/SCOPE: CPT | Performed by: NURSE PRACTITIONER

## 2022-06-10 RX ORDER — MEPERIDINE HYDROCHLORIDE 25 MG/ML
12.5 INJECTION INTRAMUSCULAR; INTRAVENOUS; SUBCUTANEOUS
Status: DISCONTINUED | OUTPATIENT
Start: 2022-06-10 | End: 2022-06-10 | Stop reason: CLARIF

## 2022-06-10 RX ORDER — FENTANYL CITRATE 50 UG/ML
25 INJECTION, SOLUTION INTRAMUSCULAR; INTRAVENOUS EVERY 5 MIN PRN
Status: DISCONTINUED | OUTPATIENT
Start: 2022-06-10 | End: 2022-06-10 | Stop reason: CLARIF

## 2022-06-10 RX ORDER — ETOMIDATE 2 MG/ML
INJECTION INTRAVENOUS PRN
Status: DISCONTINUED | OUTPATIENT
Start: 2022-06-10 | End: 2022-06-10

## 2022-06-10 RX ORDER — ONDANSETRON 2 MG/ML
4 INJECTION INTRAMUSCULAR; INTRAVENOUS EVERY 30 MIN PRN
Status: DISCONTINUED | OUTPATIENT
Start: 2022-06-10 | End: 2022-06-10 | Stop reason: CLARIF

## 2022-06-10 RX ORDER — ESMOLOL HYDROCHLORIDE 10 MG/ML
INJECTION INTRAVENOUS PRN
Status: DISCONTINUED | OUTPATIENT
Start: 2022-06-10 | End: 2022-06-10

## 2022-06-10 RX ORDER — HYDROMORPHONE HCL IN WATER/PF 6 MG/30 ML
0.2 PATIENT CONTROLLED ANALGESIA SYRINGE INTRAVENOUS EVERY 5 MIN PRN
Status: DISCONTINUED | OUTPATIENT
Start: 2022-06-10 | End: 2022-06-10 | Stop reason: CLARIF

## 2022-06-10 RX ORDER — ACETAMINOPHEN 500 MG
500 TABLET ORAL EVERY 6 HOURS PRN
Status: DISCONTINUED | OUTPATIENT
Start: 2022-06-10 | End: 2022-06-24 | Stop reason: HOSPADM

## 2022-06-10 RX ORDER — OXYCODONE HYDROCHLORIDE 5 MG/1
5 TABLET ORAL EVERY 4 HOURS PRN
Status: DISCONTINUED | OUTPATIENT
Start: 2022-06-10 | End: 2022-06-10 | Stop reason: CLARIF

## 2022-06-10 RX ORDER — FENTANYL CITRATE 50 UG/ML
25 INJECTION, SOLUTION INTRAMUSCULAR; INTRAVENOUS
Status: DISCONTINUED | OUTPATIENT
Start: 2022-06-10 | End: 2022-06-10 | Stop reason: CLARIF

## 2022-06-10 RX ORDER — ONDANSETRON 4 MG/1
4 TABLET, ORALLY DISINTEGRATING ORAL EVERY 30 MIN PRN
Status: DISCONTINUED | OUTPATIENT
Start: 2022-06-10 | End: 2022-06-10 | Stop reason: CLARIF

## 2022-06-10 RX ORDER — SODIUM CHLORIDE, SODIUM LACTATE, POTASSIUM CHLORIDE, CALCIUM CHLORIDE 600; 310; 30; 20 MG/100ML; MG/100ML; MG/100ML; MG/100ML
INJECTION, SOLUTION INTRAVENOUS CONTINUOUS
Status: DISCONTINUED | OUTPATIENT
Start: 2022-06-10 | End: 2022-06-10 | Stop reason: CLARIF

## 2022-06-10 RX ORDER — ONDANSETRON 2 MG/ML
4 INJECTION INTRAMUSCULAR; INTRAVENOUS ONCE
Status: DISCONTINUED | OUTPATIENT
Start: 2022-06-10 | End: 2022-06-10

## 2022-06-10 RX ORDER — NICARDIPINE HCL-0.9% SOD CHLOR 1 MG/10 ML
SYRINGE (ML) INTRAVENOUS PRN
Status: DISCONTINUED | OUTPATIENT
Start: 2022-06-10 | End: 2022-06-10

## 2022-06-10 RX ADMIN — MIRTAZAPINE 30 MG: 15 TABLET, FILM COATED ORAL at 21:05

## 2022-06-10 RX ADMIN — ONDANSETRON 4 MG: 2 INJECTION INTRAMUSCULAR; INTRAVENOUS at 09:30

## 2022-06-10 RX ADMIN — MIDAZOLAM 1 MG: 1 INJECTION INTRAMUSCULAR; INTRAVENOUS at 09:40

## 2022-06-10 RX ADMIN — SUCCINYLCHOLINE CHLORIDE 60 MG: 20 INJECTION, SOLUTION INTRAMUSCULAR; INTRAVENOUS; PARENTERAL at 09:36

## 2022-06-10 RX ADMIN — SENNOSIDES AND DOCUSATE SODIUM 1 TABLET: 50; 8.6 TABLET ORAL at 13:01

## 2022-06-10 RX ADMIN — CALCIUM CARBONATE 600 MG (1,500 MG)-VITAMIN D3 400 UNIT TABLET 1 TABLET: at 10:55

## 2022-06-10 RX ADMIN — Medication 75 MG: at 23:55

## 2022-06-10 RX ADMIN — Medication 75 MG: at 21:06

## 2022-06-10 RX ADMIN — SIMVASTATIN 40 MG: 20 TABLET, FILM COATED ORAL at 21:06

## 2022-06-10 RX ADMIN — Medication 16 MG: at 09:36

## 2022-06-10 RX ADMIN — ESMOLOL HYDROCHLORIDE 100 MG: 10 INJECTION, SOLUTION INTRAVENOUS at 09:36

## 2022-06-10 RX ADMIN — TIMOLOL MALEATE 1 DROP: 5 SOLUTION OPHTHALMIC at 10:58

## 2022-06-10 RX ADMIN — VENLAFAXINE HYDROCHLORIDE 225 MG: 225 TABLET, EXTENDED RELEASE ORAL at 10:55

## 2022-06-10 RX ADMIN — Medication 1000 MCG: at 09:36

## 2022-06-10 ASSESSMENT — ACTIVITIES OF DAILY LIVING (ADL)
HYGIENE/GROOMING: INDEPENDENT
HYGIENE/GROOMING: INDEPENDENT
ORAL_HYGIENE: INDEPENDENT
LAUNDRY: UNABLE TO COMPLETE
ORAL_HYGIENE: INDEPENDENT
DRESS: INDEPENDENT;SCRUBS (BEHAVIORAL HEALTH)
DRESS: SCRUBS (BEHAVIORAL HEALTH);INDEPENDENT

## 2022-06-10 NOTE — ANESTHESIA CARE TRANSFER NOTE
Patient: Lorena Hayden    Procedure: * No procedures listed *       Diagnosis: * No pre-op diagnosis entered *  Diagnosis Additional Information: No value filed.    Anesthesia Type:   General     Note:      Level of Consciousness: awake  Oxygen Supplementation: room air    Independent Airway: airway patency satisfactory and stable        Patient transferred to: PACU    Handoff Report: Identifed the Patient, Identified the Reponsible Provider, Reviewed the pertinent medical history, Discussed the surgical course, Reviewed Intra-OP anesthesia mangement and issues during anesthesia, Set expectations for post-procedure period and Allowed opportunity for questions and acknowledgement of understanding      Vitals:  Vitals Value Taken Time   BP     Temp     Pulse     Resp     SpO2         Electronically Signed By: Lew Adame MD  Nisreen 10, 2022  9:45 AM

## 2022-06-10 NOTE — PLAN OF CARE
[facility] Occupational Therapy Group Intervention Note     06/09/22 1400   Group Therapy Session   Group Attendance attended group session   Time Session Began 1400   Time Session Ended 1450   Total Time patient participated (minutes) 45   Total # Attendees 4   Group Type recreation;psychoeducation   Group Topic Covered coping skills/lifestyle management;leisure exploration/use of leisure time;structured socialization   Group Session Detail Leisure exploration and participation group offered for increased intrinsic motivation to engage in social, non-obligatory occupations via a group game. Structured group was used to promote positive milieu interaction and collaboration.    Patient Response/Contribution cooperative with task;discussed personal experience with topic   Patient Response Detail Presented with a congruent-mildly anxious affect. Required min-mod assist for sequencing game directions. Shared about self; stated various interests and positive qualities of her life.      Shiela French OT on 6/10/2022 at 9:51 AM

## 2022-06-10 NOTE — ANESTHESIA PREPROCEDURE EVALUATION
Anesthesia Pre-Procedure Evaluation    Patient: Lorena Hayden   MRN: [id number] : 1945        Procedure : * No procedures listed *          Past Medical History:   Diagnosis Date     Allergic rhinitis, cause unspecified      Anxiety      Depressive disorder       Past Surgical History:   Procedure Laterality Date     C IMPLANT HALLUX  3/2006 &     Yesi STEELE MAMMOGRAM, SCREENING  2011    Dr arredondo     COLONOSCOPY  2014    Mirian Mottamalika     EXTRACAPSULAR CATARACT EXTRATION WITH INTRAOCULAR LENS IMPLANT  2011     HC DILATION/CURETTAGE DIAG/THER NON OB  2005    D & C     HCL PAP SMEAR  2011    Dr Arredondo     ZZC  DELIVERY ONLY       ZZC LIGATE FALLOPIAN TUBE      Tubal Ligation     ZZHC COLONOSCOPY THRU STOMA W BIOPSY/CAUTERY TUMOR/POLYP/LESION  / /2011    benign polyp      Allergies   Allergen Reactions     Horse Allergy      Allergic to horse hair      Social History     Tobacco Use     Smoking status: Never Smoker     Smokeless tobacco: Never Used   Substance Use Topics     Alcohol use: Yes     Alcohol/week: 0.8 standard drinks     Types: 1 drink(s) per week     Comment: social      Wt Readings from Last 1 Encounters:   22 65.6 kg (144 lb 11.2 oz)        Anesthesia Evaluation   Pt has had prior anesthetic.         ROS/MED HX  ENT/Pulmonary:     (+) allergic rhinitis,     Neurologic:       Cardiovascular:       METS/Exercise Tolerance:     Hematologic:       Musculoskeletal:       GI/Hepatic:       Renal/Genitourinary:       Endo:       Psychiatric/Substance Use:     (+) psychiatric history anxiety and depression     Infectious Disease:       Malignancy:       Other:            Physical Exam    Airway        Mallampati: III   TM distance: > 3 FB   Neck ROM: full   Mouth opening: > 3 cm    Respiratory Devices and Support         Dental  no notable dental history         Cardiovascular   cardiovascular exam normal          Pulmonary   pulmonary exam normal                 OUTSIDE LABS:  CBC:   Lab Results   Component Value Date    WBC 5.4 05/14/2022    WBC 4.3 05/03/2013    HGB 15.4 05/14/2022    HGB 10.5 (L) 06/29/2016    HCT 47.4 (H) 05/14/2022    HCT 42.4 05/03/2013     05/14/2022     05/03/2013     BMP:   Lab Results   Component Value Date     05/14/2022     04/12/2016    POTASSIUM 3.7 05/14/2022    POTASSIUM 4.1 04/12/2016    CHLORIDE 106 05/14/2022    CHLORIDE 102 04/12/2016    CO2 29 05/14/2022    CO2 25 04/12/2016    BUN 13 05/14/2022    BUN 14 04/12/2016    BUN NOT APPLICABLE 04/12/2016    CR 0.65 05/14/2022    CR 0.75 04/12/2016     (H) 05/14/2022    GLC 97 04/12/2016     COAGS: No results found for: PTT, INR, FIBR  POC: No results found for: BGM, HCG, HCGS  HEPATIC:   Lab Results   Component Value Date    ALBUMIN 3.3 (L) 05/14/2022    PROTTOTAL 7.2 05/14/2022    ALT 24 05/14/2022    AST 21 05/14/2022    ALKPHOS 87 05/14/2022    BILITOTAL 0.6 05/14/2022    BILIDIRECT 0.1 07/28/2012     OTHER:   Lab Results   Component Value Date    PH 7.0 07/28/2012    A1C 5.4 01/25/2013    RENUKA 8.9 05/14/2022    TSH 1.22 05/17/2022    SED 33 (A) 04/23/2011       Anesthesia Plan    ASA Status:  3   NPO Status:  NPO Appropriate    Anesthesia Type: General.     - Airway: Mask Only   Induction: Intravenous.           Consents    Anesthesia Plan(s) and associated risks, benefits, and realistic alternatives discussed. Questions answered and patient/representative(s) expressed understanding.    - Discussed:     - Discussed with:  Patient         Postoperative Care            Comments:    Other Comments: ECT                Lew Adame MD

## 2022-06-10 NOTE — PLAN OF CARE
"Goal Outcome Evaluation:     Plan of Care Reviewed With: patient     Patient alert and oriented to person, place, and time, appears untidy. Pt flat, good eye contact, cooperative, medications held d/t ECT scheduled at 0900. ECT checklist completed, pt voided before leaving unit. Pt reported feeling nauseous. Rather than giving oral maalox this writer called ECT to find out if pt would be able to receive zofran in ECT which they verified. Pt off unit at 0905, returned at 1030, vital signs taken. Pt arrived on unit with ginger ale, declined breakfast, denied nausea but did not feel like eating. Pt c/o being \"cold\" and was provided with warm blanket, given AM scheduled meds. Pt rested until lunch, ate 75%. Pt requested prn senna which was given. Pt on suicidal, falls precautions, no behaviors observed. Pt contracts for safety. Pt in milieu after lunch, attended/engaged in groups, appropriate with peers and staff. Pt demonstrates ability to communicate needs to staff. No behaviors noted. Order received Code 3 for pt to leave unit accompanied by staff.  Will continue to monitor behavior and encourage engagement.     NURSING ASSESSMENT  Pain: denies  Anxiety: denies  Depression: \"feeling sad\"  SI: denies  SIB: denies  HI: denies  AVH: denies, did not appear to be responding to internal stimuli, did not demonstrate delusional thinking.  Mood/Affect: blunted but appeared brighter after lunch   Sleep: pt states \"ok\"  Medication: compliant, no side effects reported or observed  PRN: senna - pt requested  Appetite: breakfast  0%    Lunch 75%  ADLs: independent   Visits: none  Vitals: Pt experienced increased BP during ECT(provded medication in ECT), recheck WDL      Problem: Behavioral Health Plan of Care  Goal: Plan of Care Review  Outcome: Ongoing, Progressing     Problem: Behavioral Health Plan of Care  Goal: Adheres to Safety Considerations for Self and Others  Outcome: Ongoing, Progressing     Problem: Behavioral Health " Plan of Care  Goal: Optimal Comfort and Wellbeing  Outcome: Ongoing, Progressing     Problem: Suicidal Behavior  Goal: Suicidal Behavior is Absent or Managed  Outcome: Ongoing, Progressing     Problem: Activity and Energy Impairment (Depressive Signs/Symptoms)  Goal: Optimized Energy Level (Depressive Signs/Symptoms)  Outcome: Ongoing, Progressing     Problem: Decreased Participation/Engagement (Depressive Signs/Symptoms)  Goal: Increased Participation and Engagement (Depressive Signs/Symptoms)  Outcome: Ongoing, Progressing     Problem: Feelings of Worthlessness, Hopelessness or Excessive Guilt (Depressive Signs/Symptoms)  Goal: Enhanced Self-Esteem and Confidence (Depressive Signs/Symptoms)  Outcome: Ongoing, Progressing     Problem: Sleep Disturbance (Depressive Signs/Symptoms)  Goal: Improved Sleep (Depressive Signs/Symptoms)  Outcome: Ongoing, Progressing     Problem: Social, Occupational or Functional Impairment (Depressive Signs/Symptoms)  Goal: Enhanced Social, Occupational or Functional Skills (Depressive Signs/Symptoms)  Outcome: Ongoing, Progressing     Problem: Mood Impairment (Anxiety Signs/Symptoms)  Goal: Improved Mood Symptoms (Anxiety Signs/Symptoms)  Outcome: Ongoing, Progressing     Problem: Suicide Risk  Goal: Absence of Self-Harm  Outcome: Ongoing, Progressing

## 2022-06-10 NOTE — PROGRESS NOTES
Pt came into group, she said she had to use the restroom right at the beginning of gorup but did not returned, appeared restless with difficulty sitting still.

## 2022-06-10 NOTE — PLAN OF CARE
Problem: Behavioral Health Plan of Care  Goal: Adheres to Safety Considerations for Self and Others  Outcome: Ongoing, Progressing  Intervention: Develop and Maintain Individualized Safety Plan  Recent Flowsheet Documentation  Taken 6/9/2022 2107 by Joe Cerda, RN  Safety Measures:    environmental rounds completed    safety rounds completed    suicide assessment completed     Plan of Care Reviewed With: patient   Pt calm, cooperative, denies SI/SIB, reported depression 7/10, no anxiety. Appropraite eye contact.  Pt denies any pain, visible at [facility], social to staff and peers, appetite good, ate 100% of dinner, participated in activity. Pt medication compliant.   Late this evening pt was teary, reported feeling down, encouraged to verbalized but said she will call her . Reported feeling better after using the phone.  ECT # 9 tomorrow scheduled at 9 AM. Last Covid result Monday negative.

## 2022-06-10 NOTE — PROGRESS NOTES
"PSYCHIATRY  PROGRESS NOTE     DATE OF SERVICE   06/10/2022       CHIEF COMPLAINT   \"I am tired\".       SUBJECTIVE   Nursing reports:   Patient alert and oriented to person, place, and time, appears untidy. Pt flat, good eye contact, cooperative, medications held d/t ECT scheduled at 0900. ECT checklist completed, pt voided before leaving unit. Pt reported feeling nauseous. Rather than giving oral maalox this writer called ECT to find out if pt would be able to receive zofran in ECT which they verified. Pt off unit at 0905, returned at 1030, vital signs taken. Pt arrived on unit with ginger ale, declined breakfast, denied nausea but did not feel like eating. Pt c/o being \"cold\" and was provided with warm blanket, given AM scheduled meds. Pt rested until lunch, ate 75%. Pt requested prn senna which was given. Pt on suicidal, falls precautions, no behaviors observed. Pt contracts for safety. Pt in milieu after lunch, attended/engaged in groups, appropriate with peers and staff. Pt demonstrates ability to communicate needs to staff. No behaviors noted. Order received Code 3 for pt to leave unit accompanied by staff.  Will continue to monitor behavior and encourage engagement.     Patient has been discussed with the  earlier today.   will continue following up today on referrals for outpatient treatment.     OBJECTIVE   Patient was seen and evaluated in the consult with nurse practitioner student present during the assessment, this was a face-to-face evaluation.  Patient reported that today she is feeling very tired and feeling lightheaded after the ECT treatment.  She is denying feeling dizziness or nausea at the moment of the assessment.  Patient discussed with me that today is very hard to talk about her depression because she does not feel good after the ECT.  Patient did inquire about her discharge plans.  I informed the patient that her last ECT treatment will be next Friday and next week " "the  will focus on scheduling that outpatient appointments.  We have continue to wait for pharmacy to let us know if the Rexulti will be approved by her health insurance.  Once I get an update about the Rexulti I will be communicating with her.  At the moment patient verbalized good understanding and continues to be in agreement with the plan.  She continues to deny having any thoughts of harming herself, harming others and has been able to contract for safety.       MEDICATIONS   Medications:  Scheduled Meds:    [Held by provider] brexpiprazole  0.5 mg Oral Daily     calcium carbonate 600 mg-vitamin D 400 units  1 tablet Oral Daily     mirtazapine  30 mg Oral At Bedtime     simvastatin  40 mg Oral At Bedtime     timolol maleate  1 drop Both Eyes Daily     venlafaxine  225 mg Oral Daily with breakfast     Continuous Infusions:  PRN Meds:.acetaminophen, alum & mag hydroxide-simethicone, hydrOXYzine, OLANZapine **OR** OLANZapine, senna-docusate, traZODone    Medication adherence issues: MS Med Adherence Y/N: Yes, Hospitalization  Medication side effects: MEDICATION SIDE EFFECTS: no side effects reported  Benefit: Yes / No: Yes       ROS   A comprehensive review of systems was negative.       MENTAL STATUS EXAM   Vitals: /74 (BP Location: Right arm, Patient Position: Sitting, Cuff Size: Adult Regular)   Pulse 79   Temp 98.3  F (36.8  C) (Oral)   Resp 18   Wt 65.6 kg (144 lb 11.2 oz)   SpO2 99%   BMI 24.84 kg/m      Appearance:  No apparent distress  Mood: \"Feeling tired\"  Affect: Brighter and calm  Suicidal Ideation: PRESENT / ABSENT: absent   Homicidal Ideation: PRESENT / ABSENT: absent   Thought process: unremarkable   Thought content: devoid of  suicidal and violent ideation.   Fund of Knowledge: Average  Attention/Concentration: Normal  Language ability:  Intact  Memory:  Immediate recall intact, Short-term memory intact and Long-term memory intact  Insight:  fair.  Judgement: " fair  Orientation: Yes, x4  Psychomotor Behavior: normal or unremarkable    Muscle Strength and Tone: MuscleStrength: Normal  Gait and Station: stable        LABS   personally reviewed.     No results found for: PHENYTOIN, PHENOBARB, VALPROATE, CBMZ       DIAGNOSIS   Principal Problem:    <principal problem not specified>    Active Problem List:  Patient Active Problem List   Diagnosis     Allergic rhinitis     Symptomatic menopausal or female climacteric states     Mixed hyperlipidemia     Anxiety state     Insomnia     Anal fissure     Major depressive disorder, recurrent episode, moderate (H)     Irritable bowel syndrome     ACP (advance care planning)     Health Care Home     Tinnitus     Acute lower gastrointestinal hemorrhage     Abdominal pain     Suicidal ideation     Major depressive disorder, recurrent episode, severe with anxious distress (H)     Major depressive disorder, 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 Ongoing education given regarding diagnostic and treatment options with risks, benefits and alternatives and adequate verbalization of understanding.  2.  Medications:      Remeron 15 mg at bedtime      Effexor 225 mg daily      Start Rexulti 0.5 mg daily      As needed dose of trazodone has been increased as the patient reported having insomnia.  3.  Medical team to follow the patient as needed  4.   coordinating a safe discharge plan with the patient.        Risk Assessment: [facility] RISK ASSESSMENT: Patient able to contract for safety    Coordination of Care:   Treatment Plan reviewed and physician signed, Care discussed with Care/Treatment Team Members, Chart reviewed and Patient seen      Re-Certification I certify that the inpatient psychiatric facility services furnished since the previous certification were, and continue to be, medically necessary for, either, treatment which could reasonably be expected to improve the patient s condition or  diagnostic study and that the hospital records indicate that the services furnished were, either, intensive treatment services, admission and related services necessary for diagnostic study, or equivalent services.     I certify that the patient continues to need, on a daily basis, active treatment furnished directly by or requiring the supervision of inpatient psychiatric facility personnel.   I estimate 14 days of hospitalization is necessary for proper treatment of the patient. My plans for post-hospital care for this patient are  home with family     Bri Elliott MD    -     06/10/2022  -     1:22 PM    Total time 25 minutes with > 50%spent on coordination of cares and psycho-education.    This note was created with help of Dragon dictation system. Grammatical / typing errors are not intentional.    Bri Elliott MD

## 2022-06-10 NOTE — PROCEDURES
Procedure/Surgery Information   [facility]    Bedside Procedure Note  Date of Service (when I performed the procedure): 06/10/2022    Lorena Hayden is a 76 year old female patient.  1. 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 Major depressive disorder, recurrent episode, severe with anxious distress (H)      Past Medical History:   Diagnosis Date     Allergic rhinitis, cause unspecified      Anxiety      Depressive disorder      Temp: 97.7  F (36.5  C) Temp src: Oral BP: 111/63 Pulse: 84   Resp: 16 SpO2: 99 % O2 Device: None (Room air)      Procedures       Marvin March MD     Lorena Hayden is a 76 year old  year old female patient.  [id number]  @DX@    [facility]   ECT Procedure Note   06/10/2022    Patient Status: Inpatient    Is this the first in a series of 12 treatments?  No     Allergies   Allergen Reactions     Horse Allergy      Allergic to horse hair       Weight:  144 lbs 11.2 oz         Indications for ECT:   Medications ineffective         Clinical Narrative:   Lorena Hayden is a 76 year old woman with a history of depression, started in her mid 60s. Her depression was relatively well-controlled on duloxetine until February 2022, since when she has had progressive depression with suicidal thoughts without an identifiable trigger. Multiple medication trials yielded to no improvement (Increased duloxetine, added aripiprazole then bupropion, now cross titrating to desvenlafaxine). She had a recent psychiatric hospitalization in [facility] in [place], discharged only two weeks ago. However, depression persisted, she had more thoughts that life was not worth living and thoughts of suicide.  She also reported difficulties with sleep, low energy, poor appetite with significant weight loss of about 20 pounds.  She finally came to the hospital and requested to  be hospitalized.     Psychiatric History:   Diagnoses: Major Depressive Disorder     Hospitalizations: Two former (first in 2013, last in 2022)     Suicide Attempts: None     Medication Trials: Ativan, trazodone, Klonopin, Lexapro, Wellbutrin, Celexa, Seroquel, Zoloft, Abilify, currently Effexor and Cymbalta. No TCAs or MAOIs.     Neuromodulation:  Right unilateral ultrabrief ECT x 19 sessions in 2012. She had a good response during treatment which lasted only several weeks after the discontinuation.     Psychotherapy:  None            Diagnosis:   Major depression         Assessment:   #1 she reports her depression is severe and decreases pleasure and motivation and she can be very paralyzed and indecisive with anxiety. ECT was helpful before  #2  She is irritable she was awoken early but her appt pushed back. She denies discomfort after last appointment. Some SI but she feels it is  becuase she is so anxious waiting.  #3 sleeping well with trazodone + remeron, she reports no worsening as in in fact improving no SI today, no tremor and less anxious, less catastrophizing thoughts. Her dog nehemias is awaiting her at home. She feels her thinking is improved not worsened since starting ECT  #4 Today she is flustered she has trouble putting her depression intensity on a scale but admits sadness, no tearfulness, no Si today and denies complaints about recovery process of ECT. She denies worsening anxiety and she slept well overnight.  #5 She is irritable and anxious today. She I not yet feeling improvement in mood. She struggled with the transfer of units to  it disrupted her bedtime and she did not sleep well. She finds the ECT frightening.   #6 reports form the floor report unchanged mood so far. Less irritable and anxious today 1/10 ( mood worst it has ever been ) but SI has resolved.   2/3 at 5min recall Will increase to 9x ST  #7 more verbose today. She is frustrated that her meal will be cold by the time she  returns for breakfast.  She reports mood is improving some. She denies physical side effects   #8 6/10 mood (10 best) no anxiety between treatments. She feels very fatigued after treatments and needs to nap more. denies concern for confusion or insomnia. denies concerns form family about confusion.  #9 mood still 6/10 she is anxious because she didn't sleep last night she fears trazodone will no longer work for her. Less irritable. Mild nausea this AM and increased intensity of RLS           Pause for the Cause:     Right patient Yes   Right procedure/laterality settings: Yes          Intra-Procedure Documentation:       ECT #: 9   Treatment number this series: 9   Total treatment number: 29     Type of ECT:  Right, unilateral ultrabrief    ECT Medications:    Etomidate: 16mg  Succinyl Choline: 60mg  zofran 4mg  Versed 1mg for agitation      ECT Strip Summary:    Titration ( 19.2 percent ) Energy Level:172.8mc  0.3ms 45 Hz time 8 sec 800mA  Motor Seizure Duration:  33 seconds  EEG Seizure Duration: 68 seconds      Complications: None        Plan:    Prefers earliest appointment to decrease pre-procedure anxiety.      If she has consistent transportation and supervision for at least 6hrs after treatment she can complete her course outpatient when appropriate    Continue ECT Q MWF at 172.8 mCsettings   COVID test today     Monitor depression severity with clinical assessment augmented with CUDOS every treatment  Continue current medications    MD Rod

## 2022-06-10 NOTE — PLAN OF CARE
Care coordination:  - Patient's care was discussed in team meeting.  - Writer is working on referrals for discharge plans.  - Writer made more referrals today. Please see below for updates.   - Writer met with patient.      Referrals:  Referrals for psychiatry and therapy, details in AVS.    [facility] [place]  [phone] General number  [phone] Thea, coordinator for DBT groups  - 6/8 Writer left a message with Thea requesting documents needed for the referral.  - 6/10 Writer received voice message from Thea. She stated patient can call her and she will be put on the wait-list. They are booking a few weeks out.      [facility] program in [place]  [phone]  -Openings in [place] and (1 opening) [place].    stated patient is able to reserve her spot if she called the following phone number to schedule [phone].   - Writer and patient called [phone] and spoke with the . There are no spots available in [place] or [place]. Her recommendation is to call often, as it's rolling admission.   - 6/10 Writer called and there is no openings.     [facility] Location:  [address] [place] [id number]  Phone: [phone]  Fax: [phone]  - 6/10 Only virtual and no openings for a year.      Barriers to discharge:  - Symptom reduction and stabilization.

## 2022-06-10 NOTE — ANESTHESIA POSTPROCEDURE EVALUATION
Patient: Lorena Hayden    Procedure: * No procedures listed *       Anesthesia Type:  General    Note:  Disposition: Outpatient   Postop Pain Control: Uneventful            Sign Out: Well controlled pain   PONV: No   Neuro/Psych: Uneventful            Sign Out: Acceptable/Baseline neuro status   Airway/Respiratory: Uneventful            Sign Out: Acceptable/Baseline resp. status   CV/Hemodynamics: Uneventful            Sign Out: Acceptable CV status; No obvious hypovolemia; No obvious fluid overload   Other NRE: NONE   DID A NON-ROUTINE EVENT OCCUR? No           Last vitals:  Vitals:    06/10/22 0730 06/10/22 0944 06/10/22 0959   BP: 111/63 127/71 135/67   Pulse: 84 99 94   Resp: 16 16 18   Temp: 36.5  C (97.7  F) 36.9  C (98.5  F) 36.9  C (98.5  F)   SpO2: 99% 95% 97%       Electronically Signed By: Lew Adame MD  Nisreen 10, 2022  10:33 AM

## 2022-06-10 NOTE — PLAN OF CARE
Problem: Sleep Disturbance (Depressive Signs/Symptoms)  Goal: Improved Sleep (Depressive Signs/Symptoms)  Outcome: Ongoing, Progressing   Goal Outcome Evaluation:     Patient was maintained on NPO post midnight.  ECT is scheduled @ 0900. Vital signs taken and recorded. Patient was asked to void but she said, she will do it later this morning just before going down to ECT.    Slept a total of 9.75  hours.

## 2022-06-10 NOTE — PLAN OF CARE
06/10/22 1609   Group Therapy Session   Group Attendance attended group session   Time Session Began 1330   Time Session Ended 1430   Total Time patient participated (minutes) 60   Total # Attendees 5   Group Type psychotherapeutic   Group Topic Covered coping skills/lifestyle management   Group Session Detail Group therapy: time was spent on discussing expectations. Patient's explored expectations and the role it plays in mood.   Patient Response/Contribution expressed understanding of topic;cooperative with task   Patient Response Detail Patient engaged with group. She actively listened to others and accepted feedback. She appeared guarded at times, but did share. She is struggling with expectations of herself and feeling overwhelmed with the housework she needs to do once discharged.

## 2022-06-11 PROCEDURE — 124N000003 HC R&B MH SENIOR/ADOLESCENT

## 2022-06-11 PROCEDURE — 250N000013 HC RX MED GY IP 250 OP 250 PS 637: Performed by: PSYCHIATRY & NEUROLOGY

## 2022-06-11 PROCEDURE — 90853 GROUP PSYCHOTHERAPY: CPT

## 2022-06-11 RX ADMIN — CALCIUM CARBONATE 600 MG (1,500 MG)-VITAMIN D3 400 UNIT TABLET 1 TABLET: at 08:49

## 2022-06-11 RX ADMIN — SIMVASTATIN 40 MG: 20 TABLET, FILM COATED ORAL at 21:18

## 2022-06-11 RX ADMIN — VENLAFAXINE HYDROCHLORIDE 225 MG: 225 TABLET, EXTENDED RELEASE ORAL at 08:49

## 2022-06-11 RX ADMIN — Medication 75 MG: at 21:18

## 2022-06-11 RX ADMIN — MIRTAZAPINE 30 MG: 15 TABLET, FILM COATED ORAL at 21:18

## 2022-06-11 RX ADMIN — TIMOLOL MALEATE 1 DROP: 5 SOLUTION OPHTHALMIC at 08:49

## 2022-06-11 RX ADMIN — Medication 75 MG: at 23:54

## 2022-06-11 ASSESSMENT — ACTIVITIES OF DAILY LIVING (ADL)
LAUNDRY: WITH SUPERVISION
HYGIENE/GROOMING: INDEPENDENT
DRESS: SCRUBS (BEHAVIORAL HEALTH)
ORAL_HYGIENE: INDEPENDENT
ORAL_HYGIENE: INDEPENDENT
HYGIENE/GROOMING: INDEPENDENT
DRESS: INDEPENDENT
LAUNDRY: WITH SUPERVISION

## 2022-06-11 NOTE — PLAN OF CARE
Problem: Behavioral Health Plan of Care  Goal: Adheres to Safety Considerations for Self and Others  Outcome: Ongoing, Progressing  Intervention: Develop and Maintain Individualized Safety Plan  Recent Flowsheet Documentation  Taken 6/10/2022 2057 by Joe Cerda RN  Safety Measures:    environmental rounds completed    safety rounds completed    suicide assessment completed    suicide check-in completed      Plan of Care Reviewed With: patient    Pt calm, cooperative, denies SI/SIB, no anxiety, depression 7/10, feeling sad. Later this afternoon pt reported getting anxious, complaining of some discomfort at her vaginal area, requested a female nurse to check, mentioned she had urinalysis 2 days ago and was negative. Pt reported no itchiness, no dysuria. Appropraite eye contact, able to tell her needs.  After examination nurse reported redness at the lower portion of the vaginal area, slightly tender to touch, no discharges.  Paged Provider, ordered UA/UC, and team will come see her tomorrow. Urine specimen sent.  Pt denies any pain, social to staff and peers, appetite good, ate 75% of her food, visible at [facility], participated in activity. Pt medication compliant.  ECT Monday at 7:20 AM, Covid result negative taken today.

## 2022-06-11 NOTE — PROGRESS NOTES
Called for vaginal pain without discharge. Will get UA, Start Tylenol 500 mg q6h PRN. Morning team to assess.

## 2022-06-11 NOTE — PLAN OF CARE
Goal Outcome Evaluation:    Patient has been out in the milieu and socializing with peers. Her mood appears calm and she is complaint with medications. She reports no anxiety and high depression. She rates depression at 9/10, denies suicidal thoughts. Patient is reporting pain and tenderness around vaginal area, is requesting to be seen by medical. Medical was notified, UA/UC results was evaluated, plan to see pt in the morning by IM.

## 2022-06-11 NOTE — PLAN OF CARE
Problem: Sleep Disturbance (Depressive Signs/Symptoms)  Goal: Improved Sleep (Depressive Signs/Symptoms)  Outcome: Ongoing, Progressing   Goal Outcome Evaluation:    Patient visible in the lounge at 2350, requested Trazodone by name. Reported having difficulty sleeping. PRN 75 mg of trazodone administered at 2355.  Patient returned to her room shortly after.  Flat blunted affect.  However calm.  Observed sleeping 0030. No further concerns reported.  9 hrs of sleep.

## 2022-06-12 LAB — BACTERIA UR CULT: NORMAL

## 2022-06-12 PROCEDURE — H2032 ACTIVITY THERAPY, PER 15 MIN: HCPCS

## 2022-06-12 PROCEDURE — 99231 SBSQ HOSP IP/OBS SF/LOW 25: CPT | Performed by: PHYSICIAN ASSISTANT

## 2022-06-12 PROCEDURE — 124N000003 HC R&B MH SENIOR/ADOLESCENT

## 2022-06-12 PROCEDURE — 250N000013 HC RX MED GY IP 250 OP 250 PS 637: Performed by: PSYCHIATRY & NEUROLOGY

## 2022-06-12 RX ADMIN — Medication 75 MG: at 21:40

## 2022-06-12 RX ADMIN — HYDROXYZINE HYDROCHLORIDE 25 MG: 25 TABLET, FILM COATED ORAL at 21:40

## 2022-06-12 RX ADMIN — Medication 75 MG: at 23:11

## 2022-06-12 RX ADMIN — CALCIUM CARBONATE 600 MG (1,500 MG)-VITAMIN D3 400 UNIT TABLET 1 TABLET: at 08:34

## 2022-06-12 RX ADMIN — SENNOSIDES AND DOCUSATE SODIUM 1 TABLET: 50; 8.6 TABLET ORAL at 13:51

## 2022-06-12 RX ADMIN — TIMOLOL MALEATE 1 DROP: 5 SOLUTION OPHTHALMIC at 08:34

## 2022-06-12 RX ADMIN — SENNOSIDES AND DOCUSATE SODIUM 1 TABLET: 50; 8.6 TABLET ORAL at 21:40

## 2022-06-12 RX ADMIN — VENLAFAXINE HYDROCHLORIDE 225 MG: 225 TABLET, EXTENDED RELEASE ORAL at 08:34

## 2022-06-12 RX ADMIN — MIRTAZAPINE 30 MG: 15 TABLET, FILM COATED ORAL at 21:40

## 2022-06-12 RX ADMIN — SIMVASTATIN 40 MG: 20 TABLET, FILM COATED ORAL at 21:40

## 2022-06-12 ASSESSMENT — ACTIVITIES OF DAILY LIVING (ADL)
ORAL_HYGIENE: INDEPENDENT
DRESS: INDEPENDENT
HYGIENE/GROOMING: INDEPENDENT
ORAL_HYGIENE: INDEPENDENT
LAUNDRY: UNABLE TO COMPLETE
LAUNDRY: UNABLE TO COMPLETE
HYGIENE/GROOMING: INDEPENDENT
DRESS: INDEPENDENT

## 2022-06-12 NOTE — CONSULTS
Internal Medicine Follow Up Note     Patient: Lorena Hayden  MRN: [id number]  Admission Date: 5/16/2022  Hospital Day # 27    Assessment & Recommendations: Lorena Hayden is a 76 year old woman with a history of severe depression who is admitted to station 3B with severe depression and ECT treatment. Medicine seeing today for vaginal pain.     Severe depression  ECT treatment  - cares per primary psychiatry team    Reports of vaginal pain, resolved?  Ua 6/10 with + LE, 44 WBC, few bacteria and mucous, less likely c/w UTI. Patient denies symptoms, abdomen benign (see HPI for further).  Given patient age, would suspect atrophic vaganitis  - follow up urine culture  - could consider topical estrace if patient continues to have bothersome sx  - if pain recurs, recommend discuss with GYN and will certainly need to follow up with primary or OBGYN    Medicine will follow up urine culture, please page with any additional concerns.     Irma Escobar PA-C  Hospitalist Service  Pager:   [facility] Paging/Directory  _________________________________________________________________    Subjective & Interval History:  Chief complaint of vaginal pain    Today Lorena is feeling ok and denies having any pain, including vaginal, urinary, abdominal or bowel pain.  She continues to have an appetite.  She continues to void and have BM without issues.  She does not endorse fevers or chills. She denies vaginal bleeding or discharge.    On discussing with her nurse, I confirmed I have the correct patient and indeed she has been complaining of intense vaginal pain x 2 days for which my colleagues obtained a UA.     Last 24 hour care team notes reviewed.   ROS: 4 point ROS (including Respiratory, CV, GI and ) was performed and negative unless otherwise noted in HPI.     Medications: Reviewed in EPIC.    Physical Exam:    Blood pressure 103/71, pulse 79, temperature 98.6  F (37  C), temperature source Oral, resp. rate 16, weight 65.6 kg  (144 lb 11.2 oz), SpO2 94 %, not currently breastfeeding.    GENERAL: Alert and oriented x 3. Appears comfortable  HEENT: Anicteric sclera. Mucous membranes moist.   RESPIRATORY: Effort normal.   GI: Abdomen soft, non distended, non tender.   NEUROLOGICAL: No focal deficits. Moves all extremities.    EXTREMITIES: No peripheral edema. Intact bilateral pedal pulses.   SKIN: No jaundice. No rashes.      Labs & Studies of Note: I personally reviewed the following studies:  Reviewed UA, prelim UC, last CBC and BMP

## 2022-06-12 NOTE — PLAN OF CARE
"Goal Outcome Evaluation:  Patient awake at the start of the shift, requesting medication for sleep.  Per patient, \"im having difficulty sleeping.\" PRN trazodone 75 mg administered to assist with sleep at 2354. 7 hrs of sleep total.   "

## 2022-06-12 NOTE — PLAN OF CARE
Problem: Mood Impairment (Depressive Signs/Symptoms)  Goal: Improved Mood Symptoms (Depressive Signs/Symptoms)  Outcome: Ongoing, Progressing   Goal Outcome Evaluation:    Pt appears blunted and sad. She has been present in lounge, but slightly withdrawn. She was slightly irritated with writer at [employer].     Pt has a pleasant visit with her son.     1351 PRN senna given for constipation. LBM 3 days ago. She reports drinking plenty of fluids, encouraged her to walk more. Declined offer for prune juice.     Pt has been c/o of vaginal pain and was anxious yesterday about seeing a doctor. However, when ALVIN Chicas came to evaluate pt, she denied any vaginal pain. No new orders. Irma recommend a gynecologist consult if pain returns.     Pt had an episode of incontinence.     Plan of Care Reviewed With: patient

## 2022-06-12 NOTE — PROGRESS NOTES
06/11/22 2100   Group Therapy Session   Group Attendance attended group session   Time Session Began 1900   Time Session Ended 2000   Total Time patient participated (minutes) 60   Total # Attendees 4   Group Type psychotherapeutic   Group Topic Covered cognitive activities   Group Session Detail   (process self defeating cycles and art/ ACT mindfulness meditation)   Patient Response/Contribution cooperative with task;discussed personal experience with topic   Patient Response Detail good day, saw her . She seemed restless, stood up and walked the room. She is worried she wont be able to keep up with her house responsibilities she said

## 2022-06-12 NOTE — PLAN OF CARE
Problem: Decreased Participation/Engagement (Depressive Signs/Symptoms)  Goal: Increased Participation and Engagement (Depressive Signs/Symptoms)  Outcome: Ongoing, Progressing  Flowsheets (Taken 6/11/2022 1932)  Mutually Determined Action Steps (Increased Participation and Engagement):    participates in one or more activity    voluntarily attends group therapy    initiates interaction with others    Pt presents with blunted, flat affect and depressed, calm mood. Denies SI/SIB and hallucinations. Reports depression 9/10 and denies anxiety. Pt was present in the lounge and social with select peers. Attended groups. Watched movie after group this evening. Continues to report discomfort to her vaginal area but knows she will be seen by internal medicine tomorrow, refused PRN Tylenol. Denies any other physical pain. VSS. Medication compliant, PRN Trazodone given at HS per pt request. Appetite and fluid intake adequate. Pt will have ECT #10 Monday at 0720. She reports that she looks forward to her son visiting tomorrow. No other concerns or complaints noted.

## 2022-06-13 ENCOUNTER — DOCUMENTATION ONLY (OUTPATIENT)
Dept: BEHAVIORAL HEALTH | Facility: CLINIC | Age: 77
End: 2022-06-13

## 2022-06-13 ENCOUNTER — APPOINTMENT (OUTPATIENT)
Dept: BEHAVIORAL HEALTH | Facility: CLINIC | Age: 77
DRG: 885 | End: 2022-06-13
Attending: PSYCHIATRY & NEUROLOGY
Payer: COMMERCIAL

## 2022-06-13 ENCOUNTER — ANESTHESIA (OUTPATIENT)
Dept: BEHAVIORAL HEALTH | Facility: CLINIC | Age: 77
DRG: 885 | End: 2022-06-13
Payer: COMMERCIAL

## 2022-06-13 ENCOUNTER — ANESTHESIA EVENT (OUTPATIENT)
Dept: BEHAVIORAL HEALTH | Facility: CLINIC | Age: 77
DRG: 885 | End: 2022-06-13
Payer: COMMERCIAL

## 2022-06-13 LAB — SARS-COV-2 RNA RESP QL NAA+PROBE: NEGATIVE

## 2022-06-13 PROCEDURE — 250N000011 HC RX IP 250 OP 636: Performed by: STUDENT IN AN ORGANIZED HEALTH CARE EDUCATION/TRAINING PROGRAM

## 2022-06-13 PROCEDURE — 250N000013 HC RX MED GY IP 250 OP 250 PS 637: Performed by: PSYCHIATRY & NEUROLOGY

## 2022-06-13 PROCEDURE — 90870 ELECTROCONVULSIVE THERAPY: CPT | Performed by: PSYCHIATRY & NEUROLOGY

## 2022-06-13 PROCEDURE — 124N000003 HC R&B MH SENIOR/ADOLESCENT

## 2022-06-13 PROCEDURE — 370N000017 HC ANESTHESIA TECHNICAL FEE, PER MIN

## 2022-06-13 PROCEDURE — 250N000011 HC RX IP 250 OP 636: Performed by: PSYCHIATRY & NEUROLOGY

## 2022-06-13 PROCEDURE — 250N000009 HC RX 250: Performed by: STUDENT IN AN ORGANIZED HEALTH CARE EDUCATION/TRAINING PROGRAM

## 2022-06-13 PROCEDURE — 99232 SBSQ HOSP IP/OBS MODERATE 35: CPT | Mod: 25 | Performed by: PSYCHIATRY & NEUROLOGY

## 2022-06-13 PROCEDURE — G0177 OPPS/PHP; TRAIN & EDUC SERV: HCPCS

## 2022-06-13 PROCEDURE — 90853 GROUP PSYCHOTHERAPY: CPT

## 2022-06-13 PROCEDURE — 90870 ELECTROCONVULSIVE THERAPY: CPT

## 2022-06-13 PROCEDURE — U0003 INFECTIOUS AGENT DETECTION BY NUCLEIC ACID (DNA OR RNA); SEVERE ACUTE RESPIRATORY SYNDROME CORONAVIRUS 2 (SARS-COV-2) (CORONAVIRUS DISEASE [COVID-19]), AMPLIFIED PROBE TECHNIQUE, MAKING USE OF HIGH THROUGHPUT TECHNOLOGIES AS DESCRIBED BY CMS-2020-01-R: HCPCS | Performed by: PSYCHIATRY & NEUROLOGY

## 2022-06-13 RX ORDER — AMOXICILLIN 250 MG
1 CAPSULE ORAL 2 TIMES DAILY
Status: DISCONTINUED | OUTPATIENT
Start: 2022-06-13 | End: 2022-06-24 | Stop reason: HOSPADM

## 2022-06-13 RX ORDER — ETOMIDATE 2 MG/ML
INJECTION INTRAVENOUS PRN
Status: DISCONTINUED | OUTPATIENT
Start: 2022-06-13 | End: 2022-06-13

## 2022-06-13 RX ORDER — RISPERIDONE 0.25 MG/1
0.5 TABLET ORAL AT BEDTIME
Status: DISCONTINUED | OUTPATIENT
Start: 2022-06-13 | End: 2022-06-24 | Stop reason: HOSPADM

## 2022-06-13 RX ADMIN — SUCCINYLCHOLINE CHLORIDE 60 MG: 20 INJECTION, SOLUTION INTRAMUSCULAR; INTRAVENOUS; PARENTERAL at 08:16

## 2022-06-13 RX ADMIN — RISPERIDONE 0.5 MG: 0.25 TABLET ORAL at 21:16

## 2022-06-13 RX ADMIN — SIMVASTATIN 40 MG: 20 TABLET, FILM COATED ORAL at 21:15

## 2022-06-13 RX ADMIN — SENNOSIDES AND DOCUSATE SODIUM 1 TABLET: 50; 8.6 TABLET ORAL at 11:34

## 2022-06-13 RX ADMIN — Medication 16 MG: at 08:16

## 2022-06-13 RX ADMIN — Medication 75 MG: at 23:07

## 2022-06-13 RX ADMIN — CALCIUM CARBONATE 600 MG (1,500 MG)-VITAMIN D3 400 UNIT TABLET 1 TABLET: at 09:49

## 2022-06-13 RX ADMIN — VENLAFAXINE HYDROCHLORIDE 225 MG: 225 TABLET, EXTENDED RELEASE ORAL at 09:49

## 2022-06-13 RX ADMIN — MIRTAZAPINE 30 MG: 15 TABLET, FILM COATED ORAL at 21:15

## 2022-06-13 RX ADMIN — MIDAZOLAM HYDROCHLORIDE 1 MG: 1 INJECTION, SOLUTION INTRAMUSCULAR; INTRAVENOUS at 08:21

## 2022-06-13 RX ADMIN — Medication 75 MG: at 21:16

## 2022-06-13 RX ADMIN — Medication 5 MG: at 21:15

## 2022-06-13 RX ADMIN — MAGNESIUM HYDROXIDE 30 ML: 400 SUSPENSION ORAL at 14:28

## 2022-06-13 RX ADMIN — TIMOLOL MALEATE 1 DROP: 5 SOLUTION OPHTHALMIC at 09:49

## 2022-06-13 ASSESSMENT — ACTIVITIES OF DAILY LIVING (ADL)
LAUNDRY: WITH SUPERVISION
DRESS: SCRUBS (BEHAVIORAL HEALTH)
HYGIENE/GROOMING: INDEPENDENT
ORAL_HYGIENE: INDEPENDENT
DRESS: SCRUBS (BEHAVIORAL HEALTH)
HYGIENE/GROOMING: INDEPENDENT
LAUNDRY: UNABLE TO COMPLETE
ORAL_HYGIENE: INDEPENDENT

## 2022-06-13 NOTE — PROGRESS NOTES
"PSYCHIATRY  PROGRESS NOTE     DATE OF SERVICE   06/13/2022       CHIEF COMPLAINT   \"I am okay\".       SUBJECTIVE   Nursing reports:   Patient was very anxious this shift. Thoughts were focussed on her poor sleep at night, saying that the medications she takes no longer work. Said she had taken Melatonin at home, but that the psychiatrist prescribed different medications for sleep here. Discussed the PRN options, patient agreed to take PRN Trazodone and Hydroxyzine at HS.  Patient perseverated over her supper for a long time, she said she is on calory count and that she was served food she did not order. Patient was worried that she won't be able to eat it all. At 2100 patient became very anxious and worried about being unable to reach her  on the phone, said she left 4 messages, and that she was afraid he had fallen. She called her daughter-in-law to check on him. Patient was tearful while waiting a call back.   She ate close to 75% of supper. Kept self clean and well groomed. Communicated needs well.   Patient was informed about ECT in the morning, and the need to keep NPO.     Constipation: patient took PRN PeriColace and prune juice.      Addendum: shortly after 23:00 patient reported not being able to fall asleep. She said she fell asleep after the second dose of Trazodone last night, and said she is considering taking it now. Repeated dose of Trazodone 75 mg PO was given.     Patient has been discussed with the  earlier today.   has continue to assist the patient on follow-up appointments for psychiatry and therapy.     OBJECTIVE   Patient was seen and evaluated in the consult room by herself, this was a face-to-face evaluation.  Today patient presented as more bright, pleasant and she was cooperative with the assessment.  She was more able to deal with frustration.  Patient informed me that she has been constipated.  I informed the patient that I have ordered milk of magnesium " as needed once a day, senna 2 times a day schedule and if this does not work I will let the medical team down.  Patient verbalized good understanding and she is in agreement with this plan.    Patient reported that she feels the medications are still not working, on the other hand again today she seemed to be doing much better than on prior occasions.  I discussed with the patient the cost of Rexulti and Latuda.  Given that she has tried Abilify in the past she is not interested in taking this medication again.  I did discuss with the patient the use of Risperdal including reasons for prescribing this medication, benefits, risks and side effects.  I also reviewed with the patient that there is a risk of developing tardive dyskinesia with this medication.  After clarifying patient's questions that she was in agreement for a trial of Risperdal.  I discussed with the patient that we will try to keep the lowest dose possible of this medication in order to prevent having side effects.  At the moment the patient has continued to deny having any thoughts about harming herself and she is angelina for safety.  Today the patient mentioned that she is more open to do maintenance ECT.  I encouraged the patient to discuss this with the family and let me know in order to start making appointments.     MEDICATIONS   Medications:  Scheduled Meds:    calcium carbonate 600 mg-vitamin D 400 units  1 tablet Oral Daily     mirtazapine  30 mg Oral At Bedtime     risperiDONE  0.5 mg Oral At Bedtime     senna-docusate  1 tablet Oral BID     simvastatin  40 mg Oral At Bedtime     timolol maleate  1 drop Both Eyes Daily     venlafaxine  225 mg Oral Daily with breakfast     Continuous Infusions:  PRN Meds:.acetaminophen, alum & mag hydroxide-simethicone, hydrOXYzine, magnesium hydroxide, OLANZapine **OR** OLANZapine, traZODone    Medication adherence issues: MS Med Adherence Y/N: Yes, Hospitalization  Medication side effects: MEDICATION  "SIDE EFFECTS: no side effects reported  Benefit: Yes / No: Yes       ROS   A comprehensive review of systems was negative.       MENTAL STATUS EXAM   Vitals: /86 (BP Location: Left arm, Patient Position: Sitting)   Pulse 94   Temp 97.5  F (36.4  C) (Temporal)   Resp 16   Wt 67 kg (147 lb 9.6 oz)   SpO2 97%   BMI 25.34 kg/m      Appearance:  No apparent distress  Mood: \"Feeling okay\"  Affect: Brighter, pleasant and calm  Suicidal Ideation: PRESENT / ABSENT: absent   Homicidal Ideation: PRESENT / ABSENT: absent   Thought process: unremarkable   Thought content: devoid of  suicidal and violent ideation.   Fund of Knowledge: Average  Attention/Concentration: Normal  Language ability:  Intact  Memory:  Immediate recall intact, Short-term memory intact and Long-term memory intact  Insight:  fair.  Judgement: fair  Orientation: Yes, x4  Psychomotor Behavior: normal or unremarkable    Muscle Strength and Tone: MuscleStrength: Normal  Gait and Station: stable        LABS   personally reviewed.     No results found for: PHENYTOIN, PHENOBARB, VALPROATE, CBMZ       DIAGNOSIS   Principal Problem:    <principal problem not specified>    Active Problem List:  Patient Active Problem List   Diagnosis     Allergic rhinitis     Symptomatic menopausal or female climacteric states     Mixed hyperlipidemia     Anxiety state     Insomnia     Anal fissure     Major depressive disorder, recurrent episode, moderate (H)     Irritable bowel syndrome     ACP (advance care planning)     Health Care Home     Tinnitus     Acute lower gastrointestinal hemorrhage     Abdominal pain     Suicidal ideation     Major depressive disorder, recurrent episode, severe with anxious distress (H)     Major depressive disorder, 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 Ongoing education given regarding diagnostic and treatment options with risks, benefits and alternatives and adequate verbalization of understanding.  2.  " Medications:      Remeron 15 mg at bedtime      Effexor 225 mg daily      Start Risperdal 0.5 mg at bedtime      Start melatonin 5 mg at bedtime      As needed trazodone for insomnia.  3.  Medical team to follow the patient as needed  4.   coordinating a safe discharge plan with the patient.        Risk Assessment: [facility] RISK ASSESSMENT: Patient able to contract for safety    Coordination of Care:   Treatment Plan reviewed and physician signed, Care discussed with Care/Treatment Team Members, Chart reviewed and Patient seen      Re-Certification I certify that the inpatient psychiatric facility services furnished since the previous certification were, and continue to be, medically necessary for, either, treatment which could reasonably be expected to improve the patient s condition or diagnostic study and that the hospital records indicate that the services furnished were, either, intensive treatment services, admission and related services necessary for diagnostic study, or equivalent services.     I certify that the patient continues to need, on a daily basis, active treatment furnished directly by or requiring the supervision of inpatient psychiatric facility personnel.   I estimate 14 days of hospitalization is necessary for proper treatment of the patient. My plans for post-hospital care for this patient are  home with family     Bri Elliott MD    -     06/13/2022  -     2:23 PM    Total time 25 minutes with > 50%spent on coordination of cares and psycho-education.    This note was created with help of Dragon dictation system. Grammatical / typing errors are not intentional.    Bri Elliott MD

## 2022-06-13 NOTE — PROGRESS NOTES
Patient adequate for discharge. Report called to inpatient RN Jose. VSS, A/O, IV removed. Discharged with staff at this time.

## 2022-06-13 NOTE — PLAN OF CARE
Care coordination:  - Patient's care was discussed in team meeting.  - Writer is working on referrals for discharge plans.  - Writer made more referrals today. Please see below for updates.   - Writer met with patient.      Referrals:  Referrals for psychiatry and therapy, details in AVS.    [facility] [place]  [phone] General number  [phone] Thea, coordinator for DBT groups  - 6/8 Writer left a message with Thea requesting documents needed for the referral.  - 6/10 Writer received voice message from Thea. She stated patient can call her and she will be put on the wait-list. They are booking a few weeks out.      [facility] DBT program in [place]  [phone]  -Openings in [place] and (1 opening) [place].    stated patient is able to reserve her spot if she called the following phone number to schedule [phone].   - Writer and patient called [phone] and spoke with the . There are no spots available in [place] or [place]. Her recommendation is to call often, as it's rolling admission.   - 6/10 Writer called and there is no openings.     [facility] Location:  [address] [place] [address]  Phone: [phone]  Fax: [phone]  - 6/10 Only virtual and no openings for a year.      Barriers to discharge:  - Symptom reduction and stabilization.

## 2022-06-13 NOTE — PLAN OF CARE
Care coordination:  - Patient's care was discussed in team meeting.  - Writer made more phone calls for referrals. Please see below.    Referrals:  Referrals for psychiatry and therapy, details in AVS.    [facility] [place]  [phone] General number  [phone] Thea, coordinator for DBT groups  - 6/8 Writer left a message with Thea requesting documents needed for the referral.  - 6/10 Writer received voice message from Thea. She stated patient can call her and she will be put on the wait-list. They are booking a few weeks out.   - 6/13 Writer left a message with Thea to schedule intake appointment for patient.      [facility] DBT program in [place]  [phone]  -Openings in [place] and (1 opening) [place].    stated patient is able to reserve her spot if she called the following phone number to schedule [phone].   - Writer and patient called [phone] and spoke with the . There are no spots available in [place] or [place]. Her recommendation is to call often, as it's rolling admission.   - 6/10 Writer called and there are no openings.   - 6/13 Writer called and there are no openings.     [facility] Location:  [address] [place] [address]  Phone: [phone]  Fax: [phone]  - 6/10 Only virtual and no openings for a year.      Barriers to discharge:  - Symptom reduction and stabilization.

## 2022-06-13 NOTE — PLAN OF CARE
Goal Outcome Evaluation:    Plan of Care Reviewed With: patient        Patient had a good shift, tense and irritable at times.  Flat affect, denies SI/SIB, anxiety, rated depression at 7/10.  Patient had ect #7 today which went well.  Patient c/o constipation that has been going on for the past two days.  Patient received prn Senakot for constipation.  Came back to complain Senakot not working and requesting for something else.  MD notified, patient was given milk of magnesia.  Patient hoping to get good result later today.  Patient did not eat breakfast but had a good appetite at lunch.  Patient resting comfortably in room.  Will continue to monitor closely.

## 2022-06-13 NOTE — PROGRESS NOTES
Prior Authorization **APPROVED**    Authorization Effective Date: 3/11/2022  Authorization Expiration Date: 6/9/2023  Medication: Rexulti 0.5mg tabs **APPROVED**  Approved Dose/Quantity: 1 tablet daily  Reference #: Suki Key: [id number], Case #: [id number]   Insurance Company: [employer] [place] - Phone [phone] Fax [phone]  Expected CoPay: $577.08 for 1st fill then $327.08/mo thereafter (price d/t $250 deductible plus coinsurance)     CoPay Card Available: No    Foundation Assistance Needed: n/a  Which Pharmacy is filling the prescription (Not needed for infusion/clinic administered):n/a - Patient has not yet been discharged, and there are no outpatient orders for this medication yet  Comments:  Proactive Prior Authorization.  Aripiprazole preferred and lower price under insurance: $9.60 before and after deductible is met.    If Rexulti still preferred, patient may be eligible for free drug via the 's Patient Assistance Program. Typically subject to income limits, typically 300-400% of Federal Poverty Level.    Application:  https://www.[employer].Viverae/files/2020-01/PAUS19EXP0005-OPAF-Neuroscience-Application-Form-2020.pdf          Alba Oglesby CPhT  [place] Discharge Pharmacy Liaison  Pronouns: She/Her/Hers    [facility]  [facility]  [address] [place] [address] [address] [place] [address]   [email or web address]  [email or web address]   Phone: [phone]  Pager: [phone]  Fax: [phone]

## 2022-06-13 NOTE — PROGRESS NOTES
"   06/12/22 2100   Group Therapy Session   Group Attendance attended group session   Time Session Began 1900   Time Session Ended 2000   Total Time patient participated (minutes) 60   Total # Attendees 5   Group Type expressive therapy   Group Topic Covered emotions/expression   Patient Response/Contribution cooperative with task   Art Therapy directive was to create a \"tree of life\" drawing of a tree with parts of tree symbolizing parts of self.  Goals of directive: to create a personal narrative, to identify personal strengths and goals, emotional expression  Pt was a quiet participant, focused on task for the full duration of group. Pt created a detailed image of a tree and irma hearts amongst the leaves to symbolize her family, values and strengths.  Pts mood was calm, pleasant participant.  "

## 2022-06-13 NOTE — PLAN OF CARE
Problem: Mood Impairment (Anxiety Signs/Symptoms)  Goal: Improved Mood Symptoms (Anxiety Signs/Symptoms)  Outcome: Ongoing, Not Progressing  Note: Patient was very anxious this shift. Thoughts were focussed on her poor sleep at night, saying that the medications she takes no longer work. Said she had taken Melatonin at home, but that the psychiatrist prescribed different medications for sleep here. Discussed the PRN options, patient agreed to take PRN Trazodone and Hydroxyzine at HS.  Patient perseverated over her supper for a long time, she said she is on calory count and that she was served food she did not order. Patient was worried that she won't be able to eat it all. At 2100 patient became very anxious and worried about being unable to reach her  on the phone, said she left 4 messages, and that she was afraid he had fallen. She called her daughter-in-law to check on him. Patient was tearful while waiting a call back.   She ate close to 75% of supper. Kept self clean and well groomed. Communicated needs well.   Patient was informed about ECT in the morning, and the need to keep NPO.    Constipation: patient took PRN PeriColace and prune juice.     Addendum: shortly after 23:00 patient reported not being able to fall asleep. She said she fell asleep after the second dose of Trazodone last night, and said she is considering taking it now. Repeated dose of Trazodone 75 mg PO was given.     Plan of Care Reviewed With: patient

## 2022-06-13 NOTE — PLAN OF CARE
Problem: Sleep Disturbance (Depressive Signs/Symptoms)  Goal: Improved Sleep (Depressive Signs/Symptoms)  Outcome: Ongoing, Progressing   Goal Outcome Evaluation:    Patient maintained on NPO post midnight.    ECT #10  at 0720 today.    No pre-ECT medication to administer. VS taken. Patient instructed to void prior to ECT.  Pt verbalized understanding.  See ECT checklist.    8 hrs of sleep.  No concerns reported during this shift.  Irritable this AM.

## 2022-06-13 NOTE — ANESTHESIA PREPROCEDURE EVALUATION
Anesthesia Pre-Procedure Evaluation    Patient: Lorena Hayden   MRN: [id number] : 1945        Procedure : * No procedures listed *          Past Medical History:   Diagnosis Date     Allergic rhinitis, cause unspecified      Anxiety      Depressive disorder       Past Surgical History:   Procedure Laterality Date     C IMPLANT HALLUX  3/2006 &     Yesi STEELE MAMMOGRAM, SCREENING  2011    Dr arredondo     COLONOSCOPY  2014    Mirian Mottamalika     EXTRACAPSULAR CATARACT EXTRATION WITH INTRAOCULAR LENS IMPLANT  2011     HC DILATION/CURETTAGE DIAG/THER NON OB  2005    D & C     HCL PAP SMEAR  2011    Dr Arredondo     ZZC  DELIVERY ONLY       ZZC LIGATE FALLOPIAN TUBE      Tubal Ligation     ZZHC COLONOSCOPY THRU STOMA W BIOPSY/CAUTERY TUMOR/POLYP/LESION  / /2011    benign polyp      Allergies   Allergen Reactions     Horse Allergy      Allergic to horse hair      Social History     Tobacco Use     Smoking status: Never Smoker     Smokeless tobacco: Never Used   Substance Use Topics     Alcohol use: Yes     Alcohol/week: 0.8 standard drinks     Types: 1 drink(s) per week     Comment: social      Wt Readings from Last 1 Encounters:   22 67 kg (147 lb 9.6 oz)        Anesthesia Evaluation   Pt has had prior anesthetic.         ROS/MED HX  ENT/Pulmonary:     (+) allergic rhinitis,     Neurologic:       Cardiovascular:       METS/Exercise Tolerance:     Hematologic:       Musculoskeletal:       GI/Hepatic:       Renal/Genitourinary:       Endo:       Psychiatric/Substance Use:     (+) psychiatric history anxiety and depression     Infectious Disease:       Malignancy:       Other:            Physical Exam    Airway        Mallampati: III   TM distance: > 3 FB   Neck ROM: full   Mouth opening: > 3 cm    Respiratory Devices and Support         Dental  no notable dental history         Cardiovascular   cardiovascular exam normal          Pulmonary   pulmonary exam normal                 OUTSIDE LABS:  CBC:   Lab Results   Component Value Date    WBC 5.4 05/14/2022    WBC 4.3 05/03/2013    HGB 15.4 05/14/2022    HGB 10.5 (L) 06/29/2016    HCT 47.4 (H) 05/14/2022    HCT 42.4 05/03/2013     05/14/2022     05/03/2013     BMP:   Lab Results   Component Value Date     05/14/2022     04/12/2016    POTASSIUM 3.7 05/14/2022    POTASSIUM 4.1 04/12/2016    CHLORIDE 106 05/14/2022    CHLORIDE 102 04/12/2016    CO2 29 05/14/2022    CO2 25 04/12/2016    BUN 13 05/14/2022    BUN 14 04/12/2016    BUN NOT APPLICABLE 04/12/2016    CR 0.65 05/14/2022    CR 0.75 04/12/2016     (H) 05/14/2022    GLC 97 04/12/2016     COAGS: No results found for: PTT, INR, FIBR  POC: No results found for: BGM, HCG, HCGS  HEPATIC:   Lab Results   Component Value Date    ALBUMIN 3.3 (L) 05/14/2022    PROTTOTAL 7.2 05/14/2022    ALT 24 05/14/2022    AST 21 05/14/2022    ALKPHOS 87 05/14/2022    BILITOTAL 0.6 05/14/2022    BILIDIRECT 0.1 07/28/2012     OTHER:   Lab Results   Component Value Date    PH 7.0 07/28/2012    A1C 5.4 01/25/2013    RENUKA 8.9 05/14/2022    TSH 1.22 05/17/2022    SED 33 (A) 04/23/2011       Anesthesia Plan    ASA Status:  3   NPO Status:  NPO Appropriate    Anesthesia Type: General.     - Airway: Mask Only   Induction: Intravenous.           Consents    Anesthesia Plan(s) and associated risks, benefits, and realistic alternatives discussed. Questions answered and patient/representative(s) expressed understanding.    - Discussed:     - Discussed with:  Patient         Postoperative Care            Comments:    Other Comments: ECT                Manish Mcneal MD

## 2022-06-13 NOTE — ANESTHESIA CARE TRANSFER NOTE
Patient: Lorena Hayden    Procedure: * No procedures listed *       Diagnosis: * No pre-op diagnosis entered *  Diagnosis Additional Information: No value filed.    Anesthesia Type:   General     Note:    Oropharynx: oropharynx clear of all foreign objects  Level of Consciousness: iatrogenic sedation  Oxygen Supplementation: room air    Independent Airway: airway patency satisfactory and stable  Dentition: dentition unchanged  Vital Signs Stable: post-procedure vital signs reviewed and stable  Report to RN Given: handoff report given  Patient transferred to: PACU    Handoff Report: Identifed the Patient, Identified the Reponsible Provider, Reviewed the pertinent medical history, Discussed the surgical course, Reviewed Intra-OP anesthesia mangement and issues during anesthesia, Set expectations for post-procedure period and Allowed opportunity for questions and acknowledgement of understanding      Vitals:  Vitals Value Taken Time   /83 06/13/22 0850   Temp 36.4  C (97.5  F) 06/13/22 0850   Pulse 110 06/13/22 0850   Resp 16 06/13/22 0850   SpO2 96 % 06/13/22 0850       Electronically Signed By: Manish Mcneal MD  June 13, 2022  8:59 AM

## 2022-06-13 NOTE — PLAN OF CARE
06/13/22 1417   Group Therapy Session   Group Attendance attended group session   Time Session Began 1300   Time Session Ended 1400   Total Time patient participated (minutes) 45   Total # Attendees 2   Group Type Occupational Therapy   Group Topic Covered cognitive activities;coping skills/lifestyle management;leisure exploration/use of leisure time;relaxation techniques;structured socialization   Group Session Detail OT Leisure Group   Patient Response Detail Intervention: Leisure Group with 1 peer.    Patient Response: Pt participated in group tile matching game for leisure participation, coping skills exploration and socialization. Pt was an active participant in group game for the duration of their time in group, leaving briefly to meet with provider and returning after this. Pt required assistance/cues about 75% of the time in order to place tiles for the game. Pt was receptive to writer's assistance and was minimally to moderately social during group when answering a question asked by the other peer in group or noting that they weren't feeling the best due to feeling constipated and hoped they would feel better soon. Pt however participated for the duration of the game, helped writer clean up and thanked writer on their way out of group.     Mood/Affect: Pleasant       Plan: Patient encouraged to maintain attendance for continued ongoing support in working towards occupational therapy goals to support overall treatment/care.

## 2022-06-13 NOTE — PHARMACY-RX INSURANCE COVERAGE
Consulted to run a test claim for Rexulti 0.5mg tablets & Latuda tabs.    Patient has pharmacy benefits through [employer] [place] [employer]. Patient has $250 remaining in 2022 RX deductible and these medications are covered at the following prices:      Rexulti 0.5mg tabs - $577.08 for 1st fill, then $327.08/mo thereafter    PA in place through 6/9/23    Latuda tabs - $602.77 for 1st fill, $352.77/mo thereafter    Aripiprazole tabs - $9.60      Please feel free to contact me with any other test claims, prior authorizations, or insurance questions regarding outpatient medications.     Thanks!      Alba Oglesby [facility] Liaison  Pronouns: She/Her/Hers    [facility]  [facility]  [address] [place] [address] [address], [place] [address]   [email or web address]  [email or web address]   Phone: [phone]  Pager: [phone]  Fax: [phone]

## 2022-06-13 NOTE — PROCEDURES
Procedure/Surgery Information   [facility]    Bedside Procedure Note  Date of Service (when I performed the procedure): 06/13/2022    Lorena Hayden is a 76 year old female patient.  1. 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 Major depressive disorder, recurrent episode, severe with anxious distress (H)      Past Medical History:   Diagnosis Date     Allergic rhinitis, cause unspecified      Anxiety      Depressive disorder      Temp: 97.6  F (36.4  C) Temp src: Oral BP: 100/63 Pulse: 77   Resp: 16 SpO2: 95 % O2 Device: None (Room air)      Procedures       Marvin March MD     Lorena Hayden is a 76 year old  year old female patient.  [id number]  @DX@    [facility]   ECT Procedure Note   06/13/2022    Patient Status: Inpatient    Is this the first in a series of 12 treatments?  No     Allergies   Allergen Reactions     Horse Allergy      Allergic to horse hair       Weight:  147 lbs 9.6 oz         Indications for ECT:   Medications ineffective         Clinical Narrative:   Lorena Hayden is a 76 year old woman with a history of depression, started in her mid 60s. Her depression was relatively well-controlled on duloxetine until February 2022, since when she has had progressive depression with suicidal thoughts without an identifiable trigger. Multiple medication trials yielded to no improvement (Increased duloxetine, added aripiprazole then bupropion, now cross titrating to desvenlafaxine). She had a recent psychiatric hospitalization in [facility] in [place], discharged only two weeks ago. However, depression persisted, she had more thoughts that life was not worth living and thoughts of suicide.  She also reported difficulties with sleep, low energy, poor appetite with significant weight loss of about 20 pounds.  She finally came to the hospital and requested to  be hospitalized.     Psychiatric History:   Diagnoses: Major Depressive Disorder     Hospitalizations: Two former (first in 2013, last in 2022)     Suicide Attempts: None     Medication Trials: Ativan, trazodone, Klonopin, Lexapro, Wellbutrin, Celexa, Seroquel, Zoloft, Abilify, currently Effexor and Cymbalta. No TCAs or MAOIs.     Neuromodulation:  Right unilateral ultrabrief ECT x 19 sessions in 2012. She had a good response during treatment which lasted only several weeks after the discontinuation.     Psychotherapy:  None            Diagnosis:   Major depression         Assessment:   #1 she reports her depression is severe and decreases pleasure and motivation and she can be very paralyzed and indecisive with anxiety. ECT was helpful before  #2  She is irritable she was awoken early but her appt pushed back. She denies discomfort after last appointment. Some SI but she feels it is  becuase she is so anxious waiting.  #3 sleeping well with trazodone + remeron, she reports no worsening as in in fact improving no SI today, no tremor and less anxious, less catastrophizing thoughts. Her dog nehemias is awaiting her at home. She feels her thinking is improved not worsened since starting ECT  #4 Today she is flustered she has trouble putting her depression intensity on a scale but admits sadness, no tearfulness, no Si today and denies complaints about recovery process of ECT. She denies worsening anxiety and she slept well overnight.  #5 She is irritable and anxious today. She I not yet feeling improvement in mood. She struggled with the transfer of units to  it disrupted her bedtime and she did not sleep well. She finds the ECT frightening.   #6 reports form the floor report unchanged mood so far. Less irritable and anxious today 1/10 ( mood worst it has ever been ) but SI has resolved.   2/3 at 5min recall Will increase to 9x ST  #7 more verbose today. She is frustrated that her meal will be cold by the time she  returns for breakfast.  She reports mood is improving some. She denies physical side effects   #8 6/10 mood (10 best) no anxiety between treatments. She feels very fatigued after treatments and needs to nap more. denies concern for confusion or insomnia. denies concerns form family about confusion.  #9 mood still 6/10 she is anxious because she didn't sleep last night she fears trazodone will no longer work for her. Less irritable. Mild nausea this AM and increased intensity of RLS  #10 mood unchanged no drift. She denies SI over the weekend and anxiety well controlled. She acknowledges need for continued oral medications after ECT to sustain benefit and by her history maintenance ECT is recommended           Pause for the Cause:     Right patient Yes   Right procedure/laterality settings: Yes          Intra-Procedure Documentation:       ECT #: 10   Treatment number this series: 10   Total treatment number: 30     Type of ECT:  Right, unilateral ultrabrief    ECT Medications:    Etomidate: 16mg  Succinyl Choline: 60mg  zofran 4mg  Versed 1mg for agitation      ECT Strip Summary:    Titration ( 19.2 percent ) Energy Level:172.8mc  0.3ms 45 Hz time 8 sec 800mA  Motor Seizure Duration:  48 seconds  EEG Seizure Duration: 52 seconds      Complications: None        Plan:    Prefers earliest appointment to decrease pre-procedure anxiety.      If she has consistent transportation and supervision for at least 6hrs after treatment she can complete her course outpatient when appropriate    Continue ECT  Until Tx 12. Recommend maintenance 1per week for 4 weeks then further spacing     COVID test today     Monitor depression severity with clinical assessment augmented with CUDOS every treatment  Continue current medications    MD Rod

## 2022-06-13 NOTE — PROGRESS NOTES
Brief Medicine Note    Medicine following urine culture results. Urine culture collected on 6/10 grew mixed urogenital deja only. This is not indicative of a UTI. No further interventions needed.     Medicine will sign off. Please do not hesitate to contact if new questions or concerns arise.       Amber Polk PA-C  Hospitalist Service  Pager: [phone]

## 2022-06-13 NOTE — ANESTHESIA POSTPROCEDURE EVALUATION
Patient: Lorena Hayden    Procedure: * No procedures listed *       Anesthesia Type:  General    Note:  Disposition: Inpatient   Postop Pain Control: Uneventful            Sign Out: Well controlled pain   PONV:    Neuro/Psych: Uneventful            Sign Out: Acceptable/Baseline neuro status   Airway/Respiratory: Uneventful            Sign Out: Acceptable/Baseline resp. status   CV/Hemodynamics: Uneventful            Sign Out: Acceptable CV status; No obvious hypovolemia; No obvious fluid overload   Other NRE:    DID A NON-ROUTINE EVENT OCCUR?            Last vitals:  Vitals:    06/13/22 0825 06/13/22 0840 06/13/22 0850   BP: 130/70 (!) 155/84 (!) 154/83   Pulse: 96 106 110   Resp: 16 16 16   Temp: 36.5  C (97.7  F) 36.5  C (97.7  F) 36.4  C (97.5  F)   SpO2: 100% 98% 96%       Electronically Signed By: Manish Mcneal MD  June 13, 2022  8:59 AM

## 2022-06-14 PROCEDURE — 124N000003 HC R&B MH SENIOR/ADOLESCENT

## 2022-06-14 PROCEDURE — H2032 ACTIVITY THERAPY, PER 15 MIN: HCPCS

## 2022-06-14 PROCEDURE — 250N000013 HC RX MED GY IP 250 OP 250 PS 637: Performed by: PSYCHIATRY & NEUROLOGY

## 2022-06-14 PROCEDURE — 99231 SBSQ HOSP IP/OBS SF/LOW 25: CPT | Mod: 95 | Performed by: PSYCHIATRY & NEUROLOGY

## 2022-06-14 RX ORDER — VENLAFAXINE HYDROCHLORIDE 150 MG/1
150 TABLET, EXTENDED RELEASE ORAL
Status: DISCONTINUED | OUTPATIENT
Start: 2022-06-15 | End: 2022-06-15

## 2022-06-14 RX ORDER — TRAZODONE HYDROCHLORIDE 100 MG/1
100 TABLET ORAL
Status: DISCONTINUED | OUTPATIENT
Start: 2022-06-14 | End: 2022-06-24 | Stop reason: HOSPADM

## 2022-06-14 RX ORDER — VILAZODONE HYDROCHLORIDE 10 MG/1
10 TABLET ORAL DAILY
Status: DISCONTINUED | OUTPATIENT
Start: 2022-06-15 | End: 2022-06-16

## 2022-06-14 RX ADMIN — SENNOSIDES AND DOCUSATE SODIUM 1 TABLET: 50; 8.6 TABLET ORAL at 21:21

## 2022-06-14 RX ADMIN — SIMVASTATIN 40 MG: 20 TABLET, FILM COATED ORAL at 21:21

## 2022-06-14 RX ADMIN — TRAZODONE HYDROCHLORIDE 100 MG: 100 TABLET ORAL at 21:21

## 2022-06-14 RX ADMIN — Medication 5 MG: at 21:21

## 2022-06-14 RX ADMIN — SENNOSIDES AND DOCUSATE SODIUM 1 TABLET: 50; 8.6 TABLET ORAL at 11:17

## 2022-06-14 RX ADMIN — TIMOLOL MALEATE 1 DROP: 5 SOLUTION OPHTHALMIC at 08:33

## 2022-06-14 RX ADMIN — MIRTAZAPINE 30 MG: 15 TABLET, FILM COATED ORAL at 21:21

## 2022-06-14 RX ADMIN — RISPERIDONE 0.5 MG: 0.25 TABLET ORAL at 21:21

## 2022-06-14 RX ADMIN — CALCIUM CARBONATE 600 MG (1,500 MG)-VITAMIN D3 400 UNIT TABLET 1 TABLET: at 08:33

## 2022-06-14 RX ADMIN — VENLAFAXINE HYDROCHLORIDE 225 MG: 225 TABLET, EXTENDED RELEASE ORAL at 08:33

## 2022-06-14 ASSESSMENT — ACTIVITIES OF DAILY LIVING (ADL)
DRESS: INDEPENDENT;SCRUBS (BEHAVIORAL HEALTH)
LAUNDRY: WITH SUPERVISION
DRESS: SCRUBS (BEHAVIORAL HEALTH)
HYGIENE/GROOMING: INDEPENDENT
ORAL_HYGIENE: INDEPENDENT
LAUNDRY: UNABLE TO COMPLETE
ORAL_HYGIENE: INDEPENDENT
HYGIENE/GROOMING: INDEPENDENT

## 2022-06-14 NOTE — PLAN OF CARE
Care coordination:  - Patient's care was discuss in team meeting.  - Dr. Hernandez would like a care conference on Friday, at 10 a.m. Writer confirmed time works for patient's daughter in law. Writer left a message with patient's  to confirm meeting time and day works for him.   - Writer met with patient and called [facility] intake line. Patient is on the list for new patient intake. Writer described IOP program to patient, patient is agreeable. Community involvement options were discussed with patient.   - Writer contacted [facility], again. Please see note below.   - Possible discharge on Friday, pending stabilization.     Referrals:     [facility]  - Writer called and asked if patient's insurance is accepted.  stated it is possible, patient needs to call to speak with them. They are not accepting referrals from clinicians for the IOP program. Programming is 3 hours a day, up to 5 days a week, in person.   - Writer and patient attempted to call, but received a busy signal.     [facility] program in [place]  [phone]  -Openings in [place] and (1 opening) [place].    stated patient is able to reserve her spot if she called the following phone number to schedule [phone].   - Writer and patient called [phone] and spoke with the . There are no spots available in [place] or [place]. Her recommendation is to call often, as it's rolling admission.   - 6/10 Writer called and there are no openings.   - 6/13 Writer called and there are no openings.   - 6/14 Writer called and there are openings in [facility], but not in [place] or [place].     [email or web address]  [place] Location:  [address],  [place] [address]  Phone: [phone]  Fax: [phone]  - 6/10 Only virtual and no openings for a year.      Barriers to discharge:  - Symptom reduction and stabilization.

## 2022-06-14 NOTE — PLAN OF CARE
"Problem: Activity and Energy Impairment (Depressive Signs/Symptoms)  Goal: Optimized Energy Level (Depressive Signs/Symptoms)  Outcome: Ongoing, Progressing  Intervention: Optimize Energy Level     Problem: Cognitive Impairment (Depressive Signs/Symptoms)  Goal: Optimized Cognitive Function  Outcome: Ongoing, Progressing     Patient is flat and withdrawn but pleasant upon approach.  Attend group.  She endorses depression rated at 8/10.  She denies anxiety, SI, HI, SIB, and hallucinations.  Patient states her goal today is \"to get some rest and go to groups.\"  Patient is medication compliant.  She initially declined scheduled senna due to diarrhea yesterday but later decided to take it.  Patient remains safe on unit.  Will continue to monitor.  Sivan Arteaga RN   "

## 2022-06-14 NOTE — PLAN OF CARE
Problem: Sleep Disturbance (Depressive Signs/Symptoms)  Goal: Improved Sleep (Depressive Signs/Symptoms)  Outcome: Ongoing, Progressing   Goal Outcome Evaluation:    Patient has remained in her room sleeping for the entire shift.  7 hrs of sleep. No concerns reported this shift.

## 2022-06-14 NOTE — PROGRESS NOTES
06/13/22 2100   Group Therapy Session   Group Attendance attended group session   Time Session Began 1905   Time Session Ended 2000   Total Time patient participated (minutes) 55   Total # Attendees 4   Group Type psychotherapeutic   Group Topic Covered coping skills/lifestyle management;structured socialization   Group Session Detail Coping toolbox process   Patient Response/Contribution cooperative with task;discussed personal experience with topic   Patient Response Detail   (ECT today, stomach issues are bothering her. She spoke about the improtance of her support system and her Anabaptism/ sony)

## 2022-06-14 NOTE — PLAN OF CARE
Problem: Decreased Participation/Engagement (Depressive Signs/Symptoms)  Goal: Increased Participation and Engagement (Depressive Signs/Symptoms)  Outcome: Ongoing, Progressing  Flowsheets (Taken 6/13/2022 1920)  Mutually Determined Action Steps (Increased Participation and Engagement):    voluntarily attends group therapy    initiates interaction with others  Intervention: Facilitate Participation and Engagement  Recent Flowsheet Documentation  Taken 6/13/2022 1915 by Chiquita Benson RN  Diversional Activity: television    Pt presents with blunted, flat affect and depressed mood. Denies SI/SIB and hallucinations. Rates depression 8/10 and denies anxiety. Pt was present in the lounge and social with select peers. Attended group and played games with peers. Pt was perseverative on being constipated at the beginning of the shift but then had a large BM. Pt reported multiple episodes of diarrhea this evening so HS Senokot held. Does report feeling better now that she had a BM. Did not eat much of her dinner because of the diarrhea she was having. VSS. Medication compliant, PRN Trazodone given at HS per pt request. Fluid intake adequate. Pt had ECT #10 today and is scheduled for #11 Wednesday at 0840. She has had no complaints r/t her ECT treatment today this evening. Denies physical pain. COVID swab returned negative. No other concerns or complaints noted.

## 2022-06-14 NOTE — PLAN OF CARE
Problem: Decreased Participation/Engagement (Depressive Signs/Symptoms)  Goal: Increased Participation and Engagement (Depressive Signs/Symptoms)  Outcome: Ongoing, Progressing  Flowsheets (Taken 6/14/2022 1855)  Mutually Determined Action Steps (Increased Participation and Engagement):    voluntarily attends group therapy    initiates interaction with others  Intervention: Facilitate Participation and Engagement  Recent Flowsheet Documentation  Taken 6/14/2022 1852 by Chiquita Benson RN  Diversional Activity: television    Pt presents with blunted, flat affect and depressed mood. Denies SI/SIB and hallucinations. Rates depression 8/10 and denies anxiety. Pt is present in the lounge and social with select peers. Pt had no specific complaints this evening. Writer encouraged pt to shower this evening as she still had gel in her hair from ECT Monday, pt agreed to do this. Denied any further stomach issues this evening. Denied pain. Appetite and fluid intake adequate. VSS. Medication compliant. Pt did c/o people talking excessively in the hallway at night which is affecting her sleep, advised pt that this information would be passed along. PRN Trazodone given at HS per her request. Pt has ECT #11 scheduled tomorrow morning at 0840. No other concerns or complaints noted at this time.

## 2022-06-14 NOTE — PROGRESS NOTES
Pt arrived late but participated fully in dance/movement therapy (D/MT) using familiar music as a prop to support movement and emotional expression.  Group process included peer support & encouragement, as well as socially inclusive use of space.  Her movements were organized and aligned with her affect expression.  She smiled often and moved with energy and terry.       06/14/22 1130   Group Therapy Session   Group Attendance attended group session   Total Time patient participated (minutes) 30   Group Type psychotherapeutic   Group Topic Covered structured socialization;emotions/expression;community integration

## 2022-06-14 NOTE — PROGRESS NOTES
Pt came to the nurses station stating that she is still unable to sleep at this time. Pt provided with second dose of Trazodone 75 mg.

## 2022-06-14 NOTE — PROGRESS NOTES
"PSYCHIATRY  PROGRESS NOTE     DATE OF SERVICE   06/14/2022  The patient is a 76 year old female who is being evaluated via a video billable telemedicine visit. The patient/guardian has consented to being seen via telemedicine. The provider was in front of a computer in a home office. The patient was on the inpatient unit at [facility].    Start time: 11:26 AM  Stop time: 11:36 AM    The patient/guardian has been notified of the following:     This telemedicine visit is conducted live between you and your clinician. We have found that certain health care needs can be provided without the need for a physical exam. This service lets us provide the care you need with a telemedicine conversation.           CHIEF COMPLAINT   \"I am okay\".       SUBJECTIVE   Nursing reports: Patient is flat and withdrawn but pleasant upon approach.  Attend group.  She endorses depression rated at 8/10.  She denies anxiety, SI, HI, SIB, and hallucinations.  Patient states her goal today is \"to get some rest and go to groups.\"  Patient is medication compliant.  She initially declined scheduled senna due to diarrhea yesterday but later decided to take it.  Patient remains safe on unit.  Will continue to monitor.  Sivan Arteaga RN     Patient has been discussed with the  earlier today.   is in the process of scheduling a care conference with patient's daughter-in-law for Friday at 10 AM.     OBJECTIVE   Patient was seen and evaluated in the consult room with nurse practitioner student present during the assessment, this was done with the use of telehealth.  Patient verbalized that she is doing okay today.  She continues to report that her depression is 8 out of 10 and the medications have not helped at all with the symptoms of depression.  Again I offered the patient to make a change on her medications and today the patient was more open to change her antidepressant.  After reviewing with the patient the " list of medications she has tried in the past patient did agree to for a trial of Viibryd.  Patient does not remember the reasons why she was taken off Abilify or Wellbutrin; at the same time she does not remembered if these medications gave her side effects.  Given that the patient has been on a trial of both of these medications she will prefer to go with the Viibryd.  The patient understands that we will need to review with her health insurance if the medication is covered. Patient has been informed about decreasing the dose of Effexor and eventually discontinuing the medication.  She is aware that she might experience a discontinuation syndrome and is okay with making the change.    Patient verbalized that she is looking forward to be able to go home and denies having any thoughts about harming herself.  She understands that we will be having a care conference on Friday where we will be deciding when she will be going home.     MEDICATIONS   Medications:  Scheduled Meds:    calcium carbonate 600 mg-vitamin D 400 units  1 tablet Oral Daily     melatonin  5 mg Oral At Bedtime     mirtazapine  30 mg Oral At Bedtime     risperiDONE  0.5 mg Oral At Bedtime     senna-docusate  1 tablet Oral BID     simvastatin  40 mg Oral At Bedtime     timolol maleate  1 drop Both Eyes Daily     [START ON 6/15/2022] venlafaxine  150 mg Oral Daily with breakfast     [START ON 6/15/2022] vilazodone  10 mg Oral Daily     Continuous Infusions:  PRN Meds:.acetaminophen, alum & mag hydroxide-simethicone, hydrOXYzine, magnesium hydroxide, OLANZapine **OR** OLANZapine, traZODone    Medication adherence issues: MS Med Adherence Y/N: Yes, Hospitalization  Medication side effects: MEDICATION SIDE EFFECTS: no side effects reported  Benefit: Yes / No: Yes       ROS   A comprehensive review of systems was negative.       MENTAL STATUS EXAM   Vitals: /75   Pulse 94   Temp 97.4  F (36.3  C) (Temporal)   Resp 16   Wt 64.8 kg (142 lb 14.4  "oz)   SpO2 97%   BMI 24.53 kg/m      Appearance:  No apparent distress  Mood: \"Still feeling depressed\"  Affect: Brighter, pleasant and calm  Suicidal Ideation: PRESENT / ABSENT: absent   Homicidal Ideation: PRESENT / ABSENT: absent   Thought process: unremarkable   Thought content: devoid of  suicidal and violent ideation.   Fund of Knowledge: Average  Attention/Concentration: Normal  Language ability:  Intact  Memory:  Immediate recall intact, Short-term memory intact and Long-term memory intact  Insight:  fair.  Judgement: fair  Orientation: Yes, x4  Psychomotor Behavior: normal or unremarkable    Muscle Strength and Tone: MuscleStrength: Normal  Gait and Station: stable        LABS   personally reviewed.     No results found for: PHENYTOIN, PHENOBARB, VALPROATE, CBMZ       DIAGNOSIS   Principal Problem:    <principal problem not specified>    Active Problem List:  Patient Active Problem List   Diagnosis     Allergic rhinitis     Symptomatic menopausal or female climacteric states     Mixed hyperlipidemia     Anxiety state     Insomnia     Anal fissure     Major depressive disorder, recurrent episode, moderate (H)     Irritable bowel syndrome     ACP (advance care planning)     Health Care Home     Tinnitus     Acute lower gastrointestinal hemorrhage     Abdominal pain     Suicidal ideation     Major depressive disorder, recurrent episode, severe with anxious distress (H)     Major depressive disorder, 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 Ongoing education given regarding diagnostic and treatment options with risks, benefits and alternatives and adequate verbalization of understanding.  2.  Medications:      Remeron 15 mg at bedtime      Decrease Effexor 150 mg daily at the same time cross-taper with Viibryd 10 mg daily starting tomorrow      Risperdal 0.5 mg at bedtime      melatonin 5 mg at bedtime      As needed trazodone for insomnia.  3.  Medical team to follow the patient as " needed  4.   coordinating a safe discharge plan with the patient.        Risk Assessment: [facility] RISK ASSESSMENT: Patient able to contract for safety    Coordination of Care:   Treatment Plan reviewed and physician signed, Care discussed with Care/Treatment Team Members, Chart reviewed and Patient seen      Re-Certification I certify that the inpatient psychiatric facility services furnished since the previous certification were, and continue to be, medically necessary for, either, treatment which could reasonably be expected to improve the patient s condition or diagnostic study and that the hospital records indicate that the services furnished were, either, intensive treatment services, admission and related services necessary for diagnostic study, or equivalent services.     I certify that the patient continues to need, on a daily basis, active treatment furnished directly by or requiring the supervision of inpatient psychiatric facility personnel.   I estimate 14 days of hospitalization is necessary for proper treatment of the patient. My plans for post-hospital care for this patient are  home with family     Bri Elliott MD    -     06/14/2022  -     3:56 PM    Total time 15 minutes with > 50%spent on coordination of cares and psycho-education.    This note was created with help of Dragon dictation system. Grammatical / typing errors are not intentional.    Bri Elliott MD

## 2022-06-15 ENCOUNTER — ANESTHESIA (OUTPATIENT)
Dept: BEHAVIORAL HEALTH | Facility: CLINIC | Age: 77
DRG: 885 | End: 2022-06-15
Payer: COMMERCIAL

## 2022-06-15 ENCOUNTER — ANESTHESIA EVENT (OUTPATIENT)
Dept: BEHAVIORAL HEALTH | Facility: CLINIC | Age: 77
DRG: 885 | End: 2022-06-15
Payer: COMMERCIAL

## 2022-06-15 ENCOUNTER — APPOINTMENT (OUTPATIENT)
Dept: BEHAVIORAL HEALTH | Facility: CLINIC | Age: 77
DRG: 885 | End: 2022-06-15
Attending: PSYCHIATRY & NEUROLOGY
Payer: COMMERCIAL

## 2022-06-15 PROCEDURE — 250N000013 HC RX MED GY IP 250 OP 250 PS 637: Performed by: PSYCHIATRY & NEUROLOGY

## 2022-06-15 PROCEDURE — 99232 SBSQ HOSP IP/OBS MODERATE 35: CPT | Mod: 25 | Performed by: PSYCHIATRY & NEUROLOGY

## 2022-06-15 PROCEDURE — 124N000003 HC R&B MH SENIOR/ADOLESCENT

## 2022-06-15 PROCEDURE — 370N000017 HC ANESTHESIA TECHNICAL FEE, PER MIN

## 2022-06-15 PROCEDURE — 90870 ELECTROCONVULSIVE THERAPY: CPT

## 2022-06-15 PROCEDURE — 250N000011 HC RX IP 250 OP 636: Performed by: STUDENT IN AN ORGANIZED HEALTH CARE EDUCATION/TRAINING PROGRAM

## 2022-06-15 PROCEDURE — H2032 ACTIVITY THERAPY, PER 15 MIN: HCPCS

## 2022-06-15 PROCEDURE — 250N000011 HC RX IP 250 OP 636: Performed by: PSYCHIATRY & NEUROLOGY

## 2022-06-15 PROCEDURE — 250N000009 HC RX 250: Performed by: STUDENT IN AN ORGANIZED HEALTH CARE EDUCATION/TRAINING PROGRAM

## 2022-06-15 PROCEDURE — 90853 GROUP PSYCHOTHERAPY: CPT

## 2022-06-15 PROCEDURE — 90870 ELECTROCONVULSIVE THERAPY: CPT | Performed by: PSYCHIATRY & NEUROLOGY

## 2022-06-15 RX ORDER — VENLAFAXINE HYDROCHLORIDE 37.5 MG/1
75 TABLET, EXTENDED RELEASE ORAL
Status: COMPLETED | OUTPATIENT
Start: 2022-06-18 | End: 2022-06-20

## 2022-06-15 RX ORDER — ESMOLOL HYDROCHLORIDE 10 MG/ML
INJECTION INTRAVENOUS PRN
Status: DISCONTINUED | OUTPATIENT
Start: 2022-06-15 | End: 2022-06-15

## 2022-06-15 RX ORDER — NICARDIPINE HCL-0.9% SOD CHLOR 1 MG/10 ML
SYRINGE (ML) INTRAVENOUS PRN
Status: DISCONTINUED | OUTPATIENT
Start: 2022-06-15 | End: 2022-06-15

## 2022-06-15 RX ORDER — VENLAFAXINE HYDROCHLORIDE 37.5 MG/1
37.5 TABLET, EXTENDED RELEASE ORAL
Status: COMPLETED | OUTPATIENT
Start: 2022-06-21 | End: 2022-06-23

## 2022-06-15 RX ORDER — ETOMIDATE 2 MG/ML
INJECTION INTRAVENOUS PRN
Status: DISCONTINUED | OUTPATIENT
Start: 2022-06-15 | End: 2022-06-15

## 2022-06-15 RX ORDER — ONDANSETRON 4 MG/1
4 TABLET, ORALLY DISINTEGRATING ORAL EVERY 30 MIN PRN
Status: DISCONTINUED | OUTPATIENT
Start: 2022-06-15 | End: 2022-06-15

## 2022-06-15 RX ORDER — SODIUM CHLORIDE, SODIUM LACTATE, POTASSIUM CHLORIDE, CALCIUM CHLORIDE 600; 310; 30; 20 MG/100ML; MG/100ML; MG/100ML; MG/100ML
INJECTION, SOLUTION INTRAVENOUS CONTINUOUS
Status: DISCONTINUED | OUTPATIENT
Start: 2022-06-15 | End: 2022-06-15

## 2022-06-15 RX ORDER — ACETAMINOPHEN 325 MG/1
975 TABLET ORAL ONCE
Status: DISCONTINUED | OUTPATIENT
Start: 2022-06-15 | End: 2022-06-15

## 2022-06-15 RX ORDER — ONDANSETRON 2 MG/ML
4 INJECTION INTRAMUSCULAR; INTRAVENOUS EVERY 30 MIN PRN
Status: DISCONTINUED | OUTPATIENT
Start: 2022-06-15 | End: 2022-06-15

## 2022-06-15 RX ORDER — VENLAFAXINE HYDROCHLORIDE 150 MG/1
150 TABLET, EXTENDED RELEASE ORAL
Status: COMPLETED | OUTPATIENT
Start: 2022-06-16 | End: 2022-06-17

## 2022-06-15 RX ADMIN — Medication 16 MG: at 09:15

## 2022-06-15 RX ADMIN — CALCIUM CARBONATE 600 MG (1,500 MG)-VITAMIN D3 400 UNIT TABLET 1 TABLET: at 10:20

## 2022-06-15 RX ADMIN — MIDAZOLAM HYDROCHLORIDE 0.5 MG: 1 INJECTION, SOLUTION INTRAMUSCULAR; INTRAVENOUS at 09:16

## 2022-06-15 RX ADMIN — TRAZODONE HYDROCHLORIDE 100 MG: 100 TABLET ORAL at 21:34

## 2022-06-15 RX ADMIN — SIMVASTATIN 40 MG: 20 TABLET, FILM COATED ORAL at 21:34

## 2022-06-15 RX ADMIN — MIRTAZAPINE 30 MG: 15 TABLET, FILM COATED ORAL at 21:34

## 2022-06-15 RX ADMIN — SENNOSIDES AND DOCUSATE SODIUM 1 TABLET: 50; 8.6 TABLET ORAL at 21:00

## 2022-06-15 RX ADMIN — HYDROXYZINE HYDROCHLORIDE 25 MG: 25 TABLET, FILM COATED ORAL at 23:44

## 2022-06-15 RX ADMIN — SENNOSIDES AND DOCUSATE SODIUM 1 TABLET: 50; 8.6 TABLET ORAL at 10:21

## 2022-06-15 RX ADMIN — TIMOLOL MALEATE 1 DROP: 5 SOLUTION OPHTHALMIC at 10:19

## 2022-06-15 RX ADMIN — Medication 500 MCG: at 09:17

## 2022-06-15 RX ADMIN — VILAZODONE HYDROCHLORIDE 10 MG: 10 TABLET ORAL at 10:19

## 2022-06-15 RX ADMIN — ESMOLOL HYDROCHLORIDE 20 MG: 10 INJECTION, SOLUTION INTRAVENOUS at 09:17

## 2022-06-15 RX ADMIN — Medication 5 MG: at 21:34

## 2022-06-15 RX ADMIN — ESMOLOL HYDROCHLORIDE 20 MG: 10 INJECTION, SOLUTION INTRAVENOUS at 09:19

## 2022-06-15 RX ADMIN — Medication 500 MCG: at 09:19

## 2022-06-15 RX ADMIN — VENLAFAXINE HYDROCHLORIDE 150 MG: 150 TABLET, EXTENDED RELEASE ORAL at 10:20

## 2022-06-15 RX ADMIN — SUCCINYLCHOLINE CHLORIDE 60 MG: 20 INJECTION, SOLUTION INTRAMUSCULAR; INTRAVENOUS; PARENTERAL at 09:16

## 2022-06-15 RX ADMIN — RISPERIDONE 0.5 MG: 0.25 TABLET ORAL at 21:34

## 2022-06-15 RX ADMIN — ESMOLOL HYDROCHLORIDE 20 MG: 10 INJECTION, SOLUTION INTRAVENOUS at 09:15

## 2022-06-15 ASSESSMENT — ACTIVITIES OF DAILY LIVING (ADL)
DRESS: INDEPENDENT
HYGIENE/GROOMING: INDEPENDENT
ORAL_HYGIENE: INDEPENDENT
ORAL_HYGIENE: INDEPENDENT
HYGIENE/GROOMING: INDEPENDENT
DRESS: INDEPENDENT

## 2022-06-15 NOTE — ANESTHESIA CARE TRANSFER NOTE
Patient: Lorena Hayden    Procedure: * ECT       Diagnosis: * No pre-op diagnosis entered *  Diagnosis Additional Information: No value filed.    Anesthesia Type:   General     Note:    Oropharynx: oropharynx clear of all foreign objects  Level of Consciousness: drowsy  Oxygen Supplementation: room air    Independent Airway: airway patency satisfactory and stable    Vital Signs Stable: post-procedure vital signs reviewed and stable  Report to RN Given: handoff report given  Patient transferred to: PACU    Handoff Report: Identifed the Patient, Identified the Reponsible Provider, Reviewed the pertinent medical history, Discussed the surgical course, Reviewed Intra-OP anesthesia mangement and issues during anesthesia, Set expectations for post-procedure period and Allowed opportunity for questions and acknowledgement of understanding      Vitals:  Vitals Value Taken Time   BP     Temp     Pulse     Resp     SpO2         Electronically Signed By: Petra Peters MD  Nisreen 15, 2022  9:27 AM

## 2022-06-15 NOTE — CONSULTS
Consulted to run a test claim for Vilazodone tablets (Viibryd).    Patient has pharmacy benefits through [employer] [place] [employer] with $250 remaining in RX deductible. Per insurance, this medication s covered and preferred under the patient's plan:       Viibryd tablets - $284.44 for 1st fill, then $34.44 thereafter       Please feel free to contact me with any other test claims, prior authorizations, or insurance questions regarding outpatient medications.     Thanks!      Alba Oglesby [facility] Discharge Pharmacy Liaison  Pronouns: She/Her/Hers    [facility]  [facility]  [address] [place] [address] [address] [id number]   [email or web address]  [email or web address]   Phone: [phone]  Pager: [phone]  Fax: [phone]

## 2022-06-15 NOTE — PLAN OF CARE
"  Problem: Behavioral Health Plan of Care  Goal: Optimal Comfort and Wellbeing  Outcome: Ongoing, Progressing  Intervention: Provide Person-Centered Care  Recent Flowsheet Documentation  Taken 6/15/2022 1317 by Ihsan Del Angel RN  Trust Relationship/Rapport:    care explained    emotional support provided    empathic listening provided    questions answered    questions encouraged    reassurance provided   Goal Outcome Evaluation:    Plan of Care Reviewed With: patient     Vitals: B/P: 153/86, T: 97.5, P: 120, R: 23    Symptomatology: Suicide, falls, ECT - (F)     Summary: patient had her 11th ECT today and she replied it was ok, she denied pain and within .30minutes of returning to unit she was up and about and affect was bright and she was med compliant and appetite was adequate. She has attended groups today and performs adls independently, she denies SI,and  anxiety but admits to depression of 10/10 because of beginning  new medication (today-6-15) (viinbryd - antidepressant), she will not discharge Friday as hoped, but will remain throughout weekend for monitoring,  this has caused patient to become very tearful and sad. 'I am so disappointed,\"  I continue to reassure / comfort patient as needed.      New Orders: 150mg Effexor ER changed to 75mg +37.5 mg (112.5 mg ) @ breakfast                     "

## 2022-06-15 NOTE — PROGRESS NOTES
06/14/22 2000   Group Therapy Session   Group Attendance attended group session   Time Session Began 1900   Time Session Ended 1950   Total Time patient participated (minutes) 45   Total # Attendees 6   Group Type recreation   Group Topic Covered leisure exploration/use of leisure time   Group Session Detail TR leisure group   Patient Response/Contribution cooperative with task   Patient Response Detail Pt participated in Therapeutic Recreation group with focus on socializing, problem solving, and leisure participation. Engaged and cooperative in group recreational intervention; word puzzles. Pt participated throughout entire duration of the group. Pt affect was flat and mood was calm. Pt added to the group discussion during the activity, relating the discussion to each puzzle. Pt talked about what each quote meant to her.

## 2022-06-15 NOTE — PROGRESS NOTES
Patients VSS, A/O, IV removed, meets phase 2 criteria and is able to move to [facility] at this time. Report given to RN, pt transported by staff.

## 2022-06-15 NOTE — PLAN OF CARE
Care coordination:  - Patient's care was discussed in team meeting.  - Writer left another message with patient's  to confirm care conference on Friday at 10 a.m.  - Writer left a message for patient's daughter in law (Shannon) to notify discharge is moved, due to new medication.     Referrals:   - Therapy, PCP, psychiatry appointments in [place].   [facility] IOP   - (6/14) Writer and patient called. Patient is on list for new patient intake phone call. Staff stated the first step for the referral is to complete the intake call.   [facility]  - Writer called and asked if patient's insurance is accepted.  stated it is possible, patient needs to call to speak with them. They are not accepting referrals from clinicians for the IOP program. Programming is 3 hours a day, up to 5 days a week, in person.   - Writer and patient attempted to call, but received a busy signal.       [facility] DBT program in [place]  [phone]  -Openings in [place] and (1 opening) [place].    stated patient is able to reserve her spot if she called the following phone number to schedule [phone].   - Writer and patient called [phone] and spoke with the . There are no spots available in [place] or [place]. Her recommendation is to call often, as it's rolling admission.   - 6/10 Writer called and there are no openings.   - 6/13 Writer called and there are no openings.   - 6/14 Writer called and there are openings in [facility], but not in [place] or [place].     [facility]  [place] Location:  [address] [place] [address]  Phone: [phone]  Fax: [phone]  - 6/10 Only virtual and no openings for a year.      Barriers to discharge:  - Symptom reduction and stabilization.

## 2022-06-15 NOTE — PLAN OF CARE
Problem: Sleep Disturbance (Depressive Signs/Symptoms)  Goal: Improved Sleep (Depressive Signs/Symptoms)  Outcome: Ongoing, Progressing   Goal Outcome Evaluation:  Patient maintained on NPO post midnight for ECT at 0840. Covid screening completed 6/13/22 and negative.  VS WNL.  Encouraged to empty her bladder prior to ECT.  Patient verbalized understanding.  8 hrs of sleep.  No concerns reported.  See ECT checklist.

## 2022-06-15 NOTE — ANESTHESIA PREPROCEDURE EVALUATION
Anesthesia Pre-Procedure Evaluation    Patient: Lorena Hayden   MRN: [id number] : 1945        Procedure : * No procedures listed *          Past Medical History:   Diagnosis Date     Allergic rhinitis, cause unspecified      Anxiety      Depressive disorder       Past Surgical History:   Procedure Laterality Date     C IMPLANT HALLUX  3/2006 &     Yesi STEELE MAMMOGRAM, SCREENING  2011    Dr arredondo     COLONOSCOPY  2014    Mirian Mottamalika     EXTRACAPSULAR CATARACT EXTRATION WITH INTRAOCULAR LENS IMPLANT  2011     HC DILATION/CURETTAGE DIAG/THER NON OB  2005    D & C     HCL PAP SMEAR  2011    Dr Arredondo     ZZC  DELIVERY ONLY       ZZC LIGATE FALLOPIAN TUBE      Tubal Ligation     ZZHC COLONOSCOPY THRU STOMA W BIOPSY/CAUTERY TUMOR/POLYP/LESION  / /2011    benign polyp      Allergies   Allergen Reactions     Horse Allergy      Allergic to horse hair      Social History     Tobacco Use     Smoking status: Never Smoker     Smokeless tobacco: Never Used   Substance Use Topics     Alcohol use: Yes     Alcohol/week: 0.8 standard drinks     Types: 1 drink(s) per week     Comment: social      Wt Readings from Last 1 Encounters:   22 64.8 kg (142 lb 14.4 oz)        Anesthesia Evaluation   Pt has had prior anesthetic.     No history of anesthetic complications       ROS/MED HX  ENT/Pulmonary:     (+) allergic rhinitis,     Neurologic:       Cardiovascular:       METS/Exercise Tolerance:     Hematologic:       Musculoskeletal:       GI/Hepatic:       Renal/Genitourinary:       Endo:       Psychiatric/Substance Use:     (+) psychiatric history anxiety and depression     Infectious Disease:       Malignancy:       Other:            Physical Exam    Airway  airway exam normal           Respiratory Devices and Support         Dental  no notable dental history         Cardiovascular   cardiovascular exam normal          Pulmonary   pulmonary exam normal                OUTSIDE  LABS:  CBC:   Lab Results   Component Value Date    WBC 5.4 05/14/2022    WBC 4.3 05/03/2013    HGB 15.4 05/14/2022    HGB 10.5 (L) 06/29/2016    HCT 47.4 (H) 05/14/2022    HCT 42.4 05/03/2013     05/14/2022     05/03/2013     BMP:   Lab Results   Component Value Date     05/14/2022     04/12/2016    POTASSIUM 3.7 05/14/2022    POTASSIUM 4.1 04/12/2016    CHLORIDE 106 05/14/2022    CHLORIDE 102 04/12/2016    CO2 29 05/14/2022    CO2 25 04/12/2016    BUN 13 05/14/2022    BUN 14 04/12/2016    BUN NOT APPLICABLE 04/12/2016    CR 0.65 05/14/2022    CR 0.75 04/12/2016     (H) 05/14/2022    GLC 97 04/12/2016     COAGS: No results found for: PTT, INR, FIBR  POC: No results found for: BGM, HCG, HCGS  HEPATIC:   Lab Results   Component Value Date    ALBUMIN 3.3 (L) 05/14/2022    PROTTOTAL 7.2 05/14/2022    ALT 24 05/14/2022    AST 21 05/14/2022    ALKPHOS 87 05/14/2022    BILITOTAL 0.6 05/14/2022    BILIDIRECT 0.1 07/28/2012     OTHER:   Lab Results   Component Value Date    PH 7.0 07/28/2012    A1C 5.4 01/25/2013    RENUKA 8.9 05/14/2022    TSH 1.22 05/17/2022    SED 33 (A) 04/23/2011       Anesthesia Plan    ASA Status:  2   NPO Status:  NPO Appropriate    Anesthesia Type: General.     - Airway: Mask Only   Induction: Intravenous.   Maintenance: N/A.        Consents    Anesthesia Plan(s) and associated risks, benefits, and realistic alternatives discussed. Questions answered and patient/representative(s) expressed understanding.    - Discussed:     - Discussed with:  Patient      - Extended Intubation/Ventilatory Support Discussed: No.      - Patient is DNR/DNI Status: No    Use of blood products discussed: No .     Postoperative Care    Pain management: Oral pain medications.   PONV prophylaxis: Ondansetron (or other 5HT-3)     Comments:    Other Comments: ECT       H&P reviewed: Unable to attach H&P to encounter due to EHR limitations. H&P Update: appropriate H&P reviewed, patient examined.  No interval changes since H&P (within 30 days).         Petra Peters MD

## 2022-06-15 NOTE — PLAN OF CARE
"  Problem: Behavioral Health Plan of Care  Goal: Optimal Comfort and Wellbeing  Outcome: Ongoing, Progressing  Intervention: Provide Person-Centered Care  Recent Flowsheet Documentation  Taken 6/15/2022 1317 by Ihsan Del Angel RN  Trust Relationship/Rapport:    care explained    emotional support provided    empathic listening provided    questions answered    questions encouraged    reassurance provided   Goal Outcome Evaluation:    Plan of Care Reviewed With: patient     Vitals: B/P: 153/86, T: 97.5, P: 120, R: 23    Symptomatology: Suicide, falls, ECT - ([facility])     Summary: patient had her 11th ECT today and she replied it was ok, she denied pain and within .30minutes of returning to unit she was up and about and affect was bright and she was med compliant and appetite was adequate. She has attended groups today and performs adls independently, she denies SI, anxiety but admits to depression of 10/10 because of beginning  new medication (today-6-15) (viinbryd - antidepressant), she will not discharge Friday as hoped, but will remain throughout weekend for monitoring,  this has caused patient to become very tearful and sad. 'I am so disappointed,\"  I continue to reassure / comfort patient as needed.      New Orders: 150mg Effexor ER changed to 75mg +37.5 mg (112.5 mg ) @ breakfast                     "

## 2022-06-15 NOTE — PLAN OF CARE
06/15/22 9669   Individualization/Patient Specific Goals   Patient Personal Strengths community support;expressive of needs;family/social support   Patient Vulnerabilities history of unsuccessful treatment   Anxieties, Fears or Concerns Patient feels overwhelmed with the tasks she must accomplish after discharge (household obligations).   Individualized Care Needs Patient is receiving ECT treatments. Final inpatient ECT is scheduled for Friday.   Patient-Specific Goals (Include Timeframe) 7-10 days symptom reduction and stabilization.   Interprofessional Rounds   Summary Due to recent medication change, patient will not discharge on Friday. Patient's final inpatient ECT treatment is on Friday. Care meeting scheduled for Friday at 10:00 a.m. with family and patient. Continue with plan: ECT, medication management, psychiatric evaluations, nursing assessments, group therapy, milieu therapy, and CTC case management.   Participants nursing;psychiatrist;CTC   Team Discussion   Participants Dr. Hernandez, BRITTANY Champagne, [facility]   Progress Making progress.   Anticipated length of stay 7 days   Continued Stay Criteria/Rationale Change in medication, ECT treatments, symptom reduction and stabilization is needed.   Medical/Physical Please see H and P.   Precautions Suicide, Fall   Plan ECT treatments, medication management, psychiatric evaluations, nursing assessments, group therapy, milieu therapy, CTC case management. Patient will discharge home with referrals and appointments, once stabilized.   Safety Plan Code 2, s-15, voluntary   Anticipated Discharge Disposition home with family     PRECAUTIONS AND SAFETY    Behavioral Orders   Procedures    Code 1 - Restrict to Unit    Code 2    Code 3     Patient can leave the unit accompanied by a staff    Electroconvulsive therapy    Fall precautions    Routine Programming     As clinically indicated    Status 15     Every 15 minutes.    Suicide precautions     Patients on Suicide  Precautions should have a Combination Diet ordered that includes a Diet selection(s) AND a Behavioral Tray selection for Safe Tray - with utensils, or Safe Tray - NO utensils         Safety  Safety WDL: WDL  Patient Location: group room  Observed Behavior: calm  Observed Behavior (Comment): partcipating  Airway Safety Measures: all equipment/monitors on and audible  Safety Measures: environmental rounds completed, safety rounds completed  Diversional Activity: television  Suicidality: Status 15  Assault: status 15  Elopement: status 15  Sexual: status 15, semi-private room

## 2022-06-15 NOTE — PROGRESS NOTES
"PSYCHIATRY  PROGRESS NOTE     DATE OF SERVICE   06/15/2022       CHIEF COMPLAINT   \"I am doing better\".       SUBJECTIVE   Nursing reports: Patient had her 11th ECT today and she replied it was ok, she denied pain and within .30minutes of returning to unit she was up and about and affect was bright and she was med compliant and appetite was adequate. She has attended groups today and performs adls independently, she denies SI, anxiety but admits to depression of 10/10 because of beginning  new medication (today-6-15) (viinbryd - antidepressant), she will not discharge Friday as hoped, but will remain throughout weekend for monitoring,  this has caused patient to become very tearful and sad. 'I am so disappointed,\"  I continue to reassure / comfort patient as needed.      Patient has been discussed with the  earlier today.   is aware that we might not discharge the patient on Friday as the patient has decided to change the antidepressant from Effexor to Viibryd.     OBJECTIVE   Patient was seen and evaluated in the consult room by herself, this was a face-to-face evaluation.  Patient reported that she is feeling better and has not experienced any side effects from either the decrease of Effexor or the initiation of Viibryd.  Patient stated that she is beginning to feel more stable and is looking forward to be able to discharge home.  I have reviewed with the patient that we are having a care conference on Friday but my recommendation will be for her to stay in the hospital until we are fully able to switch her to Viibryd.  I discussed with the patient that I would like to monitor her tolerance and response to the medication.  Patient was a little bit discouraged but she understands.  I reviewed with the patient that we are focusing on getting her stable enough in order for her not having to return to the hospital.  Patient verbalized good understanding and she is in agreement with the plan.  " "Patient also discusses interest in doing maintenance ECT and she will be communicating with her  in order to see what is his opinion on the matter.  At the moment the patient is denying having any thoughts of harming herself or harming others and she is angelina for safety.    Because of Viibryd has been discussed with the patient and she said that she will be willing to pay for this medication.     MEDICATIONS   Medications:  Scheduled Meds:    calcium carbonate 600 mg-vitamin D 400 units  1 tablet Oral Daily     melatonin  5 mg Oral At Bedtime     mirtazapine  30 mg Oral At Bedtime     risperiDONE  0.5 mg Oral At Bedtime     senna-docusate  1 tablet Oral BID     simvastatin  40 mg Oral At Bedtime     timolol maleate  1 drop Both Eyes Daily     [START ON 6/16/2022] venlafaxine  150 mg Oral Daily with breakfast     [START ON 6/21/2022] venlafaxine  37.5 mg Oral Daily with breakfast     [START ON 6/18/2022] venlafaxine  75 mg Oral Daily with breakfast     vilazodone  10 mg Oral Daily     Continuous Infusions:  PRN Meds:.acetaminophen, alum & mag hydroxide-simethicone, hydrOXYzine, magnesium hydroxide, OLANZapine **OR** OLANZapine, traZODone    Medication adherence issues: MS Med Adherence Y/N: Yes, Hospitalization  Medication side effects: MEDICATION SIDE EFFECTS: no side effects reported  Benefit: Yes / No: Yes       ROS   A comprehensive review of systems was negative.       MENTAL STATUS EXAM   Vitals: BP (!) 153/86 (Cuff Size: Adult Regular)   Pulse 120   Temp 97.5  F (36.4  C)   Resp 23   Wt 64.8 kg (142 lb 14.4 oz)   SpO2 98%   BMI 24.53 kg/m      Appearance:  No apparent distress  Mood: \"Feeling better\"  Affect: Calm and pleasant  Suicidal Ideation: PRESENT / ABSENT: absent   Homicidal Ideation: PRESENT / ABSENT: absent   Thought process: unremarkable   Thought content: devoid of  suicidal and violent ideation.   Fund of Knowledge: Average  Attention/Concentration: Normal  Language ability:  " Intact  Memory:  Immediate recall intact, Short-term memory intact and Long-term memory intact  Insight:  fair.  Judgement: fair  Orientation: Yes, x4  Psychomotor Behavior: normal or unremarkable    Muscle Strength and Tone: MuscleStrength: Normal  Gait and Station: stable        LABS   personally reviewed.     No results found for: PHENYTOIN, PHENOBARB, VALPROATE, CBMZ       DIAGNOSIS   Principal Problem:    <principal problem not specified>    Active Problem List:  Patient Active Problem List   Diagnosis     Allergic rhinitis     Symptomatic menopausal or female climacteric states     Mixed hyperlipidemia     Anxiety state     Insomnia     Anal fissure     Major depressive disorder, recurrent episode, moderate (H)     Irritable bowel syndrome     ACP (advance care planning)     Health Care Home     Tinnitus     Acute lower gastrointestinal hemorrhage     Abdominal pain     Suicidal ideation     Major depressive disorder, recurrent episode, severe with anxious distress (H)     Major depressive disorder, 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 Ongoing education given regarding diagnostic and treatment options with risks, benefits and alternatives and adequate verbalization of understanding.  2.  Medications:      Remeron 15 mg at bedtime      Effexor 150 mg daily at the same time cross-taper with Viibryd 10 mg daily       Risperdal 0.5 mg at bedtime      melatonin 5 mg at bedtime      As needed trazodone for insomnia.  3.  Medical team to follow the patient as needed  4.   coordinating a safe discharge plan with the patient.        Risk Assessment: [facility] RISK ASSESSMENT: Patient able to contract for safety    Coordination of Care:   Treatment Plan reviewed and physician signed, Care discussed with Care/Treatment Team Members, Chart reviewed and Patient seen      Re-Certification I certify that the inpatient psychiatric facility services furnished since the previous  certification were, and continue to be, medically necessary for, either, treatment which could reasonably be expected to improve the patient s condition or diagnostic study and that the hospital records indicate that the services furnished were, either, intensive treatment services, admission and related services necessary for diagnostic study, or equivalent services.     I certify that the patient continues to need, on a daily basis, active treatment furnished directly by or requiring the supervision of inpatient psychiatric facility personnel.   I estimate 14 days of hospitalization is necessary for proper treatment of the patient. My plans for post-hospital care for this patient are  home with family     Bri Elliott MD    -     06/15/2022  -     2:56 PM    Total time 25 minutes with > 50%spent on coordination of cares and psycho-education.    This note was created with help of Dragon dictation system. Grammatical / typing errors are not intentional.    Bri Elliott MD

## 2022-06-15 NOTE — ANESTHESIA POSTPROCEDURE EVALUATION
Impression: Cortical age-related cataract, bilateral: H25.013.  Plan: monitor Patient: Lorena Hayden    Procedure: * No procedures listed *       Anesthesia Type:  General    Note:  Disposition: Inpatient   Postop Pain Control: Uneventful            Sign Out: Well controlled pain   PONV: No   Neuro/Psych: Uneventful            Sign Out: Acceptable/Baseline neuro status   Airway/Respiratory: Uneventful            Sign Out: Acceptable/Baseline resp. status   CV/Hemodynamics: Uneventful            Sign Out: Acceptable CV status; No obvious hypovolemia; No obvious fluid overload   Other NRE: NONE   DID A NON-ROUTINE EVENT OCCUR? No           Last vitals:  Vitals:    06/15/22 0925 06/15/22 0940 06/15/22 0950   BP: 124/76 (!) 144/85 (!) 153/86   Pulse: 110 119 120   Resp: 20 22 23   Temp: 36.4  C (97.5  F) 36.5  C (97.7  F) 36.4  C (97.5  F)   SpO2: 99% 98% 98%       Electronically Signed By: Petra Peters MD  Nisreen 15, 2022  10:06 AM

## 2022-06-15 NOTE — PROCEDURES
Procedure/Surgery Information   [facility]    Bedside Procedure Note  Date of Service (when I performed the procedure): 06/15/2022    Lorena Hayden is a 76 year old female patient.  1. 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 Major depressive disorder, recurrent episode, severe with anxious distress (H)      Past Medical History:   Diagnosis Date     Allergic rhinitis, cause unspecified      Anxiety      Depressive disorder      Temp: 97.3  F (36.3  C) Temp src: Oral BP: 117/70 Pulse: 76   Resp: 16 SpO2: 97 % O2 Device: None (Room air)      Procedures       Marvin March MD     Lorena Hayden is a 76 year old  year old female patient.  [id number]  @DX@    [facility]   ECT Procedure Note   06/15/2022    Patient Status: Inpatient    Is this the first in a series of 12 treatments?  No     Allergies   Allergen Reactions     Horse Allergy      Allergic to horse hair       Weight:  142 lbs 14.4 oz         Indications for ECT:   Medications ineffective         Clinical Narrative:   Lorena Hayden is a 76 year old woman with a history of depression, started in her mid 60s. Her depression was relatively well-controlled on duloxetine until February 2022, since when she has had progressive depression with suicidal thoughts without an identifiable trigger. Multiple medication trials yielded to no improvement (Increased duloxetine, added aripiprazole then bupropion, now cross titrating to desvenlafaxine). She had a recent psychiatric hospitalization in [facility] in [place], discharged only two weeks ago. However, depression persisted, she had more thoughts that life was not worth living and thoughts of suicide.  She also reported difficulties with sleep, low energy, poor appetite with significant weight loss of about 20 pounds.  She finally came to the hospital and requested to  be hospitalized.     Psychiatric History:   Diagnoses: Major Depressive Disorder     Hospitalizations: Two former (first in 2013, last in 2022)     Suicide Attempts: None     Medication Trials: Ativan, trazodone, Klonopin, Lexapro, Wellbutrin, Celexa, Seroquel, Zoloft, Abilify, currently Effexor and Cymbalta. No TCAs or MAOIs.     Neuromodulation:  Right unilateral ultrabrief ECT x 19 sessions in 2012. She had a good response during treatment which lasted only several weeks after the discontinuation.     Psychotherapy:  None            Diagnosis:   Major depression         Assessment:   #1 she reports her depression is severe and decreases pleasure and motivation and she can be very paralyzed and indecisive with anxiety. ECT was helpful before  #2  She is irritable she was awoken early but her appt pushed back. She denies discomfort after last appointment. Some SI but she feels it is  becuase she is so anxious waiting.  #3 sleeping well with trazodone + remeron, she reports no worsening as in in fact improving no SI today, no tremor and less anxious, less catastrophizing thoughts. Her dog nehemias is awaiting her at home. She feels her thinking is improved not worsened since starting ECT  #4 Today she is flustered she has trouble putting her depression intensity on a scale but admits sadness, no tearfulness, no Si today and denies complaints about recovery process of ECT. She denies worsening anxiety and she slept well overnight.  #5 She is irritable and anxious today. She I not yet feeling improvement in mood. She struggled with the transfer of units to  it disrupted her bedtime and she did not sleep well. She finds the ECT frightening.   #6 reports form the floor report unchanged mood so far. Less irritable and anxious today 1/10 ( mood worst it has ever been ) but SI has resolved.   2/3 at 5min recall Will increase to 9x ST  #7 more verbose today. She is frustrated that her meal will be cold by the time she  returns for breakfast.  She reports mood is improving some. She denies physical side effects   #8 6/10 mood (10 best) no anxiety between treatments. She feels very fatigued after treatments and needs to nap more. denies concern for confusion or insomnia. denies concerns form family about confusion.  #9 mood still 6/10 she is anxious because she didn't sleep last night she fears trazodone will no longer work for her. Less irritable. Mild nausea this AM and increased intensity of RLS  #10 mood unchanged no drift. She denies SI over the weekend and anxiety well controlled. She acknowledges need for continued oral medications after ECT to sustain benefit and by her history maintenance ECT is recommended  #11 doing well anxiety much decreased mood 6/10           Pause for the Cause:     Right patient Yes   Right procedure/laterality settings: Yes          Intra-Procedure Documentation:       ECT #: 11   Treatment number this series: 11   Total treatment number: 31     Type of ECT:  Right, unilateral ultrabrief    ECT Medications:    Etomidate: 16mg  Succinyl Choline: 60mg  zofran 4mg  Versed 0.5mg for agitation  Nicardipine 1000mg esmolol 60mg      ECT Strip Summary:    Titration ( 19.2 percent ) Energy Level:172.8mc  0.3ms 45 Hz time 8 sec 800mA  Motor Seizure Duration:  36  seconds  EEG Seizure Duration: 57 seconds      Complications: None        Plan:    Prefers earliest appointment to decrease pre-procedure anxiety.      If she has consistent transportation and supervision for at least 6hrs after treatment she can complete her course outpatient when appropriate    Continue ECT  Until Tx 12. Recommend maintenance 1per week for 4 weeks then further spacing      Monitor depression severity with clinical assessment augmented with CUDOS every treatment  Continue current medications    MD Rod

## 2022-06-16 PROCEDURE — 250N000013 HC RX MED GY IP 250 OP 250 PS 637: Performed by: PSYCHIATRY & NEUROLOGY

## 2022-06-16 PROCEDURE — 124N000003 HC R&B MH SENIOR/ADOLESCENT

## 2022-06-16 PROCEDURE — G0177 OPPS/PHP; TRAIN & EDUC SERV: HCPCS

## 2022-06-16 PROCEDURE — 99231 SBSQ HOSP IP/OBS SF/LOW 25: CPT | Mod: 95 | Performed by: PSYCHIATRY & NEUROLOGY

## 2022-06-16 PROCEDURE — H2032 ACTIVITY THERAPY, PER 15 MIN: HCPCS

## 2022-06-16 RX ORDER — VILAZODONE HYDROCHLORIDE 10 MG/1
20 TABLET ORAL DAILY
Status: DISCONTINUED | OUTPATIENT
Start: 2022-06-17 | End: 2022-06-24 | Stop reason: HOSPADM

## 2022-06-16 RX ADMIN — RISPERIDONE 0.5 MG: 0.25 TABLET ORAL at 21:27

## 2022-06-16 RX ADMIN — CALCIUM CARBONATE 600 MG (1,500 MG)-VITAMIN D3 400 UNIT TABLET 1 TABLET: at 08:23

## 2022-06-16 RX ADMIN — VENLAFAXINE HYDROCHLORIDE 150 MG: 150 TABLET, EXTENDED RELEASE ORAL at 08:23

## 2022-06-16 RX ADMIN — TIMOLOL MALEATE 1 DROP: 5 SOLUTION OPHTHALMIC at 08:23

## 2022-06-16 RX ADMIN — SENNOSIDES AND DOCUSATE SODIUM 1 TABLET: 50; 8.6 TABLET ORAL at 21:27

## 2022-06-16 RX ADMIN — VILAZODONE HYDROCHLORIDE 10 MG: 10 TABLET ORAL at 08:23

## 2022-06-16 RX ADMIN — SIMVASTATIN 40 MG: 20 TABLET, FILM COATED ORAL at 21:27

## 2022-06-16 RX ADMIN — SENNOSIDES AND DOCUSATE SODIUM 1 TABLET: 50; 8.6 TABLET ORAL at 08:23

## 2022-06-16 RX ADMIN — MIRTAZAPINE 30 MG: 15 TABLET, FILM COATED ORAL at 21:27

## 2022-06-16 RX ADMIN — Medication 5 MG: at 21:27

## 2022-06-16 ASSESSMENT — ACTIVITIES OF DAILY LIVING (ADL)
DRESS: INDEPENDENT
HYGIENE/GROOMING: INDEPENDENT
HYGIENE/GROOMING: INDEPENDENT
ORAL_HYGIENE: INDEPENDENT
LAUNDRY: WITH SUPERVISION
LAUNDRY: WITH SUPERVISION
DRESS: INDEPENDENT
ORAL_HYGIENE: INDEPENDENT

## 2022-06-16 NOTE — PROGRESS NOTES
"PSYCHIATRY  PROGRESS NOTE     DATE OF SERVICE   06/16/2022  The patient is a 76 year old female who is being evaluated via a video billable telemedicine visit. The patient/guardian has consented to being seen via telemedicine. The provider was in front of a computer in a home office. The patient was on the inpatient unit at [facility].    Start time: 10:47 AM  Stop time: 10:55 AM    The patient/guardian has been notified of the following:     This telemedicine visit is conducted live between you and your clinician. We have found that certain health care needs can be provided without the need for a physical exam. This service lets us provide the care you need with a telemedicine conversation.           CHIEF COMPLAINT   \"I have been doing okay\".       SUBJECTIVE   Nursing reports: Pt states her depression is \"unchanged\" and has a flat affect. Denies anxiety this shift. Pt complains of poor sleep over night, states she is laying and resting her eyes but she is unable to sleep. Pt did lay down after breakfast for 2 hours and appeared to be sleeping but states she was just \"resting\". Pt is in the process of a cross taper of medications and staying in the hospital for monitoring of symptoms and patient is coming to terms with this. Pt did attend dance group and was social with peers after lunch.     Patient has been discussed with the  earlier today.  Care conference is scheduled for tomorrow.     OBJECTIVE   Patient was seen and evaluated in the consult room with nurse practitioner student present during the assessment, this was done with the use of telehealth.  Patient verbalized that she feels she is doing okay today.  Patient is denying having any side effects from the Viibryd or the decreased dose of Effexor.  Patient only mentioned feeling dizzy at times but this is transient.  Patient feels that her depression has continued to be the same but understands that she just started on the new " "medication.  Again I reviewed with the patient that we are looking to discharge her tentatively Friday, June 24 as I want to make sure she transitions to the Viibryd with no problems.  Patient verbalized good understanding and is open to discuss this with her family.    On the other hand patient has continued to look brighter, her mood is more stable and she has not had any episodes of crying as she did before.  Patient has continued to denied any thoughts of harming herself and denies being anxious.  Patient has been attending most groups and she is very social with her peers.     MEDICATIONS   Medications:  Scheduled Meds:    calcium carbonate 600 mg-vitamin D 400 units  1 tablet Oral Daily     melatonin  5 mg Oral At Bedtime     mirtazapine  30 mg Oral At Bedtime     risperiDONE  0.5 mg Oral At Bedtime     senna-docusate  1 tablet Oral BID     simvastatin  40 mg Oral At Bedtime     timolol maleate  1 drop Both Eyes Daily     venlafaxine  150 mg Oral Daily with breakfast     [START ON 6/21/2022] venlafaxine  37.5 mg Oral Daily with breakfast     [START ON 6/18/2022] venlafaxine  75 mg Oral Daily with breakfast     [START ON 6/17/2022] vilazodone  20 mg Oral Daily     Continuous Infusions:  PRN Meds:.acetaminophen, alum & mag hydroxide-simethicone, hydrOXYzine, magnesium hydroxide, OLANZapine **OR** OLANZapine, traZODone    Medication adherence issues: MS Med Adherence Y/N: Yes, Hospitalization  Medication side effects: MEDICATION SIDE EFFECTS: no side effects reported  Benefit: Yes / No: Yes       ROS   A comprehensive review of systems was negative.       MENTAL STATUS EXAM   Vitals: /75   Pulse 93   Temp 97.3  F (36.3  C) (Temporal)   Resp 16   Wt 65.2 kg (143 lb 12.8 oz)   SpO2 95%   BMI 24.68 kg/m      Appearance:  No apparent distress  Mood: \"I am doing okay\"  Affect: Brighter, pleasant and calm  Suicidal Ideation: PRESENT / ABSENT: absent   Homicidal Ideation: PRESENT / ABSENT: absent   Thought " process: unremarkable   Thought content: devoid of  suicidal and violent ideation.   Fund of Knowledge: Average  Attention/Concentration: Normal  Language ability:  Intact  Memory:  Immediate recall intact, Short-term memory intact and Long-term memory intact  Insight:  fair.  Judgement: fair  Orientation: Yes, x4  Psychomotor Behavior: normal or unremarkable    Muscle Strength and Tone: MuscleStrength: Normal  Gait and Station: stable        LABS   personally reviewed.     No results found for: PHENYTOIN, PHENOBARB, VALPROATE, CBMZ       DIAGNOSIS   Principal Problem:    <principal problem not specified>    Active Problem List:  Patient Active Problem List   Diagnosis     Allergic rhinitis     Symptomatic menopausal or female climacteric states     Mixed hyperlipidemia     Anxiety state     Insomnia     Anal fissure     Major depressive disorder, recurrent episode, moderate (H)     Irritable bowel syndrome     ACP (advance care planning)     Health Care Home     Tinnitus     Acute lower gastrointestinal hemorrhage     Abdominal pain     Suicidal ideation     Major depressive disorder, recurrent episode, severe with anxious distress (H)     Major depressive disorder, 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 Ongoing education given regarding diagnostic and treatment options with risks, benefits and alternatives and adequate verbalization of understanding.  2.  Medications:      Remeron 15 mg at bedtime      Decrease Effexor 150 mg daily at the same time cross-taper with Viibryd 10 mg daily starting tomorrow      Risperdal 0.5 mg at bedtime      melatonin 5 mg at bedtime      As needed trazodone for insomnia.  3.  Medical team to follow the patient as needed  4.   coordinating a safe discharge plan with the patient.        Risk Assessment: [facility] RISK ASSESSMENT: Patient able to contract for safety    Coordination of Care:   Treatment Plan reviewed and physician signed, Care  discussed with Care/Treatment Team Members, Chart reviewed and Patient seen      Re-Certification I certify that the inpatient psychiatric facility services furnished since the previous certification were, and continue to be, medically necessary for, either, treatment which could reasonably be expected to improve the patient s condition or diagnostic study and that the hospital records indicate that the services furnished were, either, intensive treatment services, admission and related services necessary for diagnostic study, or equivalent services.     I certify that the patient continues to need, on a daily basis, active treatment furnished directly by or requiring the supervision of inpatient psychiatric facility personnel.   I estimate 14 days of hospitalization is necessary for proper treatment of the patient. My plans for post-hospital care for this patient are  home with family     Bri Elliott MD    -     06/16/2022  -     1:19 PM    Total time 15 minutes with > 50%spent on coordination of cares and psycho-education.    This note was created with help of Dragon dictation system. Grammatical / typing errors are not intentional.    Bri Elliott MD

## 2022-06-16 NOTE — PLAN OF CARE
Occupational Therapy Group Note:     06/16/22 1500   Group Therapy Session   Group Attendance attended group session   Time Session Began 1315   Time Session Ended 1445   Total Time patient participated (minutes) 60   Total # Attendees 6-7   Group Type life skill   Group Topic Covered balanced lifestyle;emotions/expression;structured socialization   Group Session Detail music & emotions discussion   Patient Response/Contribution cooperative with task   Patient Response Detail Pt actively participated in a structured occupational therapy group with a focus on connecting song titles to life events and personal feelings. Using a list of song titles, pt identified [place], What A Wonderful [place], and I Can See Clearly Now as song titles that relate to her life. Politely declined the visual scanning task offered, instead requesting to relax and listen to music. Shared that she enjoys listening to country music and Mehran Jennifer. Calm and pleasant. Affect appeared brighter in interactions in comparison to group with writer last week.

## 2022-06-16 NOTE — PLAN OF CARE
06/15/22 0100   Group Therapy Session   Group Attendance attended group session   Time Session Began 0100   Time Session Ended 0130   Total Time patient participated (minutes) 30   Total # Attendees 5   Group Type psychotherapeutic   Group Topic Covered coping skills/lifestyle management   Group Session Detail Ted that guid choices in mental health recovery   Patient Response/Contribution cooperative with task   Patient Response Detail Nani actively participated giving and recieving feed back from peers

## 2022-06-16 NOTE — PLAN OF CARE
Problem: Sleep Disturbance (Depressive Signs/Symptoms)  Goal: Improved Sleep (Depressive Signs/Symptoms)  Outcome: Ongoing, Progressing   Goal Outcome Evaluation:    Patient was still awake at the start of the shift and verbalized that she was having difficulty in sleeping. She requested for Trazodone and she was reminded that she has taken it already earlier in the evening. Atarax 25 mg.was offered and accepted by the patient @ 2344 PRN for anxiety. She was noted to be sleeping @ 0030.     Slept a total of 7.25 hours. No other complaints presented.

## 2022-06-16 NOTE — PLAN OF CARE
"Goal Outcome Evaluation:    Plan of Care Reviewed With: patient     Overall Patient Progress: improving         Pt states her depression is \"unchanged\" and has a flat affect. Denies anxiety this shift. Pt complains of poor sleep over night, states she is laying and resting her eyes but she is unable to sleep. Pt did lay down after breakfast for 2 hours and appeared to be sleeping but states she was just \"resting\". Pt is in the process of a cross taper of medications and staying in the hospital for monitoring of symptoms and patient is coming to terms with this. Pt did attend dance group and was social with peers after lunch.   "

## 2022-06-16 NOTE — PROGRESS NOTES
Pt participated in dance/movement therapy (D/MT) using socially-connecting movements and affirmations of self-worth and self-acceptance.  Patients used the body and movements as a metaphor for both being human and a miracle.  Pt was more relaxed in her self-expression and her social engagement.  She sat and moved with more self-assurance and ease with her social interactions.  She smiled often.       06/16/22 1130   Group Therapy Session   Group Attendance attended group session   Total Time patient participated (minutes) 50   Group Type expressive therapy;psychotherapeutic   Group Topic Covered community integration;self-care activities;structured socialization

## 2022-06-16 NOTE — PLAN OF CARE
06/15/22 2000   Group Therapy Session   Group Attendance attended group session   Time Session Began 1900   Time Session Ended 1945   Total Time patient participated (minutes) 45   Total # Attendees 4   Group Type expressive therapy  (art therapy)   Group Topic Covered relaxation techniques   Group Session Detail painting to music   Patient Response/Contribution cooperative with task;organized   Patient Response Detail Cayla presented as calm and cooperative. She participated in practicing mindfulness by painting to music. She appeared calm and focused while painting and experimented freely. She was proud of her creation and reported enjoying the experience. She interacted appropriately with peers.   Goal Outcome Evaluation:

## 2022-06-16 NOTE — PLAN OF CARE
"  Problem: Decreased Participation/Engagement (Depressive Signs/Symptoms)  Goal: Increased Participation and Engagement (Depressive Signs/Symptoms)  Intervention: Facilitate Participation and Engagement  Recent Flowsheet Documentation  Taken 6/15/2022 2200 by Ihsan Del Angel RN  Diversional Activity: television  Taken 6/15/2022 1317 by Ihsan Del Angel RN  Diversional Activity: television   Goal Outcome Evaluation:    Plan of Care Reviewed With: patient     Vitals:B/P: 106/64, T: 98.4, P: 91, R: 23    Symptomatolgy: Suicide, Fall risk     Summary: patient was semi sad this shift d/t not being discharged as planned on Friday, with the start of her new medication MD Imelda explained to her the benefit of staying to monitor, again patient was sad, but ultimately understood reasoning, she is hopeful for discharge on Monday, she counted the days that she would have been on this medication and is hopeful MD will think that is long enough for monitoring any SE.  Patient was able to interact appropriately with peers and intake was adequate, she denied \"depression\", just sad at the outcome of staying more days, denied anxiety and contracted for safety, noted no AH/VH,     PRN: 100mg trazdone @[facility]                "

## 2022-06-17 ENCOUNTER — APPOINTMENT (OUTPATIENT)
Dept: BEHAVIORAL HEALTH | Facility: CLINIC | Age: 77
DRG: 885 | End: 2022-06-17
Attending: PSYCHIATRY & NEUROLOGY
Payer: COMMERCIAL

## 2022-06-17 ENCOUNTER — ANESTHESIA EVENT (OUTPATIENT)
Dept: BEHAVIORAL HEALTH | Facility: CLINIC | Age: 77
DRG: 885 | End: 2022-06-17
Payer: COMMERCIAL

## 2022-06-17 ENCOUNTER — ANESTHESIA (OUTPATIENT)
Dept: BEHAVIORAL HEALTH | Facility: CLINIC | Age: 77
DRG: 885 | End: 2022-06-17
Payer: COMMERCIAL

## 2022-06-17 PROCEDURE — 124N000003 HC R&B MH SENIOR/ADOLESCENT

## 2022-06-17 PROCEDURE — 250N000011 HC RX IP 250 OP 636: Performed by: PSYCHIATRY & NEUROLOGY

## 2022-06-17 PROCEDURE — 90870 ELECTROCONVULSIVE THERAPY: CPT

## 2022-06-17 PROCEDURE — 90853 GROUP PSYCHOTHERAPY: CPT

## 2022-06-17 PROCEDURE — 90870 ELECTROCONVULSIVE THERAPY: CPT | Performed by: PSYCHIATRY & NEUROLOGY

## 2022-06-17 PROCEDURE — 250N000009 HC RX 250: Performed by: STUDENT IN AN ORGANIZED HEALTH CARE EDUCATION/TRAINING PROGRAM

## 2022-06-17 PROCEDURE — 250N000011 HC RX IP 250 OP 636: Performed by: STUDENT IN AN ORGANIZED HEALTH CARE EDUCATION/TRAINING PROGRAM

## 2022-06-17 PROCEDURE — 370N000017 HC ANESTHESIA TECHNICAL FEE, PER MIN

## 2022-06-17 PROCEDURE — 250N000013 HC RX MED GY IP 250 OP 250 PS 637: Performed by: PSYCHIATRY & NEUROLOGY

## 2022-06-17 PROCEDURE — 99233 SBSQ HOSP IP/OBS HIGH 50: CPT | Mod: 25 | Performed by: PSYCHIATRY & NEUROLOGY

## 2022-06-17 PROCEDURE — H2032 ACTIVITY THERAPY, PER 15 MIN: HCPCS

## 2022-06-17 RX ORDER — OXYCODONE HYDROCHLORIDE 5 MG/1
5 TABLET ORAL EVERY 4 HOURS PRN
Status: DISCONTINUED | OUTPATIENT
Start: 2022-06-17 | End: 2022-06-17

## 2022-06-17 RX ORDER — ONDANSETRON 4 MG/1
4 TABLET, ORALLY DISINTEGRATING ORAL EVERY 30 MIN PRN
Status: DISCONTINUED | OUTPATIENT
Start: 2022-06-17 | End: 2022-06-17

## 2022-06-17 RX ORDER — ESMOLOL HYDROCHLORIDE 10 MG/ML
INJECTION INTRAVENOUS PRN
Status: DISCONTINUED | OUTPATIENT
Start: 2022-06-17 | End: 2022-06-17

## 2022-06-17 RX ORDER — SODIUM CHLORIDE, SODIUM LACTATE, POTASSIUM CHLORIDE, CALCIUM CHLORIDE 600; 310; 30; 20 MG/100ML; MG/100ML; MG/100ML; MG/100ML
INJECTION, SOLUTION INTRAVENOUS CONTINUOUS
Status: DISCONTINUED | OUTPATIENT
Start: 2022-06-17 | End: 2022-06-17

## 2022-06-17 RX ORDER — NICARDIPINE HCL-0.9% SOD CHLOR 1 MG/10 ML
SYRINGE (ML) INTRAVENOUS PRN
Status: DISCONTINUED | OUTPATIENT
Start: 2022-06-17 | End: 2022-06-17

## 2022-06-17 RX ORDER — ETOMIDATE 2 MG/ML
INJECTION INTRAVENOUS PRN
Status: DISCONTINUED | OUTPATIENT
Start: 2022-06-17 | End: 2022-06-17

## 2022-06-17 RX ORDER — ONDANSETRON 2 MG/ML
4 INJECTION INTRAMUSCULAR; INTRAVENOUS EVERY 30 MIN PRN
Status: DISCONTINUED | OUTPATIENT
Start: 2022-06-17 | End: 2022-06-17

## 2022-06-17 RX ORDER — HYDROMORPHONE HCL IN WATER/PF 6 MG/30 ML
0.2 PATIENT CONTROLLED ANALGESIA SYRINGE INTRAVENOUS EVERY 5 MIN PRN
Status: DISCONTINUED | OUTPATIENT
Start: 2022-06-17 | End: 2022-06-17

## 2022-06-17 RX ADMIN — Medication 1000 MCG: at 08:09

## 2022-06-17 RX ADMIN — TIMOLOL MALEATE 1 DROP: 5 SOLUTION OPHTHALMIC at 09:31

## 2022-06-17 RX ADMIN — SIMVASTATIN 40 MG: 20 TABLET, FILM COATED ORAL at 21:18

## 2022-06-17 RX ADMIN — MIDAZOLAM HYDROCHLORIDE 0.5 MG: 1 INJECTION, SOLUTION INTRAMUSCULAR; INTRAVENOUS at 08:12

## 2022-06-17 RX ADMIN — SENNOSIDES AND DOCUSATE SODIUM 1 TABLET: 50; 8.6 TABLET ORAL at 21:18

## 2022-06-17 RX ADMIN — VENLAFAXINE HYDROCHLORIDE 150 MG: 150 TABLET, EXTENDED RELEASE ORAL at 09:31

## 2022-06-17 RX ADMIN — SENNOSIDES AND DOCUSATE SODIUM 1 TABLET: 50; 8.6 TABLET ORAL at 09:30

## 2022-06-17 RX ADMIN — TRAZODONE HYDROCHLORIDE 100 MG: 100 TABLET ORAL at 21:24

## 2022-06-17 RX ADMIN — Medication 16 MG: at 08:09

## 2022-06-17 RX ADMIN — MIRTAZAPINE 30 MG: 15 TABLET, FILM COATED ORAL at 21:18

## 2022-06-17 RX ADMIN — RISPERIDONE 0.5 MG: 0.25 TABLET ORAL at 21:18

## 2022-06-17 RX ADMIN — Medication 5 MG: at 21:18

## 2022-06-17 RX ADMIN — VILAZODONE HYDROCHLORIDE 20 MG: 10 TABLET ORAL at 09:29

## 2022-06-17 RX ADMIN — ESMOLOL HYDROCHLORIDE 100 MG: 10 INJECTION, SOLUTION INTRAVENOUS at 08:14

## 2022-06-17 RX ADMIN — CALCIUM CARBONATE 600 MG (1,500 MG)-VITAMIN D3 400 UNIT TABLET 1 TABLET: at 09:30

## 2022-06-17 RX ADMIN — SUCCINYLCHOLINE CHLORIDE 60 MG: 20 INJECTION, SOLUTION INTRAMUSCULAR; INTRAVENOUS; PARENTERAL at 08:10

## 2022-06-17 ASSESSMENT — ACTIVITIES OF DAILY LIVING (ADL)
LAUNDRY: WITH SUPERVISION
ORAL_HYGIENE: INDEPENDENT
HYGIENE/GROOMING: INDEPENDENT
DRESS: INDEPENDENT

## 2022-06-17 NOTE — PROGRESS NOTES
"PSYCHIATRY  PROGRESS NOTE     DATE OF SERVICE   06/17/2022       CHIEF COMPLAINT   \"I have been doing okay\".       SUBJECTIVE   Nursing reports: Pt slept 7.5 hours overnight; Pt oriented x 4; Pt pleasant and cooperative upon approach;  pt compliant with medications and cares;  Pt rates eating and hydrating adequately; Pt isolative to room/self; did not attend groups; speech fluent and appropriate; Pt denied SI, SIB, HI, and hallucinations; Pt affect flat and blunted; mood depressed;  Pt received and tolerated ECT procedure at 0740 today.  When pt was asked if she felt improvement in her mood she indicated that she does not \"feel a change yet,\" but is hopeful.    Patient has been discussed with the  earlier today.   was present during the care conference.  Please see 's notes for more details.     OBJECTIVE   Today we had a care conference with patient's daughter-in-law, sister and , , nurse practitioner student and this writer.  The purpose of the meeting was to discuss medication changes, tentative discharge plans and how the patient is progressing.  Family received an update about the reasons why the patient was started on Risperdal, use of Viibryd and discontinuation of Effexor.  I informed the family that the last dose of Effexor will be next Wednesday, I would like for the patient to receive 1 more ECT treatment next Friday and then discharged home.  My recommendation will be for the patient to do maintenance ECT and if the patient decides to do this then we will help with scheduling appointments.   had an opportunity to discuss with the family that appointments that we have been able to make.  We are still waiting to hear from the IOP program but we are hoping that by the end of next week we are going to have at least a clear picture of when the patient will be able to start.  Family verbalized good understanding, they are in agreement " "with the treatment and discharge plans and they agree that the patient seemed to be doing better.    Patient was then brought into the meeting and the same information that it was discussed with the family I discussed with the patient.  Initially the patient was adamant about staying on until next Friday but after thinking about it she did agreed with the plan.  She would like to talk to her  about continuing ECT treatments as he will be the one providing transportation.  At the moment the patient denies having any thoughts about harming herself or harming others.  She is able to contract for safety and verbalized that she is feeling more ready to return home.     MEDICATIONS   Medications:  Scheduled Meds:    calcium carbonate 600 mg-vitamin D 400 units  1 tablet Oral Daily     melatonin  5 mg Oral At Bedtime     mirtazapine  30 mg Oral At Bedtime     risperiDONE  0.5 mg Oral At Bedtime     senna-docusate  1 tablet Oral BID     simvastatin  40 mg Oral At Bedtime     timolol maleate  1 drop Both Eyes Daily     [START ON 6/21/2022] venlafaxine  37.5 mg Oral Daily with breakfast     [START ON 6/18/2022] venlafaxine  75 mg Oral Daily with breakfast     vilazodone  20 mg Oral Daily     Continuous Infusions:  PRN Meds:.acetaminophen, alum & mag hydroxide-simethicone, hydrOXYzine, magnesium hydroxide, OLANZapine **OR** OLANZapine, traZODone    Medication adherence issues: MS Med Adherence Y/N: Yes, Hospitalization  Medication side effects: MEDICATION SIDE EFFECTS: no side effects reported  Benefit: Yes / No: Yes       ROS   A comprehensive review of systems was negative.       MENTAL STATUS EXAM   Vitals: /84 (BP Location: Left arm, Patient Position: Sitting)   Pulse 98   Temp 98.1  F (36.7  C) (Oral)   Resp 20   Wt 65.2 kg (143 lb 12.8 oz)   SpO2 96%   BMI 24.68 kg/m      Appearance:  No apparent distress  Mood: \"I am doing okay\"  Affect: Tearful at times especially when talking to her .  Most " of the time the patient was calm during the meeting.  Suicidal Ideation: PRESENT / ABSENT: absent   Homicidal Ideation: PRESENT / ABSENT: absent   Thought process: unremarkable   Thought content: devoid of  suicidal and violent ideation.   Fund of Knowledge: Average  Attention/Concentration: Normal  Language ability:  Intact  Memory:  Immediate recall intact, Short-term memory intact and Long-term memory intact  Insight:  fair.  Judgement: fair  Orientation: Yes, x4  Psychomotor Behavior: normal or unremarkable    Muscle Strength and Tone: MuscleStrength: Normal  Gait and Station: stable        LABS   personally reviewed.     No results found for: PHENYTOIN, PHENOBARB, VALPROATE, CBMZ       DIAGNOSIS   Principal Problem:    <principal problem not specified>    Active Problem List:  Patient Active Problem List   Diagnosis     Allergic rhinitis     Symptomatic menopausal or female climacteric states     Mixed hyperlipidemia     Anxiety state     Insomnia     Anal fissure     Major depressive disorder, recurrent episode, moderate (H)     Irritable bowel syndrome     ACP (advance care planning)     Health Care Home     Tinnitus     Acute lower gastrointestinal hemorrhage     Abdominal pain     Suicidal ideation     Major depressive disorder, recurrent episode, severe with anxious distress (H)     Major depressive disorder, 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 Ongoing education given regarding diagnostic and treatment options with risks, benefits and alternatives and adequate verbalization of understanding.  2.  Medications:      Remeron 30 mg at bedtime      Effexor 75 mg daily at the same time cross-taper with Viibryd 20 mg daily starting tomorrow      Risperdal 0.5 mg at bedtime      melatonin 5 mg at bedtime      As needed trazodone for insomnia.  3.  Medical team to follow the patient as needed  4.   coordinating a safe discharge plan with the patient.        Risk  Assessment: [facility] RISK ASSESSMENT: Patient able to contract for safety    Coordination of Care:   Treatment Plan reviewed and physician signed, Care discussed with Care/Treatment Team Members, Chart reviewed and Patient seen      Re-Certification I certify that the inpatient psychiatric facility services furnished since the previous certification were, and continue to be, medically necessary for, either, treatment which could reasonably be expected to improve the patient s condition or diagnostic study and that the hospital records indicate that the services furnished were, either, intensive treatment services, admission and related services necessary for diagnostic study, or equivalent services.     I certify that the patient continues to need, on a daily basis, active treatment furnished directly by or requiring the supervision of inpatient psychiatric facility personnel.   I estimate 14 days of hospitalization is necessary for proper treatment of the patient. My plans for post-hospital care for this patient are  home with family     Bri Elliott MD    -     06/17/2022  -     3:29 PM    Total time 35 minutes with > 50%spent on coordination of cares and psycho-education.    This note was created with help of Dragon dictation system. Grammatical / typing errors are not intentional.    Bri Elliott MD

## 2022-06-17 NOTE — ANESTHESIA PREPROCEDURE EVALUATION
Anesthesia Pre-Procedure Evaluation    Patient: Lorena Hayden   MRN: [id number] : 1945        Procedure : * No procedures listed *          Past Medical History:   Diagnosis Date     Allergic rhinitis, cause unspecified      Anxiety      Depressive disorder       Past Surgical History:   Procedure Laterality Date     C IMPLANT HALLUX  3/2006 &     Yesi STEELE MAMMOGRAM, SCREENING  2011    Dr arredondo     COLONOSCOPY  2014    Mirian Mottamalika     EXTRACAPSULAR CATARACT EXTRATION WITH INTRAOCULAR LENS IMPLANT  2011     HC DILATION/CURETTAGE DIAG/THER NON OB  2005    D & C     HCL PAP SMEAR  2011    Dr Arredondo     ZZC  DELIVERY ONLY       ZZC LIGATE FALLOPIAN TUBE      Tubal Ligation     ZZHC COLONOSCOPY THRU STOMA W BIOPSY/CAUTERY TUMOR/POLYP/LESION  / /2011    benign polyp      Allergies   Allergen Reactions     Horse Allergy      Allergic to horse hair      Social History     Tobacco Use     Smoking status: Never Smoker     Smokeless tobacco: Never Used   Substance Use Topics     Alcohol use: Yes     Alcohol/week: 0.8 standard drinks     Types: 1 drink(s) per week     Comment: social      Wt Readings from Last 1 Encounters:   22 65.2 kg (143 lb 12.8 oz)        Anesthesia Evaluation   Pt has had prior anesthetic.     No history of anesthetic complications       ROS/MED HX  ENT/Pulmonary:     (+) allergic rhinitis,     Neurologic:       Cardiovascular:       METS/Exercise Tolerance:     Hematologic:       Musculoskeletal:       GI/Hepatic:       Renal/Genitourinary:       Endo:       Psychiatric/Substance Use:     (+) psychiatric history anxiety and depression     Infectious Disease:       Malignancy:       Other:            Physical Exam    Airway        Mallampati: II   TM distance: < 3 FB   Neck ROM: full   Mouth opening: > 3 cm    Respiratory Devices and Support         Dental  no notable dental history         Cardiovascular   cardiovascular exam normal           Pulmonary   pulmonary exam normal                OUTSIDE LABS:  CBC:   Lab Results   Component Value Date    WBC 5.4 05/14/2022    WBC 4.3 05/03/2013    HGB 15.4 05/14/2022    HGB 10.5 (L) 06/29/2016    HCT 47.4 (H) 05/14/2022    HCT 42.4 05/03/2013     05/14/2022     05/03/2013     BMP:   Lab Results   Component Value Date     05/14/2022     04/12/2016    POTASSIUM 3.7 05/14/2022    POTASSIUM 4.1 04/12/2016    CHLORIDE 106 05/14/2022    CHLORIDE 102 04/12/2016    CO2 29 05/14/2022    CO2 25 04/12/2016    BUN 13 05/14/2022    BUN 14 04/12/2016    BUN NOT APPLICABLE 04/12/2016    CR 0.65 05/14/2022    CR 0.75 04/12/2016     (H) 05/14/2022    GLC 97 04/12/2016     COAGS: No results found for: PTT, INR, FIBR  POC: No results found for: BGM, HCG, HCGS  HEPATIC:   Lab Results   Component Value Date    ALBUMIN 3.3 (L) 05/14/2022    PROTTOTAL 7.2 05/14/2022    ALT 24 05/14/2022    AST 21 05/14/2022    ALKPHOS 87 05/14/2022    BILITOTAL 0.6 05/14/2022    BILIDIRECT 0.1 07/28/2012     OTHER:   Lab Results   Component Value Date    PH 7.0 07/28/2012    A1C 5.4 01/25/2013    RENUKA 8.9 05/14/2022    TSH 1.22 05/17/2022    SED 33 (A) 04/23/2011       Anesthesia Plan    ASA Status:  2   NPO Status:  NPO Appropriate    Anesthesia Type: General.     - Airway: Mask Only              Consents            Postoperative Care            Comments:           H&P reviewed: Unable to attach VIRTUAL H&P to encounter due to EHR limitations. Appropriate H&P reviewed. The physical exam performed by anesthesia during this surgical encounter serves as the physical portion of that virtual H&P.  Any significant changes noted within this preop evaluation.          Stefani Rizo MD

## 2022-06-17 NOTE — PLAN OF CARE
Problem: Activity and Energy Impairment (Depressive Signs/Symptoms)  Goal: Optimized Energy Level (Depressive Signs/Symptoms)  Outcome: Ongoing, Progressing  Intervention: Optimize Energy Level  Recent Flowsheet Documentation  Taken 6/16/2022 1800 by Michelle Hernández RN  Patient Performed Hygiene: shower  Diversional Activity: television  Activity (Behavioral Health): activity adjusted per tolerance   Goal Outcome Evaluation:       Patient has ECT in the morning at 7:40 am .Patient remained withdrawn and does not socialize with peers. She rated anxiety and drepession at 4/10 but denied all other psych symptoms angelina to be safe. She took all her medications as ordered.Apetite and fluid intakewas adequate and she attended groups and participated in bingo and card games.

## 2022-06-17 NOTE — PROCEDURES
Procedure/Surgery Information   [facility]    Bedside Procedure Note  Date of Service (when I performed the procedure): 06/17/2022    Lorena Hayden is a 76 year old female patient.  1. 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 Major depressive disorder, recurrent episode, severe with anxious distress (H)      Past Medical History:   Diagnosis Date     Allergic rhinitis, cause unspecified      Anxiety      Depressive disorder      Temp: 97.4  F (36.3  C) Temp src: Oral BP: (!) 142/80 Pulse: 92   Resp: 16 SpO2: 98 % O2 Device: None (Room air)      Procedures       Marvin March MD     Lorena Hayden is a 76 year old  year old female patient.  [id number]  @DX@    [facility]   ECT Procedure Note   06/17/2022    Patient Status: Inpatient    Is this the first in a series of 12 treatments?  No     Allergies   Allergen Reactions     Horse Allergy      Allergic to horse hair       Weight:  143 lbs 12.8 oz         Indications for ECT:   Medications ineffective         Clinical Narrative:   Lorena Hayden is a 76 year old woman with a history of depression, started in her mid 60s. Her depression was relatively well-controlled on duloxetine until February 2022, since when she has had progressive depression with suicidal thoughts without an identifiable trigger. Multiple medication trials yielded to no improvement (Increased duloxetine, added aripiprazole then bupropion, now cross titrating to desvenlafaxine). She had a recent psychiatric hospitalization in [facility] in [place], discharged only two weeks ago. However, depression persisted, she had more thoughts that life was not worth living and thoughts of suicide.  She also reported difficulties with sleep, low energy, poor appetite with significant weight loss of about 20 pounds.  She finally came to the hospital and  requested to be hospitalized.     Psychiatric History:   Diagnoses: Major Depressive Disorder     Hospitalizations: Two former (first in 2013, last in 2022)     Suicide Attempts: None     Medication Trials: Ativan, trazodone, Klonopin, Lexapro, Wellbutrin, Celexa, Seroquel, Zoloft, Abilify, currently Effexor and Cymbalta. No TCAs or MAOIs.     Neuromodulation:  Right unilateral ultrabrief ECT x 19 sessions in 2012. She had a good response during treatment which lasted only several weeks after the discontinuation.     Psychotherapy:  None            Diagnosis:   Major depression         Assessment:   #1 she reports her depression is severe and decreases pleasure and motivation and she can be very paralyzed and indecisive with anxiety. ECT was helpful before  #2  She is irritable she was awoken early but her appt pushed back. She denies discomfort after last appointment. Some SI but she feels it is  becuase she is so anxious waiting.  #3 sleeping well with trazodone + remeron, she reports no worsening as in in fact improving no SI today, no tremor and less anxious, less catastrophizing thoughts. Her dog nehemias is awaiting her at home. She feels her thinking is improved not worsened since starting ECT  #4 Today she is flustered she has trouble putting her depression intensity on a scale but admits sadness, no tearfulness, no Si today and denies complaints about recovery process of ECT. She denies worsening anxiety and she slept well overnight.  #5 She is irritable and anxious today. She I not yet feeling improvement in mood. She struggled with the transfer of units to  it disrupted her bedtime and she did not sleep well. She finds the ECT frightening.   #6 reports form the floor report unchanged mood so far. Less irritable and anxious today 1/10 ( mood worst it has ever been ) but SI has resolved.   2/3 at 5min recall Will increase to 9x ST  #7 more verbose today. She is frustrated that her meal will be cold by the  time she returns for breakfast.  She reports mood is improving some. She denies physical side effects   #8 6/10 mood (10 best) no anxiety between treatments. She feels very fatigued after treatments and needs to nap more. denies concern for confusion or insomnia. denies concerns form family about confusion.  #9 mood still 6/10 she is anxious because she didn't sleep last night she fears trazodone will no longer work for her. Less irritable. Mild nausea this AM and increased intensity of RLS  #10 mood unchanged no drift. She denies SI over the weekend and anxiety well controlled. She acknowledges need for continued oral medications after ECT to sustain benefit and by her history maintenance ECT is recommended  #11 doing well anxiety much decreased mood 6/10  #12  She is worried that she recently changed medications. Depression is not fully resolved but no longer has SI          Pause for the Cause:     Right patient Yes   Right procedure/laterality settings: Yes          Intra-Procedure Documentation:       ECT #: 12   Treatment number this series: 12   Total treatment number: 31     Type of ECT:  Right, unilateral ultrabrief    ECT Medications:    Etomidate: 16mg  Succinyl Choline: 60mg  Versed 0.5mg for agitation  Nicardipine 1000mg esmolol 60mg      ECT Strip Summary:    Titration ( 19.2 percent ) Energy Level:172.8mc  0.3ms 45 Hz time 8 sec 800mA  Motor Seizure Duration:  36   seconds  EEG Seizure Duration: 45 seconds      Complications: None        Plan:    Prefers earliest appointment to decrease pre-procedure anxiety.      Recommend maintenance 1per week (6/24) for 4 weeks then further spacing. Please make appointment and communicate to floor staff for instructions.     COVID test ordered 6/22    Monitor depression severity with clinical assessment augmented with CUDOS every treatment  Continue current medications    SayMD Surekha

## 2022-06-17 NOTE — PLAN OF CARE
Problem: Sleep Disturbance (Depressive Signs/Symptoms)  Goal: Improved Sleep (Depressive Signs/Symptoms)  Outcome: Ongoing, Progressing   Goal Outcome Evaluation:    Patient was maintained on NPO post midnight per protocol. ECT # is scheduled @ 0740. Vital signs taken and recorded. BP- 143/86, P-94, R-16, T-97.4 and O2 sat-98% @ R.A. Patient was encouraged  to void before ECT. No pre-ECT meds given.     Patient woke up in a good mod. Patient slept for a total of 7.5 hours. No complaints noted.     ECT checklist completed.

## 2022-06-17 NOTE — ANESTHESIA CARE TRANSFER NOTE
Patient: Lorena Hayden    Procedure: * No procedures listed *       Diagnosis: * No pre-op diagnosis entered *  Diagnosis Additional Information: No value filed.    Anesthesia Type:   General     Note:    Oropharynx: oropharynx clear of all foreign objects  Level of Consciousness: awake  Oxygen Supplementation: face mask    Independent Airway: airway patency satisfactory and stable  Dentition: dentition unchanged  Vital Signs Stable: post-procedure vital signs reviewed and stable    Patient transferred to: PACU    Handoff Report: Identifed the Patient, Identified the Reponsible Provider, Reviewed the pertinent medical history, Discussed the surgical course, Reviewed Intra-OP anesthesia mangement and issues during anesthesia, Set expectations for post-procedure period and Allowed opportunity for questions and acknowledgement of understanding      Vitals:  Vitals Value Taken Time   BP     Temp     Pulse     Resp     SpO2         Electronically Signed By: Stefani Rizo MD  June 17, 2022  8:21 AM

## 2022-06-17 NOTE — PLAN OF CARE
"  Problem: Cognitive Impairment (Depressive Signs/Symptoms)  Goal: Optimized Cognitive Function  Outcome: Ongoing, Progressing     Behavioral  Pt slept 7.5 hours overnight; Pt oriented x 4; Pt pleasant and cooperative upon approach;  pt compliant with medications and cares;  Pt rates eating and hydrating adequately; Pt isolative to room/self; did not attend groups; speech fluent and appropriate; Pt denied SI, SIB, HI, and hallucinations; Pt affect flat and blunted; mood depressed;  Pt received and tolerated ECT procedure at 0740 today.  When pt was asked if she felt improvement in her mood she indicated that she does not \"feel a change yet,\" but is hopeful.    Medical  Pt endorses no new medical complaints    Plan  Next - ECT 0700 6/20     "

## 2022-06-18 PROCEDURE — 250N000013 HC RX MED GY IP 250 OP 250 PS 637: Performed by: PSYCHIATRY & NEUROLOGY

## 2022-06-18 PROCEDURE — 124N000003 HC R&B MH SENIOR/ADOLESCENT

## 2022-06-18 PROCEDURE — H2032 ACTIVITY THERAPY, PER 15 MIN: HCPCS

## 2022-06-18 RX ADMIN — SENNOSIDES AND DOCUSATE SODIUM 1 TABLET: 50; 8.6 TABLET ORAL at 21:17

## 2022-06-18 RX ADMIN — TIMOLOL MALEATE 1 DROP: 5 SOLUTION OPHTHALMIC at 09:16

## 2022-06-18 RX ADMIN — RISPERIDONE 0.5 MG: 0.25 TABLET ORAL at 21:17

## 2022-06-18 RX ADMIN — VILAZODONE HYDROCHLORIDE 20 MG: 10 TABLET ORAL at 09:15

## 2022-06-18 RX ADMIN — SENNOSIDES AND DOCUSATE SODIUM 1 TABLET: 50; 8.6 TABLET ORAL at 09:15

## 2022-06-18 RX ADMIN — SIMVASTATIN 40 MG: 20 TABLET, FILM COATED ORAL at 21:17

## 2022-06-18 RX ADMIN — Medication 5 MG: at 21:17

## 2022-06-18 RX ADMIN — MIRTAZAPINE 30 MG: 15 TABLET, FILM COATED ORAL at 21:17

## 2022-06-18 RX ADMIN — CALCIUM CARBONATE 600 MG (1,500 MG)-VITAMIN D3 400 UNIT TABLET 1 TABLET: at 09:15

## 2022-06-18 RX ADMIN — VENLAFAXINE HYDROCHLORIDE 75 MG: 37.5 TABLET, EXTENDED RELEASE ORAL at 09:15

## 2022-06-18 RX ADMIN — TRAZODONE HYDROCHLORIDE 100 MG: 100 TABLET ORAL at 21:17

## 2022-06-18 ASSESSMENT — ACTIVITIES OF DAILY LIVING (ADL)
ORAL_HYGIENE: INDEPENDENT
DRESS: SCRUBS (BEHAVIORAL HEALTH)
LAUNDRY: WITH SUPERVISION
DRESS: SCRUBS (BEHAVIORAL HEALTH)
LAUNDRY: WITH SUPERVISION
HYGIENE/GROOMING: INDEPENDENT
ORAL_HYGIENE: INDEPENDENT
HYGIENE/GROOMING: INDEPENDENT

## 2022-06-18 NOTE — PLAN OF CARE
06/17/22 1917   Group Therapy Session   Group Attendance attended group session   Time Session Began 1300   Time Session Ended 1345   Total Time patient participated (minutes) 45   Total # Attendees 3   Group Type psychotherapeutic   Group Topic Covered coping skills/lifestyle management;emotions/expression   Group Session Detail Group therapy: Patients explore, identify, and express emotions. They support each other, offer encouragement, and build group cohesion.   Patient Response/Contribution cooperative with task;offered helpful suggestions to peers;listened actively   Patient Response Detail Patient actively participated in group. She shared that she is feeling very sad today and is disappointed her anti-depressant was changed and therefore has to stay an extra week. Patient was able to identify coping skill of using distraction to help her mood. She plans to play Dwight later today with her peers.

## 2022-06-18 NOTE — PLAN OF CARE
Problem: Sleep Disturbance (Depressive Signs/Symptoms)  Goal: Improved Sleep (Depressive Signs/Symptoms)  Outcome: Ongoing, Progressing   Goal Outcome Evaluation:    Patient slept a total of 6.5 hours. No complaints noted the whole shift.

## 2022-06-18 NOTE — PLAN OF CARE
Problem: Suicide Risk  Goal: Absence of Self-Harm  Intervention: Assess Risk to Self and Maintain Safety  Recent Flowsheet Documentation  Taken 6/17/2022 1900 by Michelle Hernández RN  Behavior Management: behavioral plan reviewed  Self-Harm Prevention:    environmental self-harm risks assessed    environment modified for self-harm risk  Intervention: Promote Psychosocial Wellbeing  Recent Flowsheet Documentation  Taken 6/17/2022 1900 by Michelle Hernández RN  Supportive Measures: active listening utilized  Sleep/Rest Enhancement: medication   Patient was calm and cooperative with a flat and blunted affect., She endorsed depression 8/10 but denied pain, anxiety and all other psych symptoms angelina to be safe.She shared with staff that she was feeling very sad today and is disappointed her anti-depressant was changed which was going to make stay an extra week. Patient became tearful after dinner and wanted to talk to staff but coincidentally her daughter in law called and talked to her, She reported feeling better after talking to her. When staff asked if she still wanted to talk, She said no but later told staff one of her sisters called the police and told them she did not know her where about and was being nasty to her  calling him names.She told writer she thinks  the problem has been resolved by her daughter in law. Patient was med compliant no other concerns noted.Michelle Hernández, RN

## 2022-06-18 NOTE — PROGRESS NOTES
" 06/17/22 2000   Group Therapy Session   Time Session Began 1903   Time Session Ended 1956   Total Time patient participated (minutes) 53   Total # Attendees 5   Group Type expressive therapy   Group Topic Covered emotions/expression   Group Session Detail Songs of the Heart: Songs of Empowerment and Comfort   Patient Response/Contribution cooperative with task   Patient Response Detail \"Cayla\" accepted encouragement from peer tonight in group and showed good social engagement.  She also engaged well with the music and was calm and cooperative, though wistful.         "

## 2022-06-18 NOTE — PLAN OF CARE
Problem: Decreased Participation/Engagement (Depressive Signs/Symptoms)  Goal: Increased Participation and Engagement (Depressive Signs/Symptoms)  Outcome: Ongoing, Progressing  Flowsheets (Taken 6/18/2022 1217)  Mutually Determined Action Steps (Increased Participation and Engagement):    participates in one or more activity    voluntarily attends group therapy    initiates interaction with others    Pt presents with blunted, flat affect and depressed mood. Denies SI/SIB and hallucinations. Reports improvement in her depression today, rates it 5-6/10, denies anxiety this morning. Pt slept in until almost nine today. Reports that she did sleep good after she finally did fall asleep, reports some difficulty in initially falling asleep. She was present in the lounge and social with select peers. Denies pain. VSS. Medication compliant, no PRNs given. Appetite and fluid intake adequate. Participated in a game of Dwight with peers.  visited this afternoon which went well. No other concerns or complaints noted.

## 2022-06-19 PROCEDURE — 250N000013 HC RX MED GY IP 250 OP 250 PS 637: Performed by: PSYCHIATRY & NEUROLOGY

## 2022-06-19 PROCEDURE — 124N000003 HC R&B MH SENIOR/ADOLESCENT

## 2022-06-19 PROCEDURE — H2032 ACTIVITY THERAPY, PER 15 MIN: HCPCS

## 2022-06-19 RX ADMIN — SIMVASTATIN 40 MG: 20 TABLET, FILM COATED ORAL at 21:27

## 2022-06-19 RX ADMIN — Medication 5 MG: at 21:27

## 2022-06-19 RX ADMIN — RISPERIDONE 0.5 MG: 0.25 TABLET ORAL at 21:27

## 2022-06-19 RX ADMIN — SENNOSIDES AND DOCUSATE SODIUM 1 TABLET: 50; 8.6 TABLET ORAL at 08:41

## 2022-06-19 RX ADMIN — MIRTAZAPINE 30 MG: 15 TABLET, FILM COATED ORAL at 21:27

## 2022-06-19 RX ADMIN — SENNOSIDES AND DOCUSATE SODIUM 1 TABLET: 50; 8.6 TABLET ORAL at 21:27

## 2022-06-19 RX ADMIN — VILAZODONE HYDROCHLORIDE 20 MG: 10 TABLET ORAL at 08:41

## 2022-06-19 RX ADMIN — TIMOLOL MALEATE 1 DROP: 5 SOLUTION OPHTHALMIC at 08:41

## 2022-06-19 RX ADMIN — MAGNESIUM HYDROXIDE 30 ML: 400 SUSPENSION ORAL at 09:51

## 2022-06-19 RX ADMIN — VENLAFAXINE HYDROCHLORIDE 75 MG: 37.5 TABLET, EXTENDED RELEASE ORAL at 08:41

## 2022-06-19 RX ADMIN — CALCIUM CARBONATE 600 MG (1,500 MG)-VITAMIN D3 400 UNIT TABLET 1 TABLET: at 08:41

## 2022-06-19 ASSESSMENT — ACTIVITIES OF DAILY LIVING (ADL)
LAUNDRY: WITH SUPERVISION
HYGIENE/GROOMING: INDEPENDENT
DRESS: SCRUBS (BEHAVIORAL HEALTH)
ORAL_HYGIENE: INDEPENDENT

## 2022-06-19 NOTE — PLAN OF CARE
Problem: Behavioral Health Plan of Care  Goal: Plan of Care Review  Outcome: Ongoing, Progressing  Flowsheets  Taken 6/18/2022 1952  Plan of Care Reviewed With: patient  Patient Agreement with Plan of Care: agrees    Pt presents with blunted, flat affect and depressed mood. Denies SI/SIB and hallucinations. Continues to rate depression 5-6/10 which is an improvement for pt. [facility], social with staff and peers. Attended groups. Went to the [facility] this evening with a group of peers which she really enjoyed. Denies pain. Appetite and fluid intake adequate. Played Apples to Apples with group of peers later in the evening. VSS. Medication compliant, PRN Trazodone given at HS per pt request. No other concerns or complaints noted.

## 2022-06-19 NOTE — PLAN OF CARE
Goal Outcome Evaluation:    Patient appeared calm, social and attending group activities. She reported anxiety and depression at 5/10, denies suicidal ideations. Patient is eating and drinking adequately, denies pain and or medication side effects.

## 2022-06-19 NOTE — PLAN OF CARE
"   06/18/22 2100   Group Therapy Session   Group Attendance attended group session   Time Session Began 1900   Time Session Ended 2000   Total Time patient participated (minutes) 55   Total # Attendees 5   Group Type expressive therapy  (art therapy)   Group Topic Covered emotions/expression   Group Session Detail keepsake bag   Patient Response/Contribution cooperative with task;organized   Patient Response Detail Cayla presented as calm and cooperative. She participated in decorating a paper bag to fill with written memories and values. She was focused while adorning her bag with sequence and tissue paper. She added several paper cards with words representing her priorities such as \"family.\" She interacted appropriately with peers and appeared proud of her project.   Goal Outcome Evaluation:                      "

## 2022-06-19 NOTE — PLAN OF CARE
Problem: Sleep Disturbance (Depressive Signs/Symptoms)  Goal: Improved Sleep (Depressive Signs/Symptoms)  Outcome: Ongoing, Progressing   Goal Outcome Evaluation:     Patient slept a total of 8 hours. No complaints noted the whole shift.

## 2022-06-20 PROCEDURE — 99232 SBSQ HOSP IP/OBS MODERATE 35: CPT | Performed by: PSYCHIATRY & NEUROLOGY

## 2022-06-20 PROCEDURE — 124N000003 HC R&B MH SENIOR/ADOLESCENT

## 2022-06-20 PROCEDURE — 250N000013 HC RX MED GY IP 250 OP 250 PS 637: Performed by: PSYCHIATRY & NEUROLOGY

## 2022-06-20 PROCEDURE — H2032 ACTIVITY THERAPY, PER 15 MIN: HCPCS

## 2022-06-20 PROCEDURE — G0177 OPPS/PHP; TRAIN & EDUC SERV: HCPCS

## 2022-06-20 RX ADMIN — Medication 5 MG: at 21:15

## 2022-06-20 RX ADMIN — SIMVASTATIN 40 MG: 20 TABLET, FILM COATED ORAL at 21:15

## 2022-06-20 RX ADMIN — SENNOSIDES AND DOCUSATE SODIUM 1 TABLET: 50; 8.6 TABLET ORAL at 08:22

## 2022-06-20 RX ADMIN — MIRTAZAPINE 30 MG: 15 TABLET, FILM COATED ORAL at 21:15

## 2022-06-20 RX ADMIN — VENLAFAXINE HYDROCHLORIDE 75 MG: 37.5 TABLET, EXTENDED RELEASE ORAL at 08:21

## 2022-06-20 RX ADMIN — TIMOLOL MALEATE 1 DROP: 5 SOLUTION OPHTHALMIC at 08:22

## 2022-06-20 RX ADMIN — SENNOSIDES AND DOCUSATE SODIUM 1 TABLET: 50; 8.6 TABLET ORAL at 21:16

## 2022-06-20 RX ADMIN — CALCIUM CARBONATE 600 MG (1,500 MG)-VITAMIN D3 400 UNIT TABLET 1 TABLET: at 08:22

## 2022-06-20 RX ADMIN — RISPERIDONE 0.5 MG: 0.25 TABLET ORAL at 21:16

## 2022-06-20 RX ADMIN — VILAZODONE HYDROCHLORIDE 20 MG: 10 TABLET ORAL at 08:21

## 2022-06-20 ASSESSMENT — ACTIVITIES OF DAILY LIVING (ADL)
DRESS: INDEPENDENT
HYGIENE/GROOMING: INDEPENDENT
HYGIENE/GROOMING: INDEPENDENT
ORAL_HYGIENE: INDEPENDENT
LAUNDRY: WITH SUPERVISION
DRESS: INDEPENDENT
ORAL_HYGIENE: INDEPENDENT

## 2022-06-20 NOTE — PLAN OF CARE
"Problem: Mood Impairment (Anxiety Signs/Symptoms)  Goal: Improved Mood Symptoms (Anxiety Signs/Symptoms)  Intervention: Optimize Emotion and Mood  Recent Flowsheet Documentation  Supportive Measures: active listening utilized   Goal Outcome Evaluation:  Plan of Care Reviewed With: patient     Patient was visible in the milieu this shift watching TV. Pt is social with others. Patient attended and participated group. Pt was dismissive to assessment questions. Pt denied anxiety, depression, and pain; Denied other mental health symptoms. Patient describes mood as \"normal\" and affect was flat. Patient is cooperative with cares; appearing appropriate to situation. Patient is med-compliant, and is eating 75% of her meals. Reports \"sleeps through the night\". Pt took a shower this evening. Patient evaluation continues.       VS reviewed: /68 (BP Location: Right arm, Patient Position: Sitting, Cuff Size: Adult Regular)   Pulse 91   Temp 98.7  F (37.1  C) (Oral)   Resp 16   Wt 65.2 kg (143 lb 12.8 oz)   SpO2 94%   BMI 24.68 kg/m   .     Length of stay: 34                                                                            "

## 2022-06-20 NOTE — PLAN OF CARE
Occupational Therapy Group Note:     06/20/22 1400   Group Therapy Session   Group Attendance attended group session   Time Session Began 1315   Time Session Ended 1415   Total Time patient participated (minutes) 50   Total # Attendees 5   Group Type life skill;recreation   Group Topic Covered balanced lifestyle;structured socialization   Group Session Detail bingo with discussion questions   Patient Response/Contribution cooperative with task;discussed personal experience with topic;expressed understanding of topic;listened actively   Patient Response Detail Pt actively participated in a structured occupational therapy group with a focus on facilitating self-awareness, self-esteem, and social engagement. Pt appeared comfortable sharing positive personal information, and was respectful in listening and responding to peers. Pt identified her  as a positive social support, and expressed gratitude for their relationship. Pleasant and engaged.

## 2022-06-20 NOTE — PLAN OF CARE
Occupational Therapy Group Note:     06/20/22 0619   Group Therapy Session   Group Attendance attended group session   Time Session Began 1015   Time Session Ended 1115   Total Time patient participated (minutes) 50   Total # Attendees 4   Group Type expressive therapy   Group Topic Covered coping skills/lifestyle management   Group Session Detail OT clinic   Patient Response/Contribution cooperative with task;organized   Patient Response Detail Pt actively participated in occupational therapy clinic to facilitate coping skill exploration, creative expression within personally meaningful activities, and clinical observation of social, cognitive, and kinesthetic performance skills. Pt response: Independent to initiate, gather materials, sequence, and adjust to workspace demands as needed. Demonstrated good focus, planning, and attention to detail for selected creative expression task. Able to ask for assistance and socialized with peers and staff. Shared that she enjoys the movie Mamma Milady. Calm and pleasant.

## 2022-06-20 NOTE — PLAN OF CARE
Goal Outcome Evaluation:    Plan of Care Reviewed With: patient     Patient has been active in the milieu, social with peers and attending group activities. She reports anxiety and depression at 5/10, denies hallucination and SI/SIB.

## 2022-06-20 NOTE — PROVIDER NOTIFICATION
06/20/22 0619   Sleep/Rest   Sleep/Rest/Relaxation no problem identified   Sleep Hygiene Promotion regular sleep pattern promoted   Night Time # Hours 7 hours     NOC Shift Report    Pt in bed at beginning of shift, breathing quiet and unlabored. Pt slept through shift. Pt slept 7 hours.     No pt complaints or concerns at this time. No PRNs given. Will continue to monitor.

## 2022-06-20 NOTE — PROGRESS NOTES
06/19/22 2000   Group Therapy Session   Group Attendance attended group session   Time Session Began 1900   Time Session Ended 1950   Total Time patient participated (minutes) 50   Total # Attendees 5   Group Type recreation   Group Topic Covered relaxation techniques   Group Session Detail stress reduction   Patient Response/Contribution cooperative with task;discussed personal experience with topic   Patient Response Detail Pt actively participated in a structured Therapeutic Recreation group with a focus on leisure participation, socializing, and exercise. Pt participated in the guided exercise for the full duration of the group. Pt followed along, engaged in the guided chair exercise routine and added to the discussion prompts throughout the routine.  Pt was encouraged to use positive imagery with the deep breathing and stretching to foster relaxation, improves focus, and reduce stress.

## 2022-06-20 NOTE — PROGRESS NOTES
"PSYCHIATRY  PROGRESS NOTE     DATE OF SERVICE   06/20/2022       CHIEF COMPLAINT   \"I have been doing okay\".       SUBJECTIVE   Nursing reports: Patient was visible in the milieu this shift watching TV. Pt is social with others. Patient attended and participated group. Pt was dismissive to assessment questions. Pt denied anxiety, depression, and pain; Denied other mental health symptoms. Patient describes mood as \"normal\" and affect was flat. Patient is cooperative with cares; appearing appropriate to situation. Patient is med-compliant, and is eating 75% of her meals. Reports \"sleeps through the night\". Pt took a shower this evening. Patient evaluation continues.     Patient has been discussed with the  earlier today.  We continue to plan for the patient to discharge next Friday.   will confirm with the family exactly at what time the patient will be leaving.     OBJECTIVE   Patient was seen and evaluated in the consult room by herself, this was a face-to-face evaluation.  Patient presented as calm, pleasant and cooperative.  At the moment the patient said that she is feeling that she is improving and feels that the Viibryd has been more effective.  Patient said she is feeling more hopeful.  Patient said that yesterday with the decrease of the Effexor she felt dizzy but today she is doing better and denies any other issues.  When talking about tentative discharge plans the patient stated that she is looking forward to be able to leave the hospital.  Assisted after giving thought she has decided to do maintenance ECT treatments once she leaves the hospital.  She would like to know exactly at what time she will be scheduled in order to coordinate with her  the transportation.  Patient has continued to denied having any thoughts of harming herself or harming others and she is able to contract for safety.  Denies any hallucinations and no delusions have been elicited.       MEDICATIONS " "  Medications:  Scheduled Meds:    calcium carbonate 600 mg-vitamin D 400 units  1 tablet Oral Daily     melatonin  5 mg Oral At Bedtime     mirtazapine  30 mg Oral At Bedtime     risperiDONE  0.5 mg Oral At Bedtime     senna-docusate  1 tablet Oral BID     simvastatin  40 mg Oral At Bedtime     timolol maleate  1 drop Both Eyes Daily     [START ON 6/21/2022] venlafaxine  37.5 mg Oral Daily with breakfast     vilazodone  20 mg Oral Daily     Continuous Infusions:  PRN Meds:.acetaminophen, alum & mag hydroxide-simethicone, hydrOXYzine, magnesium hydroxide, OLANZapine **OR** OLANZapine, traZODone    Medication adherence issues: MS Med Adherence Y/N: Yes, Hospitalization  Medication side effects: MEDICATION SIDE EFFECTS: no side effects reported  Benefit: Yes / No: Yes       ROS   A comprehensive review of systems was negative.       MENTAL STATUS EXAM   Vitals: BP (!) 142/82 (BP Location: Right arm)   Pulse 92   Temp 97.8  F (36.6  C) (Temporal)   Resp 16   Wt 65.2 kg (143 lb 12.8 oz)   SpO2 98%   BMI 24.68 kg/m      Appearance:  No apparent distress  Mood: \"I am doing better\"  Affect: Bright and stable  Suicidal Ideation: PRESENT / ABSENT: absent   Homicidal Ideation: PRESENT / ABSENT: absent   Thought process: unremarkable   Thought content: devoid of  suicidal and violent ideation.   Fund of Knowledge: Average  Attention/Concentration: Normal  Language ability:  Intact  Memory:  Immediate recall intact, Short-term memory intact and Long-term memory intact  Insight:  fair.  Judgement: fair  Orientation: Yes, x4  Psychomotor Behavior: normal or unremarkable    Muscle Strength and Tone: MuscleStrength: Normal  Gait and Station: stable        LABS   personally reviewed.     No results found for: PHENYTOIN, PHENOBARB, VALPROATE, CBMZ       DIAGNOSIS   Principal Problem:    <principal problem not specified>    Active Problem List:  Patient Active Problem List   Diagnosis     Allergic rhinitis     Symptomatic " menopausal or female climacteric states     Mixed hyperlipidemia     Anxiety state     Insomnia     Anal fissure     Major depressive disorder, recurrent episode, moderate (H)     Irritable bowel syndrome     ACP (advance care planning)     Health Care Home     Tinnitus     Acute lower gastrointestinal hemorrhage     Abdominal pain     Suicidal ideation     Major depressive disorder, recurrent episode, severe with anxious distress (H)     Major depressive disorder, 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 Ongoing education given regarding diagnostic and treatment options with risks, benefits and alternatives and adequate verbalization of understanding.  2.  Medications:      Remeron 30 mg at bedtime      Decrease Effexor 37.5 mg daily at the same time cross-taper with Viibryd 20 mg daily      Risperdal 0.5 mg at bedtime      melatonin 5 mg at bedtime      As needed trazodone for insomnia.  3.  Medical team to follow the patient as needed  4.   coordinating a safe discharge plan with the patient.        Risk Assessment: [facility] RISK ASSESSMENT: Patient able to contract for safety    Coordination of Care:   Treatment Plan reviewed and physician signed, Care discussed with Care/Treatment Team Members, Chart reviewed and Patient seen      Re-Certification I certify that the inpatient psychiatric facility services furnished since the previous certification were, and continue to be, medically necessary for, either, treatment which could reasonably be expected to improve the patient s condition or diagnostic study and that the hospital records indicate that the services furnished were, either, intensive treatment services, admission and related services necessary for diagnostic study, or equivalent services.     I certify that the patient continues to need, on a daily basis, active treatment furnished directly by or requiring the supervision of inpatient psychiatric facility  personnel.   I estimate 14 days of hospitalization is necessary for proper treatment of the patient. My plans for post-hospital care for this patient are  home with family     Bri Elliott MD    -     06/20/2022  -     1:39 PM    Total time 25 minutes with > 50%spent on coordination of cares and psycho-education.    This note was created with help of Dragon dictation system. Grammatical / typing errors are not intentional.    Bri Elliott MD

## 2022-06-20 NOTE — PLAN OF CARE
Occupational Therapy Group Note:     06/20/22 1300   Group Therapy Session   Group Attendance attended group session   Time Session Began 1115   Time Session Ended 1200   Total Time patient participated (minutes) 45   Total # Attendees 2-3   Group Type task skill;recreation   Group Topic Covered leisure exploration/use of leisure time   Group Session Detail group game   Patient Response/Contribution cooperative with task;organized   Patient Response Detail Pt actively participated in a structured occupational therapy group with a focus on social and leisure engagement via a group game. Pt was able to follow multi-step directions and was attentive to task parameters throughout. Pt demonstrated strategic planning and good task organization. Focused and engaged throughout full duration of group. Affect appeared to brighten in interactions.

## 2022-06-21 ENCOUNTER — DOCUMENTATION ONLY (OUTPATIENT)
Dept: BEHAVIORAL HEALTH | Facility: CLINIC | Age: 77
End: 2022-06-21

## 2022-06-21 PROCEDURE — 250N000013 HC RX MED GY IP 250 OP 250 PS 637: Performed by: PSYCHIATRY & NEUROLOGY

## 2022-06-21 PROCEDURE — G0177 OPPS/PHP; TRAIN & EDUC SERV: HCPCS

## 2022-06-21 PROCEDURE — 99232 SBSQ HOSP IP/OBS MODERATE 35: CPT | Performed by: PSYCHIATRY & NEUROLOGY

## 2022-06-21 PROCEDURE — 124N000003 HC R&B MH SENIOR/ADOLESCENT

## 2022-06-21 PROCEDURE — H2032 ACTIVITY THERAPY, PER 15 MIN: HCPCS

## 2022-06-21 RX ORDER — TRAZODONE HYDROCHLORIDE 100 MG/1
100 TABLET ORAL
Qty: 30 TABLET | Refills: 0 | Status: SHIPPED | OUTPATIENT
Start: 2022-06-21

## 2022-06-21 RX ORDER — VILAZODONE HYDROCHLORIDE 20 MG/1
20 TABLET ORAL DAILY
Qty: 30 TABLET | Refills: 0 | Status: SHIPPED | OUTPATIENT
Start: 2022-06-22 | End: 2022-07-07

## 2022-06-21 RX ORDER — MIRTAZAPINE 30 MG/1
30 TABLET, FILM COATED ORAL AT BEDTIME
Qty: 30 TABLET | Refills: 0 | Status: SHIPPED | OUTPATIENT
Start: 2022-06-21 | End: 2022-07-07

## 2022-06-21 RX ORDER — RISPERIDONE 0.5 MG/1
0.5 TABLET ORAL AT BEDTIME
Qty: 30 TABLET | Refills: 0 | Status: SHIPPED | OUTPATIENT
Start: 2022-06-21 | End: 2022-07-07

## 2022-06-21 RX ADMIN — Medication 5 MG: at 21:15

## 2022-06-21 RX ADMIN — TIMOLOL MALEATE 1 DROP: 5 SOLUTION OPHTHALMIC at 08:41

## 2022-06-21 RX ADMIN — MIRTAZAPINE 30 MG: 15 TABLET, FILM COATED ORAL at 21:15

## 2022-06-21 RX ADMIN — SENNOSIDES AND DOCUSATE SODIUM 1 TABLET: 50; 8.6 TABLET ORAL at 21:15

## 2022-06-21 RX ADMIN — CALCIUM CARBONATE 600 MG (1,500 MG)-VITAMIN D3 400 UNIT TABLET 1 TABLET: at 08:42

## 2022-06-21 RX ADMIN — VENLAFAXINE HYDROCHLORIDE 37.5 MG: 37.5 TABLET, EXTENDED RELEASE ORAL at 08:42

## 2022-06-21 RX ADMIN — RISPERIDONE 0.5 MG: 0.25 TABLET ORAL at 21:15

## 2022-06-21 RX ADMIN — SENNOSIDES AND DOCUSATE SODIUM 1 TABLET: 50; 8.6 TABLET ORAL at 08:42

## 2022-06-21 RX ADMIN — VILAZODONE HYDROCHLORIDE 20 MG: 10 TABLET ORAL at 08:42

## 2022-06-21 RX ADMIN — SIMVASTATIN 40 MG: 20 TABLET, FILM COATED ORAL at 21:15

## 2022-06-21 ASSESSMENT — ACTIVITIES OF DAILY LIVING (ADL)
HYGIENE/GROOMING: INDEPENDENT
HYGIENE/GROOMING: INDEPENDENT
DRESS: INDEPENDENT
ORAL_HYGIENE: INDEPENDENT
ORAL_HYGIENE: INDEPENDENT
LAUNDRY: WITH SUPERVISION
DRESS: INDEPENDENT

## 2022-06-21 NOTE — PLAN OF CARE
Occupational Therapy Group Note:     06/21/22 1400   Group Therapy Session   Group Attendance attended group session   Time Session Began 1315   Time Session Ended 1400   Total Time patient participated (minutes) 45   Total # Attendees 5   Group Type life skill   Group Topic Covered balanced lifestyle;structured socialization   Group Session Detail self-esteem discussion questions   Patient Response/Contribution cooperative with task;discussed personal experience with topic   Patient Response Detail Pt actively participated in a structured occupational therapy group with a focus on facilitating self-esteem via self-reflection questions. Pt shared thoughtful responses regarding past achievements, positive social supports, and hobbies/skills. 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 Pleasant and engaged.

## 2022-06-21 NOTE — PROGRESS NOTES
Pt participated in dance/movement therapy (D/MT) using emotional release through body-based interventions.  Pt was able to find emotional release through the context of community integration and somatic memory connected to both attachments and struggle.  Traumatic reprocessing was reached through bilateral stimulation and circling/ rolling motions that were patient-led.  A dual-focus on upward movements with exhaling/sighing supported the ability to both hold painful experiences and accept what cannot be held. Pt was a leader in nonverbal processes and verbally expressed the relief of having a discharge plan for Friday.         06/21/22 1130   Group Therapy Session   Group Attendance attended group session   Total Time patient participated (minutes) 50   Group Type expressive therapy;psychotherapeutic   Group Topic Covered emotions/expression;self-care activities;balanced lifestyle;community integration

## 2022-06-21 NOTE — PROGRESS NOTES
Prior Authorization **INITIATED**    Medication: Risperidone 0.5mg tabs  Insurance Company: [employer] [place] - Phone [phone] Fax [phone]  Pharmacy Filling the Rx: [facility] - [place], [place] - [address]  Filling Pharmacy Phone: [phone]  Filling Pharmacy Fax: [phone]  Start Date: 6/21/2022  Reference #: CoverMyMeds Key: [id number]  Comments:  -      [facility] Ogles[facility] Discharge Pharmacy Liaison  Pronouns: She/Her/Hers    [facility]  [address]  [address], [place] [address]   [email or web address]  [email or web address]   Phone: [phone]  Pager: [phone]  Fax: [phone]

## 2022-06-21 NOTE — PROGRESS NOTES
" 06/20/22 2000   Group Therapy Session   Time Session Began 1900   Time Session Ended 1952   Total Time patient participated (minutes) 50-52   Total # Attendees 6   Group Type expressive therapy   Group Topic Covered relaxation techniques;structured socialization   Group Session Detail Evening Relaxation: Storytelling through Song   Patient Response/Contribution cooperative with task   Patient Response Detail Cooperatively engaged in Evening Music Relaxation group to decrease anxiety and promote sleep. \"Cayla\" exhibited a significantly brighter and more confident affect in tonight's session than previously.   Her countenance, tone of voice and participation had a quicker, lighter tempo to them.  She was a positive participant.         "

## 2022-06-21 NOTE — PLAN OF CARE
"Goal Outcome Evaluation:    Plan of Care Reviewed With: patient        Pt continues to be visible in milieu. Affect bright on approach. Appropriate interactions with staff and peers. Attending and participating in therapeutic groups. Reports \"good\" mood. Denies anxiety, hopelessness or SI. Looking forward to discharge on Friday. Med-compliant. Pleasant, calm, cooperative. Continue with current treatment plan and recommendations. Continue to monitor and reassess symptoms. Monitor response to medications. Monitor progress towards treatment goals. Encourage groups and participation.          "

## 2022-06-21 NOTE — PLAN OF CARE
Goal Outcome Evaluation:    Plan of Care Reviewed With: patient     Patient appeared calm, social and active in the milieu. She attended and participated in unit activities, reports improved mood, decreased depression. Patient denies anxiety, hallucinations and SI/SIB. She states she is looking forward to upcoming discharge.

## 2022-06-21 NOTE — PLAN OF CARE
"Goal Outcome Evaluation:    Plan of Care Reviewed With: patient        Pt visible in milieu. Appropriate with peers. Attended and participated in therapeutic group. Good eye contact. Described mood as \"good.\" Denied anxiety. Reports feeling hopeful. Looking forward to discharge hopefully on Friday. Med-compliant. Hygiene, sleep, appetite good. Continue with current treatment plan and recommendations. Continue to monitor and reassess symptoms. Monitor response to medications. Monitor progress towards treatment goals. Encourage groups and participation.          "

## 2022-06-21 NOTE — PROGRESS NOTES
"PSYCHIATRY  PROGRESS NOTE     DATE OF SERVICE   06/21/2022       CHIEF COMPLAINT   \"I am doing good\".       SUBJECTIVE   Nursing reports: Pt visible in milieu. Appropriate with peers. Attended and participated in therapeutic group. Good eye contact. Described mood as \"good.\" Denied anxiety. Reports feeling hopeful. Looking forward to discharge hopefully on Friday. Med-compliant. Hygiene, sleep, appetite good. Continue with current treatment plan and recommendations. Continue to monitor and reassess symptoms. Monitor response to medications. Monitor progress towards treatment goals. Encourage groups and participation.     Patient has been discussed with the  earlier today.   will continue following up with [facility] in order to see if the patient can start their IOP program.     OBJECTIVE   Patient was seen and evaluated in the day area by herself, this was a face-to-face evaluation.  Patient presented as bright and calm during the assessment.  Patient said that she is feeling a little bit lightheaded as she was not able to sleep well last night because her roommate snores.  Other than this she denies any other issues or concerns.  Patient is aware that she has 2 more ECT treatments to be completed and I informed the patient that we have continue to plan for her to discharge on Friday.  Patient verbalized good understanding and stated that she is feeling ready to leave the hospital.  She is denying having any side effects from the medication.  Denies any suicidal ideations and continues to contract for safety.       MEDICATIONS   Medications:  Scheduled Meds:    calcium carbonate 600 mg-vitamin D 400 units  1 tablet Oral Daily     melatonin  5 mg Oral At Bedtime     mirtazapine  30 mg Oral At Bedtime     risperiDONE  0.5 mg Oral At Bedtime     senna-docusate  1 tablet Oral BID     simvastatin  40 mg Oral At Bedtime     timolol maleate  1 drop Both Eyes Daily     venlafaxine  37.5 mg Oral " "Daily with breakfast     vilazodone  20 mg Oral Daily     Continuous Infusions:  PRN Meds:.acetaminophen, alum & mag hydroxide-simethicone, hydrOXYzine, magnesium hydroxide, OLANZapine **OR** OLANZapine, traZODone    Medication adherence issues: MS Med Adherence Y/N: Yes, Hospitalization  Medication side effects: MEDICATION SIDE EFFECTS: no side effects reported  Benefit: Yes / No: Yes       ROS   A comprehensive review of systems was negative.       MENTAL STATUS EXAM   Vitals: /72   Pulse 93   Temp 98.6  F (37  C) (Temporal)   Resp 16   Wt 71 kg (156 lb 8 oz)   SpO2 98%   BMI 26.86 kg/m      Appearance:  No apparent distress  Mood: \"I am good\"  Affect: Bright and stable  Suicidal Ideation: PRESENT / ABSENT: absent   Homicidal Ideation: PRESENT / ABSENT: absent   Thought process: unremarkable   Thought content: devoid of  suicidal and violent ideation.   Fund of Knowledge: Average  Attention/Concentration: Normal  Language ability:  Intact  Memory:  Immediate recall intact, Short-term memory intact and Long-term memory intact  Insight:  fair.  Judgement: fair  Orientation: Yes, x4  Psychomotor Behavior: normal or unremarkable    Muscle Strength and Tone: MuscleStrength: Normal  Gait and Station: stable        LABS   personally reviewed.     No results found for: PHENYTOIN, PHENOBARB, VALPROATE, CBMZ       DIAGNOSIS   Principal Problem:    <principal problem not specified>    Active Problem List:  Patient Active Problem List   Diagnosis     Allergic rhinitis     Symptomatic menopausal or female climacteric states     Mixed hyperlipidemia     Anxiety state     Insomnia     Anal fissure     Major depressive disorder, recurrent episode, moderate (H)     Irritable bowel syndrome     ACP (advance care planning)     Health Care Home     Tinnitus     Acute lower gastrointestinal hemorrhage     Abdominal pain     Suicidal ideation     Major depressive disorder, recurrent episode, severe with anxious distress (H) "     Major depressive disorder, 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 Ongoing education given regarding diagnostic and treatment options with risks, benefits and alternatives and adequate verbalization of understanding.  2.  Medications:      Remeron 30 mg at bedtime      Decrease Effexor 37.5 mg daily at the same time cross-taper with Viibryd 20 mg daily      Risperdal 0.5 mg at bedtime      melatonin 5 mg at bedtime      As needed trazodone for insomnia.  3.  Medical team to follow the patient as needed  4.   coordinating a safe discharge plan with the patient.        Risk Assessment: [facility] RISK ASSESSMENT: Patient able to contract for safety    Coordination of Care:   Treatment Plan reviewed and physician signed, Care discussed with Care/Treatment Team Members, Chart reviewed and Patient seen      Re-Certification I certify that the inpatient psychiatric facility services furnished since the previous certification were, and continue to be, medically necessary for, either, treatment which could reasonably be expected to improve the patient s condition or diagnostic study and that the hospital records indicate that the services furnished were, either, intensive treatment services, admission and related services necessary for diagnostic study, or equivalent services.     I certify that the patient continues to need, on a daily basis, active treatment furnished directly by or requiring the supervision of inpatient psychiatric facility personnel.   I estimate 14 days of hospitalization is necessary for proper treatment of the patient. My plans for post-hospital care for this patient are  home with family     Bri Elliott MD    -     06/21/2022  -     1:48 PM    Total time 25 minutes with > 50%spent on coordination of cares and psycho-education.    This note was created with help of Dragon dictation system. Grammatical / typing errors are not intentional.    Bri  MD Lexi

## 2022-06-21 NOTE — PLAN OF CARE
Goal Outcome Evaluation:      Patient slept well the whole night without interruptions for 8.5 hours. Nothing unusual noted. No behavioral issues observed.

## 2022-06-22 PROCEDURE — 124N000003 HC R&B MH SENIOR/ADOLESCENT

## 2022-06-22 PROCEDURE — H2032 ACTIVITY THERAPY, PER 15 MIN: HCPCS

## 2022-06-22 PROCEDURE — 250N000013 HC RX MED GY IP 250 OP 250 PS 637: Performed by: PSYCHIATRY & NEUROLOGY

## 2022-06-22 PROCEDURE — 99232 SBSQ HOSP IP/OBS MODERATE 35: CPT | Performed by: PSYCHIATRY & NEUROLOGY

## 2022-06-22 RX ADMIN — SENNOSIDES AND DOCUSATE SODIUM 1 TABLET: 50; 8.6 TABLET ORAL at 08:51

## 2022-06-22 RX ADMIN — Medication 5 MG: at 21:12

## 2022-06-22 RX ADMIN — TRAZODONE HYDROCHLORIDE 100 MG: 100 TABLET ORAL at 00:54

## 2022-06-22 RX ADMIN — CALCIUM CARBONATE 600 MG (1,500 MG)-VITAMIN D3 400 UNIT TABLET 1 TABLET: at 08:51

## 2022-06-22 RX ADMIN — SENNOSIDES AND DOCUSATE SODIUM 1 TABLET: 50; 8.6 TABLET ORAL at 21:12

## 2022-06-22 RX ADMIN — SIMVASTATIN 40 MG: 20 TABLET, FILM COATED ORAL at 21:12

## 2022-06-22 RX ADMIN — TRAZODONE HYDROCHLORIDE 100 MG: 100 TABLET ORAL at 21:12

## 2022-06-22 RX ADMIN — VENLAFAXINE HYDROCHLORIDE 37.5 MG: 37.5 TABLET, EXTENDED RELEASE ORAL at 08:51

## 2022-06-22 RX ADMIN — RISPERIDONE 0.5 MG: 0.25 TABLET ORAL at 21:12

## 2022-06-22 RX ADMIN — VILAZODONE HYDROCHLORIDE 20 MG: 10 TABLET ORAL at 08:51

## 2022-06-22 RX ADMIN — MIRTAZAPINE 30 MG: 15 TABLET, FILM COATED ORAL at 21:12

## 2022-06-22 RX ADMIN — TIMOLOL MALEATE 1 DROP: 5 SOLUTION OPHTHALMIC at 08:53

## 2022-06-22 ASSESSMENT — ACTIVITIES OF DAILY LIVING (ADL)
HYGIENE/GROOMING: INDEPENDENT
ORAL_HYGIENE: INDEPENDENT
DRESS: INDEPENDENT
LAUNDRY: WITH SUPERVISION

## 2022-06-22 NOTE — PROGRESS NOTES
" 06/21/22 2100   Group Therapy Session   Time Session Began 1905   Time Session Ended 2000   Total Time patient participated (minutes) 40   Total # Attendees 6   Group Type expressive therapy   Group Topic Covered balanced lifestyle;structured socialization;self-care activities   Group Session Detail Evening Relaxation   Patient Response/Contribution cooperative with task   Patient Response Detail Cooperatively engaged in Evening Music Relaxation group to decrease anxiety and promote sleep.      \"Cayla\" was confident and pleasant in her participation this evening.  She left group early, citing wanting to rest before bed by herself.          "

## 2022-06-22 NOTE — PLAN OF CARE
Problem: Sleep Disturbance (Depressive Signs/Symptoms)  Goal: Improved Sleep (Depressive Signs/Symptoms)  Outcome: Ongoing, Progressing  Flowsheets (Taken 6/22/2022 0628)  Mutually Determined Action Steps (Improved Sleep): sleeps 4-6 hours at night  Intervention: Promote Healthy Sleep Hygiene  Flowsheets  Taken 6/22/2022 0628  Sleep Hygiene Promotion: regular sleep pattern promoted  Taken 6/22/2022 0618  Sleep Hygiene Promotion: regular sleep pattern promoted     Patient had a difficulty of falling asleep. Stated that her room mate was disturbing her. Offered to sleep on the reclining chair at the lounge. She took a PRN Trazodone 100 mg at 0054. Later, stated she can't sleep either at the lounge then went back to her bedroom. Eventually noted sleeping for the rest of the night. Pt appeared to have slept 6.75 hours. Prn Trazodone given may be effective.

## 2022-06-22 NOTE — PLAN OF CARE
06/22/22 1724   Individualization/Patient Specific Goals   Patient Personal Strengths family/social support   Patient Vulnerabilities family/relationship conflict   Anxieties, Fears or Concerns Patient has concerns she is not able to sleep due to her roommate.   Individualized Care Needs Patient is receiving ECT   Patient-Specific Goals (Include Timeframe) Patient is tentatively scheduled to discharge on Friday. Symptom reduction and stabilization. Final ECT on Friday morning.   Interprofessional Rounds   Summary Patient continues to improve. She is scheduled for discharge on Friday, at 12:15 pm. Her daughter in law, Shannon, will pick her up.   Participants nursing;CTC;physician   Team Discussion   Participants Alie Coles - SOLO, RN   Progress Making progress   Anticipated length of stay Discharge this Friday   Continued Stay Criteria/Rationale Stabilization goal and final ECT on Friday.   Medical/Physical Please see H and P.   Plan Patient is receiving ECT on Friday morning, and will discharge at 12:15 p.m. Her daughter in law will pick her up. Plan: ECT, medication management, psychiatric evaluations, nursing assessments, group therapy, milieu therapy, and CTC case management.   Rationale for change in precautions or plan Patient is stabilizing.   Safety Plan s-15   Anticipated Discharge Disposition home with family     PRECAUTIONS AND SAFETY    Behavioral Orders   Procedures    Code 1 - Restrict to Unit    Code 2    Code 3     Patient can leave the unit accompanied by a staff    Electroconvulsive therapy    Fall precautions    Routine Programming     As clinically indicated    Status 15     Every 15 minutes.    Suicide precautions     Patients on Suicide Precautions should have a Combination Diet ordered that includes a Diet selection(s) AND a Behavioral Tray selection for Safe Tray - with utensils, or Safe Tray - NO utensils         Safety  Safety WDL: WDL  [place] [facility] [place]ed Behavior:  calm  Observed Behavior (Comment): calm  Airway Safety Measures: all equipment/monitors on and audible  Safety Measures: safety rounds completed  Diversional Activity: television  Suicidality: Status 15, Behavioral scrubs (pajamas), Minimal furniture in room, Minimal personal belongings in room  Assault: status 15  Elopement Interventions: status 15  Sexual: semi-private room

## 2022-06-22 NOTE — PROGRESS NOTES
"PSYCHIATRY  PROGRESS NOTE     DATE OF SERVICE   06/22/2022       CHIEF COMPLAINT   \"I am stressed out\".       SUBJECTIVE   Nursing reports:   Patient was tearful and reporting increased anxiety and depression due to not being able to sleep during the night. She reports not sleeping last night due to loud noises, disruptions  and snoring from roommate. She stated \"I cant take this two more nights\". Pt was offered sound machine ear plug to help with the noise. She states she has tried all of it and it has not been helpful. She was encouraged to speak with provider. She is eating and drinking adequately, is attending group activities and denies any thoughts of self harm.     Patient has been discussed with the  earlier today.  We continue to plan for the patient to leave the hospital on Friday after the ECT treatment.     OBJECTIVE   Patient was seen and evaluated in the day area by herself, this was a face-to-face evaluation.  Patient reported feeling stressed out as she has not been able to sleep over the last 2 nights.  Patient stated that she would like to move to a different room.  I discussed with the patient that at the moment we are looking to see how we can move people in the unit in order for her to be able to either have a different roommate or being a private room.  Patient verbalized good understanding and was appreciative of the effort the staff is making.  Other than that the patient denied having any thoughts of harming herself or harming others and has been able to contract for safety.       MEDICATIONS   Medications:  Scheduled Meds:    calcium carbonate 600 mg-vitamin D 400 units  1 tablet Oral Daily     melatonin  5 mg Oral At Bedtime     mirtazapine  30 mg Oral At Bedtime     risperiDONE  0.5 mg Oral At Bedtime     senna-docusate  1 tablet Oral BID     simvastatin  40 mg Oral At Bedtime     timolol maleate  1 drop Both Eyes Daily     venlafaxine  37.5 mg Oral Daily with breakfast     " "vilazodone  20 mg Oral Daily     Continuous Infusions:  PRN Meds:.acetaminophen, alum & mag hydroxide-simethicone, hydrOXYzine, magnesium hydroxide, OLANZapine **OR** OLANZapine, traZODone    Medication adherence issues: MS Med Adherence Y/N: Yes, Hospitalization  Medication side effects: MEDICATION SIDE EFFECTS: no side effects reported  Benefit: Yes / No: Yes       ROS   A comprehensive review of systems was negative.       MENTAL STATUS EXAM   Vitals: BP (!) 149/83   Pulse 95   Temp 98.2  F (36.8  C) (Temporal)   Resp 18   Wt 71 kg (156 lb 8 oz)   SpO2 97%   BMI 26.86 kg/m      Appearance:  No apparent distress  Mood: \"I am stressed out\"  Affect: Anxious  Suicidal Ideation: PRESENT / ABSENT: absent   Homicidal Ideation: PRESENT / ABSENT: absent   Thought process: unremarkable   Thought content: devoid of  suicidal and violent ideation.   Fund of Knowledge: Average  Attention/Concentration: Normal  Language ability:  Intact  Memory:  Immediate recall intact, Short-term memory intact and Long-term memory intact  Insight:  fair.  Judgement: fair  Orientation: Yes, x4  Psychomotor Behavior: normal or unremarkable    Muscle Strength and Tone: MuscleStrength: Normal  Gait and Station: stable        LABS   personally reviewed.     No results found for: PHENYTOIN, PHENOBARB, VALPROATE, CBMZ       DIAGNOSIS   Principal Problem:    <principal problem not specified>    Active Problem List:  Patient Active Problem List   Diagnosis     Allergic rhinitis     Symptomatic menopausal or female climacteric states     Mixed hyperlipidemia     Anxiety state     Insomnia     Anal fissure     Major depressive disorder, recurrent episode, moderate (H)     Irritable bowel syndrome     ACP (advance care planning)     Health Care Home     Tinnitus     Acute lower gastrointestinal hemorrhage     Abdominal pain     Suicidal ideation     Major depressive disorder, recurrent episode, severe with anxious distress (H)     Major depressive " disorder, 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 Ongoing education given regarding diagnostic and treatment options with risks, benefits and alternatives and adequate verbalization of understanding.  2.  Medications:      Remeron 30 mg at bedtime      Effexor 37.5 mg daily at the same time cross-taper with Viibryd 20 mg daily      Risperdal 0.5 mg at bedtime      melatonin 5 mg at bedtime      As needed trazodone for insomnia.  3.  Medical team to follow the patient as needed  4.   coordinating a safe discharge plan with the patient.        Risk Assessment: [facility] RISK ASSESSMENT: Patient able to contract for safety    Coordination of Care:   Treatment Plan reviewed and physician signed, Care discussed with Care/Treatment Team Members, Chart reviewed and Patient seen      Re-Certification I certify that the inpatient psychiatric facility services furnished since the previous certification were, and continue to be, medically necessary for, either, treatment which could reasonably be expected to improve the patient s condition or diagnostic study and that the hospital records indicate that the services furnished were, either, intensive treatment services, admission and related services necessary for diagnostic study, or equivalent services.     I certify that the patient continues to need, on a daily basis, active treatment furnished directly by or requiring the supervision of inpatient psychiatric facility personnel.   I estimate 14 days of hospitalization is necessary for proper treatment of the patient. My plans for post-hospital care for this patient are  home with family     Bri Elliott MD    -     06/22/2022  -     2:43 PM    Total time 25 minutes with > 50%spent on coordination of cares and psycho-education.    This note was created with help of Dragon dictation system. Grammatical / typing errors are not intentional.    Bri Elliott MD

## 2022-06-22 NOTE — PLAN OF CARE
"Goal Outcome Evaluation:    Plan of Care Reviewed With: patient     Patient was tearful and reporting increased anxiety and depression due to not being able to sleep during the night. She reports not sleeping last night due to loud noises, disruptions  and snoring from roommate. She stated \"I cant take this two more nights\". Pt was offered sound machine ear plug to help with the noise. She states she has tried all of it and it has not been helpful. She was encouraged to speak with provider. She is eating and drinking adequately, is attending group activities and denies any thoughts of self harm.               "

## 2022-06-22 NOTE — PROGRESS NOTES
Behavioral Health  Note    Behavioral Health  Spirituality Group Note    UNIT 3BW    Name: Lorena Hayden YOB: 1945   MRN: [id number] Age: 76 year old      Patient attended -led group, which included discussion of spirituality, coping with illness and building resilience.    Patient attended group for 0.5 hrs.    patient demonstrated an appreciation of topic's application for their personal circumstances. and was quiet, contributed when invited to discussion. Cayla shared uplifting memories of her marriage and birth of her children.    Valeria Staples MDiv  Associate   Pager [phone]  Office [phone]

## 2022-06-23 LAB — SARS-COV-2 RNA RESP QL NAA+PROBE: NEGATIVE

## 2022-06-23 PROCEDURE — 124N000003 HC R&B MH SENIOR/ADOLESCENT

## 2022-06-23 PROCEDURE — U0005 INFEC AGEN DETEC AMPLI PROBE: HCPCS | Performed by: PSYCHIATRY & NEUROLOGY

## 2022-06-23 PROCEDURE — 250N000013 HC RX MED GY IP 250 OP 250 PS 637: Performed by: PSYCHIATRY & NEUROLOGY

## 2022-06-23 PROCEDURE — H2032 ACTIVITY THERAPY, PER 15 MIN: HCPCS

## 2022-06-23 PROCEDURE — 99232 SBSQ HOSP IP/OBS MODERATE 35: CPT | Performed by: PSYCHIATRY & NEUROLOGY

## 2022-06-23 RX ADMIN — VENLAFAXINE HYDROCHLORIDE 37.5 MG: 37.5 TABLET, EXTENDED RELEASE ORAL at 08:26

## 2022-06-23 RX ADMIN — MIRTAZAPINE 30 MG: 15 TABLET, FILM COATED ORAL at 21:30

## 2022-06-23 RX ADMIN — CALCIUM CARBONATE 600 MG (1,500 MG)-VITAMIN D3 400 UNIT TABLET 1 TABLET: at 08:27

## 2022-06-23 RX ADMIN — RISPERIDONE 0.5 MG: 0.25 TABLET ORAL at 21:30

## 2022-06-23 RX ADMIN — VILAZODONE HYDROCHLORIDE 20 MG: 10 TABLET ORAL at 08:26

## 2022-06-23 RX ADMIN — Medication 5 MG: at 21:30

## 2022-06-23 RX ADMIN — SENNOSIDES AND DOCUSATE SODIUM 1 TABLET: 50; 8.6 TABLET ORAL at 08:27

## 2022-06-23 RX ADMIN — TIMOLOL MALEATE 1 DROP: 5 SOLUTION OPHTHALMIC at 08:27

## 2022-06-23 RX ADMIN — SENNOSIDES AND DOCUSATE SODIUM 1 TABLET: 50; 8.6 TABLET ORAL at 21:30

## 2022-06-23 RX ADMIN — SIMVASTATIN 40 MG: 20 TABLET, FILM COATED ORAL at 21:30

## 2022-06-23 NOTE — PLAN OF CARE
Goal Outcome Evaluation:  Pt slept in a luis armando chair in the conference room because room mate complained of her snoring the night before.  Pt stated she slept comfortably all night.  She appeared to be a sleep for 8 hours.

## 2022-06-23 NOTE — PLAN OF CARE
06/23/22 1522   Group Therapy Session   Group Attendance attended group session   Time Session Began 1300   Time Session Ended 1350   Total Time patient participated (minutes) 25 no charge   Total # Attendees 5   Group Type expressive therapy;task skill   Group Topic Covered coping skills/lifestyle management;cognitive therapy techniques   Group Session Detail [facility] group to facilitate coping skill exploration, use of cognitive skills and problem solving, creative expression, clinical observation and facilitation of social, cognitive, and kinesthetic performance skills.    Patient Response/Contribution   (sat w the group, declined participation)   Patient Response Detail Sat with the group. Was pleasant and social on approach. Declined to participate. Left early to lie down to rest

## 2022-06-23 NOTE — PROGRESS NOTES
Prior Authorization **DENIED**    Medication: Latuda tabs ~tier exception~ *DENIED*  Insurance Company: [employer] [place] - Phone [phone] Fax [phone]  Reference #: CoverMyMeds Key: [id number]  Denial Date: 6/13/2022  Expected CoPay: $602.77 for 1st fill, $352.77 thereafter (due to $250 deductible plust coinsurance)    Denial Rational:     Appeal Information:       Comments:  Requested Tier Exception to price medication at lower price tier.              Alba Oglesby CPhT  [facility] Liaison  Pronouns: She/Her/Hers    [facility]  [facility]  [address] [place] [address] [address] [address]   [email or web address]  [email or web address]   Phone: [phone]  Pager: [phone]  Fax: [phone]

## 2022-06-23 NOTE — PROGRESS NOTES
Prior Authorization **APPROVED**    Authorization Effective Date: 6/21/2022  Authorization Expiration Date: 6/21/2023  Medication: Risperidone 0.5mg tabs **APPROVED**  Approved Dose/Quantity: 1 tablet daily  Reference #: CoverMyMeds Key: [id number]   Insurance Company: [employer] [place] - Phone [phone] Fax [phone]  Expected CoPay: $0.00     CoPay Card Available: No    Foundation Assistance Needed: n/a  Which Pharmacy is filling the prescription (Not needed for infusion/clinic administered): [facility] [place], [place] - [address]  Pharmacy Notified: Yes  Patient Notified: Yes  Comments:  -        Alba Oglesby CPhT  [facility] Liaison  Pronouns: She/Her/Hers    [facility]  [address] [facility]  [address], [place] [address]   [email or web address]  [email or web address]   Phone: [phone]  Pager: [phone]  Fax: [phone]

## 2022-06-23 NOTE — PROGRESS NOTES
06/22/22 2000   Group Therapy Session   Group Attendance attended group session   Time Session Began 1900   Time Session Ended 1955   Total Time patient participated (minutes) 50   Total # Attendees 4   Group Type recreation   Group Topic Covered leisure exploration/use of leisure time   Group Session Detail TR leisure group   Patient Response/Contribution cooperative with task;offered helpful suggestions to peers;organized   Patient Response Detail Pt participated in Therapeutic Recreation group with focus on leisure participation, communication skills, and critical thinking. Engaged and focused in the group recreational activity via a group game.  Pt was a full participant throughout the entire duration of group.  Appropriately shared sense of humor with peers during group and appeared to brighten with social interaction. Pt often helped give clues and help clarify responses to help out others in group.

## 2022-06-23 NOTE — PLAN OF CARE
Goal Outcome Evaluation:    Plan of Care Reviewed With: patient     Patient was calmer and less tearful this shift. She states she is going to try to get better sleep by using sound machine and ear plugs. She requested and was given PRN trazodone for sleep aid. She endorses mild depression and low anxiety, denies SI/SIB.

## 2022-06-23 NOTE — PROGRESS NOTES
"PSYCHIATRY  PROGRESS NOTE     DATE OF SERVICE   06/23/2022       CHIEF COMPLAINT   \"I am doing much better\".       SUBJECTIVE   Nursing reports:   Patient was calmer and less tearful this shift. She states she is going to try to get better sleep by using sound machine and ear plugs. She requested and was given PRN trazodone for sleep aid. She endorses mild depression and low anxiety, denies SI/SIB.     Patient has been discussed with the  earlier today.   is getting everything ready for the patient to leave tomorrow with family.     OBJECTIVE   Patient was seen and evaluated in the day area by herself, this was a face-to-face evaluation.  Patient reported that today she is feeling much better and is looking forward to be able to leave the hospital tomorrow.  Patient stated that she had a good night sleep and there were no issues.  Patient verbalized feeling hopeful, denies any suicidal ideations and has continued to contract for safety.  Patient wanted to make sure that her ECT treatment will be done early in the morning.  Nurse in charge of the patient and this writer discussed with the patient that her ECT has been scheduled for 8 AM.  Patient verbalized good understanding and is in agreement with the plan.       MEDICATIONS   Medications:  Scheduled Meds:    calcium carbonate 600 mg-vitamin D 400 units  1 tablet Oral Daily     melatonin  5 mg Oral At Bedtime     mirtazapine  30 mg Oral At Bedtime     risperiDONE  0.5 mg Oral At Bedtime     senna-docusate  1 tablet Oral BID     simvastatin  40 mg Oral At Bedtime     timolol maleate  1 drop Both Eyes Daily     vilazodone  20 mg Oral Daily     Continuous Infusions:  PRN Meds:.acetaminophen, alum & mag hydroxide-simethicone, hydrOXYzine, magnesium hydroxide, OLANZapine **OR** OLANZapine, traZODone    Medication adherence issues: MS Med Adherence Y/N: Yes, Hospitalization  Medication side effects: MEDICATION SIDE EFFECTS: no side effects " "reported  Benefit: Yes / No: Yes       ROS   A comprehensive review of systems was negative.       MENTAL STATUS EXAM   Vitals: /62 (BP Location: Left arm, Patient Position: Supine)   Pulse 82   Temp 98.3  F (36.8  C) (Oral)   Resp 12   Wt 65.6 kg (144 lb 9.6 oz)   SpO2 97%   BMI 24.82 kg/m      Appearance:  No apparent distress  Mood: \"I am doing much better\"  Affect: Calm and pleasant  Suicidal Ideation: PRESENT / ABSENT: absent   Homicidal Ideation: PRESENT / ABSENT: absent   Thought process: unremarkable   Thought content: devoid of  suicidal and violent ideation.   Fund of Knowledge: Average  Attention/Concentration: Normal  Language ability:  Intact  Memory:  Immediate recall intact, Short-term memory intact and Long-term memory intact  Insight:  fair.  Judgement: fair  Orientation: Yes, x4  Psychomotor Behavior: normal or unremarkable    Muscle Strength and Tone: MuscleStrength: Normal  Gait and Station: stable        LABS   personally reviewed.     No results found for: PHENYTOIN, PHENOBARB, VALPROATE, CBMZ       DIAGNOSIS   Principal Problem:    <principal problem not specified>    Active Problem List:  Patient Active Problem List   Diagnosis     Allergic rhinitis     Symptomatic menopausal or female climacteric states     Mixed hyperlipidemia     Anxiety state     Insomnia     Anal fissure     Major depressive disorder, recurrent episode, moderate (H)     Irritable bowel syndrome     ACP (advance care planning)     Health Care Home     Tinnitus     Acute lower gastrointestinal hemorrhage     Abdominal pain     Suicidal ideation     Major depressive disorder, recurrent episode, severe with anxious distress (H)     Major depressive disorder, 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 Ongoing education given regarding diagnostic and treatment options with risks, benefits and alternatives and adequate verbalization of understanding.  2.  Medications:      Remeron 30 mg at " bedtime      Discontinue Effexor and continue Viibryd 20 mg daily      Risperdal 0.5 mg at bedtime      melatonin 5 mg at bedtime      As needed trazodone for insomnia.  3.  Medical team to follow the patient as needed  4.   coordinating a safe discharge plan with the patient.        Risk Assessment: [facility] RISK ASSESSMENT: Patient able to contract for safety    Coordination of Care:   Treatment Plan reviewed and physician signed, Care discussed with Care/Treatment Team Members, Chart reviewed and Patient seen      Re-Certification I certify that the inpatient psychiatric facility services furnished since the previous certification were, and continue to be, medically necessary for, either, treatment which could reasonably be expected to improve the patient s condition or diagnostic study and that the hospital records indicate that the services furnished were, either, intensive treatment services, admission and related services necessary for diagnostic study, or equivalent services.     I certify that the patient continues to need, on a daily basis, active treatment furnished directly by or requiring the supervision of inpatient psychiatric facility personnel.   I estimate 14 days of hospitalization is necessary for proper treatment of the patient. My plans for post-hospital care for this patient are  home with family     Bri Elliott MD    -     06/23/2022  -     1:00 PM    Total time 25 minutes with > 50%spent on coordination of cares and psycho-education.    This note was created with help of Dragon dictation system. Grammatical / typing errors are not intentional.    Bri Elliott MD

## 2022-06-23 NOTE — PLAN OF CARE
"  Problem: Behavioral Health Plan of Care  Goal: Plan of Care Review  Outcome: Ongoing, Progressing     Problem: Activity and Energy Impairment (Depressive Signs/Symptoms)  Goal: Optimized Energy Level (Depressive Signs/Symptoms)  Outcome: Ongoing, Progressing  Intervention: Optimize Energy Level   Goal Outcome Evaluation:    Plan of Care Reviewed With: patient       Patient continues to be visible in the lounge, her affect is full range in a calm mood. Social with staff and peers. Attending group. Denies anxiety, SI, SIB and hallucination. Stated her depression is \"Same\". Medication complaint. Last night, she slept in the [facility] room on a [employer] chair because patient reported her sleep was disrupted the night before due to her roommate snoring and loud noises. Stated she slept well and recorder 8 hour of sleep. Her appetite is good. Denies pain, VSS. Looking forward to discharge home tomorrow after ECT. Her ECT schedule is 8:00 am tomorrow morning. Covid swap sent to lab by medical flyer, result is pending. Continue to monitor patient as plan of care.      "

## 2022-06-24 ENCOUNTER — ANESTHESIA EVENT (OUTPATIENT)
Dept: BEHAVIORAL HEALTH | Facility: CLINIC | Age: 77
DRG: 885 | End: 2022-06-24
Payer: COMMERCIAL

## 2022-06-24 ENCOUNTER — APPOINTMENT (OUTPATIENT)
Dept: BEHAVIORAL HEALTH | Facility: CLINIC | Age: 77
DRG: 885 | End: 2022-06-24
Attending: PSYCHIATRY & NEUROLOGY
Payer: COMMERCIAL

## 2022-06-24 ENCOUNTER — ANESTHESIA (OUTPATIENT)
Dept: BEHAVIORAL HEALTH | Facility: CLINIC | Age: 77
DRG: 885 | End: 2022-06-24
Payer: COMMERCIAL

## 2022-06-24 VITALS
HEART RATE: 103 BPM | WEIGHT: 144.6 LBS | RESPIRATION RATE: 20 BRPM | BODY MASS INDEX: 24.82 KG/M2 | TEMPERATURE: 97.1 F | OXYGEN SATURATION: 97 % | DIASTOLIC BLOOD PRESSURE: 90 MMHG | SYSTOLIC BLOOD PRESSURE: 148 MMHG

## 2022-06-24 PROCEDURE — 250N000013 HC RX MED GY IP 250 OP 250 PS 637: Performed by: PSYCHIATRY & NEUROLOGY

## 2022-06-24 PROCEDURE — 250N000009 HC RX 250: Performed by: ANESTHESIOLOGY

## 2022-06-24 PROCEDURE — 250N000011 HC RX IP 250 OP 636: Performed by: ANESTHESIOLOGY

## 2022-06-24 PROCEDURE — GZB0ZZZ ELECTROCONVULSIVE THERAPY, UNILATERAL-SINGLE SEIZURE: ICD-10-PCS | Performed by: PSYCHIATRY & NEUROLOGY

## 2022-06-24 PROCEDURE — 370N000017 HC ANESTHESIA TECHNICAL FEE, PER MIN

## 2022-06-24 PROCEDURE — 90870 ELECTROCONVULSIVE THERAPY: CPT

## 2022-06-24 PROCEDURE — 99239 HOSP IP/OBS DSCHRG MGMT >30: CPT | Performed by: PSYCHIATRY & NEUROLOGY

## 2022-06-24 PROCEDURE — 90870 ELECTROCONVULSIVE THERAPY: CPT | Performed by: PSYCHIATRY & NEUROLOGY

## 2022-06-24 RX ORDER — ETOMIDATE 2 MG/ML
INJECTION INTRAVENOUS PRN
Status: DISCONTINUED | OUTPATIENT
Start: 2022-06-24 | End: 2022-06-24

## 2022-06-24 RX ORDER — NICARDIPINE HCL-0.9% SOD CHLOR 1 MG/10 ML
SYRINGE (ML) INTRAVENOUS PRN
Status: DISCONTINUED | OUTPATIENT
Start: 2022-06-24 | End: 2022-06-24

## 2022-06-24 RX ORDER — ESMOLOL HYDROCHLORIDE 10 MG/ML
INJECTION INTRAVENOUS PRN
Status: DISCONTINUED | OUTPATIENT
Start: 2022-06-24 | End: 2022-06-24

## 2022-06-24 RX ADMIN — Medication 16 MG: at 09:08

## 2022-06-24 RX ADMIN — Medication 400 MCG: at 09:11

## 2022-06-24 RX ADMIN — ESMOLOL HYDROCHLORIDE 50 MG: 10 INJECTION, SOLUTION INTRAVENOUS at 09:06

## 2022-06-24 RX ADMIN — TIMOLOL MALEATE 1 DROP: 5 SOLUTION OPHTHALMIC at 10:55

## 2022-06-24 RX ADMIN — SENNOSIDES AND DOCUSATE SODIUM 1 TABLET: 50; 8.6 TABLET ORAL at 10:54

## 2022-06-24 RX ADMIN — CALCIUM CARBONATE 600 MG (1,500 MG)-VITAMIN D3 400 UNIT TABLET 1 TABLET: at 10:54

## 2022-06-24 RX ADMIN — VILAZODONE HYDROCHLORIDE 20 MG: 10 TABLET ORAL at 10:54

## 2022-06-24 RX ADMIN — SUCCINYLCHOLINE CHLORIDE 60 MG: 20 INJECTION, SOLUTION INTRAMUSCULAR; INTRAVENOUS; PARENTERAL at 09:11

## 2022-06-24 NOTE — PROGRESS NOTES
Admit to pacu at 0917   0933 oriented x 4  In phase ll   0945 report called to RNBrandy 5   on 3B  MDA ok to discharge to floor.  1000 discharged to floor with staff

## 2022-06-24 NOTE — PLAN OF CARE
"  Problem: Behavioral Health Plan of Care  Goal: Plan of Care Review  Outcome: Ongoing, Progressing  Flowsheets (Taken 6/23/2022 2000)  Plan of Care Reviewed With: patient  Patient Agreement with Plan of Care: agrees   Goal Outcome Evaluation:    Plan of Care Reviewed With: patient      Pt endorsed mood as somewhat  Depressed  and rated at 8/10. Pt reports  Anxiety is \"hardly there\". Pt reports she feels somewhat depressed because she was informed today that her insurance will not cover for group therapy. She states she is however feeling \"a little positive\" since she will be having a virtual one. Pt denies SI/SIB/HI or any forms of hallucinations.   Pt is visible in the milieu, social with staff and peers. She is eating and drinking adequately, medication compliant. Pt reports being concerned that she might not be able to have a good night sleep due to roommate snoring. Staff assisted Pt with setting up sleep area as Pt decided to sleep in the resilience room.     Plan: Pt has ECT 06/24/22 around 0820. Pt should be ready by 0800. Pt will discharge tomorrow 06/24/22 at 1215 and will be picked-up by daughter in-law.    "

## 2022-06-24 NOTE — ANESTHESIA CARE TRANSFER NOTE
Patient: Lorena Hayden    Procedure: * No procedures listed *       Diagnosis: * No pre-op diagnosis entered *  Diagnosis Additional Information: No value filed.    Anesthesia Type:   General     Note:    Oropharynx: oral airway in place  Level of Consciousness: drowsy      Independent Airway: airway patency satisfactory and stable  Dentition: dentition unchanged  Vital Signs Stable: post-procedure vital signs reviewed and stable  Report to RN Given: handoff report given  Patient transferred to: PACU    Handoff Report: Identifed the Patient, Identified the Reponsible Provider, Reviewed the pertinent medical history, Discussed the surgical course, Reviewed Intra-OP anesthesia mangement and issues during anesthesia, Set expectations for post-procedure period and Allowed opportunity for questions and acknowledgement of understanding      Vitals:  Vitals Value Taken Time   BP     Temp     Pulse     Resp     SpO2         Electronically Signed By: Jenaro Beaver DO  June 24, 2022  9:18 AM

## 2022-06-24 NOTE — DISCHARGE SUMMARY
PSYCHIATRY  DISCHARGE SUMMARY     DATE OF DISCHARGE   06/24/2022       DISCHARGE DIAGNOSIS   Major depressive disorder, recurrent episode, severe with anxious distress (H)    Patient Active Problem List   Diagnosis     Allergic rhinitis     Symptomatic menopausal or female climacteric states     Mixed hyperlipidemia     Anxiety state     Insomnia     Anal fissure     Major depressive disorder, recurrent episode, moderate (H)     Irritable bowel syndrome     ACP (advance care planning)     Health Care Home     Tinnitus     Acute lower gastrointestinal hemorrhage     Abdominal pain     Suicidal ideation     Major depressive disorder, recurrent episode, severe with anxious distress (H)     Major depressive disorder, 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    HISTORY OF PRESENT ILLNESS:  The patient presents as a reasonably reliable historian, reports longstanding history of depression with worsening about a year to year and a half ago.  She apparently was treated with Cymbalta for a long time and was able to maintain stable mood and be functional.  Then symptoms of depression started getting worse.  She tried to see a primary care provider/prescriber of psychotropic medication to increase Cymbalta to no avail.  Then Abilify was added, then Wellbutrin, then patient's Cymbalta was decreased with plan to taper it off and stop, and she was started on Effexor.  She reported that no significant improvement with all those medication changes.  Reported having more thoughts that life was not worth living and thoughts of suicide.  She also reported difficulties with sleep, low energy, poor appetite with significant weight loss of about 20 pounds.  She finally came to the hospital and requested to be hospitalized.  The patient was initially seen at [facility] unit and was  referred for psychiatric stabilization.     PAST PSYCHIATRIC HISTORY:  Reports history of depression and anxiety.  Reported 1 psychiatric hospitalization back in 2013 when she was treated for anxiety and depression.  Past medication trials include Ativan, trazodone, Klonopin, Lexapro, Wellbutrin, Celexa, Seroquel, Zoloft, Abilify, currently Effexor and Cymbalta.  Patient denied any previous suicide attempts.  Reported that she had a course of 15 ECT about 10 years ago and had a brief improvement, which then disappeared and she started feeling depressed again.  She was hospitalized again about 2 weeks ago.  Was transferred to [facility] in [place] and was hospitalized there for about 1 week.  The patient is unable to identify any specific triggers for increased depression.  Reports that she and her  are relatively healthy.  They are both on Social Security, but do not have any major financial obligations; they own their house and their cars.       HOSPITAL COURSE   Admitted due to aforementioned presentation.  Education regarding diagnostic and treatment options with risks, benefits and alternatives and adequate verbalization of understanding.  Discussed reviewed in further detail, stressors and events leading to presentation.    Admitted on a Voluntary status    Patient was started on a multimodal therapy that included medications.  After reviewing with the patient and the family her past psychiatric history and medication use it was decided to transition the patient to Effexor and increase the dose of Remeron.  Patient was eventually at a dose of Effexor 225 mg daily, Remeron 30 mg at bedtime and trazodone as needed for sleep.  ECT treatments were started.  Despite the medication changes the patient continues to report feeling extremely depressed and not enjoying her life.  We had multiple discussions with the patient about adding a mood stabilizer but her insurance was not able to cover Rexulti or  Latuda.  Patient has tried in the past Abilify that she did not felt it was helpful.  At the end the patient agreed for a small dose of Risperdal.  I have discussed with the patient and her daughter-in-law in detail the use of Risperdal including reasons for prescribing this medication, benefits, risk and side effects.  I also had a discussion with the patient and her daughter-in-law about Risperdal having an FDA black box warning.  Patient was in agreement with a trial of Risperdal.  At the same time the patient also agreed with changing Effexor for Viibryd.  With this medication adjustments the patient was able to improve and at this time she is adequate for discharge.    Patient has completed 12 ECT treatments and she will continue doing maintenance ECT treatments.    Patient was seen and evaluated today in the day area by herself, this was a face-to-face evaluation.  The patient reported that she is ready to leave the hospital.  She is feeling overwhelmed about all the appointments but understands her daughter-in-law is going to be helping her.  Patient is denying having any thoughts of harming herself and she is angelina for safety.  Patient is aware that if at any time she feels like harming herself or harming others she can call 911 or go to any emergency room.  Patient denies having homicidal ideations or hallucinations and no delusions have been elicited.  During the hospital stay patient has not demonstrated any aggressive or self injurious behaviors and this has been consistent with my observations and the observations of the staff.  For the most part patient has been attending most groups.  She has had a good relationship with the staff and other patients.    Throughout the hospitalization the patient was seen by the service of internal medicine.  She was also evaluated by the  that met with her regularly and helped her scheduled outpatient appointments.  We had numerous interactions with  "the family and scheduled two care conferences.  At this time the family is verbalizing good understanding and they are in agreement with the discharge plans.  I have encouraged the patient and the family to give us a call if they have any problems with understanding AVS or with the medications.  Patient also has been informed that if there is any issue with her medications she will call her primary care doctor.  Patient verbalized good understanding and is in agreement with this.  At this time she is denying any safety concerns and is in agreement with leaving the hospital.       MENTAL STATUS EXAM   Vitals: BP (!) 148/90 (BP Location: Right arm)   Pulse 103   Temp 97.1  F (36.2  C) (Temporal)   Resp 20   Wt 65.6 kg (144 lb 9.6 oz)   SpO2 97%   BMI 24.82 kg/m      Mental Status:  Appearance:  No apparent distress  Mood:  {Mood: \"Anxious about going home\"  Affect: restricted was was congruent to speech  Suicidal Ideation: PRESENT / ABSENT: absent   Homicidal Ideation: PRESENT / ABSENT: absent   Thought process: unremarkable   Thought content: devoid of  suicidal and violent ideation.   Fund of Knowledge: Average  Attention/Concentration: Normal  Language ability:  Intact  Memory:  Immediate recall intact, Short-term memory intact and Long-term memory intact  Insight:  good.  Judgement: good  Orientation: Yes, x4  Psychomotor Behavior: normal or unremarkable    Muscle Strength and Tone: MuscleStrength: Normal  Gait and Station: Normal         DISCHARGE MEDICATIONS   Discharge Medication Options:   Discharge Medication List as of 6/24/2022 11:54 AM      START taking these medications    Details   !! melatonin 5 MG tablet Take 1 tablet (5 mg) by mouth At Bedtime, Disp-30 tablet, R-0, E-Prescribe      risperiDONE (RISPERDAL) 0.5 MG tablet Take 1 tablet (0.5 mg) by mouth At Bedtime, Disp-30 tablet, R-0, E-Prescribe      traZODone (DESYREL) 100 MG tablet Take 1 tablet (100 mg) by mouth nightly as needed for sleep " (may repeat after 60 minutes), Disp-30 tablet, R-0, E-Prescribe      vilazodone (VIIBRYD) 20 MG TABS tablet Take 1 tablet (20 mg) by mouth daily, Disp-30 tablet, R-0, E-Prescribe       !! - Potential duplicate medications found. Please discuss with provider.      CONTINUE these medications which have CHANGED    Details   mirtazapine (REMERON) 30 MG tablet Take 1 tablet (30 mg) by mouth At Bedtime, Disp-30 tablet, R-0, E-Prescribe         CONTINUE these medications which have NOT CHANGED    Details   calcium carbonate 600 mg-vitamin D 400 units (CALTRATE) 600-400 MG-UNIT per tablet Take 1 tablet by mouth daily, Historical      !! melatonin 3 MG tablet Take 3 mg by mouth nightly as needed for sleep (+ 1 tablet PRN if first tablet does not work), Historical      Omega-3 Fatty Acids (FISH OIL PO) Take 1 capsule by mouth daily, Historical      simvastatin (ZOCOR) 40 MG tablet Take 40 mg by mouth At Bedtime, Historical      timolol maleate (TIMOPTIC) 0.5 % ophthalmic solution Place 1 drop into both eyes every morning, Historical       !! - Potential duplicate medications found. Please discuss with provider.      STOP taking these medications       DULoxetine (CYMBALTA) 20 MG capsule Comments:   Reason for Stopping:         DULoxetine (CYMBALTA) 20 MG capsule Comments:   Reason for Stopping:         venlafaxine (EFFEXOR) 37.5 MG tablet Comments:   Reason for Stopping:         VITAMIN D PO Comments:   Reason for Stopping:               Medication adherence issues: MS Med Adherence Y/N: Yes, Hospitalization  Medication side effects: MEDICATION SIDE EFFECTS: no side effects reported         DISCHARGE PLAN   1.  Education given regarding diagnostic and treatment options with risks, benefits and alternatives with adequate verbalization of understanding.  2.  Discharge to home with family.  Upon detailed review of risk factors, patient amenable for release.   3.  Continue aforementioned medications and associated medication  changes with follow-up by outpatient mental health provider.  4.  Crisis management planning in place.    5.  Continue efforts for sobriety.    .  Nursing and  to review further discharge recommendations.     TOTAL TIME:  Greater than 30 minutes for discharge planning.    This note was created with help of Dragon dictation system. Grammatical / typing errors are not intentional.    Bri Elliott MD

## 2022-06-24 NOTE — PLAN OF CARE
Nursing assessment completed. Patient awake and visible early. Has been NPO after midnight for am ECT. VSS. Patient calm and cooperative. Patient had ECT and returned to the unit without issue. Alert and oriented, VSS. Once back to the unit she was able to eat her breakfast and take am medications. Patient discharging home with her daughter in law. All discharge instructions reviewed with patient, her daughter in law Shannon, and her  via the phone. All belongings brought in on admission returned to patient, including discharge medications from [facility]. Patient denies SI/SIB.

## 2022-06-24 NOTE — PROCEDURES
Procedure/Surgery Information   [facility]    Bedside Procedure Note  Date of Service (when I performed the procedure): 06/24/2022    Lorena Hayden is a 76 year old female patient.  1. Major depressive disorder, recurrent episode, moderate (H)    2. 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 Major depressive disorder, recurrent episode, severe with anxious distress (H)      Past Medical History:   Diagnosis Date     Allergic rhinitis, cause unspecified      Anxiety      Depressive disorder      Temp: 98.3  F (36.8  C) Temp src: Oral BP: 131/82 Pulse: 85   Resp: 16 SpO2: 98 % O2 Device: None (Room air)      Procedures       Marvin March MD     Lorena Hayden is a 76 year old  year old female patient.  [id number]  @DX@    [facility]   ECT Procedure Note   06/24/2022    Patient Status: Inpatient    Is this the first in a series of 12 treatments?  No     Allergies   Allergen Reactions     Horse Allergy      Allergic to horse hair       Weight:  144 lbs 9.6 oz         Indications for ECT:   Medications ineffective         Clinical Narrative:   Lorena Hayden is a 76 year old woman with a history of depression, started in her mid 60s. Her depression was relatively well-controlled on duloxetine until February 2022, since when she has had progressive depression with suicidal thoughts without an identifiable trigger. Multiple medication trials yielded to no improvement (Increased duloxetine, added aripiprazole then bupropion, now cross titrating to desvenlafaxine). She had a recent psychiatric hospitalization in [facility] in [place], discharged only two weeks ago. However, depression persisted, she had more thoughts that life was not worth living and thoughts of suicide.  She also reported difficulties with sleep, low energy, poor appetite with significant weight loss of  about 20 pounds.  She finally came to the hospital and requested to be hospitalized.     Psychiatric History:   Diagnoses: Major Depressive Disorder     Hospitalizations: Two former (first in 2013, last in 2022)     Suicide Attempts: None     Medication Trials: Ativan, trazodone, Klonopin, Lexapro, Wellbutrin, Celexa, Seroquel, Zoloft, Abilify, currently Effexor and Cymbalta. No TCAs or MAOIs.     Neuromodulation:  Right unilateral ultrabrief ECT x 19 sessions in 2012. She had a good response during treatment which lasted only several weeks after the discontinuation.     Psychotherapy:  None            Diagnosis:   Major depression         Assessment:   #1 she reports her depression is severe and decreases pleasure and motivation and she can be very paralyzed and indecisive with anxiety. ECT was helpful before  #2  She is irritable she was awoken early but her appt pushed back. She denies discomfort after last appointment. Some SI but she feels it is  becuase she is so anxious waiting.  #3 sleeping well with trazodone + remeron, she reports no worsening as in in fact improving no SI today, no tremor and less anxious, less catastrophizing thoughts. Her dog nehemias is awaiting her at home. She feels her thinking is improved not worsened since starting ECT  #4 Today she is flustered she has trouble putting her depression intensity on a scale but admits sadness, no tearfulness, no Si today and denies complaints about recovery process of ECT. She denies worsening anxiety and she slept well overnight.  #5 She is irritable and anxious today. She I not yet feeling improvement in mood. She struggled with the transfer of units to  it disrupted her bedtime and she did not sleep well. She finds the ECT frightening.   #6 reports form the floor report unchanged mood so far. Less irritable and anxious today 1/10 ( mood worst it has ever been ) but SI has resolved.   2/3 at 5min recall Will increase to 9x ST  #7 more verbose today.  She is frustrated that her meal will be cold by the time she returns for breakfast.  She reports mood is improving some. She denies physical side effects   #8 6/10 mood (10 best) no anxiety between treatments. She feels very fatigued after treatments and needs to nap more. denies concern for confusion or insomnia. denies concerns form family about confusion.  #9 mood still 6/10 she is anxious because she didn't sleep last night she fears trazodone will no longer work for her. Less irritable. Mild nausea this AM and increased intensity of RLS  #10 mood unchanged no drift. She denies SI over the weekend and anxiety well controlled. She acknowledges need for continued oral medications after ECT to sustain benefit and by her history maintenance ECT is recommended  #11 doing well anxiety much decreased mood 6/10  #12  She is worried that she recently changed medications. Depression is not fully resolved but no longer has SI    M1 mood 5/10 stil anxious she is concerned about what time her maintenance appointments will be since she has a reasonably long drive drive to get here. New consent signed.         Pause for the Cause:     Right patient Yes   Right procedure/laterality settings: Yes          Intra-Procedure Documentation:       ECT #: 13   Treatment number this series: 13   Total treatment number: 32     Type of ECT:  Right, unilateral ultrabrief    ECT Medications:    Etomidate: 16mg  Succinyl Choline: 60mg  Versed 0.5mg for agitation  Nicardipine 400mg esmolol 50mg      ECT Strip Summary:    Titration ( 19.2 percent ) Energy Level:230.4mc  0.3ms  60 Hz time 8 sec 800mA  Motor Seizure Duration:  44   seconds  EEG Seizure Duration: 79seconds  Did not improve suppression resume 172.8 Mc    Complications: None        Plan:       Give outpatient maintenance instructions to the floor to relay to daughter in law      Recommend maintenance 1per week at 172.8 Mc  0.3msec 45msec  8sec 800mA  (7/1) for 3weeks then further  spacing. Please make appointment and communicate to floor staff for instructions.  pt wants appointment after 10am has a distance to drive and must have time to walk dog in the morning before she gets on the road     COVID test ordered 6/29    Monitor depression severity with clinical assessment augmented with CUDOS every treatment  Continue current medications    MD Rod

## 2022-06-24 NOTE — ANESTHESIA POSTPROCEDURE EVALUATION
Patient: Lorena Hayden    Procedure: * No procedures listed *       Anesthesia Type:  General    Note:     Postop Pain Control: Uneventful            Sign Out: Well controlled pain   PONV: No   Neuro/Psych: Uneventful            Sign Out: Acceptable/Baseline neuro status   Airway/Respiratory: Uneventful            Sign Out: Acceptable/Baseline resp. status   CV/Hemodynamics: Uneventful            Sign Out: Acceptable CV status; No obvious hypovolemia; No obvious fluid overload   Other NRE: NONE   DID A NON-ROUTINE EVENT OCCUR? No           Last vitals:  Vitals:    06/23/22 2014 06/24/22 0500 06/24/22 0805   BP: 112/79 131/82 131/82   Pulse: 91 85 85   Resp: 16 16    Temp: 36.7  C (98  F) 36.8  C (98.3  F) 36.8  C (98.3  F)   SpO2: 98% 98% 98%       Electronically Signed By: Jenaro Beaver DO  June 24, 2022  9:19 AM

## 2022-06-24 NOTE — PROVIDER NOTIFICATION
06/24/22 0627   Sleep/Rest   Sleep/Rest/Relaxation no problem identified   Sleep Hygiene Promotion awakenings minimized   Night Time # Hours 5 hours     Pt slept a total of 5 hours and was up early this morning. Pt has ECT this morning at 0820 No concerns noted this shift.

## 2022-06-24 NOTE — ANESTHESIA PREPROCEDURE EVALUATION
Anesthesia Pre-Procedure Evaluation    Patient: Lorena Hayden   MRN: [id number] : 1945        Procedure : * No procedures listed *          Past Medical History:   Diagnosis Date     Allergic rhinitis, cause unspecified      Anxiety      Depressive disorder       Past Surgical History:   Procedure Laterality Date     C IMPLANT HALLUX  3/2006 &     Yesi STEELE MAMMOGRAM, SCREENING  2011    Dr arredondo     COLONOSCOPY  2014    Mirian Mottamalika     EXTRACAPSULAR CATARACT EXTRATION WITH INTRAOCULAR LENS IMPLANT  2011     HC DILATION/CURETTAGE DIAG/THER NON OB  2005    D & C     HCL PAP SMEAR  2011    Dr Arredondo     ZZC  DELIVERY ONLY       ZZC LIGATE FALLOPIAN TUBE      Tubal Ligation     ZZHC COLONOSCOPY THRU STOMA W BIOPSY/CAUTERY TUMOR/POLYP/LESION  / /2011    benign polyp      Allergies   Allergen Reactions     Horse Allergy      Allergic to horse hair      Social History     Tobacco Use     Smoking status: Never Smoker     Smokeless tobacco: Never Used   Substance Use Topics     Alcohol use: Yes     Alcohol/week: 0.8 standard drinks     Types: 1 drink(s) per week     Comment: social      Wt Readings from Last 1 Encounters:   22 65.6 kg (144 lb 9.6 oz)           Physical Exam    Airway        Mallampati: II   TM distance: > 3 FB   Neck ROM: full   Mouth opening: > 3 cm    Respiratory Devices and Support         Dental  no notable dental history         Cardiovascular   cardiovascular exam normal          Pulmonary   pulmonary exam normal                OUTSIDE LABS:  CBC:   Lab Results   Component Value Date    WBC 5.4 2022    WBC 4.3 2013    HGB 15.4 2022    HGB 10.5 (L) 2016    HCT 47.4 (H) 2022    HCT 42.4 2013     2022     2013     BMP:   Lab Results   Component Value Date     2022     2016    POTASSIUM 3.7 2022    POTASSIUM 4.1 2016    CHLORIDE 106 2022     CHLORIDE 102 04/12/2016    CO2 29 05/14/2022    CO2 25 04/12/2016    BUN 13 05/14/2022    BUN 14 04/12/2016    BUN NOT APPLICABLE 04/12/2016    CR 0.65 05/14/2022    CR 0.75 04/12/2016     (H) 05/14/2022    GLC 97 04/12/2016     COAGS: No results found for: PTT, INR, FIBR  POC: No results found for: BGM, HCG, HCGS  HEPATIC:   Lab Results   Component Value Date    ALBUMIN 3.3 (L) 05/14/2022    PROTTOTAL 7.2 05/14/2022    ALT 24 05/14/2022    AST 21 05/14/2022    ALKPHOS 87 05/14/2022    BILITOTAL 0.6 05/14/2022    BILIDIRECT 0.1 07/28/2012     OTHER:   Lab Results   Component Value Date    PH 7.0 07/28/2012    A1C 5.4 01/25/2013    RENUKA 8.9 05/14/2022    TSH 1.22 05/17/2022    SED 33 (A) 04/23/2011       Anesthesia Plan    ASA Status:  3      Anesthesia Type: General.   Induction: Intravenous.           Consents            Postoperative Care            Comments:                Jenaro Beaver DO

## 2022-06-28 ENCOUNTER — HOSPITAL ENCOUNTER (OUTPATIENT)
Dept: BEHAVIORAL HEALTH | Facility: CLINIC | Age: 77
Discharge: HOME | End: 2022-06-28
Attending: FAMILY MEDICINE
Payer: COMMERCIAL

## 2022-06-28 PROCEDURE — 999N000216 HC STATISTIC ADULT CD FACE TO FACE-NO CHRG: Mod: TEL,95 | Performed by: COUNSELOR

## 2022-06-28 NOTE — PROGRESS NOTES
"[facility]    ADULT Mental Health Assessment Appointment Note    PATIENT'S NAME:  Lorena Hayden    MRN: [id number]  YOB: 1945  Address:  [address] [place], [place] [address] [address]  PREFERRED PHONE: [phone]  [email or web address]  Emergency Contact: Spouse Cole Hayden [phone]    DATE OF SERVICE: 22  START TIME: 2:00pm  END TIME: 2:20pm  SERVICE MODALITY:  Phone Visit:      Provider verified identity through the following two step process.  Patient provided:  Patient  and Patient address    The patient has been notified of the following:      \"We have found that certain health care needs can be provided without the need for a face to face visit.  This service lets us provide the care you need with a phone conversation.       I will have full access to your [facility] medical record during this entire phone call.   I will be taking notes for your medical record.      Since this is like an office visit, we will bill your insurance company for this service.       There are potential benefits and risks of telephone visits (e.g. limits to patient confidentiality) that differ from in-person visits.?Confidentiality still applies for telephone services, and nobody will record the visit.  It is important to be in a quiet, private space that is free of distractions (including cell phone or other devices) during the visit.??      If during the course of the call I believe a telephone visit is not appropriate, you will not be charged for this service\"     Consent has been obtained for this service by care team member: Yes     Identifying Information:  Patient is a 76 year old.  The pronoun use throughout this assessment reflects the patient's chosen pronoun.  Patient was referred for an assessment by [facility]. Patient attended the session alone.    Chief Complaint:   The reason for seeking services at this time is: She stated " she left the hospital a couple of days ago, so she thinks the hospital scheduled it. Patient was hospitalized at [facility] from 05/13 until 06/24/2022. Patient sees her primary care doctor on 06/30 at 2pm. On 07/01, she has ECT at 10:20am. She thinks she will have ECT weekly, then every other week, then once a month. On 07/05, she meets with a new therapist at 10am. On 08/11, she meets with a new psychiatrist. For ECT, she needs a Covid test first. Her  returned from the store with a Covid test. Patient asked  how to take the Covid test?  recommended she call her primary care doctor's office and talk with a nurse.  explained 55+ program and patient was very interested in the program. However, she doesn't have a computer or tablet with a camera. Patient stated she will check with her daughter-in-law to get set up with video.    Patient seemed overwhelmed with details regarding several upcoming appointments.  recommended that today's assessment be rescheduled to next week after she has met with her primary care doctor, started ECT, and met with new therapist. Patient agreed to 07/07 at 12pm.  explained that patients often don't do well in the 55+ program if they are receiving ECT at the same time because the group information is a lot to remember and process.  recommended before next week's appointment that patient determine how often she will have ECT, how often she will meet with her individual therapist, and if she can get video capabilities set up.     Current Mental Status Exam:   Appearance:  Unable to assess due to telephone assessment   Eye Contact:  Unable to assess due to telephone assessment   Psychomotor:  Unable to assess due to telephone assessment       Gait / station:  Unable to assess due to telephone assessment   Attitude / Demeanor: Cooperative  Friendly  Speech      Rate / Production: Normal/ Responsive      Volume:  Normal  volume       Language:  intact  Mood:   Normal  Affect:   Unable to assess due to telephone assessment    Thought Content: Clear   Thought Process: Coherent       Associations: No loosening of associations  Insight:   Fair   Judgment:  Intact   Orientation:  All  Attention/concentration: Fair    Rating Scales:    PHQ-9 SCORE 11/13/2012 6/19/2013 9/4/2013   PHQ-9 Total Score 17 - -   PHQ-9 Total Score - 1 0     HYACINTH-7 SCORE 6/19/2013 9/4/2013   Total Score BEH Adult 4 0       Safety Assessment:   Current Safety Concerns: Patient denied suicidal ideation, plan, and intent. She feels safe. She was future oriented and plans to attend her upcoming appointments.     Clinical Impressions:  Major Depressive Disorder    Therapeutic Interventions Provided: supportive active listening, provided safety planning, explored alternatives and emotional validation    Recommendations/Plan: Patient seemed overwhelmed with details regarding several upcoming appointments.  recommended that today's assessment be rescheduled to next week after she has met with her primary care doctor, started ECT, and met with new therapist. Patient agreed to 07/07 at 12pm.  explained that patients often don't do well in the 55+ program if they are receiving ECT at the same time because the group information is a lot to remember and process.  recommended before next week's appointment that patient determine how often she will have ECT, how often she will meet with her individual therapist, and if she can get video capabilities set up.       CAL Rojas,  June 28, 2022    [facility]

## 2022-07-01 ENCOUNTER — ANESTHESIA (OUTPATIENT)
Dept: BEHAVIORAL HEALTH | Facility: CLINIC | Age: 77
End: 2022-07-01
Payer: COMMERCIAL

## 2022-07-01 ENCOUNTER — HOSPITAL ENCOUNTER (OUTPATIENT)
Dept: BEHAVIORAL HEALTH | Facility: CLINIC | Age: 77
Discharge: HOME | End: 2022-07-01
Attending: PSYCHIATRY & NEUROLOGY
Payer: COMMERCIAL

## 2022-07-01 ENCOUNTER — ANESTHESIA EVENT (OUTPATIENT)
Dept: BEHAVIORAL HEALTH | Facility: CLINIC | Age: 77
End: 2022-07-01
Payer: COMMERCIAL

## 2022-07-01 VITALS
WEIGHT: 144 LBS | HEART RATE: 78 BPM | BODY MASS INDEX: 24.59 KG/M2 | DIASTOLIC BLOOD PRESSURE: 95 MMHG | TEMPERATURE: 97.9 F | OXYGEN SATURATION: 97 % | SYSTOLIC BLOOD PRESSURE: 145 MMHG | RESPIRATION RATE: 16 BRPM | HEIGHT: 64 IN

## 2022-07-01 DIAGNOSIS — F33.2 MAJOR DEPRESSIVE DISORDER, RECURRENT SEVERE WITHOUT PSYCHOTIC FEATURES (H): Primary | ICD-10-CM

## 2022-07-01 PROCEDURE — 250N000011 HC RX IP 250 OP 636: Performed by: PSYCHIATRY & NEUROLOGY

## 2022-07-01 PROCEDURE — 250N000011 HC RX IP 250 OP 636: Performed by: ANESTHESIOLOGY

## 2022-07-01 PROCEDURE — 90870 ELECTROCONVULSIVE THERAPY: CPT

## 2022-07-01 PROCEDURE — 370N000017 HC ANESTHESIA TECHNICAL FEE, PER MIN

## 2022-07-01 PROCEDURE — 90870 ELECTROCONVULSIVE THERAPY: CPT | Performed by: PSYCHIATRY & NEUROLOGY

## 2022-07-01 PROCEDURE — 250N000009 HC RX 250: Performed by: ANESTHESIOLOGY

## 2022-07-01 RX ORDER — OXYCODONE HYDROCHLORIDE 5 MG/1
5 TABLET ORAL EVERY 4 HOURS PRN
Status: DISCONTINUED | OUTPATIENT
Start: 2022-07-01 | End: 2022-07-02 | Stop reason: HOSPADM

## 2022-07-01 RX ORDER — FENTANYL CITRATE 50 UG/ML
25 INJECTION, SOLUTION INTRAMUSCULAR; INTRAVENOUS
Status: DISCONTINUED | OUTPATIENT
Start: 2022-07-01 | End: 2022-07-02 | Stop reason: HOSPADM

## 2022-07-01 RX ORDER — ONDANSETRON 4 MG/1
4 TABLET, ORALLY DISINTEGRATING ORAL EVERY 30 MIN PRN
Status: DISCONTINUED | OUTPATIENT
Start: 2022-07-01 | End: 2022-07-02 | Stop reason: HOSPADM

## 2022-07-01 RX ORDER — LABETALOL HYDROCHLORIDE 5 MG/ML
INJECTION, SOLUTION INTRAVENOUS PRN
Status: DISCONTINUED | OUTPATIENT
Start: 2022-07-01 | End: 2022-07-01

## 2022-07-01 RX ORDER — SODIUM CHLORIDE, SODIUM LACTATE, POTASSIUM CHLORIDE, CALCIUM CHLORIDE 600; 310; 30; 20 MG/100ML; MG/100ML; MG/100ML; MG/100ML
INJECTION, SOLUTION INTRAVENOUS CONTINUOUS
Status: DISCONTINUED | OUTPATIENT
Start: 2022-07-01 | End: 2022-07-02 | Stop reason: HOSPADM

## 2022-07-01 RX ORDER — HYDROMORPHONE HCL IN WATER/PF 6 MG/30 ML
0.2 PATIENT CONTROLLED ANALGESIA SYRINGE INTRAVENOUS EVERY 5 MIN PRN
Status: DISCONTINUED | OUTPATIENT
Start: 2022-07-01 | End: 2022-07-02 | Stop reason: HOSPADM

## 2022-07-01 RX ORDER — ONDANSETRON 2 MG/ML
4 INJECTION INTRAMUSCULAR; INTRAVENOUS EVERY 30 MIN PRN
Status: DISCONTINUED | OUTPATIENT
Start: 2022-07-01 | End: 2022-07-02 | Stop reason: HOSPADM

## 2022-07-01 RX ORDER — MEPERIDINE HYDROCHLORIDE 25 MG/ML
12.5 INJECTION INTRAMUSCULAR; INTRAVENOUS; SUBCUTANEOUS
Status: DISCONTINUED | OUTPATIENT
Start: 2022-07-01 | End: 2022-07-02 | Stop reason: HOSPADM

## 2022-07-01 RX ORDER — FENTANYL CITRATE 50 UG/ML
25 INJECTION, SOLUTION INTRAMUSCULAR; INTRAVENOUS EVERY 5 MIN PRN
Status: DISCONTINUED | OUTPATIENT
Start: 2022-07-01 | End: 2022-07-02 | Stop reason: HOSPADM

## 2022-07-01 RX ORDER — ETOMIDATE 2 MG/ML
INJECTION INTRAVENOUS PRN
Status: DISCONTINUED | OUTPATIENT
Start: 2022-07-01 | End: 2022-07-01

## 2022-07-01 RX ADMIN — SUCCINYLCHOLINE CHLORIDE 60 MG: 20 INJECTION, SOLUTION INTRAMUSCULAR; INTRAVENOUS; PARENTERAL at 11:25

## 2022-07-01 RX ADMIN — LABETALOL HYDROCHLORIDE 20 MG: 5 INJECTION, SOLUTION INTRAVENOUS at 11:25

## 2022-07-01 RX ADMIN — ETOMIDATE 16 MG: 2 INJECTION INTRAVENOUS at 11:25

## 2022-07-01 RX ADMIN — MIDAZOLAM HYDROCHLORIDE 0.5 MG: 1 INJECTION, SOLUTION INTRAMUSCULAR; INTRAVENOUS at 11:33

## 2022-07-01 NOTE — ANESTHESIA CARE TRANSFER NOTE
Patient: Lorena Hayden    Procedure: * No procedures listed *  Electroconvulsive Therapy    Diagnosis: * No pre-op diagnosis entered *  Diagnosis Additional Information: No value filed.    Anesthesia Type:   General     Note:    Oropharynx: oropharynx clear of all foreign objects    Oxygen Supplementation: room air    Independent Airway: airway patency satisfactory and stable  Dentition: dentition unchanged  Vital Signs Stable: post-procedure vital signs reviewed and stable  Report to RN Given: handoff report given  Patient transferred to: PACU    Handoff Report: Identifed the Patient, Identified the Reponsible Provider, Reviewed the pertinent medical history, Discussed the surgical course, Reviewed Intra-OP anesthesia mangement and issues during anesthesia, Set expectations for post-procedure period and Allowed opportunity for questions and acknowledgement of understanding      Vitals:  Vitals Value Taken Time   /89    Temp     Pulse 62    Resp 15    SpO2 100        Electronically Signed By: VIRIDIANA Randolph CRNA  July 1, 2022  11:35 AM

## 2022-07-01 NOTE — PROGRESS NOTES
Patient met criteria for phase I and transition to phase II. Brief period of apnea , approx 6 sec. Then resovled after physical stimulation/ stir up. Airway maintained without further intervention.

## 2022-07-01 NOTE — PROCEDURES
Procedure/Surgery Information   [facility]    Bedside Procedure Note  Date of Service (when I performed the procedure): 07/01/2022    Lorena Hayden is a 76 year old female patient.  No diagnosis found.  Past Medical History:   Diagnosis Date     Allergic rhinitis, cause unspecified      Anxiety      Depressive disorder      Temp: 98.1  F (36.7  C) Temp src: Temporal BP: (!) 146/77 Pulse: 89     SpO2: 100 % O2 Device: None (Room air)      Procedures       Marvin March MD     Lorena Hayden is a 76 year old  year old female patient.  [id number]  @DX@    [facility]   ECT Procedure Note   07/01/2022    Patient Status: outpatient    Is this the first in a series of 12 treatments?  No     Allergies   Allergen Reactions     Horse Allergy      Allergic to horse hair       Weight:  144 lbs 0 oz         Indications for ECT:   Medications ineffective         Clinical Narrative:   Lorena Hayden is a 76 year old woman with a history of depression, started in her mid 60s. Her depression was relatively well-controlled on duloxetine until February 2022, since when she has had progressive depression with suicidal thoughts without an identifiable trigger. Multiple medication trials yielded to no improvement (Increased duloxetine, added aripiprazole then bupropion, now cross titrating to desvenlafaxine). She had a recent psychiatric hospitalization in [facility] in [place], discharged only two weeks ago. However, depression persisted, she had more thoughts that life was not worth living and thoughts of suicide.  She also reported difficulties with sleep, low energy, poor appetite with significant weight loss of about 20 pounds.  She finally came to the hospital and requested to be hospitalized.     Psychiatric History:   Diagnoses: Major Depressive Disorder     Hospitalizations: Two former (first in 2013, last in  2022)     Suicide Attempts: None     Medication Trials: Ativan, trazodone, Klonopin, Lexapro, Wellbutrin, Celexa, Seroquel, Zoloft, Abilify, currently Effexor and Cymbalta. No TCAs or MAOIs.     Neuromodulation:  Right unilateral ultrabrief ECT x 19 sessions in 2012. She had a good response during treatment which lasted only several weeks after the discontinuation.     Psychotherapy:  None            Diagnosis:   Major depression         Assessment:   #1 she reports her depression is severe and decreases pleasure and motivation and she can be very paralyzed and indecisive with anxiety. ECT was helpful before  #2  She is irritable she was awoken early but her appt pushed back. She denies discomfort after last appointment. Some SI but she feels it is  becuase she is so anxious waiting.  #3 sleeping well with trazodone + remeron, she reports no worsening as in in fact improving no SI today, no tremor and less anxious, less catastrophizing thoughts. Her dog nehemias is awaiting her at home. She feels her thinking is improved not worsened since starting ECT  #4 Today she is flustered she has trouble putting her depression intensity on a scale but admits sadness, no tearfulness, no Si today and denies complaints about recovery process of ECT. She denies worsening anxiety and she slept well overnight.  #5 She is irritable and anxious today. She I not yet feeling improvement in mood. She struggled with the transfer of units to  it disrupted her bedtime and she did not sleep well. She finds the ECT frightening.   #6 reports form the floor report unchanged mood so far. Less irritable and anxious today 1/10 ( mood worst it has ever been ) but SI has resolved.   2/3 at 5min recall Will increase to 9x ST  #7 more verbose today. She is frustrated that her meal will be cold by the time she returns for breakfast.  She reports mood is improving some. She denies physical side effects   #8 6/10 mood (10 best) no anxiety between  treatments. She feels very fatigued after treatments and needs to nap more. denies concern for confusion or insomnia. denies concerns form family about confusion.  #9 mood still 6/10 she is anxious because she didn't sleep last night she fears trazodone will no longer work for her. Less irritable. Mild nausea this AM and increased intensity of RLS  #10 mood unchanged no drift. She denies SI over the weekend and anxiety well controlled. She acknowledges need for continued oral medications after ECT to sustain benefit and by her history maintenance ECT is recommended  #11 doing well anxiety much decreased mood 6/10  #12  She is worried that she recently changed medications. Depression is not fully resolved but no longer has SI    M1 mood 5/10 stil anxious she is concerned about what time her maintenance appointments will be since she has a reasonably long drive drive to get here. New consent signed.  M2 mood is 10/10 today she says no concern f or worsening since transitioned home. She was happy to see her dog and get a hair cut. Her family is packing and planning to put the house on the market. Will do 1 more weekly and after this then space out further         Pause for the Cause:     Right patient Yes   Right procedure/laterality settings: Yes          Intra-Procedure Documentation:       ECT #: 14   Treatment number this series: M2   Total treatment number: 33     Type of ECT:  Right, unilateral ultrabrief    ECT Medications:    Etomidate: 16mg  Succinyl Choline: 60mg  Versed 0.5mg for agitation  Labetalol 20mg      ECT Strip Summary:    Titration ( 19.2 percent ) Energy Level:172.8 mC  0.3ms  45 Hz time 8 sec 800mA  Motor Seizure Duration:  37   seconds  EEG Seizure Duration: 66seconds       Complications: None        Plan:      return for 7/8/22 then further space     COVID test ordered 7/6/22    Monitor depression severity with clinical assessment augmented with CUDOS every treatment  Continue current  medications    MD Rod

## 2022-07-01 NOTE — DISCHARGE INSTRUCTIONS
ECT Discharge Instructions      During your ECT series:    Do not drive or work heavy equipment until 7 days after your last treatment.  Do not drink alcohol or use street drugs (illicit drugs) while you are having treatments.  Do not make important decisions, including legal decisions.    After each treatment:    Get plenty of rest. A responsible adult must stay with your for at least 6 hours.  Avoid heavy or risky activities for 24 hours while in maintenance ECT.   Do not drive for at least 24 hours after your treatment while in maintenance ECT.   If you have more than one treatment within one week, do not drive for 7 days after your last treatment.   If you feel light-headed, sit for a few minutes before standing. Have someone help you get up.  If you have nausea (feel sick to your stomach): Drink only clear liquids such as apple juice, ginger ale, broth or 7UP, Be sure to drink plenty of liquids. Move to a normal diet as you feel able.   If you received Toradol, wait 6 hours before taking ibuprofen.  Call your doctor if:   You have a fever over 100F (37.7 C) (taken under the tongue), or a fever that last more than 24 hours.  Your IV site is red, swollen, very painful or is getting more tender.  You have nausea that gets very bad or does not improve.    If you have any symptoms after ECT, tell our staff before your next treatment.  The ECT Department can be reached at [phone].  The ECT Department is open Mondays, Wednesdays and Fridays from 7:00 AM to 2:00 PM.    To speak to a doctor, call:  Your primary care provider or [facility] for Dr. Servin, Dr. March or Dr. Olivia, PHONE: [phone]; fax: [phone]      Covid-19 Testing:  You are able to take an at-home rapid antigen Covid test. You can buy these at many pharmacy stores. Or you can order free, at-home tests at covid.gov/tests. If your test is negative, davide take a photo of the test. Show the photo to the nurse when you come in for  your procedure. If your test is positive, call ECT right away. A positive test means that you have Covid-19 and your care team will discuss the plan for scheduling your procedure.     Please come to the [facility] and check-in with Security before your ECT appointment.  The [facility] is located right next to the Adult Emergency Room.  The address is [address] [place] [address].    After your appointment, you may be picked up at the [facility] entrance, which is located at [address] [place] [address], about 1 block North of the [facility].    Transported by:     Cole (santino) [phone]

## 2022-07-01 NOTE — ANESTHESIA POSTPROCEDURE EVALUATION
Patient: Lorena Hayden    Procedure: * No procedures listed *  Electroconvulsive Therapy    Anesthesia Type:  General    Note:  Disposition: Outpatient   Postop Pain Control: Uneventful            Sign Out: Well controlled pain   PONV: No   Neuro/Psych: Uneventful            Sign Out: Acceptable/Baseline neuro status   Airway/Respiratory: Uneventful            Sign Out: Acceptable/Baseline resp. status   CV/Hemodynamics: Uneventful            Sign Out: Acceptable CV status; No obvious hypovolemia; No obvious fluid overload   Other NRE:    DID A NON-ROUTINE EVENT OCCUR?            Last vitals:  Vitals:    07/01/22 1046 07/01/22 1136 07/01/22 1150   BP: (!) 146/77 133/79 (!) 140/91   Pulse: 89 66 77   Resp:  16 16   Temp: 36.7  C (98.1  F) 36.2  C (97.2  F) 36.3  C (97.4  F)   SpO2: 100% 95% 95%       Electronically Signed By: Dasha Soto MD  July 1, 2022  11:53 AM

## 2022-07-01 NOTE — ANESTHESIA PREPROCEDURE EVALUATION
Anesthesia Pre-Procedure Evaluation    Patient: Lorena Hayden   MRN: [id number] : 1945        Procedure : * No procedures listed *          Past Medical History:   Diagnosis Date     Allergic rhinitis, cause unspecified      Anxiety      Depressive disorder       Past Surgical History:   Procedure Laterality Date     C IMPLANT HALLUX  3/2006 &     Yesi STEELE MAMMOGRAM, SCREENING  2011    Dr arredondo     COLONOSCOPY  2014    Mirian Mottamalika     EXTRACAPSULAR CATARACT EXTRATION WITH INTRAOCULAR LENS IMPLANT  2011     HC DILATION/CURETTAGE DIAG/THER NON OB  2005    D & C     HCL PAP SMEAR  2011    Dr Arredondo     ZZC  DELIVERY ONLY       ZZC LIGATE FALLOPIAN TUBE      Tubal Ligation     ZZHC COLONOSCOPY THRU STOMA W BIOPSY/CAUTERY TUMOR/POLYP/LESION  / /2011    benign polyp      Allergies   Allergen Reactions     Horse Allergy      Allergic to horse hair      Social History     Tobacco Use     Smoking status: Never Smoker     Smokeless tobacco: Never Used   Substance Use Topics     Alcohol use: Yes     Alcohol/week: 0.8 standard drinks     Types: 1 drink(s) per week     Comment: social      Wt Readings from Last 1 Encounters:   22 65.6 kg (144 lb 9.6 oz)             Physical Exam    Airway        Mallampati: II   TM distance: > 3 FB   Neck ROM: full   Mouth opening: > 3 cm    Respiratory Devices and Support         Dental  no notable dental history         Cardiovascular   cardiovascular exam normal          Pulmonary   pulmonary exam normal                OUTSIDE LABS:  CBC:   Lab Results   Component Value Date    WBC 5.4 2022    WBC 4.3 2013    HGB 15.4 2022    HGB 10.5 (L) 2016    HCT 47.4 (H) 2022    HCT 42.4 2013     2022     2013     BMP:   Lab Results   Component Value Date     2022     2016    POTASSIUM 3.7 2022    POTASSIUM 4.1 2016    CHLORIDE 106 2022     CHLORIDE 102 04/12/2016    CO2 29 05/14/2022    CO2 25 04/12/2016    BUN 13 05/14/2022    BUN 14 04/12/2016    BUN NOT APPLICABLE 04/12/2016    CR 0.65 05/14/2022    CR 0.75 04/12/2016     (H) 05/14/2022    GLC 97 04/12/2016     COAGS: No results found for: PTT, INR, FIBR  POC: No results found for: BGM, HCG, HCGS  HEPATIC:   Lab Results   Component Value Date    ALBUMIN 3.3 (L) 05/14/2022    PROTTOTAL 7.2 05/14/2022    ALT 24 05/14/2022    AST 21 05/14/2022    ALKPHOS 87 05/14/2022    BILITOTAL 0.6 05/14/2022    BILIDIRECT 0.1 07/28/2012     OTHER:   Lab Results   Component Value Date    PH 7.0 07/28/2012    A1C 5.4 01/25/2013    RENUKA 8.9 05/14/2022    TSH 1.22 05/17/2022    SED 33 (A) 04/23/2011       Anesthesia Plan    ASA Status:  3      Anesthesia Type: General.   Induction: Intravenous.           Consents            Postoperative Care            Comments:                    Dasha Soto MD

## 2022-07-05 ENCOUNTER — TELEPHONE (OUTPATIENT)
Dept: BEHAVIORAL HEALTH | Facility: CLINIC | Age: 77
End: 2022-07-05

## 2022-07-05 NOTE — PROGRESS NOTES
Medication Therapy Management (MTM) Encounter    ASSESSMENT:                            Medication Adherence/Access: See below for considerations    MDD, anxiety: Symptoms improving with medication changes. No safety concerns noted today. Patient will be out of medication before she is able to see her psychiatrist or establish care with a new PCP. Writer will contact inpatient discharging physician to see if they will approve an additional 30 day supply of psychiatry medications.      Hyperlipidemia: Stable.    Glaucoma: Stable      PLAN:                            1. Writer sent message to Bri Elliott MD to request 30 day supply of Viibryd, Remeron, and risperidone. Prescriptions can be sent to [facility] in [place]    Follow-up: will call patient once I hear back from MD.     SUBJECTIVE/OBJECTIVE:                          Lorena Hayden is a 77 year old female called for a transitions of care visit.      Reason for visit: JOVAN medication review.    Allergies/ADRs: Reviewed in chart  Past Medical History: Reviewed in chart  Tobacco: She reports that she has never smoked. She has never used smokeless tobacco.  Alcohol: none    Medication Adherence/Access: doesn't have refills of discharge medications - see below  Patient takes medications 2 time(s) per day.       MDD, anxiety:   Current medications:   Remeron 30mg nightly  Viibryd 20mg daily  Risperidone 0.5mg nightly  Melatonin 5mg nightly  Trazodone 100mg nightly PRN (not taking, hasn't needed)      Lorena Hayden is a 77 year old with a psychiatric history of depression, anxiety, and insomnia. Lorena Hayden was hospitalized at [facility] 5/16-6/24 for worsening depression with SI. Symptoms reported to include: trouble sleeping, low energy, poor appetite, weight loss.  During hospitalization, Effexor was titrated but ultimately switched to Viibryd prior to discharge due to limited efficacy, Remeron was increased and risperidone was started due to  ongoing depression symptoms (Latuda and Vraylar not covered by insurnace and Abilify had been tried in the past but wasn't helpful). Pt also completed 12 ECT treatments and is receiving maintenance ECT.     Today, patient reports the medication changes that were made have been helpful. She is hopeful her symptoms continue to improve further with more time on Viibryd. No medication side effects endorsed. She reports her PCP retired at the end of June and she isn't scheduled to see a psychiatrist until 8/11/22.      Hyperlipidemia: Current therapy includes simvastatin 40mg daily and Fish Oil 1200mg once daily.  Patient reports no significant myalgias or other side effects.          Glaucoma: Timolol drops once daily in the morning    Supplements: Calcium+vitamin D daily      ----------------  Post Discharge Medication Reconciliation Status: discharge medications reconciled, continue medications without change.    I spent 20 minutes with this patient today. A copy of the visit note was provided to the patient's provider(s).    The patient was mailed a summary of these recommendations.     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

## 2022-07-06 ENCOUNTER — VIRTUAL VISIT (OUTPATIENT)
Dept: PHARMACY | Facility: CLINIC | Age: 77
End: 2022-07-06
Attending: PSYCHIATRY & NEUROLOGY
Payer: COMMERCIAL

## 2022-07-06 DIAGNOSIS — F33.2 MAJOR DEPRESSIVE DISORDER, RECURRENT SEVERE WITHOUT PSYCHOTIC FEATURES (H): Primary | ICD-10-CM

## 2022-07-06 DIAGNOSIS — F41.1 ANXIETY STATE: ICD-10-CM

## 2022-07-06 DIAGNOSIS — F33.1 MAJOR DEPRESSIVE DISORDER, RECURRENT EPISODE, MODERATE (H): ICD-10-CM

## 2022-07-06 DIAGNOSIS — E78.2 MIXED HYPERLIPIDEMIA: ICD-10-CM

## 2022-07-06 DIAGNOSIS — G47.00 INSOMNIA: ICD-10-CM

## 2022-07-06 DIAGNOSIS — Z78.9 TAKES DIETARY SUPPLEMENTS: ICD-10-CM

## 2022-07-06 PROCEDURE — 99607 MTMS BY PHARM ADDL 15 MIN: CPT | Performed by: PHARMACIST

## 2022-07-06 PROCEDURE — 99605 MTMS BY PHARM NP 15 MIN: CPT | Performed by: PHARMACIST

## 2022-07-06 NOTE — LETTER
_  Medication List        Prepared on: 7/6/2022     Bring your Medication List when you go to the doctor, hospital, or   emergency room. And, share it with your family or caregivers.     Note any changes to how you take your medications.  Cross out medications when you no longer use them.    Medication How I take it Why I use it Prescriber   calcium carbonate 600 mg-vitamin D 400 units (CALTRATE) 600-400 MG-UNIT per tablet Take 1 tablet by mouth daily General health Over the counter   melatonin 5 MG tablet Take 1 tablet (5 mg) by mouth At Bedtime Major Depressive Disorder, Recurrent Episode, Moderate (H) Bri Elliott MD   mirtazapine (REMERON) 30 MG tablet Take 1 tablet (30 mg) by mouth At Bedtime Major Depressive Disorder, Recurrent Episode, Moderate (H) Bri Elliott MD   Omega-3 Fatty Acids (FISH OIL PO) Take 1 capsule by mouth daily cholesterol Over the counter   risperiDONE (RISPERDAL) 0.5 MG tablet Take 1 tablet (0.5 mg) by mouth At Bedtime Major Depressive Disorder, Recurrent Episode, Moderate (H) Bri Elliott MD   simvastatin (ZOCOR) 40 MG tablet Take 40 mg by mouth At Bedtime Cholesterol  Brigida Patricio MD   timolol maleate (TIMOPTIC) 0.5 % ophthalmic solution Place 1 drop into both eyes every morning glaucoma Eye doctor   traZODone (DESYREL) 100 MG tablet Take 1 tablet (100 mg) by mouth nightly as needed for sleep (may repeat after 60 minutes) Major Depressive Disorder, Recurrent Episode, Moderate (H) Bri Elliott MD   vilazodone (VIIBRYD) 20 MG TABS tablet Take 1 tablet (20 mg) by mouth daily Major Depressive Disorder, Recurrent Episode, Moderate (H) Bri Elliott MD         Add new medications, over-the-counter drugs, herbals, vitamins, or  minerals in the blank rows below.    Medication How I take it Why I use it Prescriber                          Allergies:      horse allergy        Side effects I have had:               Other Information:               My notes and questions:

## 2022-07-06 NOTE — LETTER
July 6, 2022  Lorena Hayden  [address] [place] [address]    Dear Ms. Hayden, [facility]        Thank you for talking with me on Jul 6, 2022 about your health and medications. As a follow-up to our conversation, I have included two documents:      1. Your Recommended To-Do List has steps you should take to get the best results from your medications.  2. Your Medication List will help you keep track of your medications and how to take them.    If you want to talk about these documents, please call Lucy Tom RPH at phone: [phone], Monday-Friday 8-4:30pm.    I look forward to working with you and your doctors to make sure your medications work well for you.    Sincerely,    Lucy Tom RPH  [facility] Pharmacist, [facility]

## 2022-07-06 NOTE — LETTER
"Recommended To-Do List      Prepared on: 7/6/2022     You can get the best results from your medications by completing the items on this \"To-Do List.\"      Bring your To-Do List when you go to your doctor. And, share it with your family or caregivers.    My To-Do List:  What we talked about: What I should do:   You will be out of medication before you are scheduled to see your psychiatry provider    Keep taking your medication as prescribed. I will contact the hospital doctor to get refills of Remeron, Viibryd, and risperidone sent to [facility] in [place].          What we talked about: What I should do:                       "

## 2022-07-06 NOTE — Clinical Note
Atilio Elliott,   I am a pharmacist in the outpatient psychiatry clinic. I called this patient for a transition of care medication review after her discharge. She was discharged with a 30 day supply of risperidone, Viibryd, and Remeron (no refills). However, her PCP retired and she doesn't see a psychiatrist until August 11. I am wondering if you are ok with me entering 1 refill for these 3 medications so she doesn't run out before her psychiatrist appt?  Please let me know.   Thank you,   Lucy Tom, PharmD, BCPP Medication Therapy Management Pharmacist [facility]

## 2022-07-06 NOTE — PATIENT INSTRUCTIONS
"Recommendations from today's MTM visit:                                                    1. Writer sent message to Bri Elliott MD to request 30 day supply of Viibryd, Remeron, and risperidone. Prescriptions can be sent to [facility] in [place]    Follow-up: will call patient once I hear back from MD.     It was great speaking with you today.  I value your experience and would be very thankful for your time in providing feedback in our clinic survey. In the next few days, you may receive an email or text message from [employer] with a link to a survey related to your  clinical pharmacist.\"     To schedule another MTM appointment, please call the clinic directly or you may call the MTM scheduling line at [phone] or toll-free at [phone].     My Clinical Pharmacist's contact information:                                                      Please feel free to contact me with any questions or concerns you have.      Lucy Tom, PharmD, BCPP  Medication Therapy Management Pharmacist  [facility]     "

## 2022-07-07 RX ORDER — RISPERIDONE 0.5 MG/1
0.5 TABLET ORAL AT BEDTIME
Qty: 30 TABLET | Refills: 0 | Status: SHIPPED | OUTPATIENT
Start: 2022-07-07

## 2022-07-07 RX ORDER — VILAZODONE HYDROCHLORIDE 20 MG/1
20 TABLET ORAL DAILY
Qty: 30 TABLET | Refills: 0 | Status: SHIPPED | OUTPATIENT
Start: 2022-07-07

## 2022-07-07 RX ORDER — MIRTAZAPINE 30 MG/1
30 TABLET, FILM COATED ORAL AT BEDTIME
Qty: 30 TABLET | Refills: 0 | Status: SHIPPED | OUTPATIENT
Start: 2022-07-07

## 2022-07-07 NOTE — PROGRESS NOTES
Writer refilled risperidone, Remeron, and Viibryd for patient. Called patient and let her know refills were sent to [facility] in [place]. Pt expressed understanding.

## 2022-07-08 ENCOUNTER — TELEPHONE (OUTPATIENT)
Dept: PSYCHIATRY | Facility: CLINIC | Age: 77
End: 2022-07-08

## 2022-07-08 ENCOUNTER — ANESTHESIA EVENT (OUTPATIENT)
Dept: BEHAVIORAL HEALTH | Facility: CLINIC | Age: 77
End: 2022-07-08

## 2022-07-08 ENCOUNTER — ANESTHESIA (OUTPATIENT)
Dept: BEHAVIORAL HEALTH | Facility: CLINIC | Age: 77
End: 2022-07-08

## 2022-07-08 NOTE — ANESTHESIA PREPROCEDURE EVALUATION
Anesthesia Pre-Procedure Evaluation    Patient: Lorena Hayden   MRN: [id number] : 1945        Procedure :   Electroconvulsive Therapy       Past Medical History:   Diagnosis Date     Allergic rhinitis, cause unspecified      Anxiety      Depressive disorder       Past Surgical History:   Procedure Laterality Date     C IMPLANT HALLUX  3/2006 &     Yesi     CEDRIC MAMMOGRAM, SCREENING  2011    Dr arredondo     COLONOSCOPY  2014    Mirian Orozco     EXTRACAPSULAR CATARACT EXTRATION WITH INTRAOCULAR LENS IMPLANT  2011     HC DILATION/CURETTAGE DIAG/THER NON OB  2005    D & C     HCL PAP SMEAR  2011    Dr Arredondo     ZZC  DELIVERY ONLY       ZZC LIGATE FALLOPIAN TUBE      Tubal Ligation     ZZHC COLONOSCOPY THRU STOMA W BIOPSY/CAUTERY TUMOR/POLYP/LESION  / /2011    benign polyp      Allergies   Allergen Reactions     Horse Allergy      Allergic to horse hair      Social History     Tobacco Use     Smoking status: Never Smoker     Smokeless tobacco: Never Used   Substance Use Topics     Alcohol use: Yes     Alcohol/week: 0.8 standard drinks     Types: 1 drink(s) per week     Comment: social      Wt Readings from Last 1 Encounters:   22 65.3 kg (144 lb)        Anesthesia Evaluation   Pt has had prior anesthetic.     No history of anesthetic complications       ROS/MED HX  ENT/Pulmonary:     (+) allergic rhinitis,     Neurologic:       Cardiovascular:       METS/Exercise Tolerance:     Hematologic:     (+) anemia,     Musculoskeletal:       GI/Hepatic:       Renal/Genitourinary:       Endo:    (-) Type II DM   Psychiatric/Substance Use:     (+) psychiatric history anxiety and depression     Infectious Disease:       Malignancy:       Other:               OUTSIDE LABS:  CBC:   Lab Results   Component Value Date    WBC 5.4 2022    WBC 4.3 2013    HGB 15.4 2022    HGB 10.5 (L) 2016    HCT 47.4 (H) 2022    HCT 42.4 2013     2022      05/03/2013     BMP:   Lab Results   Component Value Date     05/14/2022     04/12/2016    POTASSIUM 3.7 05/14/2022    POTASSIUM 4.1 04/12/2016    CHLORIDE 106 05/14/2022    CHLORIDE 102 04/12/2016    CO2 29 05/14/2022    CO2 25 04/12/2016    BUN 13 05/14/2022    BUN 14 04/12/2016    BUN NOT APPLICABLE 04/12/2016    CR 0.65 05/14/2022    CR 0.75 04/12/2016     (H) 05/14/2022    GLC 97 04/12/2016     COAGS: No results found for: PTT, INR, FIBR  POC: No results found for: BGM, HCG, HCGS  HEPATIC:   Lab Results   Component Value Date    ALBUMIN 3.3 (L) 05/14/2022    PROTTOTAL 7.2 05/14/2022    ALT 24 05/14/2022    AST 21 05/14/2022    ALKPHOS 87 05/14/2022    BILITOTAL 0.6 05/14/2022    BILIDIRECT 0.1 07/28/2012     OTHER:   Lab Results   Component Value Date    PH 7.0 07/28/2012    A1C 5.4 01/25/2013    RENUKA 8.9 05/14/2022    TSH 1.22 05/17/2022    SED 33 (A) 04/23/2011               Roseline Schwartz MD

## 2022-07-08 NOTE — TELEPHONE ENCOUNTER
Please see OP ECT auth details below:     Behavioral OP Authorization     Authorizing Agency: [employer]   Authorizing Person: received via fax   Phone Number: [phone]   Level of Care: OP ECT   Authorization Number: [id number]   Authorized dates of service: 06/30/2022-12/26/2022   Authorization Days or Units: 12 visits   Authorized billing code: CPT 82890   Authorization provided by Fax or Verbal: Fax

## 2022-07-15 ENCOUNTER — ANESTHESIA (OUTPATIENT)
Dept: BEHAVIORAL HEALTH | Facility: CLINIC | Age: 77
End: 2022-07-15
Payer: COMMERCIAL

## 2022-07-15 ENCOUNTER — HOSPITAL ENCOUNTER (OUTPATIENT)
Dept: BEHAVIORAL HEALTH | Facility: CLINIC | Age: 77
Discharge: HOME | End: 2022-07-15
Attending: PSYCHIATRY & NEUROLOGY
Payer: COMMERCIAL

## 2022-07-15 ENCOUNTER — ANESTHESIA EVENT (OUTPATIENT)
Dept: BEHAVIORAL HEALTH | Facility: CLINIC | Age: 77
End: 2022-07-15
Payer: COMMERCIAL

## 2022-07-15 VITALS
HEART RATE: 66 BPM | SYSTOLIC BLOOD PRESSURE: 141 MMHG | TEMPERATURE: 97.1 F | DIASTOLIC BLOOD PRESSURE: 86 MMHG | RESPIRATION RATE: 18 BRPM | OXYGEN SATURATION: 93 %

## 2022-07-15 DIAGNOSIS — F33.2 MAJOR DEPRESSIVE DISORDER, RECURRENT SEVERE WITHOUT PSYCHOTIC FEATURES (H): Primary | ICD-10-CM

## 2022-07-15 PROCEDURE — 90870 ELECTROCONVULSIVE THERAPY: CPT | Performed by: PSYCHIATRY & NEUROLOGY

## 2022-07-15 PROCEDURE — 90870 ELECTROCONVULSIVE THERAPY: CPT

## 2022-07-15 PROCEDURE — 370N000017 HC ANESTHESIA TECHNICAL FEE, PER MIN

## 2022-07-15 PROCEDURE — 250N000011 HC RX IP 250 OP 636: Performed by: PSYCHIATRY & NEUROLOGY

## 2022-07-15 PROCEDURE — 250N000009 HC RX 250: Performed by: ANESTHESIOLOGY

## 2022-07-15 PROCEDURE — 250N000011 HC RX IP 250 OP 636: Performed by: ANESTHESIOLOGY

## 2022-07-15 RX ORDER — LABETALOL HYDROCHLORIDE 5 MG/ML
INJECTION, SOLUTION INTRAVENOUS PRN
Status: DISCONTINUED | OUTPATIENT
Start: 2022-07-15 | End: 2022-07-15

## 2022-07-15 RX ORDER — ETOMIDATE 2 MG/ML
INJECTION INTRAVENOUS PRN
Status: DISCONTINUED | OUTPATIENT
Start: 2022-07-15 | End: 2022-07-15

## 2022-07-15 RX ORDER — MEPERIDINE HYDROCHLORIDE 25 MG/ML
12.5 INJECTION INTRAMUSCULAR; INTRAVENOUS; SUBCUTANEOUS
Status: CANCELLED | OUTPATIENT
Start: 2022-07-15

## 2022-07-15 RX ORDER — HYDROMORPHONE HCL IN WATER/PF 6 MG/30 ML
0.2 PATIENT CONTROLLED ANALGESIA SYRINGE INTRAVENOUS EVERY 5 MIN PRN
Status: CANCELLED | OUTPATIENT
Start: 2022-07-15

## 2022-07-15 RX ORDER — ONDANSETRON 4 MG/1
4 TABLET, ORALLY DISINTEGRATING ORAL EVERY 30 MIN PRN
Status: CANCELLED | OUTPATIENT
Start: 2022-07-15

## 2022-07-15 RX ORDER — OXYCODONE HYDROCHLORIDE 5 MG/1
5 TABLET ORAL EVERY 4 HOURS PRN
Status: CANCELLED | OUTPATIENT
Start: 2022-07-15

## 2022-07-15 RX ORDER — SODIUM CHLORIDE, SODIUM LACTATE, POTASSIUM CHLORIDE, CALCIUM CHLORIDE 600; 310; 30; 20 MG/100ML; MG/100ML; MG/100ML; MG/100ML
INJECTION, SOLUTION INTRAVENOUS CONTINUOUS
Status: CANCELLED | OUTPATIENT
Start: 2022-07-15

## 2022-07-15 RX ORDER — FENTANYL CITRATE 50 UG/ML
25 INJECTION, SOLUTION INTRAMUSCULAR; INTRAVENOUS
Status: CANCELLED | OUTPATIENT
Start: 2022-07-15

## 2022-07-15 RX ORDER — ONDANSETRON 2 MG/ML
4 INJECTION INTRAMUSCULAR; INTRAVENOUS EVERY 30 MIN PRN
Status: CANCELLED | OUTPATIENT
Start: 2022-07-15

## 2022-07-15 RX ORDER — FENTANYL CITRATE 50 UG/ML
25 INJECTION, SOLUTION INTRAMUSCULAR; INTRAVENOUS EVERY 5 MIN PRN
Status: CANCELLED | OUTPATIENT
Start: 2022-07-15

## 2022-07-15 RX ADMIN — LABETALOL HYDROCHLORIDE 20 MG: 5 INJECTION, SOLUTION INTRAVENOUS at 11:01

## 2022-07-15 RX ADMIN — MIDAZOLAM HYDROCHLORIDE 0.5 MG: 1 INJECTION, SOLUTION INTRAMUSCULAR; INTRAVENOUS at 11:08

## 2022-07-15 RX ADMIN — ETOMIDATE 16 MG: 2 INJECTION INTRAVENOUS at 11:01

## 2022-07-15 RX ADMIN — SUCCINYLCHOLINE CHLORIDE 60 MG: 20 INJECTION, SOLUTION INTRAMUSCULAR; INTRAVENOUS; PARENTERAL at 11:01

## 2022-07-15 NOTE — ANESTHESIA PREPROCEDURE EVALUATION
Anesthesia Pre-Procedure Evaluation    Patient: Lorena Hayden   MRN: [id number] : 1945        Procedure :   Electroconvulsive Therapy       Past Medical History:   Diagnosis Date     Allergic rhinitis, cause unspecified      Anxiety      Depressive disorder       Past Surgical History:   Procedure Laterality Date     C IMPLANT HALLUX  3/2006 &     eYsi     CEDRIC MAMMOGRAM, SCREENING  2011    Dr arredondo     COLONOSCOPY  2014    Mirian Orozco     EXTRACAPSULAR CATARACT EXTRATION WITH INTRAOCULAR LENS IMPLANT  2011     HC DILATION/CURETTAGE DIAG/THER NON OB  2005    D & C     HCL PAP SMEAR  2011    Dr Arredondo     ZZC  DELIVERY ONLY       ZZC LIGATE FALLOPIAN TUBE      Tubal Ligation     ZZHC COLONOSCOPY THRU STOMA W BIOPSY/CAUTERY TUMOR/POLYP/LESION  / /2011    benign polyp      Allergies   Allergen Reactions     Horse Allergy      Allergic to horse hair      Social History     Tobacco Use     Smoking status: Never Smoker     Smokeless tobacco: Never Used   Substance Use Topics     Alcohol use: Yes     Alcohol/week: 0.8 standard drinks     Types: 1 drink(s) per week     Comment: social      Wt Readings from Last 1 Encounters:   22 65.3 kg (144 lb)        Anesthesia Evaluation   Pt has had prior anesthetic.     No history of anesthetic complications       ROS/MED HX  ENT/Pulmonary:     (+) allergic rhinitis,     Neurologic:       Cardiovascular:       METS/Exercise Tolerance:     Hematologic:     (+) anemia,     Musculoskeletal:       GI/Hepatic:       Renal/Genitourinary:       Endo:    (-) Type II DM   Psychiatric/Substance Use:     (+) psychiatric history anxiety and depression     Infectious Disease:       Malignancy:       Other:               OUTSIDE LABS:  CBC:   Lab Results   Component Value Date    WBC 5.4 2022    WBC 4.3 2013    HGB 15.4 2022    HGB 10.5 (L) 2016    HCT 47.4 (H) 2022    HCT 42.4 2013     2022      2013     BMP:   Lab Results   Component Value Date     2022     2016    POTASSIUM 3.7 2022    POTASSIUM 4.1 2016    CHLORIDE 106 2022    CHLORIDE 102 2016    CO2 29 2022    CO2 25 2016    BUN 13 2022    BUN 14 2016    BUN NOT APPLICABLE 2016    CR 0.65 2022    CR 0.75 2016     (H) 2022    GLC 97 2016     COAGS: No results found for: PTT, INR, FIBR  POC: No results found for: BGM, HCG, HCGS  HEPATIC:   Lab Results   Component Value Date    ALBUMIN 3.3 (L) 2022    PROTTOTAL 7.2 2022    ALT 24 2022    AST 21 2022    ALKPHOS 87 2022    BILITOTAL 0.6 2022    BILIDIRECT 0.1 2012     OTHER:   Lab Results   Component Value Date    PH 7.0 2012    A1C 5.4 2013    RENUKA 8.9 2022    TSH 1.22 2022    SED 33 (A) 2011               [facility] Pre-Procedure Evaluation    Patient: Lorena Hayden   MRN: [id number] : 1945        Procedure : * No procedures listed *          Past Medical History:   Diagnosis Date     Allergic rhinitis, cause unspecified      Anxiety      Depressive disorder       Past Surgical History:   Procedure Laterality Date     C IMPLANT HALLUX  3/2006 &     Yesi STEELE MAMMOGRAM, SCREENING  2011    Dr arredondo     COLONOSCOPY  2014    Mirian Orozco     EXTRACAPSULAR CATARACT EXTRATION WITH INTRAOCULAR LENS IMPLANT  2011     HC DILATION/CURETTAGE DIAG/THER NON OB  2005    D & C     HCL PAP SMEAR  2011    Dr Arredondo     ZZC  DELIVERY ONLY       ZZC LIGATE FALLOPIAN TUBE      Tubal Ligation     ZZHC COLONOSCOPY THRU STOMA W BIOPSY/CAUTERY TUMOR/POLYP/LESION  / /2011    benign polyp      Allergies   Allergen Reactions     Horse Allergy      Allergic to horse hair      Social History     Tobacco Use     Smoking status: Never Smoker     Smokeless tobacco:  Never Used   Substance Use Topics     Alcohol use: Yes     Alcohol/week: 0.8 standard drinks     Types: 1 drink(s) per week     Comment: social      Wt Readings from Last 1 Encounters:   07/01/22 65.3 kg (144 lb)             Physical Exam    Airway        Mallampati: II   TM distance: > 3 FB   Neck ROM: full   Mouth opening: > 3 cm    Respiratory Devices and Support         Dental  no notable dental history         Cardiovascular   cardiovascular exam normal          Pulmonary   pulmonary exam normal                OUTSIDE LABS:  CBC:   Lab Results   Component Value Date    WBC 5.4 05/14/2022    WBC 4.3 05/03/2013    HGB 15.4 05/14/2022    HGB 10.5 (L) 06/29/2016    HCT 47.4 (H) 05/14/2022    HCT 42.4 05/03/2013     05/14/2022     05/03/2013     BMP:   Lab Results   Component Value Date     05/14/2022     04/12/2016    POTASSIUM 3.7 05/14/2022    POTASSIUM 4.1 04/12/2016    CHLORIDE 106 05/14/2022    CHLORIDE 102 04/12/2016    CO2 29 05/14/2022    CO2 25 04/12/2016    BUN 13 05/14/2022    BUN 14 04/12/2016    BUN NOT APPLICABLE 04/12/2016    CR 0.65 05/14/2022    CR 0.75 04/12/2016     (H) 05/14/2022    GLC 97 04/12/2016     COAGS: No results found for: PTT, INR, FIBR  POC: No results found for: BGM, HCG, HCGS  HEPATIC:   Lab Results   Component Value Date    ALBUMIN 3.3 (L) 05/14/2022    PROTTOTAL 7.2 05/14/2022    ALT 24 05/14/2022    AST 21 05/14/2022    ALKPHOS 87 05/14/2022    BILITOTAL 0.6 05/14/2022    BILIDIRECT 0.1 07/28/2012     OTHER:   Lab Results   Component Value Date    PH 7.0 07/28/2012    A1C 5.4 01/25/2013    RENUKA 8.9 05/14/2022    TSH 1.22 05/17/2022    SED 33 (A) 04/23/2011       Anesthesia Plan    ASA Status:  3      Anesthesia Type: General.   Induction: Intravenous.           Consents            Postoperative Care            Comments:                    Dasha Soto MD

## 2022-07-15 NOTE — ANESTHESIA POSTPROCEDURE EVALUATION
Patient: Lorena Hayden    Procedure: * No procedures listed *  Electroconvulsive Therapy    Anesthesia Type:  General    Note:  Disposition: Outpatient   Postop Pain Control: Uneventful            Sign Out: Well controlled pain   PONV: No   Neuro/Psych: Uneventful            Sign Out: Acceptable/Baseline neuro status   Airway/Respiratory: Uneventful            Sign Out: Acceptable/Baseline resp. status   CV/Hemodynamics: Uneventful            Sign Out: Acceptable CV status; No obvious hypovolemia; No obvious fluid overload   Other NRE:    DID A NON-ROUTINE EVENT OCCUR?            Last vitals:  Vitals:    07/15/22 1012 07/15/22 1111   BP: 139/75 (!) 144/95   Pulse: 82 63   Resp: 16 18   Temp: 36.4  C (97.6  F) 36.6  C (97.8  F)   SpO2: 100% 94%       Electronically Signed By: Dasha Soto MD  July 15, 2022  11:25 AM

## 2022-07-15 NOTE — PROCEDURES
Procedure/Surgery Information   [facility]    Bedside Procedure Note  Date of Service (when I performed the procedure): 07/15/2022    Lorena Hayden is a 76 year old female patient.  1. Major depressive disorder, recurrent severe without psychotic features (H)      Past Medical History:   Diagnosis Date     Allergic rhinitis, cause unspecified      Anxiety      Depressive disorder      Temp: 97.6  F (36.4  C) Temp src: Temporal BP: 139/75 Pulse: 82   Resp: 16 SpO2: 100 %        Procedures       Maurisio Servin MD     Lorena Hayden is a 76 year old  year old female patient.  [id number]  @DX@    [facility]   ECT Procedure Note   07/15/2022    Patient Status: outpatient    Is this the first in a series of 12 treatments?  No     Allergies   Allergen Reactions     Horse Allergy      Allergic to horse hair       Weight:  0 lbs 0 oz         Indications for ECT:   Medications ineffective         Clinical Narrative:   Lorena Hayden is a 76 year old woman with a history of depression, started in her mid 60s. Her depression was relatively well-controlled on duloxetine until February 2022, since when she has had progressive depression with suicidal thoughts without an identifiable trigger. Multiple medication trials yielded to no improvement (Increased duloxetine, added aripiprazole then bupropion, now cross titrating to desvenlafaxine). She had a recent psychiatric hospitalization in [facility] in [place], discharged only two weeks ago. However, depression persisted, she had more thoughts that life was not worth living and thoughts of suicide.  She also reported difficulties with sleep, low energy, poor appetite with significant weight loss of about 20 pounds.  She finally came to the hospital and requested to be hospitalized.     Psychiatric History:   Diagnoses: Major Depressive Disorder     Hospitalizations: Two  former (first in 2013, last in 2022)     Suicide Attempts: None     Medication Trials: Ativan, trazodone, Klonopin, Lexapro, Wellbutrin, Celexa, Seroquel, Zoloft, Abilify, currently Effexor and Cymbalta. No TCAs or MAOIs.     Neuromodulation:  Right unilateral ultrabrief ECT x 19 sessions in 2012. She had a good response during treatment which lasted only several weeks after the discontinuation.     Psychotherapy:  None            Diagnosis:   Major depression         Assessment:   #1 she reports her depression is severe and decreases pleasure and motivation and she can be very paralyzed and indecisive with anxiety. ECT was helpful before  #2  She is irritable she was awoken early but her appt pushed back. She denies discomfort after last appointment. Some SI but she feels it is  becuase she is so anxious waiting.  #3 sleeping well with trazodone + remeron, she reports no worsening as in in fact improving no SI today, no tremor and less anxious, less catastrophizing thoughts. Her dog nehemias is awaiting her at home. She feels her thinking is improved not worsened since starting ECT  #4 Today she is flustered she has trouble putting her depression intensity on a scale but admits sadness, no tearfulness, no Si today and denies complaints about recovery process of ECT. She denies worsening anxiety and she slept well overnight.  #5 She is irritable and anxious today. She I not yet feeling improvement in mood. She struggled with the transfer of units to  it disrupted her bedtime and she did not sleep well. She finds the ECT frightening.   #6 reports form the floor report unchanged mood so far. Less irritable and anxious today 1/10 ( mood worst it has ever been ) but SI has resolved.   2/3 at 5min recall Will increase to 9x ST  #7 more verbose today. She is frustrated that her meal will be cold by the time she returns for breakfast.  She reports mood is improving some. She denies physical side effects   #8 6/10 mood (10  best) no anxiety between treatments. She feels very fatigued after treatments and needs to nap more. denies concern for confusion or insomnia. denies concerns form family about confusion.  #9 mood still 6/10 she is anxious because she didn't sleep last night she fears trazodone will no longer work for her. Less irritable. Mild nausea this AM and increased intensity of RLS  #10 mood unchanged no drift. She denies SI over the weekend and anxiety well controlled. She acknowledges need for continued oral medications after ECT to sustain benefit and by her history maintenance ECT is recommended  #11 doing well anxiety much decreased mood 6/10  #12  She is worried that she recently changed medications. Depression is not fully resolved but no longer has SI    M1 mood 5/10 stil anxious she is concerned about what time her maintenance appointments will be since she has a reasonably long drive drive to get here. New consent signed.  M2 mood is 10/10 today she says no concern f or worsening since transitioned home. She was happy to see her dog and get a hair cut. Her family is packing and planning to put the house on the market. Will do 1 more weekly and after this then space out further  M3 7/15/22 Mood is stable. No side effects reported         Pause for the Cause:     Right patient Yes   Right procedure/laterality settings: Yes          Intra-Procedure Documentation:     ECT #: 15   Treatment number this series: M3   Total treatment number: 34     Type of ECT:  Right, unilateral ultrabrief    ECT Medications:    Etomidate: 16mg  Succinyl Choline: 60mg  Versed 0.5mg for agitation  Labetalol 20mg    ECT Strip Summary:   Titration ( 19.2 percent )   Energy Level: 172.8 mC  0.3ms  45 Hz time 8 sec 800mA  Motor Seizure Duration: 45 seconds  EEG Seizure Duration: 76 seconds       Complications: None    Plan:   Next ECT [id number] 7/29/22   COVID test ordered 7/27/22    Monitor depression severity with clinical assessment augmented  with CUDOS every treatment  Continue current medications    Maurisio Servin MD

## 2022-07-15 NOTE — DISCHARGE INSTRUCTIONS
ECT Discharge Instructions      During your ECT series:    Do not drive for 24 hours after treatment.  If you will have more than one treatment within a week, no driving until 7 days after your last ECT treatment.  Do not drink alcohol or use street drugs (illicit drugs) while you are having treatments.  Do not make important decisions, including legal decisions.    After each treatment:    Get plenty of rest. A responsible adult must stay with your for at least 6 hours.  Avoid heavy or risky activities for 24 hours.  If you feel light-headed, sit for a few minutes before standing. Have someone help you get up.  If you have nausea (feel sick to your stomach): Drink only clear liquids such as apple juice, ginger ale, broth or 7UP, Be sure to drink plenty of liquids. Move to a normal diet as you feel able.   If you received Toradol, wait 6 hours before taking ibuprofen.  Call your doctor if:   You have a fever over 100F (37.7 C) (taken under the tongue), or a fever that last more than 24 hours.  Your IV site is red, swollen, very painful or is getting more tender.  You have nausea that gets very bad or does not improve.    If you have any symptoms after ECT, tell our staff before your next treatment.  The ECT Department can be reached at [phone].  The ECT Department is open Mondays, Wednesdays and Fridays from 7:00 AM to 2:00 PM.  Discharge instructions have been reviewed with the patients responsible adult verbally over the phone. A print out has been given to the patient with discharge instructions to review along with instructions on their next appointment.       To speak to Dr. Maurisio Servin: Office # [phone]     Covid-19 Testing:  If you are going home on the same day as your procedure you are able to take an at-home rapid antigen Covid test. You can buy these at many pharmacy stores. Or you can order free, at-home tests at covid.gov/tests. If your test is negative, davide take a photo of the test. Show  the photo to the nurse when you come in for your procedure. If your test is positive, call ECT right away. A positive test means that you have Covid-19 and your care team will discuss the plan for scheduling your procedure.   If you are unable to obtain an at-home rapid antigen test, please proceed with the below information for scheduling a PCR test with [facility].     Covid-19 Testing:  Our central scheduling department will be calling you to schedule a Covid-19 test if you prefer not to do a home covid test.  You will be scheduled to have this test before your next ECT treatment. If you do not receive a phone call, please call [phone] to schedule your Covid test to be performed 48-96 hours (2-4 days) before your scheduled ECT appointment.     [place] BUILDING DROP OFF: Please come to the [facility] entrance and check-in with Security before your ECT appointment.  The [facility] entrance is located right next to the Adult Emergency Room.  The address is [address] [place] [address].    [facility] : After your appointment, you may be picked up at the [facility] entrance, which is located at [address] [place] [address].  [facility], about 1 block North of the [facility] entrance

## 2022-07-15 NOTE — ANESTHESIA CARE TRANSFER NOTE
Patient: Lorena Hayden    Procedure: * No procedures listed *  Electroconvulsive Therapy    Diagnosis: * No pre-op diagnosis entered *  Diagnosis Additional Information: No value filed.    Anesthesia Type:   General     Note:    Oropharynx: spontaneously breathing  Level of Consciousness: awake  Oxygen Supplementation: nasal cannula and blow-by O2    Independent Airway: airway patency satisfactory and stable        Patient transferred to: PACU    Handoff Report: Identifed the Patient, Identified the Reponsible Provider, Reviewed the pertinent medical history, Discussed the surgical course, Reviewed Intra-OP anesthesia mangement and issues during anesthesia, Set expectations for post-procedure period and Allowed opportunity for questions and acknowledgement of understanding      Vitals:  Vitals Value Taken Time   /95 07/15/22 1111   Temp 36.6  C (97.8  F) 07/15/22 1111   Pulse 63 07/15/22 1111   Resp 18 07/15/22 1111   SpO2 94 % 07/15/22 1111       Electronically Signed By: Dasha Soto MD  July 15, 2022  11:25 AM

## 2022-07-15 NOTE — PROGRESS NOTES
Patient arrived in PACU at 1111    Patient meets phase I recovery  criteria at 1126 , switched to phase II recovery at 1127.      The following medications were given in ECT:    Anesthetic:   Etomidate 16mg at 1101    Muscle Relaxant:   Succinylcholine  60mg at 1101    Blood Pressure:   Labetalol 20mg at 1101      Anxiety/Aggitation:      Versed 0.5 mg at 1108      Patient meets PACU discharge criteria at 1141.        Pt discharged from the recovery room via wheelchair to discharge room at 1146

## 2022-07-25 ENCOUNTER — TELEPHONE (OUTPATIENT)
Dept: BEHAVIORAL HEALTH | Facility: CLINIC | Age: 77
End: 2022-07-25

## 2022-07-29 ENCOUNTER — TELEPHONE (OUTPATIENT)
Dept: BEHAVIORAL HEALTH | Facility: CLINIC | Age: 77
End: 2022-07-29

## 2022-07-29 NOTE — TELEPHONE ENCOUNTER
Writer has fwd medication management referral only to TC RN pool as next level of care set and replied to referral source. Will wait to hear if referral is appropriate or inappropriate.    Renata Washington  07/29/22  347    ----- Message from Renata Washington sent at 7/29/2022  3:46 PM CDT -----  Type of Referral:   ____Therapy Only: Does your patient require a same or next day appointment?:   __X__Medication Only: Referral will automatically be declined if no next level of care scheduled. Suboxone and Opioid management referrals are automatically denied.   ____Therapy & Medication:  Referral will automatically be declined if no next level of care scheduled. Suboxone and Opioid management referrals are automatically denied.   ____Diagnostic Assessment   Suboxone and Opioid Management Referrals are Automatically Denied   [facility] Psychiatry cannot see patients who do not have active medical insurance   Referring Provider Name: Dr. Hernandez   Clinician completing the assessment. Alie Rodríguez Clinical Treatment Coordinator on 3B   Referring Provider: [phone] unit [address]   If known, referring provider contact name: Dr. Hernandez unit [address], [place]; Phone Number: [phone]   Service Line/Location: [facility] inpatient psych   Reason for Transition Clinic Referral: Patient does not have psychiatry until October at [facility]. Her original appointments with them was canceled, because the provider is now no longer seeing patient's of her age.   Next Level of Care Patient Will Be Transitioned To: Home with outpatient appointments.     [facility]   October 12th at 10:00   Provider(s) Gurpreet at [facility] in [facility] [place] [facility]    Psychiatry cannot see patients who do not have active medical insurance   What Would Be Helpful from the Transition Clinic: Psychiatry appointments to bridge gap until her October appointment.    Needs: No   Does Patient Have Access to Technology: No    Patient E-mail Address: Unknown   Current Patient Phone Number: [phone]   Clinician Gender Preference: no preference

## 2022-08-01 ENCOUNTER — TELEPHONE (OUTPATIENT)
Dept: BEHAVIORAL HEALTH | Facility: CLINIC | Age: 77
End: 2022-08-01

## 2022-08-01 NOTE — TELEPHONE ENCOUNTER
Coordinator forwarded medication management referral to TC RN pool since next level of care is scheduled. Reply sent to referral source.     Shiela Kramer  Transition Clinic Coordinator  Date and Time: 08/01/22 7:55 AM    ----- Message from Renata Washington sent at 7/29/2022  2:53 PM CDT -----  Type of Referral:  ____Therapy Only: Does your patient require a same or next day appointment?:   __X__Medication Only: Referral will automatically be declined if no next level of care scheduled. Suboxone and Opioid management referrals are automatically denied.  ____Therapy & Medication:  Referral will automatically be declined if no next level of care scheduled. Suboxone and Opioid management referrals are automatically denied.  ____Diagnostic Assessment   Suboxone and Opioid Management Referrals are Automatically Denied  [facility] Psychiatry cannot see patients who do not have active medical insurance  Referring Provider Name: Dr. Hernandez  Clinician completing the assessment. Alie Rodríguez Clinical Treatment Coordinator on 3B  Referring Provider: [phone] unit [address]  If known, referring provider contact name: Dr. Hernandez unit [address], [place]; Phone Number: [phone]  Service Line/Location: [facility] inpatient psych  Reason for Transition Clinic Referral: Patient does not have psychiatry until October at [facility]. Her original appointments with them was canceled, because the provider is now no longer seeing patient's of her age.   Next Level of Care Patient Will Be Transitioned To: Home with outpatient appointments.   Provider(s) Gurpreet at [facility] in [facility] Psychiatry cannot see patients who do not have active medical insurance  What Would Be Helpful from the Transition Clinic: Psychiatry appointments to bridge gap until her October appointment.   Needs: No  Does Patient Have Access to Technology: No  Patient E-mail Address: Unknown  Current Patient Phone Number: [phone]    Clinician Gender Preference: no preference   Name

## 2022-08-02 ENCOUNTER — TELEPHONE (OUTPATIENT)
Dept: BEHAVIORAL HEALTH | Facility: CLINIC | Age: 77
End: 2022-08-02

## 2022-08-02 NOTE — TELEPHONE ENCOUNTER
Mental Health &Addiction (MH&A)Transition Clinic (TC):     Provides Patient Support While Waiting to Access Programmatic and Outpatient MH&A Care and Provides Select Crisis Assessment Services     NURSING Referral Review  _________________________________________    This RN has reviewed this Medication Management referral to the Transition Clinic and deemed the referral   [x] Appropriate  [] Inappropriate   []Consulting     Based on the following criteria:    Pt has a psychiatric provider (or pending plan) in place for future prescribing: Yes:   [facility]   October 12th at 10:00   Provider(s) Gurpreet zhang [facility] in [facility]      Timeframe until pt's scheduled psychiatry appointment is less than 6 months: Yes: ~2 months     Pt takes psychiatric medications: Yes: mirtazapine (REMERON) 30 MG tablet, mirtazapine (REMERON) 30 MG tablet, vilazodone (VIIBRYD) 20 MG TABS tablet, melatonin 5 MG tablet, traZODone (DESYREL) 100 MG tablet.       Pt's goals seem to align with this temporary service: Transition Clinic to bridge psychiatric care and psychiatric medication management until next Level of Care.      Any additional pertinent information regarding this referral: Recent ED visit with Inpatient hospitalization from 5/16/22-6/24/22.  Patient completed a course of 12 ECT's inpatient.  Patient continues to have outpatient ECT.  Hx of hyperlipidemia, HYACINTH, Insomnia, MDD, IBS, SI with a plan to OD. Worsening depression over last 1-1.5 years.       Initial contact w/ patient/parent: TC Coordinator to contact this patient/patients guardian to schedule a New Person Visit with TC Provider Li Lovett.      The Transition Clinic phone # is [phone].    Zaire Tim RN on August 2, 2022 at 11:22 AM          Additional Scheduling Instructions for Transition Clinic Coordinator:   TC Coordinators:  This is a medication only Referral.      This patient had a ED visit and  "subsequent Inpatient admission from 5/16/22 -6/24/22 and has continued outpatient ECT's under her Inpatient Provider Dr. Hernandez.        Please schedule this patient with TC Provider Li MADRID or as indicated by the patient.      TC Coordinator, please educate this (patient/ parent/guardian/facility staff member ) as to the purpose and benefit of the TC.      \"The Transition Clinic is a Temporary Service that helps to bridge the time to your next appointment.  It is not intended to be a long-term service and you are expecxted to attend your scheduled appointment with your next provider.      Patient/Parent/ Facility Staff Member verbalized understanding     If you need support between appointments, please call [phone] and let them know you're seen by Transition Clinic Psychiatry.  You may also send a [employer] message to reach us.       RN Signature  Zaire Tim RN on 8/2/2022 at 11:38 AM            Renata Washington Transition Clinic Rn Pool  Hello,     [facility] and Shirley Patterson   October 12th at 10:00   Provider(s) Gurpreet zhang [facility] in [facility]       Thank you!             Previous Messages       ----- Message -----   From: Renata Washington   Sent: 7/29/2022   3:46 PM CDT   To: Transition Clinic     Type of Referral:   ____Therapy Only: Does your patient require a same or next day appointment?:   __X__Medication Only: Referral will automatically be declined if no next level of care scheduled. Suboxone and Opioid management referrals are automatically denied.   ____Therapy & Medication:  Referral will automatically be declined if no next level of care scheduled. Suboxone and Opioid management referrals are automatically denied.   ____Diagnostic Assessment   Suboxone and Opioid Management Referrals are Automatically Denied   TC Psychiatry cannot see patients who do not have active medical insurance   Referring Provider Name: Dr. Hernandez "   Clinician completing the assessment. Alie Rodríguez Clinical Treatment Coordinator on 3B   Referring Provider: [phone] [address]   If known, referring provider contact name: Dr. Hernandez [address], [place]; Phone Number: [phone]   Service Line/Location: [facility] inpatient psych   Reason for Transition Clinic Referral: Patient does not have psychiatry until October at [facility]. Her original appointments with them was canceled, because the provider is now no longer seeing patient's of her age.   Next Level of Care Patient Will Be Transitioned To: Home with outpatient appointments.     [facility]   October 12th at 10:00   Provider(s) Gurpreet at [facility] in [facility] cannot see patients who do not have active medical insurance   What Would Be Helpful from the Transition Clinic: Psychiatry appointments to bridge gap until her October appointment.    Needs: No   Does Patient Have Access to Technology: No   Patient E-mail Address: Unknown   Current Patient Phone Number: [phone]   Clinician Gender Preference: no preference

## 2022-08-02 NOTE — TELEPHONE ENCOUNTER
First attempt at reaching patient. Left message asking for a return call to schedule with the TC.    No email listed and no mychart so unable to send additional message.    Shiela Kramer  Transition Clinic Coordinator  Date and Time: 08/02/22 12:40 PM    ----- Message from Zaire Tim RN sent at 8/2/2022 11:43 AM CDT -----   Mental Health &Addiction (MH&A)Transition Clinic (TC):     Provides Patient Support While Waiting to Access Programmatic and Outpatient MH&A Care and Provides Select Crisis Assessment Services     NURSING Referral Review  _________________________________________    This RN has reviewed this Medication Management referral to the Transition Clinic and deemed the referral    Appropriate X   Inappropriate   Consulting     Based on the following criteria:    Pt has a psychiatric provider (or pending plan) in place for future prescribing: Yes:   [facility]   October 12th at 10:00   Provider(s) Gurpreet Roberson in [facility]      Timeframe until pt's scheduled psychiatry appointment is less than 6 months: Yes: ~2 months     Pt takes psychiatric medications: Yes: mirtazapine (REMERON) 30 MG tablet, mirtazapine (REMERON) 30 MG tablet, vilazodone (VIIBRYD) 20 MG TABS tablet, melatonin 5 MG tablet, traZODone (DESYREL) 100 MG tablet.       Pt's goals seem to align with this temporary service: Transition Clinic to bridge psychiatric care and psychiatric medication management until next Level of Care.      Any additional pertinent information regarding this referral: Recent ED visit with Inpatient hospitalization from 5/16/22-6/24/22.  Patient completed a course of 12 ECT's inpatient.  Patient continues to have outpatient ECT.  Hx of hyperlipidemia, HYACINTH, Insomnia, MDD, IBS, SI with a plan to OD. Worsening depression over last 1-1.5 years.       Initial contact w/ patient/parent: TC Coordinator to contact this patient/patients guardian to schedule a  "New Person Visit with TC Provider Li Lovett.      The Transition Clinic phone # is [phone].    Zaire Tim RN on August 2, 2022 at 11:22 AM          Additional Scheduling Instructions for Transition Clinic Coordinator:   TC Coordinators:  This is a medication only Referral.      This patient had a ED visit and subsequent Inpatient admission from 5/16/22 -6/24/22 and has continued outpatient ECT's under her Inpatient Provider Dr. Hernandez.        Please schedule this patient with TC Provider Li Lovett ASAP or as indicated by the patient.      TC Coordinator, please educate this (patient/ parent/guardian/facility staff member ) as to the purpose and benefit of the TC.      \"The Transition Clinic is a Temporary Service that helps to bridge the time to your next appointment.  It is not intended to be a long-term service and you are expecxted to attend your scheduled appointment with your next provider.      Patient/Parent/ Facility Staff Member verbalized understanding     If you need support between appointments, please call [phone] and let them know you're seen by Transition Clinic Psychiatry.  You may also send a [employer] message to reach us.       RN Signature  Zaire Tim RN on 8/2/2022 at 11:38 AM  ----- Message -----  From: Renata Washington  Sent: 7/29/2022   3:47 PM CDT  To: Transition Clinic Rn Pool    Hello,    Med only-    [facility]   October 12th at 10:00   Provider(s) Gurpreet zhang [facility] in [facility]       Thank you!    ----- Message -----  From: Renata Washington  Sent: 7/29/2022   3:46 PM CDT  To: Transition Clinic    Type of Referral:   ____Therapy Only: Does your patient require a same or next day appointment?:   __X__Medication Only: Referral will automatically be declined if no next level of care scheduled. Suboxone and Opioid management referrals are automatically denied.   ____Therapy & Medication:  Referral will " automatically be declined if no next level of care scheduled. Suboxone and Opioid management referrals are automatically denied.   ____Diagnostic Assessment   Suboxone and Opioid Management Referrals are Automatically Denied   TC Psychiatry cannot see patients who do not have active medical insurance   Referring Provider Name: Dr. Hernandez   Clinician completing the assessment. Alie Rodríguez Clinical Treatment Coordinator on 3B   Referring Provider: [phone] [address]   If known, referring provider contact name: Dr. Hernnadez [address], [place]; Phone Number: [phone]   Service Line/Location: [facility] inpatient psych   Reason for Transition Clinic Referral: Patient does not have psychiatry until October at [facility]. Her original appointments with them was canceled, because the provider is now no longer seeing patient's of her age.   Next Level of Care Patient Will Be Transitioned To: Home with outpatient appointments.     [facility], [facility] Burgess   October 12th at 10:00   Provider(s) Gurpreet at [facility] in [facility] cannot see patients who do not have active medical insurance   What Would Be Helpful from the Transition Clinic: Psychiatry appointments to bridge gap until her October appointment.    Needs: No   Does Patient Have Access to Technology: No   Patient E-mail Address: Unknown   Current Patient Phone Number: [phone]   Clinician Gender Preference: no preference

## 2022-08-03 ENCOUNTER — TELEPHONE (OUTPATIENT)
Dept: BEHAVIORAL HEALTH | Facility: CLINIC | Age: 77
End: 2022-08-03

## 2022-08-03 NOTE — TELEPHONE ENCOUNTER
Writer spoke with patient on phone and patient stated they were able to get into psychiatry/medication management appointment yesterday and did not need TC services. Referral marked as complete.    Renata Washington  08/03/22  850    ----- Message from Zaire Tim RN sent at 8/2/2022 11:43 AM CDT -----   Mental Health &Addiction (MH&A)Transition Clinic (TC):     Provides Patient Support While Waiting to Access Programmatic and Outpatient MH&A Care and Provides Select Crisis Assessment Services     NURSING Referral Review  _________________________________________    This RN has reviewed this Medication Management referral to the Transition Clinic and deemed the referral    Appropriate X   Inappropriate   Consulting     Based on the following criteria:    Pt has a psychiatric provider (or pending plan) in place for future prescribing: Yes:   [facility]   October 12th at 10:00   Provider(s) Gurpreet Roberson in [facility]      Timeframe until pt's scheduled psychiatry appointment is less than 6 months: Yes: ~2 months     Pt takes psychiatric medications: Yes: mirtazapine (REMERON) 30 MG tablet, mirtazapine (REMERON) 30 MG tablet, vilazodone (VIIBRYD) 20 MG TABS tablet, melatonin 5 MG tablet, traZODone (DESYREL) 100 MG tablet.       Pt's goals seem to align with this temporary service: Transition Clinic to bridge psychiatric care and psychiatric medication management until next Level of Care.      Any additional pertinent information regarding this referral: Recent ED visit with Inpatient hospitalization from 5/16/22-6/24/22.  Patient completed a course of 12 ECT's inpatient.  Patient continues to have outpatient ECT.  Hx of hyperlipidemia, HYACINTH, Insomnia, MDD, IBS, SI with a plan to OD. Worsening depression over last 1-1.5 years.       Initial contact w/ patient/parent: TC Coordinator to contact this patient/patients guardian to schedule a New Person Visit with TC  "Provider Li Lovett.      The Transition Clinic phone # is [phone].    Zaire Tim RN on August 2, 2022 at 11:22 AM          Additional Scheduling Instructions for Transition Clinic Coordinator:   TC Coordinators:  This is a medication only Referral.      This patient had a ED visit and subsequent Inpatient admission from 5/16/22 -6/24/22 and has continued outpatient ECT's under her Inpatient Provider Dr. Hernandez.        Please schedule this patient with TC Provider Li Lovett ASAP or as indicated by the patient.      TC Coordinator, please educate this (patient/ parent/guardian/facility staff member ) as to the purpose and benefit of the TC.      \"The Transition Clinic is a Temporary Service that helps to bridge the time to your next appointment.  It is not intended to be a long-term service and you are expecxted to attend your scheduled appointment with your next provider.      Patient/Parent/ Facility Staff Member verbalized understanding     If you need support between appointments, please call [phone] and let them know you're seen by Transition Clinic Psychiatry.  You may also send a [employer] message to reach us.       RN Signature  Zaire Tim RN on 8/2/2022 at 11:38 AM  ----- Message -----  From: Renata Washington  Sent: 7/29/2022   3:47 PM CDT  To: Transition Clinic Rn Pool    Hello,    [facility]   October 12th at 10:00   Provider(s) Gurpreet zhang [facility] in [facility]       Thank you!    ----- Message -----  From: Renata Washington  Sent: 7/29/2022   3:46 PM CDT  To: Transition Clinic    Type of Referral:   ____Therapy Only: Does your patient require a same or next day appointment?:   __X__Medication Only: Referral will automatically be declined if no next level of care scheduled. Suboxone and Opioid management referrals are automatically denied.   ____Therapy & Medication:  Referral will automatically be declined if " no next level of care scheduled. Suboxone and Opioid management referrals are automatically denied.   ____Diagnostic Assessment   Suboxone and Opioid Management Referrals are Automatically Denied   TC Psychiatry cannot see patients who do not have active medical insurance   Referring Provider Name: Dr. Hernandez   Clinician completing the assessment. Alie Rodríguez Clinical Treatment Coordinator on 3B   Referring Provider: [phone] [address]   If known, referring provider contact name: Dr. Hernandez [address], [place]; Phone Number: [phone]   Service Line/Location: [facility] inpatient psych   Reason for Transition Clinic Referral: Patient does not have psychiatry until October at [facility]. Her original appointments with them was canceled, because the provider is now no longer seeing patient's of her age.   Next Level of Care Patient Will Be Transitioned To: Home with outpatient appointments.     [facility]   October 12th at 10:00   Provider(s) Gurpreet at [facility] in [facility] cannot see patients who do not have active medical insurance   What Would Be Helpful from the Transition Clinic: Psychiatry appointments to bridge gap until her October appointment.    Needs: No   Does Patient Have Access to Technology: No   Patient E-mail Address: Unknown   Current Patient Phone Number: [phone]   Clinician Gender Preference: no preference

## 2022-08-05 ENCOUNTER — TELEPHONE (OUTPATIENT)
Dept: BEHAVIORAL HEALTH | Facility: CLINIC | Age: 77
End: 2022-08-05

## 2022-08-05 NOTE — TELEPHONE ENCOUNTER
"1) RN does not see CARLOS MANUEL/Consent to communicate with anyone by the name of \"Zaire\".     2) RN attempted to call patients phone at [phone]. Unsuccessful.     3) RN called Zaire to see if he is next to patient to get a Verbal consent to speak to Zaire from \"[facility]\" (Via voicemail) Unsuccessful.  Left message to call back    We have the option to have patient do a TeleHub visit at our clinic location to utilize our electronics.     Li does only see NEW patient visits as \"VIDEO or  IN PERSON\"     Marta Schafer RN on 8/5/2022 at 10:33 AM        "

## 2022-08-05 NOTE — TELEPHONE ENCOUNTER
Reason for call:  Other   Patient called regarding (reason for call): appointment  Additional comments: writer spoke with nurse and scheduled patient for 08/12/2022 @ 2:30 pm. Patient does not have a working computer and phone with camera. Writer is sending note to tc rn pool to confirm if appointment can be telephone call. Patient has severe anxiety as stated by nurse Raissa on phone. (other raissa)    Phone number to reach patient:    Best Time:  [phone] - Raissa     Can we leave a detailed message on this number?  YES    Travel screening: Negative

## 2022-08-12 ENCOUNTER — TELEPHONE (OUTPATIENT)
Dept: BEHAVIORAL HEALTH | Facility: CLINIC | Age: 77
End: 2022-08-12

## 2022-08-12 NOTE — TELEPHONE ENCOUNTER
Pt did not arrive to the clinic and was not answering check in phone calls. LVM asking pt call TC to reschedule if needed.

## 2023-02-02 NOTE — ANESTHESIA PREPROCEDURE EVALUATION
Anesthesia Pre-Procedure Evaluation    Patient: Lorena Hayden   MRN: [id number] : 1945        Procedure : * No procedures listed *          Past Medical History:   Diagnosis Date     Allergic rhinitis, cause unspecified      Anxiety      Depressive disorder       Past Surgical History:   Procedure Laterality Date     C IMPLANT HALLUX  3/2006 &     Yesi STEELE MAMMOGRAM, SCREENING  2011    Dr arredondo     COLONOSCOPY  2014    Mirian Mottamalika     EXTRACAPSULAR CATARACT EXTRATION WITH INTRAOCULAR LENS IMPLANT  2011     HC DILATION/CURETTAGE DIAG/THER NON OB  2005    D & C     HCL PAP SMEAR  2011    Dr Arredondo     ZZC  DELIVERY ONLY       ZZC LIGATE FALLOPIAN TUBE      Tubal Ligation     ZZHC COLONOSCOPY THRU STOMA W BIOPSY/CAUTERY TUMOR/POLYP/LESION  / /2011    benign polyp      Allergies   Allergen Reactions     Horse Allergy      Allergic to horse hair      Social History     Tobacco Use     Smoking status: Never Smoker     Smokeless tobacco: Never Used   Substance Use Topics     Alcohol use: Yes     Alcohol/week: 0.8 standard drinks     Types: 1 drink(s) per week     Comment: social      Wt Readings from Last 1 Encounters:   22 65.6 kg (144 lb 11.2 oz)        Anesthesia Evaluation   Pt has had prior anesthetic.         ROS/MED HX  ENT/Pulmonary:     (+) allergic rhinitis,     Neurologic:       Cardiovascular:       METS/Exercise Tolerance:     Hematologic:       Musculoskeletal:       GI/Hepatic:       Renal/Genitourinary:       Endo:       Psychiatric/Substance Use:     (+) psychiatric history anxiety and depression     Infectious Disease:       Malignancy:       Other:            Physical Exam    Airway        Mallampati: III   TM distance: > 3 FB   Neck ROM: full   Mouth opening: > 3 cm    Respiratory Devices and Support         Dental  no notable dental history         Cardiovascular   cardiovascular exam normal          Pulmonary   pulmonary exam normal                 OUTSIDE LABS:  CBC:   Lab Results   Component Value Date    WBC 5.4 05/14/2022    WBC 4.3 05/03/2013    HGB 15.4 05/14/2022    HGB 10.5 (L) 06/29/2016    HCT 47.4 (H) 05/14/2022    HCT 42.4 05/03/2013     05/14/2022     05/03/2013     BMP:   Lab Results   Component Value Date     05/14/2022     04/12/2016    POTASSIUM 3.7 05/14/2022    POTASSIUM 4.1 04/12/2016    CHLORIDE 106 05/14/2022    CHLORIDE 102 04/12/2016    CO2 29 05/14/2022    CO2 25 04/12/2016    BUN 13 05/14/2022    BUN 14 04/12/2016    BUN NOT APPLICABLE 04/12/2016    CR 0.65 05/14/2022    CR 0.75 04/12/2016     (H) 05/14/2022    GLC 97 04/12/2016     COAGS: No results found for: PTT, INR, FIBR  POC: No results found for: BGM, HCG, HCGS  HEPATIC:   Lab Results   Component Value Date    ALBUMIN 3.3 (L) 05/14/2022    PROTTOTAL 7.2 05/14/2022    ALT 24 05/14/2022    AST 21 05/14/2022    ALKPHOS 87 05/14/2022    BILITOTAL 0.6 05/14/2022    BILIDIRECT 0.1 07/28/2012     OTHER:   Lab Results   Component Value Date    PH 7.0 07/28/2012    A1C 5.4 01/25/2013    RENUKA 8.9 05/14/2022    TSH 1.22 05/17/2022    SED 33 (A) 04/23/2011       Anesthesia Plan    ASA Status:  3   NPO Status:  NPO Appropriate    Anesthesia Type: General.     - Airway: Mask Only   Induction: Intravenous.           Consents    Anesthesia Plan(s) and associated risks, benefits, and realistic alternatives discussed. Questions answered and patient/representative(s) expressed understanding.    - Discussed:     - Discussed with:  Patient         Postoperative Care            Comments:    Other Comments: ECT                Aamir Avina MD   02-Feb-2023

## 2023-03-15 ENCOUNTER — TRANSFERRED RECORDS (OUTPATIENT)
Dept: HEALTH INFORMATION MANAGEMENT | Facility: CLINIC | Age: 78
End: 2023-03-15

## 2023-10-09 NOTE — PLAN OF CARE
Problem: Pressure Injury, Risk for  Goal: # Skin remains intact  Outcome: Outcome Met, Continue evaluating goal progress toward completion  Goal: No new pressure injury (PI) development  Outcome: Outcome Met, Continue evaluating goal progress toward completion  Goal: # Verbalizes understanding of PI risk factors and prevention strategies  Description: Document education using the patient education activity.   Outcome: Outcome Met, Continue evaluating goal progress toward completion  Goal: Comfort optimized with pressure injury prevention strategies guided by patient/family preference. (Hospice)  Outcome: Outcome Met, Continue evaluating goal progress toward completion     Problem: Non-Pressure Injury Wound  Goal: # No deterioration in wound  Outcome: Outcome Met, Continue evaluating goal progress toward completion  Goal: # Verbalizes understanding of wound and wound care  Description: If abnormality is a skin tear, avoid using tape on skin including transparent dressings. Document education using the patient education activity.  Outcome: Outcome Met, Continue evaluating goal progress toward completion  Goal: Participates in wound care activities  Outcome: Outcome Met, Continue evaluating goal progress toward completion  Goal: Wound care provided to promote comfort needs (Hospice)  Outcome: Outcome Met, Continue evaluating goal progress toward completion     Problem: Stroke: Hemorrhagic  Goal: Neurological status is maintained/returned to baseline  Description: Monitor neurological and mental status including symptoms of increasing intercranial pressure (headache, nausea/vomiting, or change in behavior).  Hypertension (>180 systolic) may also indicate a change in status related to stroke.  Outcome: Outcome Met, Continue evaluating goal progress toward completion  Goal: Normal temperature is maintained  Outcome: Outcome Met, Continue evaluating goal progress toward completion  Goal: Elimination status is  "Problem: Decreased Participation/Engagement (Depressive Signs/Symptoms)  Goal: Increased Participation and Engagement (Depressive Signs/Symptoms)  Outcome: Ongoing, Progressing  Flowsheets (Taken 6/4/2022 1841)  Mutually Determined Action Steps (Increased Participation and Engagement):    participates in one or more activity    identifies alternatives to withdrawal    voluntarily attends group therapy    initiates interaction with others    Pt presents with blunted, flat affect and depressed mood. Denies SI/SIB and hallucinations. Rates depression 8-9/10 and denies anxiety. Described herself as feeling \"less frustrated\" compared to when she first transferred to this unit. Pt wants to know if her ECT has been beneficial. Pt asked if she feels better than what she did on admission, pt reports somewhat. Pt states that she is waiting on her medication to \"kick in\". She states that she has felt that before when she was on Cymbalta. Described it as \"euphoric\" and that is what she is hoping to feel again. Pt is present in the lounge and social with peers. Pt had to be reminded about boundaries this evening as she attempted to hug another female patient to show her support. Pt positive behaviors reinforced this evening - such as attending groups, not isolating, and interacting appropriately with her peers, she appreciated this. Pt did not have any emotional outbursts this evening as she has previous evenings with writer. Spoke with pt about her favorite movies, she likes to watch the [employer] channel, and her favorite genre of music - Country. Denies pain. VSS. Medication compliant, PRN Trazodone given at HS. Appetite and fluid intake adequate. No other concerns or complaints noted this shift.   " maintained/returned to baseline  Description: Remove indwelling urinary catheter as soon as possible or collaborate with provider for order/reason for continued use.   Outcome: Outcome Met, Continue evaluating goal progress toward completion  Goal: #Depressive s/s (self-reported/observed) are recognized and monitored  Outcome: Outcome Met, Continue evaluating goal progress toward completion  Goal: Personal stroke risk factors are identified with initial plan for risk reduction  Description: Stroke risk reduction involves taking medication, changing diet, increasing physical exercise, smoking cessation, or alcohol/drug use reduction/cessation based on identified risks.  Outcome: Outcome Met, Continue evaluating goal progress toward completion  Goal: Verbalizes understanding of condition and treatment plan  Description: Document education using the patient education activity.  Outcome: Outcome Met, Continue evaluating goal progress toward completion     Problem: Alcohol Withdrawal Management  Goal: # No alcohol-related delirium or seizures  Outcome: Outcome Met, Continue evaluating goal progress toward completion  Goal: # Verbalizes understanding of alcohol withdrawal and health effects of excessive alcohol intake  Description: Documented on patient education activity  Outcome: Outcome Met, Continue evaluating goal progress toward completion     Problem: Potential for injury, Restraints  Goal: # Remains free from injury  Outcome: Outcome Met, Continue evaluating goal progress toward completion  Goal: Verbalizes criteria for restraint discontinuation  Description: Document on Patient Education Activity  Outcome: Outcome Met, Continue evaluating goal progress toward completion     Problem: At Risk for Falls  Goal: # Patient does not fall  Outcome: Outcome Met, Continue evaluating goal progress toward completion  Goal: # Takes action to control fall-related risks  Outcome: Outcome Met, Continue evaluating goal progress  toward completion  Goal: # Verbalizes understanding of fall risk/precautions  Description: Document education using the patient education activity  Outcome: Outcome Met, Continue evaluating goal progress toward completion     Problem: At Risk for Injury Due to Fall  Goal: # Patient does not fall  Outcome: Outcome Met, Continue evaluating goal progress toward completion  Goal: # Takes action to control condition specific risks  Outcome: Outcome Met, Continue evaluating goal progress toward completion  Goal: # Verbalizes understanding of fall-related injury personal risks  Description: Document education using the patient education activity  Outcome: Outcome Met, Continue evaluating goal progress toward completion     Problem: Pain  Goal: #Acceptable pain level achieved/maintained at rest using NRS/Faces  Description: This goal is used for patients who can self-report.  Acceptable means the level is at or below the identified comfort/function goal.  Outcome: Outcome Met, Continue evaluating goal progress toward completion  Goal: # Acceptable pain level achieved/maintained at rest using NRS/Faces without oversedation (opioid naive or PCA/Epidural infusion)  Description: This goal is used if Opioid-naïve or on PCA/Epidural Infusion.  Outcome: Outcome Met, Continue evaluating goal progress toward completion  Goal: # Acceptable pain level achieved/maintained with activity using NRS/Faces  Description: This goal is used for patients who can self-report and are not achieving acceptable pain control during activity.  Outcome: Outcome Met, Continue evaluating goal progress toward completion  Goal: Acceptable pain/comfort level is achieved/maintained at rest (based on Pain Behaviors Scale)  Description: This goal is used for patients who are not able to self-report pain and are assessed for pain using the Pain Behaviors Scale  Outcome: Outcome Met, Continue evaluating goal progress toward completion  Goal: Acceptable  pain/comfort level is achieved/maintained at rest based on PAINAID scale (Dementia)  Description: This goal is used for patients who are not able to self-report pain, have dementia, and assessed using the PAINAD scale.  Outcome: Outcome Met, Continue evaluating goal progress toward completion  Goal: Acceptable pain/comfort level is achieved/maintained at rest (based on pediatric behavior tool: NIPS, NPASS, or FLACC)  Description: This goal is used for pediatric patients who are not able to self report pain.  Outcome: Outcome Met, Continue evaluating goal progress toward completion  Goal: # Verbalizes understanding of pain management  Description: Documented in Patient Education Activity  Outcome: Outcome Met, Continue evaluating goal progress toward completion  Goal: Verbalizes understanding and effective use of Patient Controlled Analgesia (PCA)  Description: Documented in Patient Education Activity  This goal is used for patients with PCA  Outcome: Outcome Met, Continue evaluating goal progress toward completion  Goal: Maximum comfort achieved/maintained at end of life (Hospice)  Outcome: Outcome Met, Continue evaluating goal progress toward completion

## 2024-01-15 NOTE — PLAN OF CARE
Problem: Sleep Disturbance (Depressive  Signs/Symptoms)  Goal: Improved Sleep (Depressive Signs/Symptoms)  Outcome: Ongoing, Progressing  Flowsheets (Taken 5/29/2022 0351)  Mutually Determined Action Steps (Improved Sleep): sleeps 4-6 hours at night  Intervention: Promote Healthy Sleep Hygiene  Flowsheets (Taken 5/29/2022 0351)  Sleep Hygiene Promotion: regular sleep pattern promoted     Patient appeared to sleep 7 hours this shift.On suicide precaution. No behavioral concerns noted. No PRN medications given.                      99

## 2024-02-21 ENCOUNTER — TRANSFERRED RECORDS (OUTPATIENT)
Dept: HEALTH INFORMATION MANAGEMENT | Facility: CLINIC | Age: 79
End: 2024-02-21

## 2024-08-30 ENCOUNTER — TRANSFERRED RECORDS (OUTPATIENT)
Dept: HEALTH INFORMATION MANAGEMENT | Facility: CLINIC | Age: 79
End: 2024-08-30
Payer: COMMERCIAL

## 2024-09-03 ENCOUNTER — TRANSCRIBE ORDERS (OUTPATIENT)
Dept: OTHER | Age: 79
End: 2024-09-03

## 2024-09-03 DIAGNOSIS — H54.7 VISION LOSS: Primary | ICD-10-CM

## 2024-09-04 ENCOUNTER — TRANSCRIBE ORDERS (OUTPATIENT)
Dept: OTHER | Age: 79
End: 2024-09-04

## 2024-09-04 DIAGNOSIS — H47.20 OPTIC ATROPHY: Primary | ICD-10-CM

## 2024-10-28 DIAGNOSIS — H53.40 VISUAL FIELD DEFECT: Primary | ICD-10-CM

## 2024-11-03 NOTE — PROGRESS NOTES
Lorena Hayden is a 79 year old female with the following diagnoses:   1. Optic atrophy         Patient was sent for consultation by Dr. Robb for optic atrophy of both eyes.     HPI:    NTG on latanaprost, optic atrophy each eye, exposure REGULO, Fuchs corneal dystrophy.     Patient described gradual onset of blurry vision over the last year. She does not notice a difference between each eye. Patient denied pain and headache. Patient described having dry eye and uses artificial tears occasionally. She said that her only concern is the blurry vision. Patient takes Latanoprost each eye at bedtime.       Independent historians:  Patient and daughter-in-lawShannon      Review of outside clinical notes:    08/30/2024 -- Visit with Dr. 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     Patient  reports that she has never smoked. She has never used smokeless tobacco. She reports current alcohol use of about 0.8 standard drinks of alcohol per week. She reports that she does not use drugs.       Exam:  Visual acuity 20/40 right eye 20/70 left eye.  Color vision 1/11  right eye and 1/11 left eye.  Pupils round and reactive with no afferent pupillary defect.  Intraocular pressure 11 right eye and 10 left eye.  Anterior segment exam was unremarkable. There is bilateral pallor of the optic nerves.      Tests ordered and interpreted today:  Optical coherence tomography         Discussion of management / interpretation with another provider:   None    Assessment/Plan:   It is my impression that patient has bilateral optic atrophy. Exam is notable for moderate pallor of both optic discs. Additionally, patient was unable to identify the number in the test plate of the Ishihara, suggesting a higher order visual dysfunction.  I will order an MRI brain and orbits with and without to evaluate for structural causes of bilateral optic atrophy. I will also order labs to work this up. If everything is normal, I will refer the patient to neurology for further neuro-cognitive evaluation.              Attending Physician Attestation:  Complete documentation of historical and exam elements from today's encounter can be found in the full encounter summary report (not reduplicated in this progress note).  I personally obtained the chief complaint(s) and history of present illness.  I confirmed and edited as necessary the review of systems, past medical/surgical history, family history, social history, and examination findings as documented by others; and I examined the patient myself.  I personally reviewed the relevant tests, images, and reports as documented above.  I formulated and edited as necessary the assessment and plan and discussed the findings and management plan with the patient and family. I was present with the medical student who participated in the service and in the documentation of this note. - MD Chung Jay, MS4        Precharting:  Geraldo Moseley MD   Fellow, Neuro-Ophthalmology

## 2024-11-04 ENCOUNTER — OFFICE VISIT (OUTPATIENT)
Dept: OPHTHALMOLOGY | Facility: CLINIC | Age: 79
End: 2024-11-04
Attending: OPHTHALMOLOGY
Payer: COMMERCIAL

## 2024-11-04 DIAGNOSIS — H47.20 OPTIC ATROPHY: ICD-10-CM

## 2024-11-04 PROCEDURE — 92133 CPTRZD OPH DX IMG PST SGM ON: CPT | Performed by: OPHTHALMOLOGY

## 2024-11-04 PROCEDURE — 99204 OFFICE O/P NEW MOD 45 MIN: CPT | Performed by: OPHTHALMOLOGY

## 2024-11-04 PROCEDURE — G0463 HOSPITAL OUTPT CLINIC VISIT: HCPCS | Performed by: OPHTHALMOLOGY

## 2024-11-04 RX ORDER — DESVENLAFAXINE 100 MG/1
1 TABLET, EXTENDED RELEASE ORAL
COMMUNITY
Start: 2024-07-25

## 2024-11-04 RX ORDER — LORAZEPAM 0.5 MG/1
2 TABLET ORAL
COMMUNITY
Start: 2024-10-29

## 2024-11-04 ASSESSMENT — EXTERNAL EXAM - RIGHT EYE: OD_EXAM: NORMAL

## 2024-11-04 ASSESSMENT — EXTERNAL EXAM - LEFT EYE: OS_EXAM: NORMAL

## 2024-11-04 ASSESSMENT — CONF VISUAL FIELD
OS_INFERIOR_NASAL_RESTRICTION: 0
METHOD: COUNTING FINGERS
OS_INFERIOR_TEMPORAL_RESTRICTION: 0
OD_SUPERIOR_TEMPORAL_RESTRICTION: 0
OS_NORMAL: 1
OS_SUPERIOR_TEMPORAL_RESTRICTION: 0
OD_NORMAL: 1
OD_INFERIOR_TEMPORAL_RESTRICTION: 0
OD_INFERIOR_NASAL_RESTRICTION: 0
OS_SUPERIOR_NASAL_RESTRICTION: 0
OD_SUPERIOR_NASAL_RESTRICTION: 0

## 2024-11-04 ASSESSMENT — TONOMETRY
IOP_METHOD: ICARE
OS_IOP_MMHG: 10
OD_IOP_MMHG: 11

## 2024-11-04 ASSESSMENT — VISUAL ACUITY
OS_CC: 20/70
METHOD: SNELLEN - LINEAR
CORRECTION_TYPE: GLASSES
OD_CC: 20/40

## 2024-11-04 ASSESSMENT — REFRACTION_WEARINGRX
OD_CYLINDER: +1.00
OS_ADD: +2.25
OS_AXIS: 100
OD_AXIS: 070
OS_CYLINDER: +0.75
OS_SPHERE: -1.25
OD_ADD: +2.25
OD_SPHERE: -3.00

## 2024-11-04 ASSESSMENT — CUP TO DISC RATIO
OS_RATIO: 0.3
OD_RATIO: 0.3

## 2024-11-04 ASSESSMENT — SLIT LAMP EXAM - LIDS
COMMENTS: NORMAL
COMMENTS: NORMAL

## 2024-11-04 NOTE — LETTER
2024     RE:  :  MRN: Lorena Hayden  1945  [id number]     Dear Dr. Robb    Thank you for asking me to see your very pleasant patient,Lorena Hayden, in neuro-ophthalmic consultation.  I would like to thank you for sending your records and I have summarized them in the history of present illness.   My assessment and plan are below.  For further details, please see my attached clinic note.      Lorena Hayden is a 79 year old female with the following diagnoses:   1. Optic atrophy         Patient was sent for consultation by Dr. Robb for optic atrophy of both eyes.     HPI:    NTG on latanaprost, optic atrophy each eye, exposure REGULO, Fuchs corneal dystrophy.     Patient described gradual onset of blurry vision over the last year. She does not notice a difference between each eye. Patient denied pain and headache. Patient described having dry eye and uses artificial tears occasionally. She said that her only concern is the blurry vision. Patient takes Latanoprost each eye at bedtime.       Independent historians:  Patient and daughter-in-law, Shannon      Review of outside clinical notes:    2024 -- Visit with Dr. 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     Patient  reports that she has never smoked. She has never used smokeless tobacco. She reports current alcohol use of about 0.8 standard drinks of alcohol per week. She reports that she does not use drugs.       Exam:  Visual acuity 20/40 right eye 20/70 left eye.  Color vision 1/11 right eye and 1/11 left eye.  Pupils round and reactive with no afferent pupillary defect.  Intraocular pressure 11 right eye and 10 left eye.  Anterior segment exam was unremarkable. There is bilateral pallor of the optic nerves.      Tests ordered and interpreted today:  Optical coherence tomography         Discussion of management / interpretation with another provider:   None    Assessment/Plan:   It is my impression that patient has bilateral optic atrophy. Exam is notable for moderate pallor of both optic discs. Additionally, patient was unable to identify the number in the test plate of the Ishihara, suggesting a higher order visual dysfunction.  I will order an MRI brain and orbits with and without to evaluate for structural causes of bilateral optic atrophy. I will also order labs to work this up. If everything is normal, I will refer the patient to neurology for further neuro-cognitive evaluation.             Again, thank you for allowing me to participate in the care of your patient.      Sincerely,    Derek Bronson MD  Professor  Director of Neuro-Ophthalmology  [facility] Endowed Chair  Departments of Ophthalmology, Neurology, and Neurosurgery  [place] [address]  [address] [place]  [address]  T - [phone]  F - [phone]  [email or web address]      CC: Florence Robb MD  [facility]  [address] [place] [id number]  Via Fax: [phone]    DX = bilateral optic atrophy

## 2024-11-08 ENCOUNTER — TELEPHONE (OUTPATIENT)
Dept: OPHTHALMOLOGY | Facility: CLINIC | Age: 79
End: 2024-11-08
Payer: COMMERCIAL

## 2024-11-08 NOTE — TELEPHONE ENCOUNTER
[facility] Call Center    Phone Message    May a detailed message be left on voicemail: yes     Reason for Call: Other: pts daughter in law Shannon called and is requesting a call back from Dr Bronson's care team to discuss having MRI and labs done in [place] or somewhere [facility] without having pt to go to the [facility] Please contact Shannon to advise Thank you  caller is available anytime after 1:30 pm each day a vm may be left at anytime     Action Taken: Message routed to:  [facility] ([facility]): eye    Travel Screening: Not Applicable     Date of Service:

## 2024-11-11 NOTE — TELEPHONE ENCOUNTER
Returned daughter in law's call. Left voicemail.     Patient may go to any [facility] lab to have labs drawn. Locally to patient these can be drawn at [facility] clinics in [place] / [place] / [place].     Closest imaging center to patient is in [place]. Left imaging scheduling number to schedule.     Left callback for additional questions or requests.     Macy Held Neuro-Ophthalmology Clinic Facilitator on 11/11/2024 at 8:47 AM

## 2024-11-26 ENCOUNTER — HOSPITAL ENCOUNTER (OUTPATIENT)
Dept: MRI IMAGING | Facility: CLINIC | Age: 79
Discharge: HOME | End: 2024-11-26
Attending: OPHTHALMOLOGY
Payer: COMMERCIAL

## 2024-11-26 ENCOUNTER — TELEPHONE (OUTPATIENT)
Dept: OPHTHALMOLOGY | Facility: CLINIC | Age: 79
End: 2024-11-26

## 2024-11-26 ENCOUNTER — LAB (OUTPATIENT)
Dept: LAB | Facility: CLINIC | Age: 79
End: 2024-11-26
Attending: OPHTHALMOLOGY
Payer: COMMERCIAL

## 2024-11-26 DIAGNOSIS — H47.20 OPTIC ATROPHY: ICD-10-CM

## 2024-11-26 LAB — T PALLIDUM AB SER QL: NONREACTIVE

## 2024-11-26 PROCEDURE — 70553 MRI BRAIN STEM W/O & W/DYE: CPT

## 2024-11-26 PROCEDURE — 82607 VITAMIN B-12: CPT

## 2024-11-26 PROCEDURE — 84425 ASSAY OF VITAMIN B-1: CPT

## 2024-11-26 PROCEDURE — 83921 ORGANIC ACID SINGLE QUANT: CPT

## 2024-11-26 PROCEDURE — 86780 TREPONEMA PALLIDUM: CPT

## 2024-11-26 PROCEDURE — 255N000002 HC RX 255 OP 636: Performed by: OPHTHALMOLOGY

## 2024-11-26 PROCEDURE — 36415 COLL VENOUS BLD VENIPUNCTURE: CPT

## 2024-11-26 PROCEDURE — A9585 GADOBUTROL INJECTION: HCPCS | Performed by: OPHTHALMOLOGY

## 2024-11-26 RX ORDER — GADOBUTROL 604.72 MG/ML
0.1 INJECTION INTRAVENOUS ONCE
Status: DISCONTINUED | OUTPATIENT
Start: 2024-11-26 | End: 2024-11-26

## 2024-11-26 RX ADMIN — GADOBUTROL 65 ML: 604.72 INJECTION INTRAVENOUS at 15:50

## 2024-11-26 NOTE — TELEPHONE ENCOUNTER
[facility] Call Center    Phone Message    May a detailed message be left on voicemail: yes     Reason for Call: Other: Please call patient to discuss the labs that were done today and those that have been canceled. Patient needs some clarification regarding this. Please call to advise.      Action Taken: Other: eye    Travel Screening: Not Applicable

## 2024-11-27 LAB — VIT B12 SERPL-MCNC: 680 PG/ML (ref 232–1245)

## 2024-11-27 NOTE — RESULT ENCOUNTER NOTE
Dear Lorena,     Your MRI results were in the acceptable range.  I am still waiting on a couple of lab tests to return.    Please call my office if your symptoms worsen.    Thank you for allowing me to share in your care.     Sincerely,      Derek Bronson MD  Hospital Medicine History & Physical Note    Date of Service  11/7/2024    Primary Care Physician  Pcp Pt States None    Consultants  na    Code Status  Full Code    Chief Complaint  Chief Complaint   Patient presents with    Diarrhea    Flank Pain    Body Aches       History of Presenting Illness  Octavia Levi is a 53 y.o. female with hx of RA, hypothyroidism, IBS-IBD. She is immunosuppressed as she is on DMARD for RA. She presented on 11/7 for fever, flank pain and urinary urgency and bodyache. No chest pain, sob, nausea or vomiting. Reports diarrhea.     Here at ER, noted febrile with temp 102F, labs remarkable for WBC 11.8, hemoglobin 10.7, MCV 76.3, potassium 3.5, and ALT//47. UA notes 21-50 WBC with moderate leukocyte esterase and positive nitrate.  CT renal no acute abnormalities. Negative covid/RSV/flu.     I discussed the plan of care with patient, bedside RN, and ERP .    Review of Systems  Review of Systems   Constitutional:  Positive for chills, fever and malaise/fatigue.   Gastrointestinal:  Positive for diarrhea.   Genitourinary:  Positive for flank pain and urgency.   Neurological:  Positive for weakness.   All other systems reviewed and are negative.      Past Medical History   has a past medical history of H/O gastric bypass, Hashimoto's thyroiditis, Hypertension, Hypothyroid, Raynaud's disease, Rheumatoid arthritis (HCC), Sjogren's disease (HCC), and Stroke (HCC).    Surgical History   has no past surgical history on file.     Family History  family history is not on file.   Family history reviewed with patient. There is no family history that is pertinent to the chief complaint.     Social History       Allergies  Allergies   Allergen Reactions    Other Drug      DARVOCET    Percocet [Perloxx]      Pt reports when in hospital she is given Benadryl to tolerate percocet       Medications  Prior to Admission Medications   Prescriptions Last Dose Informant Patient Reported? Taking?    Abaloparatide (TYMLOS SC) 11/7/2024 Morning Patient Yes Yes   Sig: Inject 1 Each under the skin every day. [facility]   Adalimumab 40 MG/0.4ML Auto-injector Kit 11/4/2024 Historical Yes Yes   Sig: Inject 40 mg under the skin every 14 days.   Budesonide (PULMICORT INH) 11/6/2024 Patient Yes Yes   Sig: Inhale 1 Puff 2 times a day.   CYANOCOBALAMIN INJ 11/5/2024 Patient Yes No   Sig: Inject 1 Each into the shoulder, thigh, or buttocks every 14 days.   Vitamin D-Vitamin K (VITAMIN K2-VITAMIN D3 PO) Unknown Patient Yes No   Sig: Take 1 Tablet by mouth every day.   acetaminophen (TYLENOL 8 HOUR) 650 MG CR tablet 11/7/2024 at 10:00 AM Patient Yes Yes   Sig: Take 650 mg by mouth one time as needed for Mild Pain.   albuterol 108 (90 Base) MCG/ACT Aero Soln inhalation aerosol 11/6/2024 Patient Yes Yes   Sig: Inhale 1 Puff 3 times a day.   folic acid (FOLVITE) 1 MG Tab 11/7/2024 Morning Patient Yes Yes   Sig: Take 1 mg by mouth every day.   hydroxychloroquine (PLAQUENIL) 200 MG Tab 11/7/2024 Morning Patient Yes Yes   Sig: Take 300 mg by mouth every day. 300 mg = 1.5 tab   ibuprofen (MOTRIN) 200 MG Tab 11/7/2024 at 12:00 AM Patient Yes Yes   Sig: Take 800 mg by mouth one time as needed for Mild Pain. 800 mg = 4 tabs   thyroid (ARMOUR THYROID) 90 MG Tab 11/7/2024 Morning Patient Yes Yes   Sig: Take 90 mg by mouth every day.   vitamin D2, Ergocalciferol, (DRISDOL) 1.25 MG (99971 UT) Cap capsule 11/5/2024 Patient Yes Yes   Sig: Take 50,000 Units by mouth every 7 days.      Facility-Administered Medications: None       Physical Exam  Temp:  [37.3 °C (99.1 °F)-38.9 °C (102 °F)] 37.3 °C (99.1 °F)  Pulse:  [71-95] 71  Resp:  [18-20] 20  BP: (117-140)/(59-66) 122/64  SpO2:  [86 %-100 %] 97 %  Blood Pressure: 122/64   Temperature: 37.3 °C (99.1 °F)   Pulse: 71   Respiration: 20   Pulse Oximetry: 97 %       Physical Exam  Vitals and nursing note reviewed.   Constitutional:       Appearance: Normal appearance. She is  "ill-appearing.   HENT:      Head: Normocephalic and atraumatic.      Nose: Nose normal.      Mouth/Throat:      Pharynx: Oropharynx is clear.      Comments:  thrush noted in the oral mucosa  Eyes:      Extraocular Movements: Extraocular movements intact.      Conjunctiva/sclera: Conjunctivae normal.      Pupils: Pupils are equal, round, and reactive to light.   Cardiovascular:      Rate and Rhythm: Normal rate and regular rhythm.      Pulses: Normal pulses.      Heart sounds: Normal heart sounds.   Pulmonary:      Effort: Pulmonary effort is normal.      Breath sounds: Normal breath sounds.   Abdominal:      General: Abdomen is flat. Bowel sounds are normal.      Palpations: Abdomen is soft.      Tenderness: There is right CVA tenderness and left CVA tenderness.   Musculoskeletal:         General: Normal range of motion.      Cervical back: Normal range of motion and neck supple.   Skin:     General: Skin is warm and dry.   Neurological:      General: No focal deficit present.      Mental Status: She is alert and oriented to person, place, and time. Mental status is at baseline.   Psychiatric:         Mood and Affect: Mood normal.         Behavior: Behavior normal.         Laboratory:  Recent Labs     11/07/24  1352   WBC 11.8*   RBC 4.59   HEMOGLOBIN 10.7*   HEMATOCRIT 35.0*   MCV 76.3*   MCH 23.3*   MCHC 30.6*   RDW 51.8*   PLATELETCT 274   MPV 11.0     Recent Labs     11/07/24  1352   SODIUM 135   POTASSIUM 3.5*   CHLORIDE 102   CO2 22   GLUCOSE 98   BUN 11   CREATININE 0.57   CALCIUM 8.7     Recent Labs     11/07/24  1352   ALTSGPT 106*   ASTSGOT 47*   ALKPHOSPHAT 301*   TBILIRUBIN 0.4   GLUCOSE 98         No results for input(s): \"NTPROBNP\" in the last 72 hours.      No results for input(s): \"TROPONINT\" in the last 72 hours.    Imaging:  CT-RENAL COLIC EVALUATION(A/P W/O)   Final Result      1.  No acute abnormality within the abdomen or pelvis      2.  Prior cholecystectomy      3.  Left upper quadrant bowel " postoperative changes      DX-CHEST-PORTABLE (1 VIEW)   Final Result      No acute cardiac or pulmonary abnormalities are identified. Lung volumes are low.          X-Ray:  I have personally reviewed the images and compared with prior images.    Assessment/Plan:  Justification for Admission Status  I anticipate this patient will require at least two midnights for appropriate medical management, necessitating inpatient admission because pyelonephritis in the immunocompromised patient requiring culture and iv antibiotics    Patient will need a Med/Surg bed on MEDICAL service .  The need is secondary to pyelonephritis.    * Pyelonephritis- (present on admission)  Assessment & Plan  In the setting of immunocompromised patient  Urine culture pending  Continue empirical abx iv Ancef     Smoking  Assessment & Plan  5 minutes on tobacco cessation counseling provided including nicotine patches, gum, and dangers of smoking. Discussed the risks of smoking including increased risk of heart disease, stroke, cancer and COPD. Discussed the benefits of quitting smoking.   Patient is not interested in nicotine patch    Hypothyroidism  Assessment & Plan  Check TSH  Continue [place] thyroid    Thrush  Assessment & Plan  Oral mucosa thrush noted  Start Nystatin    Transaminitis  Assessment & Plan  Check hepatitis panel  Continue to monitor    Microcytic anemia  Assessment & Plan  No sign of active bleeding  Check iron profile    Hypokalemia  Assessment & Plan  Replace as indicated    Rheumatoid arthritis (HCC)  Assessment & Plan  Following rheumatology   On DMARD including Adalimumab and Tymlos and plaquenil  Continue plaquenil        VTE prophylaxis: enoxaparin ppx

## 2024-11-30 LAB — VIT B1 PYROPHOSHATE BLD-SCNC: 161 NMOL/L

## 2024-12-02 LAB — METHYLMALONATE SERPL-SCNC: 0.2 UMOL/L (ref 0–0.4)

## 2024-12-02 NOTE — TELEPHONE ENCOUNTER
Called patient re: labs. Left message.     Per review of care team, no labs have been cancelled. Patient has 4 labs that still need to be drawn. Patient can have these drawn at any [facility] lab.     Patient's MRI results were in the accepable range.     Left callback for further questions.     Macy Held [facility] Facilitator on 12/2/2024 at 9:25 AM

## 2024-12-03 NOTE — TELEPHONE ENCOUNTER
[facility] Call Center    Phone Message    May a detailed message be left on voicemail: yes     Reason for Call: Other: Patient daughter in law called to clarify that the labs had postponed due to cost of 600$ and they are wanting to know if the labs are necessary, will they give the answers they are looking for and would like some guidance going forward. Please advise.      Action Taken: Message routed to:  [facility] ([facility] eye    Travel Screening: Not Applicable

## 2024-12-05 NOTE — TELEPHONE ENCOUNTER
Returned patient's daughter's call re: additional labs     Reviewed additional labs with Dr. Bronson. Okay to wait 6 months for follow up and will re-evaluate importance of labs at that time.     Shannon has additional questions about MRI results, optic nerve damage, and what can be done about patient's vision. Shannon also had questions about a continued discussion re: the potential for alzheimer's and whether or not patient should see neurology.     Note to provider team to call and advise.     Macy Held [facility] Facilitator on 12/5/2024 at 1:29 PM

## 2025-03-03 NOTE — PLAN OF CARE
"Problem: Mood Impairment (Depressive Signs/Symptoms)  Goal: Improved Mood Symptoms (Depressive Signs/Symptoms)  Outcome: Ongoing, Not Progressing     Problem: Psychomotor Impairment (Depressive Signs/Symptoms)  Goal: Improved Psychomotor Symptoms (Depressive Signs/Symptoms)  Outcome: Ongoing, Not Progressing  Intervention: Manage Psychomotor Movement    Patient is up in milieu today, she minimally engages with peers and staff.  She continues to endorse \"high\" anxiety and rated depression at 10/10.  When asked about SI she states, \"not right now.\"  Patient becomes tearful when discussing uncertainty about duration of hospitalization stating, \"I'm scared I'm going to be here all summer.\"  Writer attempted to provider emotional support.  Patient is restless and tremor is observed in hands.  She cries intermittently.   visited this afternoon, he was late due to traffic and patient became worried he would not be allowed to visit, reassurance provided.  Patient states visit went well and she was feeling \"much better\" after seeing her .  She continues to appear anxious, up in milieu watching movie.  Patient remains safe on unit.  Will continue to monitor.  Sivan Arteaga RN       " REFERRAL APPROVAL NOTICE         Sent on March 3, 2025                   Katie Ratliff  [address]  [place] [address]                   Dear Ms. Ratliff,    After a careful review of the medical information and benefit coverage, [facility] has processed your referral. See below for additional details.    If applicable, you must be actively enrolled with your insurance for coverage of the authorized service. If you have any questions regarding your coverage, please contact your insurance directly.    REFERRAL INFORMATION   Referral #:  [id number]  Referred-To Service Location    Referred-By Provider:  Physical Therapy    Carl Farias D.O.   [place]  [facility]      [address] [address]  [phone] [address]  [place] [id number] [phone]    Referral Start Date:  02/27/2025  Referral End Date:   02/27/2026             SCHEDULING  If you do not already have an appointment, please call [phone] to make an appointment.     MORE INFORMATION  If you do not already have a [employer] account, sign up at: [email or web address]  You can access your medical information, make appointments, see lab results, billing information, and more.  If you have questions regarding this referral, please contact  the [facility] Referrals department at:             [phone]. Monday - Friday 8:00AM - 5:00PM.     Sincerely,    [facility]

## 2025-05-29 ENCOUNTER — LAB REQUISITION (OUTPATIENT)
Dept: LAB | Facility: CLINIC | Age: 80
End: 2025-05-29
Payer: COMMERCIAL

## 2025-05-29 DIAGNOSIS — I10 ESSENTIAL (PRIMARY) HYPERTENSION: ICD-10-CM

## 2025-05-29 DIAGNOSIS — E03.9 HYPOTHYROIDISM, UNSPECIFIED: ICD-10-CM

## 2025-05-29 DIAGNOSIS — E11.8 TYPE 2 DIABETES MELLITUS WITH UNSPECIFIED COMPLICATIONS (H): ICD-10-CM

## 2025-06-03 LAB
ANION GAP SERPL CALCULATED.3IONS-SCNC: 11 MMOL/L (ref 7–15)
ANION GAP SERPL CALCULATED.3IONS-SCNC: 11 MMOL/L (ref 7–15)
BASOPHILS # BLD AUTO: 0 10E3/UL (ref 0–0.2)
BASOPHILS # BLD AUTO: 0 10E3/UL (ref 0–0.2)
BASOPHILS NFR BLD AUTO: 1 %
BASOPHILS NFR BLD AUTO: 1 %
BUN SERPL-MCNC: 18.3 MG/DL (ref 8–23)
BUN SERPL-MCNC: 18.3 MG/DL (ref 8–23)
CALCIUM SERPL-MCNC: 9.1 MG/DL (ref 8.8–10.4)
CALCIUM SERPL-MCNC: 9.1 MG/DL (ref 8.8–10.4)
CHLORIDE SERPL-SCNC: 107 MMOL/L (ref 98–107)
CHLORIDE SERPL-SCNC: 107 MMOL/L (ref 98–107)
CHOLEST SERPL-MCNC: 168 MG/DL
CREAT SERPL-MCNC: 0.74 MG/DL (ref 0.51–0.95)
CREAT SERPL-MCNC: 0.74 MG/DL (ref 0.51–0.95)
EGFRCR SERPLBLD CKD-EPI 2021: 83 ML/MIN/1.73M2
EGFRCR SERPLBLD CKD-EPI 2021: 83 ML/MIN/1.73M2
EOSINOPHIL # BLD AUTO: 0.3 10E3/UL (ref 0–0.7)
EOSINOPHIL # BLD AUTO: 0.3 10E3/UL (ref 0–0.7)
EOSINOPHIL NFR BLD AUTO: 6 %
EOSINOPHIL NFR BLD AUTO: 6 %
ERYTHROCYTE [DISTWIDTH] IN BLOOD BY AUTOMATED COUNT: 13.5 % (ref 10–15)
ERYTHROCYTE [DISTWIDTH] IN BLOOD BY AUTOMATED COUNT: 13.5 % (ref 10–15)
EST. AVERAGE GLUCOSE BLD GHB EST-MCNC: 126 MG/DL
EST. AVERAGE GLUCOSE BLD GHB EST-MCNC: 126 MG/DL
FASTING STATUS PATIENT QL REPORTED: YES
FASTING STATUS PATIENT QL REPORTED: YES
GLUCOSE SERPL-MCNC: 90 MG/DL (ref 70–99)
GLUCOSE SERPL-MCNC: 90 MG/DL (ref 70–99)
HBA1C MFR BLD: 6 %
HBA1C MFR BLD: 6 %
HCO3 SERPL-SCNC: 23 MMOL/L (ref 22–29)
HCO3 SERPL-SCNC: 23 MMOL/L (ref 22–29)
HCT VFR BLD AUTO: 43.8 % (ref 35–47)
HCT VFR BLD AUTO: 43.8 % (ref 35–47)
HDLC SERPL-MCNC: 37 MG/DL
HDLC SERPL-MCNC: 37 MG/DL
HGB BLD-MCNC: 13.9 G/DL (ref 11.7–15.7)
HGB BLD-MCNC: 13.9 G/DL (ref 11.7–15.7)
IMM GRANULOCYTES # BLD: 0 10E3/UL
IMM GRANULOCYTES # BLD: 0 10E3/UL
IMM GRANULOCYTES NFR BLD: 0 %
IMM GRANULOCYTES NFR BLD: 0 %
LDLC SERPL CALC-MCNC: 95 MG/DL
LDLC SERPL CALC-MCNC: 95 MG/DL
LYMPHOCYTES # BLD AUTO: 0.7 10E3/UL (ref 0.8–5.3)
LYMPHOCYTES # BLD AUTO: 0.7 10E3/UL (ref 0.8–5.3)
LYMPHOCYTES NFR BLD AUTO: 18 %
LYMPHOCYTES NFR BLD AUTO: 18 %
MCH RBC QN AUTO: 30.4 PG (ref 26.5–33)
MCH RBC QN AUTO: 30.4 PG (ref 26.5–33)
MCHC RBC AUTO-ENTMCNC: 31.7 G/DL (ref 31.5–36.5)
MCHC RBC AUTO-ENTMCNC: 31.7 G/DL (ref 31.5–36.5)
MCV RBC AUTO: 96 FL (ref 78–100)
MCV RBC AUTO: 96 FL (ref 78–100)
MONOCYTES # BLD AUTO: 0.5 10E3/UL (ref 0–1.3)
MONOCYTES # BLD AUTO: 0.5 10E3/UL (ref 0–1.3)
MONOCYTES NFR BLD AUTO: 13 %
MONOCYTES NFR BLD AUTO: 13 %
NEUTROPHILS # BLD AUTO: 2.4 10E3/UL (ref 1.6–8.3)
NEUTROPHILS # BLD AUTO: 2.4 10E3/UL (ref 1.6–8.3)
NEUTROPHILS NFR BLD AUTO: 61 %
NEUTROPHILS NFR BLD AUTO: 61 %
NONHDLC SERPL-MCNC: 131 MG/DL
NONHDLC SERPL-MCNC: 131 MG/DL
NRBC # BLD AUTO: 0 10E3/UL
NRBC # BLD AUTO: 0 10E3/UL
NRBC BLD AUTO-RTO: 0 /100
NRBC BLD AUTO-RTO: 0 /100
PLATELET # BLD AUTO: 237 10E3/UL (ref 150–450)
PLATELET # BLD AUTO: 237 10E3/UL (ref 150–450)
POTASSIUM SERPL-SCNC: 4.9 MMOL/L (ref 3.4–5.3)
POTASSIUM SERPL-SCNC: 4.9 MMOL/L (ref 3.4–5.3)
RBC # BLD AUTO: 4.57 10E6/UL (ref 3.8–5.2)
RBC # BLD AUTO: 4.57 10E6/UL (ref 3.8–5.2)
SODIUM SERPL-SCNC: 141 MMOL/L (ref 135–145)
SODIUM SERPL-SCNC: 141 MMOL/L (ref 135–145)
TRIGL SERPL-MCNC: 180 MG/DL
TRIGL SERPL-MCNC: 180 MG/DL
TSH SERPL DL<=0.005 MIU/L-ACNC: 2.11 UIU/ML (ref 0.3–4.2)
TSH SERPL DL<=0.005 MIU/L-ACNC: 2.11 UIU/ML (ref 0.3–4.2)
WBC # BLD AUTO: 3.9 10E3/UL (ref 4–11)
WBC # BLD AUTO: 3.9 10E3/UL (ref 4–11)

## 2025-06-03 PROCEDURE — 80061 LIPID PANEL: CPT | Mod: ORL

## 2025-06-03 PROCEDURE — 36415 COLL VENOUS BLD VENIPUNCTURE: CPT | Mod: ORL

## 2025-06-03 PROCEDURE — 85025 COMPLETE CBC W/AUTO DIFF WBC: CPT | Mod: ORL

## 2025-06-03 PROCEDURE — 84443 ASSAY THYROID STIM HORMONE: CPT | Mod: ORL

## 2025-06-03 PROCEDURE — P9603 ONE-WAY ALLOW PRORATED MILES: HCPCS | Mod: ORL

## 2025-06-03 PROCEDURE — 80048 BASIC METABOLIC PNL TOTAL CA: CPT | Mod: ORL

## 2025-06-03 PROCEDURE — 83036 HEMOGLOBIN GLYCOSYLATED A1C: CPT | Mod: ORL

## (undated) RX ORDER — NICARDIPINE HCL-0.9% SOD CHLOR 1 MG/10 ML
SYRINGE (ML) INTRAVENOUS
Status: DISPENSED
Start: 2022-05-25

## (undated) RX ORDER — ETOMIDATE 2 MG/ML
INJECTION INTRAVENOUS
Status: DISPENSED
Start: 2022-05-27

## (undated) RX ORDER — NICARDIPINE HCL-0.9% SOD CHLOR 1 MG/10 ML
SYRINGE (ML) INTRAVENOUS
Status: DISPENSED
Start: 2022-05-23

## (undated) RX ORDER — ETOMIDATE 2 MG/ML
INJECTION INTRAVENOUS
Status: DISPENSED
Start: 2022-06-01

## (undated) RX ORDER — NICARDIPINE HCL-0.9% SOD CHLOR 1 MG/10 ML
SYRINGE (ML) INTRAVENOUS
Status: DISPENSED
Start: 2022-06-01

## (undated) RX ORDER — LABETALOL HYDROCHLORIDE 5 MG/ML
INJECTION, SOLUTION INTRAVENOUS
Status: DISPENSED
Start: 2022-07-15

## (undated) RX ORDER — NICARDIPINE HCL-0.9% SOD CHLOR 1 MG/10 ML
SYRINGE (ML) INTRAVENOUS
Status: DISPENSED
Start: 2022-06-15

## (undated) RX ORDER — LABETALOL HYDROCHLORIDE 5 MG/ML
INJECTION, SOLUTION INTRAVENOUS
Status: DISPENSED
Start: 2022-07-01

## (undated) RX ORDER — ETOMIDATE 2 MG/ML
INJECTION INTRAVENOUS
Status: DISPENSED
Start: 2022-06-10

## (undated) RX ORDER — ETOMIDATE 2 MG/ML
INJECTION INTRAVENOUS
Status: DISPENSED
Start: 2022-05-23

## (undated) RX ORDER — ATROPINE SULFATE 0.4 MG/ML
AMPUL (ML) INJECTION
Status: DISPENSED
Start: 2022-06-01

## (undated) RX ORDER — ESMOLOL HYDROCHLORIDE 10 MG/ML
INJECTION INTRAVENOUS
Status: DISPENSED
Start: 2022-06-10

## (undated) RX ORDER — ATROPINE SULFATE 0.4 MG/ML
AMPUL (ML) INJECTION
Status: DISPENSED
Start: 2022-06-03

## (undated) RX ORDER — ESMOLOL HYDROCHLORIDE 10 MG/ML
INJECTION INTRAVENOUS
Status: DISPENSED
Start: 2022-06-24

## (undated) RX ORDER — ETOMIDATE 2 MG/ML
INJECTION INTRAVENOUS
Status: DISPENSED
Start: 2022-06-13

## (undated) RX ORDER — ETOMIDATE 2 MG/ML
INJECTION INTRAVENOUS
Status: DISPENSED
Start: 2022-06-03

## (undated) RX ORDER — NICARDIPINE HCL-0.9% SOD CHLOR 1 MG/10 ML
SYRINGE (ML) INTRAVENOUS
Status: DISPENSED
Start: 2022-06-17

## (undated) RX ORDER — GLYCOPYRROLATE 0.2 MG/ML
INJECTION INTRAMUSCULAR; INTRAVENOUS
Status: DISPENSED
Start: 2022-06-01

## (undated) RX ORDER — NICARDIPINE HCL-0.9% SOD CHLOR 1 MG/10 ML
SYRINGE (ML) INTRAVENOUS
Status: DISPENSED
Start: 2022-05-27

## (undated) RX ORDER — ETOMIDATE 2 MG/ML
INJECTION INTRAVENOUS
Status: DISPENSED
Start: 2022-06-24

## (undated) RX ORDER — ETOMIDATE 2 MG/ML
INJECTION INTRAVENOUS
Status: DISPENSED
Start: 2022-06-15

## (undated) RX ORDER — ETOMIDATE 2 MG/ML
INJECTION INTRAVENOUS
Status: DISPENSED
Start: 2022-07-15

## (undated) RX ORDER — ETOMIDATE 2 MG/ML
INJECTION INTRAVENOUS
Status: DISPENSED
Start: 2022-06-17

## (undated) RX ORDER — ETOMIDATE 2 MG/ML
INJECTION INTRAVENOUS
Status: DISPENSED
Start: 2022-06-08

## (undated) RX ORDER — NICARDIPINE HCL-0.9% SOD CHLOR 1 MG/10 ML
SYRINGE (ML) INTRAVENOUS
Status: DISPENSED
Start: 2022-06-24

## (undated) RX ORDER — ONDANSETRON 2 MG/ML
INJECTION INTRAMUSCULAR; INTRAVENOUS
Status: DISPENSED
Start: 2022-06-10

## (undated) RX ORDER — ETOMIDATE 2 MG/ML
INJECTION INTRAVENOUS
Status: DISPENSED
Start: 2022-05-25

## (undated) RX ORDER — ESMOLOL HYDROCHLORIDE 10 MG/ML
INJECTION INTRAVENOUS
Status: DISPENSED
Start: 2022-06-15

## (undated) RX ORDER — ETOMIDATE 2 MG/ML
INJECTION INTRAVENOUS
Status: DISPENSED
Start: 2022-06-06

## (undated) RX ORDER — GLYCOPYRROLATE 0.2 MG/ML
INJECTION INTRAMUSCULAR; INTRAVENOUS
Status: DISPENSED
Start: 2022-06-03

## (undated) RX ORDER — NICARDIPINE HCL-0.9% SOD CHLOR 1 MG/10 ML
SYRINGE (ML) INTRAVENOUS
Status: DISPENSED
Start: 2022-06-10